# Patient Record
Sex: FEMALE | Race: WHITE | Employment: OTHER | URBAN - METROPOLITAN AREA
[De-identification: names, ages, dates, MRNs, and addresses within clinical notes are randomized per-mention and may not be internally consistent; named-entity substitution may affect disease eponyms.]

---

## 2017-02-03 PROBLEM — R07.9 CHEST PAIN: Status: ACTIVE | Noted: 2017-02-03

## 2017-02-03 PROBLEM — R06.02 SHORTNESS OF BREATH: Status: ACTIVE | Noted: 2017-02-03

## 2017-12-22 PROBLEM — F33.9 RECURRENT DEPRESSION (HCC): Status: ACTIVE | Noted: 2017-12-22

## 2018-02-16 ENCOUNTER — HOSPITAL ENCOUNTER (OUTPATIENT)
Dept: MAMMOGRAPHY | Age: 68
Discharge: HOME | End: 2018-02-16
Attending: FAMILY MEDICINE
Payer: MEDICARE

## 2018-02-16 DIAGNOSIS — Z12.39 SCREENING BREAST EXAMINATION: ICD-10-CM

## 2018-02-16 DIAGNOSIS — Z78.0 POSTMENOPAUSAL: ICD-10-CM

## 2018-02-16 DIAGNOSIS — Z13.820 SCREENING FOR OSTEOPOROSIS: ICD-10-CM

## 2018-02-16 PROCEDURE — 77067 SCR MAMMO BI INCL CAD: CPT

## 2018-02-16 PROCEDURE — 77080 DXA BONE DENSITY AXIAL: CPT

## 2018-05-24 ENCOUNTER — APPOINTMENT (RX ONLY)
Dept: URBAN - METROPOLITAN AREA CLINIC 23 | Facility: CLINIC | Age: 68
Setting detail: DERMATOLOGY
End: 2018-05-24

## 2018-05-24 DIAGNOSIS — B86 SCABIES: ICD-10-CM

## 2018-05-24 PROCEDURE — ? OTHER

## 2018-05-24 PROCEDURE — ? KOH PREP

## 2018-05-24 PROCEDURE — ? PRESCRIPTION

## 2018-05-24 PROCEDURE — ? COUNSELING

## 2018-05-24 PROCEDURE — 99202 OFFICE O/P NEW SF 15 MIN: CPT

## 2018-05-24 RX ORDER — HYDROXYZINE HYDROCHLORIDE 25 MG/1
TABLET, FILM COATED ORAL
Qty: 30 | Refills: 1 | Status: ERX | COMMUNITY
Start: 2018-05-24

## 2018-05-24 RX ORDER — PERMETHRIN 50 MG/G
CREAM TOPICAL QD
Qty: 2 | Refills: 3 | Status: ERX | COMMUNITY
Start: 2018-05-24

## 2018-05-24 RX ADMIN — PERMETHRIN: 50 CREAM TOPICAL at 00:00

## 2018-05-24 RX ADMIN — HYDROXYZINE HYDROCHLORIDE: 25 TABLET, FILM COATED ORAL at 00:00

## 2018-05-24 ASSESSMENT — LOCATION DETAILED DESCRIPTION DERM
LOCATION DETAILED: RIGHT MEDIAL SUPERIOR CHEST
LOCATION DETAILED: RIGHT SUPERIOR MEDIAL UPPER BACK
LOCATION DETAILED: PERIUMBILICAL SKIN
LOCATION DETAILED: RIGHT PROXIMAL PRETIBIAL REGION

## 2018-05-24 ASSESSMENT — LOCATION SIMPLE DESCRIPTION DERM
LOCATION SIMPLE: ABDOMEN
LOCATION SIMPLE: CHEST
LOCATION SIMPLE: RIGHT UPPER BACK
LOCATION SIMPLE: RIGHT PRETIBIAL REGION

## 2018-05-24 ASSESSMENT — LOCATION ZONE DERM
LOCATION ZONE: TRUNK
LOCATION ZONE: LEG

## 2018-05-24 NOTE — HPI: RASH
How Severe Is Your Rash?: moderate
Is This A New Presentation, Or A Follow-Up?: Rash
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 Pt states itching started in December. She has changed her detergent to a gentle, fragrant free detergent and changed her shampoo with no relief. No one else in the house itches. Pt does have cats. The on,y place that doesn’t itch is her face and the itching worsens at night.

## 2018-05-24 NOTE — PROCEDURE: OTHER
Detail Level: Zone
Other (Free Text): Pt advised to clean all bed linens and clothing in hot water and dry on high heat. Pt will also treat her  with the permethrin.
Note Text (......Xxx Chief Complaint.): This diagnosis correlates with the

## 2018-08-16 PROBLEM — K64.4 EXTERNAL HEMORRHOID, BLEEDING: Status: ACTIVE | Noted: 2018-08-16

## 2018-08-17 RX ORDER — CEFAZOLIN SODIUM IN 0.9 % NACL 2 G/50 ML
2 INTRAVENOUS SOLUTION, PIGGYBACK (ML) INTRAVENOUS ONCE
Status: CANCELLED | OUTPATIENT
Start: 2018-09-07 | End: 2018-09-07

## 2018-08-22 ENCOUNTER — APPOINTMENT (RX ONLY)
Dept: URBAN - METROPOLITAN AREA CLINIC 23 | Facility: CLINIC | Age: 68
Setting detail: DERMATOLOGY
End: 2018-08-22

## 2018-08-22 DIAGNOSIS — D485 NEOPLASM OF UNCERTAIN BEHAVIOR OF SKIN: ICD-10-CM

## 2018-08-22 DIAGNOSIS — L30.9 DERMATITIS, UNSPECIFIED: ICD-10-CM

## 2018-08-22 PROBLEM — D48.5 NEOPLASM OF UNCERTAIN BEHAVIOR OF SKIN: Status: ACTIVE | Noted: 2018-08-22

## 2018-08-22 PROCEDURE — ? PRESCRIPTION

## 2018-08-22 PROCEDURE — 99213 OFFICE O/P EST LOW 20 MIN: CPT | Mod: 25

## 2018-08-22 PROCEDURE — 11100: CPT

## 2018-08-22 PROCEDURE — 11101: CPT

## 2018-08-22 PROCEDURE — ? BIOPSY BY SHAVE METHOD

## 2018-08-22 PROCEDURE — ? COUNSELING

## 2018-08-22 RX ORDER — TRIAMCINOLONE ACETONIDE 1 MG/G
CREAM TOPICAL
Qty: 1 | Refills: 0 | Status: ERX | COMMUNITY
Start: 2018-08-22

## 2018-08-22 RX ADMIN — TRIAMCINOLONE ACETONIDE: 1 CREAM TOPICAL at 00:00

## 2018-08-22 ASSESSMENT — LOCATION DETAILED DESCRIPTION DERM
LOCATION DETAILED: LEFT LATERAL PROXIMAL UPPER ARM
LOCATION DETAILED: RIGHT ANTECUBITAL SKIN

## 2018-08-22 ASSESSMENT — LOCATION SIMPLE DESCRIPTION DERM
LOCATION SIMPLE: LEFT UPPER ARM
LOCATION SIMPLE: RIGHT ELBOW

## 2018-08-22 ASSESSMENT — LOCATION ZONE DERM: LOCATION ZONE: ARM

## 2018-08-22 NOTE — PROCEDURE: BIOPSY BY SHAVE METHOD
Type Of Destruction Used: Curettage
Cryotherapy Text: The wound bed was treated with cryotherapy after the biopsy was performed.
Wound Care: Vaseline
Post-Care Instructions: I reviewed with the patient in detail post-care instructions. Patient is to keep the biopsy site dry overnight, and then apply bacitracin twice daily until healed. Patient may apply hydrogen peroxide soaks to remove any crusting.
Anesthesia Type: 1% lidocaine with epinephrine
Hemostasis: Aluminum Chloride
Size Of Lesion In Cm: 0
Biopsy Method: Dermablade
Bill 61025 For Specimen Handling/Conveyance To Laboratory?: no
Biopsy Type: H and E
Notification Instructions: Patient will be notified of biopsy results. However, patient instructed to call the office if not contacted within 2 weeks.
Consent: Written consent was obtained and risks were reviewed including but not limited to scarring, infection, bleeding, scabbing, incomplete removal, nerve damage and allergy to anesthesia.
Dressing: bandage
Electrodesiccation And Curettage Text: The wound bed was treated with electrodesiccation and curettage after the biopsy was performed.
Electrodesiccation Text: The wound bed was treated with electrodesiccation after the biopsy was performed.
Curettage Text: The wound bed was treated with curettage after the biopsy was performed.
Depth Of Biopsy: dermis
Detail Level: Detailed
Accession #: [id number]
Silver Nitrate Text: The wound bed was treated with silver nitrate after the biopsy was performed.
Was A Bandage Applied: Yes
Billing Type: Third-Party Bill
Anesthesia Volume In Cc: 0.5

## 2018-09-06 ENCOUNTER — ANESTHESIA EVENT (OUTPATIENT)
Dept: SURGERY | Age: 68
End: 2018-09-06
Payer: MEDICARE

## 2018-09-06 NOTE — ANESTHESIA PREPROCEDURE EVALUATION
Anesthetic History Review of Systems / Medical History Patient summary reviewed and pertinent labs reviewed Pulmonary Smoker (Former) Asthma Neuro/Psych Comments: Peripheral neuropathy  Cardiovascular Hypertension Valvular problems/murmurs (MVP, mild MR): mitral insufficiency Exercise tolerance: >4 METS Comments: 8/2017: NL Perfusion Scan  
GI/Hepatic/Renal 
  
GERD: well controlled Endo/Other Hypothyroidism Comments: Hyperglycemia Other Findings Physical Exam 
 
Airway Mallampati: II 
 
Neck ROM: decreased range of motion Mouth opening: Normal 
 
 Cardiovascular Regular rate and rhythm,  S1 and S2 normal,  no murmur, click, rub, or gallop Dental 
 
Dentition: Full upper dentures Pulmonary Breath sounds clear to auscultation Abdominal 
 
 
 
 Other Findings Anesthetic Plan ASA: 2 Anesthesia type: general 
 
 
 
 
 
Anesthetic plan and risks discussed with: Patient

## 2018-09-07 ENCOUNTER — HOSPITAL ENCOUNTER (OUTPATIENT)
Age: 68
Setting detail: OUTPATIENT SURGERY
Discharge: HOME | End: 2018-09-07
Attending: SURGERY | Admitting: SURGERY
Payer: MEDICARE

## 2018-09-07 ENCOUNTER — ANESTHESIA (OUTPATIENT)
Dept: SURGERY | Age: 68
End: 2018-09-07
Payer: MEDICARE

## 2018-09-07 VITALS
BODY MASS INDEX: 29.18 KG/M2 | HEART RATE: 70 BPM | WEIGHT: 181.6 LBS | SYSTOLIC BLOOD PRESSURE: 138 MMHG | HEIGHT: 66 IN | DIASTOLIC BLOOD PRESSURE: 73 MMHG | RESPIRATION RATE: 18 BRPM | TEMPERATURE: 98.4 F | OXYGEN SATURATION: 96 %

## 2018-09-07 DIAGNOSIS — Z87.19 S/P HEMORRHOIDECTOMY: Primary | ICD-10-CM

## 2018-09-07 DIAGNOSIS — Z98.890 S/P HEMORRHOIDECTOMY: Primary | ICD-10-CM

## 2018-09-07 LAB — POTASSIUM BLD-SCNC: 3.3 MMOL/L (ref 3.5–5.1)

## 2018-09-07 PROCEDURE — 74011000250 HC RX REV CODE- 250

## 2018-09-07 PROCEDURE — 77030018836 HC SOL IRR NACL ICUM -A: Performed by: SURGERY

## 2018-09-07 PROCEDURE — 88304 TISSUE EXAM BY PATHOLOGIST: CPT

## 2018-09-07 PROCEDURE — 74011250636 HC RX REV CODE- 250/636

## 2018-09-07 PROCEDURE — 74011250636 HC RX REV CODE- 250/636: Performed by: SURGERY

## 2018-09-07 PROCEDURE — 76210000020 HC REC RM PH II FIRST 0.5 HR: Performed by: SURGERY

## 2018-09-07 PROCEDURE — 77030008477 HC STYL SATN SLP COVD -A: Performed by: ANESTHESIOLOGY

## 2018-09-07 PROCEDURE — 76060000032 HC ANESTHESIA 0.5 TO 1 HR: Performed by: SURGERY

## 2018-09-07 PROCEDURE — 76010000138 HC OR TIME 0.5 TO 1 HR: Performed by: SURGERY

## 2018-09-07 PROCEDURE — 74011000250 HC RX REV CODE- 250: Performed by: ANESTHESIOLOGY

## 2018-09-07 PROCEDURE — 74011250636 HC RX REV CODE- 250/636: Performed by: ANESTHESIOLOGY

## 2018-09-07 PROCEDURE — 77030032490 HC SLV COMPR SCD KNE COVD -B: Performed by: SURGERY

## 2018-09-07 PROCEDURE — 77030020782 HC GWN BAIR PAWS FLX 3M -B: Performed by: ANESTHESIOLOGY

## 2018-09-07 PROCEDURE — 77030039425 HC BLD LARYNG TRULITE DISP TELE -A: Performed by: ANESTHESIOLOGY

## 2018-09-07 PROCEDURE — 77030008703 HC TU ET UNCUF COVD -A: Performed by: ANESTHESIOLOGY

## 2018-09-07 PROCEDURE — 84132 ASSAY OF SERUM POTASSIUM: CPT

## 2018-09-07 PROCEDURE — 77030002888 HC SUT CHRMC J&J -A: Performed by: SURGERY

## 2018-09-07 PROCEDURE — 74011250637 HC RX REV CODE- 250/637: Performed by: ANESTHESIOLOGY

## 2018-09-07 PROCEDURE — 76210000006 HC OR PH I REC 0.5 TO 1 HR: Performed by: SURGERY

## 2018-09-07 PROCEDURE — 74011000250 HC RX REV CODE- 250: Performed by: SURGERY

## 2018-09-07 RX ORDER — ONDANSETRON 2 MG/ML
INJECTION INTRAMUSCULAR; INTRAVENOUS AS NEEDED
Status: DISCONTINUED | OUTPATIENT
Start: 2018-09-07 | End: 2018-09-07 | Stop reason: HOSPADM

## 2018-09-07 RX ORDER — LIDOCAINE 50 MG/G
OINTMENT TOPICAL AS NEEDED
Status: DISCONTINUED | OUTPATIENT
Start: 2018-09-07 | End: 2018-09-07 | Stop reason: HOSPADM

## 2018-09-07 RX ORDER — CIPROFLOXACIN 2 MG/ML
400 INJECTION, SOLUTION INTRAVENOUS
Status: COMPLETED | OUTPATIENT
Start: 2018-09-07 | End: 2018-09-07

## 2018-09-07 RX ORDER — SODIUM CHLORIDE, SODIUM LACTATE, POTASSIUM CHLORIDE, CALCIUM CHLORIDE 600; 310; 30; 20 MG/100ML; MG/100ML; MG/100ML; MG/100ML
100 INJECTION, SOLUTION INTRAVENOUS CONTINUOUS
Status: DISCONTINUED | OUTPATIENT
Start: 2018-09-07 | End: 2018-09-07 | Stop reason: HOSPADM

## 2018-09-07 RX ORDER — SODIUM CHLORIDE 9 MG/ML
25 INJECTION, SOLUTION INTRAVENOUS CONTINUOUS
Status: DISCONTINUED | OUTPATIENT
Start: 2018-09-07 | End: 2018-09-07 | Stop reason: HOSPADM

## 2018-09-07 RX ORDER — GLYCOPYRROLATE 0.2 MG/ML
INJECTION INTRAMUSCULAR; INTRAVENOUS AS NEEDED
Status: DISCONTINUED | OUTPATIENT
Start: 2018-09-07 | End: 2018-09-07 | Stop reason: HOSPADM

## 2018-09-07 RX ORDER — FAMOTIDINE 20 MG/1
20 TABLET, FILM COATED ORAL ONCE
Status: COMPLETED | OUTPATIENT
Start: 2018-09-07 | End: 2018-09-07

## 2018-09-07 RX ORDER — DOCUSATE SODIUM 100 MG/1
100 CAPSULE, LIQUID FILLED ORAL 2 TIMES DAILY
Qty: 60 CAP | Refills: 0 | Status: SHIPPED | OUTPATIENT
Start: 2018-09-07 | End: 2018-10-07

## 2018-09-07 RX ORDER — SODIUM CHLORIDE 0.9 % (FLUSH) 0.9 %
5-10 SYRINGE (ML) INJECTION AS NEEDED
Status: DISCONTINUED | OUTPATIENT
Start: 2018-09-07 | End: 2018-09-07 | Stop reason: HOSPADM

## 2018-09-07 RX ORDER — LIDOCAINE HYDROCHLORIDE 10 MG/ML
0.1 INJECTION INFILTRATION; PERINEURAL AS NEEDED
Status: DISCONTINUED | OUTPATIENT
Start: 2018-09-07 | End: 2018-09-07 | Stop reason: HOSPADM

## 2018-09-07 RX ORDER — DEXAMETHASONE SODIUM PHOSPHATE 4 MG/ML
INJECTION, SOLUTION INTRA-ARTICULAR; INTRALESIONAL; INTRAMUSCULAR; INTRAVENOUS; SOFT TISSUE AS NEEDED
Status: DISCONTINUED | OUTPATIENT
Start: 2018-09-07 | End: 2018-09-07 | Stop reason: HOSPADM

## 2018-09-07 RX ORDER — FENTANYL CITRATE 50 UG/ML
INJECTION, SOLUTION INTRAMUSCULAR; INTRAVENOUS AS NEEDED
Status: DISCONTINUED | OUTPATIENT
Start: 2018-09-07 | End: 2018-09-07 | Stop reason: HOSPADM

## 2018-09-07 RX ORDER — NEOSTIGMINE METHYLSULFATE 1 MG/ML
INJECTION INTRAVENOUS AS NEEDED
Status: DISCONTINUED | OUTPATIENT
Start: 2018-09-07 | End: 2018-09-07 | Stop reason: HOSPADM

## 2018-09-07 RX ORDER — PROPOFOL 10 MG/ML
INJECTION, EMULSION INTRAVENOUS AS NEEDED
Status: DISCONTINUED | OUTPATIENT
Start: 2018-09-07 | End: 2018-09-07 | Stop reason: HOSPADM

## 2018-09-07 RX ORDER — OXYCODONE HYDROCHLORIDE 5 MG/1
5 TABLET ORAL
Status: COMPLETED | OUTPATIENT
Start: 2018-09-07 | End: 2018-09-07

## 2018-09-07 RX ORDER — MIDAZOLAM HYDROCHLORIDE 1 MG/ML
2 INJECTION, SOLUTION INTRAMUSCULAR; INTRAVENOUS
Status: DISCONTINUED | OUTPATIENT
Start: 2018-09-07 | End: 2018-09-07 | Stop reason: HOSPADM

## 2018-09-07 RX ORDER — CEFAZOLIN SODIUM IN 0.9 % NACL 2 G/50 ML
2 INTRAVENOUS SOLUTION, PIGGYBACK (ML) INTRAVENOUS ONCE
Status: DISCONTINUED | OUTPATIENT
Start: 2018-09-07 | End: 2018-09-07

## 2018-09-07 RX ORDER — SODIUM CHLORIDE 0.9 % (FLUSH) 0.9 %
5-10 SYRINGE (ML) INJECTION EVERY 8 HOURS
Status: DISCONTINUED | OUTPATIENT
Start: 2018-09-07 | End: 2018-09-07 | Stop reason: HOSPADM

## 2018-09-07 RX ORDER — ROCURONIUM BROMIDE 10 MG/ML
INJECTION, SOLUTION INTRAVENOUS AS NEEDED
Status: DISCONTINUED | OUTPATIENT
Start: 2018-09-07 | End: 2018-09-07 | Stop reason: HOSPADM

## 2018-09-07 RX ORDER — NALOXONE HYDROCHLORIDE 0.4 MG/ML
0.1 INJECTION, SOLUTION INTRAMUSCULAR; INTRAVENOUS; SUBCUTANEOUS AS NEEDED
Status: DISCONTINUED | OUTPATIENT
Start: 2018-09-07 | End: 2018-09-07 | Stop reason: HOSPADM

## 2018-09-07 RX ORDER — HYDROCODONE BITARTRATE AND ACETAMINOPHEN 10; 325 MG/1; MG/1
1 TABLET ORAL
Qty: 20 TAB | Refills: 0 | Status: SHIPPED | OUTPATIENT
Start: 2018-09-07 | End: 2018-11-16 | Stop reason: SDUPTHER

## 2018-09-07 RX ORDER — HYDROCODONE BITARTRATE AND ACETAMINOPHEN 7.5; 325 MG/1; MG/1
1 TABLET ORAL AS NEEDED
Status: DISCONTINUED | OUTPATIENT
Start: 2018-09-07 | End: 2018-09-07 | Stop reason: HOSPADM

## 2018-09-07 RX ORDER — LIDOCAINE HYDROCHLORIDE 20 MG/ML
INJECTION, SOLUTION EPIDURAL; INFILTRATION; INTRACAUDAL; PERINEURAL AS NEEDED
Status: DISCONTINUED | OUTPATIENT
Start: 2018-09-07 | End: 2018-09-07 | Stop reason: HOSPADM

## 2018-09-07 RX ORDER — FENTANYL CITRATE 50 UG/ML
100 INJECTION, SOLUTION INTRAMUSCULAR; INTRAVENOUS ONCE
Status: DISCONTINUED | OUTPATIENT
Start: 2018-09-07 | End: 2018-09-07 | Stop reason: HOSPADM

## 2018-09-07 RX ADMIN — OXYCODONE HYDROCHLORIDE 5 MG: 5 TABLET ORAL at 08:47

## 2018-09-07 RX ADMIN — LIDOCAINE HYDROCHLORIDE 70 MG: 20 INJECTION, SOLUTION EPIDURAL; INFILTRATION; INTRACAUDAL; PERINEURAL at 07:25

## 2018-09-07 RX ADMIN — CIPROFLOXACIN 400 MG: 2 INJECTION, SOLUTION INTRAVENOUS at 07:20

## 2018-09-07 RX ADMIN — NEOSTIGMINE METHYLSULFATE 1 MG: 1 INJECTION INTRAVENOUS at 08:03

## 2018-09-07 RX ADMIN — ONDANSETRON 4 MG: 2 INJECTION INTRAMUSCULAR; INTRAVENOUS at 07:30

## 2018-09-07 RX ADMIN — SODIUM CHLORIDE 6.25 MG: 9 INJECTION INTRAMUSCULAR; INTRAVENOUS; SUBCUTANEOUS at 09:12

## 2018-09-07 RX ADMIN — DEXAMETHASONE SODIUM PHOSPHATE 4 MG: 4 INJECTION, SOLUTION INTRA-ARTICULAR; INTRALESIONAL; INTRAMUSCULAR; INTRAVENOUS; SOFT TISSUE at 07:30

## 2018-09-07 RX ADMIN — GLYCOPYRROLATE 0.6 MG: 0.2 INJECTION INTRAMUSCULAR; INTRAVENOUS at 07:57

## 2018-09-07 RX ADMIN — FENTANYL CITRATE 100 MCG: 50 INJECTION, SOLUTION INTRAMUSCULAR; INTRAVENOUS at 07:25

## 2018-09-07 RX ADMIN — ROCURONIUM BROMIDE 40 MG: 10 INJECTION, SOLUTION INTRAVENOUS at 07:25

## 2018-09-07 RX ADMIN — NEOSTIGMINE METHYLSULFATE 4 MG: 1 INJECTION INTRAVENOUS at 07:57

## 2018-09-07 RX ADMIN — FAMOTIDINE 20 MG: 20 TABLET ORAL at 07:13

## 2018-09-07 RX ADMIN — SODIUM CHLORIDE, SODIUM LACTATE, POTASSIUM CHLORIDE, AND CALCIUM CHLORIDE 100 ML/HR: 600; 310; 30; 20 INJECTION, SOLUTION INTRAVENOUS at 06:45

## 2018-09-07 RX ADMIN — PROPOFOL 160 MG: 10 INJECTION, EMULSION INTRAVENOUS at 07:25

## 2018-09-07 NOTE — OP NOTES
[facility] REPORT    Tanna Petty  MR#: [id number]  : 1950  ACCOUNT #: [de-identified]   DATE OF SERVICE: 2018    PREOPERATIVE DIAGNOSIS:  Bright red blood per rectum and prolapsed hemorrhoid. POSTOPERATIVE DIAGNOSIS:  Bright red blood per rectum and prolapsed hemorrhoid. PROCEDURE PERFORMED:  Rectal exam under anesthesia, hemorrhoidectomy. SURGEON:  Elder Hernandez MD    ASSISTANT:  None. ANESTHESIA:  General anesthetic. ESTIMATED BLOOD LOSS:  20 mL. SPECIMENS REMOVED: hemorrhoid    IMPLANTS:  None. COMPLICATIONS:  None. CONDITION:  Patient was stable. HISTORY OF PRESENT ILLNESS:  This patient is a 70-year-old white female who presented with complaints of pain at the rectum with frequent episodes of bright red blood per rectum, especially with bowel movements. Exam showed evidence of hemorrhoidal prolapse. Risks, benefits and alternatives to the above procedure were clearly discussed with the patient prior to surgery. She understood the risks and wished to proceed. Appropriate consent was given. PROCEDURE:  The patient was taken to the operating room where she underwent general anesthetic with no difficulty. She was then turned in the prone jackknife position. After proper positioning,  a timeout was performed identifying the patient and the procedure. IV antibiotics were given at time of induction. The area was prepped and draped in sterile fashion. A rectal exam was performed which showed evidence of a previously thrombosed external hemorrhoid on the right lateral external hemorrhoid. The patient also had a redundant hemorrhoidal tissue in the inferior 5 o'clock position, which appeared to be a site of frequent irritation and bleeding from the internal hemorrhoid. A small incision was made directly overlying the point of thrombosis and clot was expressed from the right lateral external hemorrhoid.   The prolapsed internal hemorrhoid was then removed. A 2-0 chromic suture was placed proximally at the apex of the hemorrhoidal tissue. Electrocautery Bovie was then used to resect the entirety of the internal and external hemorrhoid pillar in this position from proximal to distal in a wedge-like fashion. The mucosa was then reapproximated using the running chromic suture from proximal to distal manner in a locking fashion. At completion, hemostasis was complete. Lidocaine gel was applied for anesthetic purposes at the site and a sterile gauze dressing was placed. The patient tolerated the procedure well and was awakened and taken to recovery in stable condition.       MD Jersey Deluca / ASHLEY  D: 09/07/2018 12:28     T: 09/07/2018 13:19  JOB #: [id number]

## 2018-09-07 NOTE — DISCHARGE INSTRUCTIONS
Remove bandage with first bowel movement    Soak in warm water bath after bowel movement and as needed for comfort     May apply lidocaine cream to area as needed for comfort         After general anesthesia or intravenous sedation, for 24 hours or while taking prescription Narcotics:  · Limit your activities  · Do not drive and operate hazardous machinery  · Do not make important personal or business decisions  · Do  not drink alcoholic beverages  · If you have not urinated within 8 hours after discharge, please contact your surgeon on call. *  Please give a list of your current medications to your Primary Care Provider. *  Please update this list whenever your medications are discontinued, doses are      changed, or new medications (including over-the-counter products) are added. *  Please carry medication information at all times in case of emergency situations. These are general instructions for a healthy lifestyle:  No smoking/ No tobacco products/ Avoid exposure to second hand smoke  Surgeon General's Warning:  Quitting smoking now greatly reduces serious risk to your health. Obesity, smoking, and sedentary lifestyle greatly increases your risk for illness  A healthy diet, regular physical exercise & weight monitoring are important for maintaining a healthy lifestyle    You may be retaining fluid if you have a history of heart failure or if you experience any of the following symptoms:  Weight gain of 3 pounds or more overnight or 5 pounds in a week, increased swelling in our hands or feet or shortness of breath while lying flat in bed. Please call your doctor as soon as you notice any of these symptoms; do not wait until your next office visit.     Recognize signs and symptoms of STROKE:  F-face looks uneven  A-arms unable to move or move unevenly  S-speech slurred or non-existent  T-time-call 911 as soon as signs and symptoms begin-DO NOT go       Back to bed or wait to see if you get better-TIME IS BRAIN.

## 2018-09-07 NOTE — ANESTHESIA POSTPROCEDURE EVALUATION
Post-Anesthesia Evaluation and Assessment Patient: Shanita Kelly MRN: [id number]  SSN: [id number] YOB: 1950  Age: 77 y.o. Sex: female Cardiovascular Function/Vital Signs Visit Vitals  /73  Pulse 70  Temp 36.9 °C (98.4 °F)  Resp 18  Ht 5' 6\" (1.676 m)  Wt 82.4 kg (181 lb 9.6 oz)  SpO2 99%  BMI 29.31 kg/m2 Patient is status post general anesthesia for Procedure(s): RECTAL EXAM UNDER ANESTHESIA (EUA) HEMORRHOIDECTOMY EXCISION. Nausea/Vomiting: None Postoperative hydration reviewed and adequate. Pain: 
Pain Scale 1: Numeric (0 - 10) (09/07/18 0846) Pain Intensity 1: 3 (09/07/18 0846) Managed Neurological Status:  
Neuro (WDL): Exceptions to WDL (09/07/18 0846) Neuro Neurologic State: Alert (09/07/18 8072) Orientation Level: Oriented X4 (09/07/18 0846) Cognition: Appropriate decision making; Appropriate for age attention/concentration; Appropriate safety awareness; Follows commands (09/07/18 4057) Speech: Clear (09/07/18 1594) LUE Motor Response: Purposeful (09/07/18 0846) LLE Motor Response: Purposeful (09/07/18 0846) RUE Motor Response: Purposeful (09/07/18 0846) RLE Motor Response: Purposeful (09/07/18 0846) At baseline Mental Status and Level of Consciousness: Arousable Pulmonary Status:  
O2 Device: Room air (09/07/18 0846) Adequate oxygenation and airway patent Complications related to anesthesia: None Post-anesthesia assessment completed. No concerns Signed By: Kiana Coronado MD   
 September 7, 2018

## 2018-09-07 NOTE — BRIEF OP NOTE
BRIEF OPERATIVE NOTE Date of Procedure: 9/7/2018 Preoperative Diagnosis: Hemorrhoids, unspecified hemorrhoid type [K64.9] Postoperative Diagnosis: same Procedure(s): RECTAL EXAM UNDER ANESTHESIA (EUA) HEMORRHOIDECTOMY EXCISION Surgeon(s) and Role: Evans Barrera MD - Primary Surgical Assistant: none Surgical Staff: 
Circ-1: Randall Schilder, RN Scrub Tech-1: Zora Galindo Scrub Tech-2: Surya Silva Event Time In Incision Start 4675 Incision Close 3809 Anesthesia: General  
Estimated Blood Loss: 10ml Specimens:  
ID Type Source Tests Collected by Time Destination 1 : Hemorrhoid Preservative Anus  Ele Singleton MD 4/6/9271 8401 Pathology Findings: prolapse of hemorrhoid Complications: none Implants: * No implants in log *

## 2018-09-07 NOTE — IP AVS SNAPSHOT
Blain Kehr 
 
 
 [address] [facility] [address] 
[phone] Patient: Clifford Young MRN: [id number] Rebecca Kohli About your hospitalization You were admitted on:  September 7, 2018 You last received care in the:  [facility] PACU You were discharged on:  September 7, 2018 Why you were hospitalized Your primary diagnosis was:  Not on File Follow-up Information Follow up With Details Comments Contact Info Telma Amato DO   [address] [facility] [address] 
[phone] Claudia Devine MD Follow up on 9/17/2018 @2:00 pm [address] [facility] [address] [facility] [address] 
[phone] Your Scheduled Appointments Monday September 17, 2018  2:00 PM EDT Global Post Op with Claudia Devine MD  
[facility] ([facility]) [facility] [address] [facility] [address]  
[phone] Discharge Orders None A check oneyda indicates which time of day the medication should be taken. My Medications START taking these medications Instructions Each Dose to Equal  
 Morning Noon Evening Bedtime  
 docusate sodium 100 mg capsule Commonly known as:  Fausto El Your last dose was: Your next dose is: Take 1 Cap by mouth two (2) times a day for 30 days. 100 mg CHANGE how you take these medications Instructions Each Dose to Equal  
 Morning Noon Evening Bedtime Fenofibrate 150 mg Cap Commonly known as:  Susana Hernandez What changed:   
- when to take this 
- additional instructions Your last dose was: Your next dose is: TAKE 1 CAPSULE DAILY  
     
   
   
   
  
 * HYDROcodone-acetaminophen  mg tablet Commonly known as:  Nicolette Arts What changed:   You were already taking a medication with the same name, and this prescription was added. Make sure you understand how and when to take each. Your last dose was: Your next dose is: Take 1 Tab by mouth every four (4) hours as needed for Pain. Max Daily Amount: 6 Tabs. 1 Tab  
    
   
   
   
  
 * HYDROcodone-acetaminophen  mg tablet Commonly known as:  Clarissa Abraham Start taking on:  9/20/2018 What changed:  when to take this Your last dose was: Your next dose is: Take 1 Tab by mouth every six (6) hours as needed for Pain for up to 30 days. Max Daily Amount: 4 Tabs. 1 Tab * Notice: This list has 2 medication(s) that are the same as other medications prescribed for you. Read the directions carefully, and ask your doctor or other care provider to review them with you. CONTINUE taking these medications Instructions Each Dose to Equal  
 Morning Noon Evening Bedtime  
 albuterol 90 mcg/actuation inhaler Commonly known as:  PROAIR HFA Your last dose was: Your next dose is:    
   
   
 USE 2 INHALATIONS EVERY 4 HOURS AS NEEDED FOR WHEEZING , ACUTE ASTHMA ATTACK ALPRAZolam 1 mg tablet Commonly known as:  Kaleta Kia Your last dose was: Your next dose is: Take 1 Tab by mouth daily. Max Daily Amount: 1 mg.  
 1 mg  
    
   
   
   
  
 aspirin delayed-release 81 mg tablet Your last dose was: Your next dose is: Take 81 mg by mouth daily. Stopped 8/24/18--- per pt-- dr Bernadette Fu  Indications: OTC  
 81 mg  
    
   
   
   
  
 butalbital-acetaminophen-caffeine -40 mg per tablet Commonly known as:  Roc Pastrana Your last dose was: Your next dose is: TAKE 1 TABLET EVERY 6 HOURS AS NEEDED. MAXIMUM DAILY DOSE 4 TABLETS.  
     
   
   
   
  
 cyanocobalamin 1,000 mcg tablet Your last dose was: Your next dose is: Take 1,000 mcg by mouth daily. Indications: OTC  
 1000 mcg  
    
   
   
   
  
 fluticasone 50 mcg/actuation nasal spray Commonly known as:  Domnick Means Your last dose was: Your next dose is: 2 Sprays by Both Nostrils route daily. 2 Spray  
    
   
   
   
  
 hydrocortisone 2.5 % rectal cream  
Commonly known as:  ANUSOL-HC Your last dose was: Your next dose is: Insert  into rectum four (4) times daily. losartan-hydroCHLOROthiazide 100-25 mg per tablet Commonly known as:  HYZAAR Your last dose was: Your next dose is: Take 1 Tab by mouth daily. 1 Tab  
    
   
   
   
  
 nebivolol 10 mg tablet Commonly known as:  BYSTOLIC Your last dose was: Your next dose is: Take 1 Tab by mouth daily. 10 mg  
    
   
   
   
  
 ONE-A-DAY PROACTIVE 65 PLUS PO Your last dose was: Your next dose is: Take 1 Tab by mouth daily. Stopped 9/6/18  Indications: OTC  
 1 Tab  
    
   
   
   
  
 pantoprazole 40 mg tablet Commonly known as:  PROTONIX Your last dose was: Your next dose is: Take 1 Tab by mouth daily. 40 mg  
    
   
   
   
  
 simvastatin 40 mg tablet Commonly known as:  ZOCOR Your last dose was: Your next dose is: TAKE 1 TABLET NIGHTLY  
     
   
   
   
  
 SYNTHROID 100 mcg tablet Generic drug:  levothyroxine Your last dose was: Your next dose is: Take 1 Tab by mouth Daily (before breakfast). YVETTE-1  
 100 mcg  
    
   
   
   
  
 venlafaxine- mg capsule Commonly known as:  EFFEXOR-XR Your last dose was: Your next dose is: TAKE 1 CAPSULE EVERY 12 HOURS FOR NERVES Where to Get Your Medications These medications were sent to [address] [facility] [address] Phone:  [phone]  
  docusate sodium 100 mg capsule Information on where to get these meds will be given to you by the nurse or doctor. ! Ask your nurse or doctor about these medications HYDROcodone-acetaminophen  mg tablet Opioid Education Prescription Opioids: What You Need to Know: 
 
Prescription opioids can be used to help relieve moderate-to-severe pain and are often prescribed following a surgery or injury, or for certain health conditions. These medications can be an important part of treatment but also come with serious risks. Opioids are strong pain medicines. Examples include hydrocodone, oxycodone, fentanyl, and morphine. Heroin is an example of an illegal opioid. It is important to work with your health care provider to make sure you are getting the safest, most effective care. WHAT ARE THE RISKS AND SIDE EFFECTS OF OPIOID USE? Prescription opioids carry serious risks of addiction and overdose, especially with prolonged use. An opioid overdose, often marked by slow breathing, can cause sudden death. The use of prescription opioids can have a number of side effects as well, even when taken as directed. · Tolerance-meaning you might need to take more of a medication for the same pain relief · Physical dependence-meaning you have symptoms of withdrawal when the medication is stopped. Withdrawal symptoms can include nausea, sweating, chills, diarrhea, stomach cramps, and muscle aches. Withdrawal can last up to several weeks, depending on which drug you took and how long you took it. · Increased sensitivity to pain · Constipation · Nausea, vomiting, and dry mouth · Sleepiness and dizziness · Confusion · Depression · Low levels of testosterone that can result in lower sex drive, energy, and strength · Itching and sweating RISKS ARE GREATER WITH:      
 · History of drug misuse, substance use disorder, or overdose · Mental health conditions (such as depression or anxiety) · Sleep apnea · Older age (72 years or older) · Pregnancy Avoid alcohol while taking prescription opioids. Also, unless specifically advised by your health care provider, medications to avoid include: · Benzodiazepines (such as Xanax or Valium) · Muscle relaxants (such as Soma or Flexeril) · Hypnotics (such as Ambien or Lunesta) · Other prescription opioids KNOW YOUR OPTIONS Talk to your health care provider about ways to manage your pain that don't involve prescription opioids. Some of these options may actually work better and have fewer risks and side effects. Options may include: 
· Pain relievers such as acetaminophen, ibuprofen, and naproxen · Some medications that are also used for depression or seizures · Physical therapy and exercise · Counseling to help patients learn how to cope better with triggers of pain and stress. · Application of heat or cold compress · Massage therapy · Relaxation techniques Be Informed Make sure you know the name of your medication, how much and how often to take it, and its potential risks & side effects. IF YOU ARE PRESCRIBED OPIOIDS FOR PAIN: 
· Never take opioids in greater amounts or more often than prescribed. Remember the goal is not to be pain-free but to manage your pain at a tolerable level. · Follow up with your primary care provider to: · Work together to create a plan on how to manage your pain. · Talk about ways to help manage your pain that don't involve prescription opioids. · Talk about any and all concerns and side effects. · Help prevent misuse and abuse. · Never sell or share prescription opioids · Help prevent misuse and abuse. · Store prescription opioids in a secure place and out of reach of others (this may include visitors, children, friends, and family). · Safely dispose of unused/unwanted prescription opioids: Find your community drug take-back program or your pharmacy mail-back program, or flush them down the toilet, following guidance from the Food and Drug Administration (www.fda.gov/Drugs/ResourcesForYou). · Visit www.cdc.gov/drugoverdose to learn about the risks of opioid abuse and overdose. · If you believe you may be struggling with addiction, tell your health care provider and ask for guidance or call [facility] at 4-506-664-RVOP. Discharge Instructions Remove bandage with first bowel movement Soak in warm water bath after bowel movement and as needed for comfort May apply lidocaine cream to area as needed for comfort After general anesthesia or intravenous sedation, for 24 hours or while taking prescription Narcotics: · Limit your activities · Do not drive and operate hazardous machinery · Do not make important personal or business decisions · Do  not drink alcoholic beverages · If you have not urinated within 8 hours after discharge, please contact your surgeon on call. *  Please give a list of your current medications to your Primary Care Provider. *  Please update this list whenever your medications are discontinued, doses are 
    changed, or new medications (including over-the-counter products) are added. *  Please carry medication information at all times in case of emergency situations. These are general instructions for a healthy lifestyle: No smoking/ No tobacco products/ Avoid exposure to second hand smoke Surgeon General's Warning:  Quitting smoking now greatly reduces serious risk to your health. Obesity, smoking, and sedentary lifestyle greatly increases your risk for illness A healthy diet, regular physical exercise & weight monitoring are important for maintaining a healthy lifestyle You may be retaining fluid if you have a history of heart failure or if you experience any of the following symptoms:  Weight gain of 3 pounds or more overnight or 5 pounds in a week, increased swelling in our hands or feet or shortness of breath while lying flat in bed. Please call your doctor as soon as you notice any of these symptoms; do not wait until your next office visit. Recognize signs and symptoms of STROKE: 
F-face looks uneven A-arms unable to move or move unevenly S-speech slurred or non-existent T-time-call 911 as soon as signs and symptoms begin-DO NOT go Back to bed or wait to see if you get better-TIME IS BRAIN. ACO Transitions of Care [facility] offers a voluntary care coordination program to provide high quality service and care to [facility] fee-for-service beneficiaries. Tha Guajardo was designed to help you enhance your health and well-being through the following services: ? Transitions of Care  support for individuals who are transitioning from one care setting to another (example: Hospital to home). ? Chronic and Complex Care Coordination  support for individuals and caregivers of those with serious or chronic illnesses or with more than one chronic (ongoing) condition and those who take a number of different medications. If you meet specific medical criteria, a [facility] [address] may call you directly to coordinate your care with your primary care physician and your other care providers. For questions about the [facility] programs, please, contact your physicians office. For general questions or additional information about Accountable Care Organizations: 
Please visit www.medicare.gov/acos. html or call 1-800-MEDICARE (9-827.238.4204) TTY users should call 9-827.547.6851. Introducing [facility]! [employer] introduces [employer] patient portal. Now you can access parts of your medical record, email your doctor's office, and request medication refills online. 1. In your internet browser, go to [email or web address] 2. Click on the First Time User? Click Here link in the Sign In box. You will see the New Member Sign Up page. 3. Enter your [employer] Access Code exactly as it appears below. You will not need to use this code after youve completed the sign-up process. If you do not sign up before the expiration date, you must request a new code. · [employer] Access Code: [email or web address] Expires: 12/5/2018  6:31 AM 
 
4. Enter the last four digits of your Social Security Number (xxxx) and Date of Birth (mm/dd/yyyy) as indicated and click Submit. You will be taken to the next sign-up page. 5. Create a [employer] ID. This will be your [employer] login ID and cannot be changed, so think of one that is secure and easy to remember. 6. Create a [employer] password. You can change your password at any time. 7. Enter your Password Reset Question and Answer. This can be used at a later time if you forget your password. 8. Enter your e-mail address. You will receive e-mail notification when new information is available in [address] 9. Click Sign Up. You can now view and download portions of your medical record. 10. Click the Download Summary menu link to download a portable copy of your medical information. If you have questions, please visit the Frequently Asked Questions section of the [employer] website. Remember, [employer] is NOT to be used for urgent needs. For medical emergencies, dial 911. Now available from your iPhone and Android! Introducing Ld Garsia As a [employer] patient, I wanted to make you aware of our electronic visit tool called Ld Garsia. [employer] 24/7 allows you to connect within minutes with a medical provider 24 hours a day, seven days a week via a mobile device or tablet or logging into a secure website from your computer. You can access [employer] from anywhere in the [place]. A virtual visit might be right for you when you have a simple condition and feel like you just dont want to get out of bed, or cant get away from work for an appointment, when your regular [facility] provider is not available (evenings, weekends or holidays), or when youre out of town and need minor care. Electronic visits cost only $49 and if the [place] 24/Loccie provider determines a prescription is needed to treat your condition, one can be electronically transmitted to a nearby pharmacy*. Please take a moment to enroll today if you have not already done so. The enrollment process is free and takes just a few minutes. To enroll, please download the Paddle [employer]) elaine to your tablet or phone, or visit [email or web address] to enroll on your computer. And, as an [address] [place] [address] patient with a My Own Med account, the results of your visits will be scanned into your electronic medical record and your primary care provider will be able to view the scanned results. We urge you to continue to see your regular [facility] provider for your ongoing medical care. And while your primary care provider may not be the one available when you seek a Ld Garsia virtual visit, the peace of mind you get from getting a real diagnosis real time can be priceless. For more information on Ld RIDERSkristalfin, view our Frequently Asked Questions (FAQs) at [email or web address]. Sincerely, 
 
Tim Christianson MD 
Chief Medical Officer Ronni Lockett *:  certain medications cannot be prescribed via Ld RIDERSjesus Providers Seen During Your Hospitalization Provider Specialty Primary office phone Joanna Nunez MD General Surgery [phone] Your Primary Care Physician (PCP) Primary Care Physician Office Phone Office Fax Kia Gomes [phone] You are allergic to the following Allergen Reactions Keflex (Cephalexin) Shortness of Breath \" stop my breathing \" Ambien (Zolpidem) Unknown (comments) Aspirin Itching \" can take low dose \" Codeine Anaphylaxis \"full codeine injection\" states has had lortab since without reaction. Daypro (Oxaprozin) Swelling Demerol (Meperidine) Other (comments) Difficulty breathing. States has had since without reaction Dilaudid (Hydromorphone (Bulk)) Itching Nausea Only Mobic (Meloxicam) Swelling Onion Other (comments)  
 headache Pcn (Penicillins) Unknown (comments) Recent Documentation Height Weight BMI OB Status Smoking Status 1.676 m 82.4 kg 29.31 kg/m2 Hysterectomy Former Smoker Emergency Contacts Name Discharge Info Relation Home Work Mobile Vinicius Campos DISCHARGE CAREGIVER [3] Spouse [3] [phone] Elizabeth Rodriguez  Son [22] [phone] Yamile Key  Sister [23] [phone] Patient Belongings The following personal items are in your possession at time of discharge: 
  Dental Appliances: None  Visual Aid: None      Home Medications: None   Jewelry: None  Clothing: Undergarments, Footwear, Shirt, Pants    Other Valuables: None Discharge Instructions Attachments/References HEMORRHOIDECTOMY : POST-OP (ENGLISH) Patient Handouts Hemorrhoidectomy: What to Expect at [facility] Your Recovery After you have hemorrhoids removed, you can expect to feel better each day. Your anal area will be painful or ache for 2 to 4 weeks. And you may need pain medicine. It is common to have some light bleeding and clear or yellow fluids from your anus.  This is most likely when you have a bowel movement. These symptoms may last for 1 to 2 months after surgery. After 1 to 2 weeks, you should be able to do most of your normal activities. But don't do things that require a lot of effort. It is important to avoid heavy lifting and straining with bowel movements while you recover. This care sheet gives you a general idea about how long it will take for you to recover. But each person recovers at a different pace. Follow the steps below to get better as quickly as possible. How can you care for yourself at home? Activity 
  · Rest when you feel tired.  
  · Be active. Walking is a good choice.  
  · Allow your body to heal. Don't move quickly or lift anything heavy until you are feeling better.  
  · You may take showers and baths as usual. Pat your anal area dry when you are done.  
  · You will probably need to take 1 to 2 weeks off work. It depends on the type of work you do and how you feel. Diet 
  · Follow your doctor's instructions about eating after surgery.  
  · Start adding high-fiber foods to your diet 2 or 3 days after your surgery. This will make bowel movements easier. And it lowers the chance that you will get hemorrhoids again.  
  · If your bowel movements are not regular right after surgery, try to avoid constipation and straining. Drink plenty of water. Your doctor may suggest fiber, a stool softener, or a mild laxative.  
 Medications 
  · Your doctor will tell you if and when you can restart your medicines. He or she will also give you instructions about taking any new medicines.  
  · If you take blood thinners, such as warfarin (Coumadin), clopidogrel (Plavix), or aspirin, be sure to talk to your doctor. He or she will tell you if and when to start taking those medicines again. Make sure that you understand exactly what your doctor wants you to do.  
  · Be safe with medicines. Read and follow all instructions on the label. ¨ If the doctor gave you a prescription medicine for pain, take it as prescribed. ¨ If you are not taking a prescription pain medicine, ask your doctor if you can take an over-the-counter medicine.  
  · If your doctor prescribed antibiotics, take them as directed. Do not stop taking them just because you feel better. You need to take the full course of antibiotics.  
  · You may apply numbing medicines before and after bowel movements to relieve pain. Other instructions 
  · Sit in a few inches of warm water (sitz bath) for 15 to 20 minutes 3 times a day and after bowel movements. Then pat the area dry. Do this as long as you have pain in your anal area.  
  · Avoid sitting on the toilet for long periods of time or straining during bowel movements.  
  · Keep your anal area clean.  
  · Support your feet with a small step stool when you sit on the toilet. This helps flex your hips and places your pelvis in a squatting position. This can make bowel movements easier after surgery.  
  · Use baby wipes or medicated pads, such as Tucks, instead of toilet paper after a bowel movement. These products do not irritate the anus.  
  · If your doctor recommends it, use an over-the-counter hydrocortisone cream on the skin in your anal area. This can reduce pain and itching after surgery.  
  · Apply ice several times a day for 10 minutes at a time.  
  · Try lying on your stomach with a pillow under your hips to decrease swelling. Follow-up care is a key part of your treatment and safety. Be sure to make and go to all appointments, and call your doctor if you are having problems. It's also a good idea to know your test results and keep a list of the medicines you take. When should you call for help? Call 911 anytime you think you may need emergency care. For example, call if: 
  · You passed out (lost consciousness).  
  · You are short of breath.  
 Call your doctor now or seek immediate medical care if:   · You have signs of infection, such as: 
¨ Increased pain, swelling, warmth, or redness. ¨ Red streaks leading from the area. ¨ Pus draining from the area. ¨ A fever.  
  · You have pain that does not get better after you take your pain medicine.  
  · You are sick to your stomach and cannot keep fluids down.  
  · You have signs of a blood clot in your leg (called a deep vein thrombosis), such as: 
¨ Pain in your calf, back of the knee, thigh, or groin. ¨ Redness and swelling in your leg or groin.  
  · You cannot pass stools or gas.  
 Watch closely for changes in your health, and be sure to contact your doctor if you have any problems. Where can you learn more? Go to http://yuliana-hodan.info/. Enter 96 [id number] in the search box to learn more about \"Hemorrhoidectomy: What to Expect at Home. \" Current as of: May 12, 2017 Content Version: 11.7 © 2338-8110 mention. Care instructions adapted under license by [employer] (which disclaims liability or warranty for this information). If you have questions about a medical condition or this instruction, always ask your healthcare professional. [address] any warranty or liability for your use of this information. Please provide this summary of care documentation to your next provider. Signatures-by signing, you are acknowledging that this After Visit Summary has been reviewed with you and you have received a copy. Patient Signature:  ____________________________________________________________ Date:  ____________________________________________________________  
  
Alfreda Alejandro Provider Signature:  ____________________________________________________________ Date:  ____________________________________________________________

## 2018-09-07 NOTE — PROGRESS NOTES
Spiritual Care visit. Initial Visit, Pre Surgery Consult. Visit and prayer before patient goes to surgery. Visit by Marquis Rula M.Ed., Th.B. ,Staff

## 2018-09-26 ENCOUNTER — APPOINTMENT (RX ONLY)
Dept: URBAN - METROPOLITAN AREA CLINIC 23 | Facility: CLINIC | Age: 68
Setting detail: DERMATOLOGY
End: 2018-09-26

## 2018-09-26 DIAGNOSIS — D22 MELANOCYTIC NEVI: ICD-10-CM

## 2018-09-26 PROBLEM — D22.62 MELANOCYTIC NEVI OF LEFT UPPER LIMB, INCLUDING SHOULDER: Status: ACTIVE | Noted: 2018-09-26

## 2018-09-26 PROCEDURE — 12032 INTMD RPR S/A/T/EXT 2.6-7.5: CPT

## 2018-09-26 PROCEDURE — 11402 EXC TR-EXT B9+MARG 1.1-2 CM: CPT

## 2018-09-26 PROCEDURE — ? EXCISION

## 2018-09-26 ASSESSMENT — LOCATION ZONE DERM: LOCATION ZONE: ARM

## 2018-09-26 ASSESSMENT — LOCATION SIMPLE DESCRIPTION DERM: LOCATION SIMPLE: LEFT UPPER ARM

## 2018-09-26 ASSESSMENT — LOCATION DETAILED DESCRIPTION DERM: LOCATION DETAILED: LEFT LATERAL PROXIMAL UPPER ARM

## 2018-09-26 NOTE — PROCEDURE: EXCISION
Show Previous Accession Variable: Yes
Consent was obtained from the patient. The risks and benefits to therapy were discussed in detail. Specifically, the risks of infection, scarring, bleeding, prolonged wound healing, incomplete removal, allergy to anesthesia, nerve injury and recurrence were addressed. Prior to the procedure, the treatment site was clearly identified and confirmed by the patient. All components of Universal Protocol/PAUSE Rule completed.
Advancement Flap (Single) Text: The defect edges were debeveled with a #15 scalpel blade.  Given the location of the defect and the proximity to free margins a single advancement flap was deemed most appropriate.  Using a sterile surgical marker, an appropriate advancement flap was drawn incorporating the defect and placing the expected incisions within the relaxed skin tension lines where possible.    The area thus outlined was incised deep to adipose tissue with a #15 scalpel blade.  The skin margins were undermined to an appropriate distance in all directions utilizing iris scissors.
Deep Sutures: 4-0 PDO
Intermediate / Complex Repair - Final Wound Length In Cm: 3.5
Paramedian Forehead Flap Text: A decision was made to reconstruct the defect utilizing an interpolation axial flap and a staged reconstruction.  A telfa template was made of the defect.  This telfa template was then used to outline the paramedian forehead pedicle flap.  The donor area for the pedicle flap was then injected with anesthesia.  The flap was excised through the skin and subcutaneous tissue down to the layer of the underlying musculature.  The pedicle flap was carefully excised within this deep plane to maintain its blood supply.  The edges of the donor site were undermined.   The donor site was closed in a primary fashion.  The pedicle was then rotated into position and sutured.  Once the tube was sutured into place, adequate blood supply was confirmed with blanching and refill.  The pedicle was then wrapped with xeroform gauze and dressed appropriately with a telfa and gauze bandage to ensure continued blood supply and protect the attached pedicle.
Estimated Blood Loss (Cc): minimal
Eliptical Excision Additional Text (Leave Blank If You Do Not Want): The margin was drawn around the clinically apparent lesion.  An elliptical shape was then drawn on the skin incorporating the lesion and margins.  Incisions were then made along these lines to the appropriate tissue plane and the lesion was extirpated.
Slit Excision Additional Text (Leave Blank If You Do Not Want): A linear line was drawn on the skin overlying the lesion. An incision was made slowly until the lesion was visualized.  Once visualized, the lesion was removed with blunt dissection.
Complex Repair And Transposition Flap Text: The defect edges were debeveled with a #15 scalpel blade.  The primary defect was closed partially with a complex linear closure.  Given the location of the remaining defect, shape of the defect and the proximity to free margins a transposition flap was deemed most appropriate for complete closure of the defect.  Using a sterile surgical marker, an appropriate advancement flap was drawn incorporating the defect and placing the expected incisions within the relaxed skin tension lines where possible.    The area thus outlined was incised deep to adipose tissue with a #15 scalpel blade.  The skin margins were undermined to an appropriate distance in all directions utilizing iris scissors.
Path Notes (To The Dermatopathologist): Please check margins.
Hemostasis: Electrocautery
Second Skin Substitute Units (Will Override Primary Defect Units If Greater Than 0): 0
Bilateral Helical Rim Advancement Flap Text: The defect edges were debeveled with a #15 blade scalpel.  Given the location of the defect and the proximity to free margins (helical rim) a bilateral helical rim advancement flap was deemed most appropriate.  Using a sterile surgical marker, the appropriate advancement flaps were drawn incorporating the defect and placing the expected incisions between the helical rim and antihelix where possible.  The area thus outlined was incised through and through with a #15 scalpel blade.  With a skin hook and iris scissors, the flaps were gently and sharply undermined and freed up.
Scalpel Size: 15 blade
Fusiform Excision Additional Text (Leave Blank If You Do Not Want): The margin was drawn around the clinically apparent lesion.  A fusiform shape was then drawn on the skin incorporating the lesion and margins.  Incisions were then made along these lines to the appropriate tissue plane and the lesion was extirpated.
Saucerization Excision Additional Text (Leave Blank If You Do Not Want): The margin was drawn around the clinically apparent lesion.  Incisions were then made along these lines, in a tangential fashion, to the appropriate tissue plane and the lesion was extirpated.
Burow's Advancement Flap Text: The defect edges were debeveled with a #15 scalpel blade.  Given the location of the defect and the proximity to free margins a Burow's advancement flap was deemed most appropriate.  Using a sterile surgical marker, the appropriate advancement flap was drawn incorporating the defect and placing the expected incisions within the relaxed skin tension lines where possible.    The area thus outlined was incised deep to adipose tissue with a #15 scalpel blade.  The skin margins were undermined to an appropriate distance in all directions utilizing iris scissors.
Size Of Margin In Cm: 0.3
Transposition Flap Text: The defect edges were debeveled with a #15 scalpel blade.  Given the location of the defect and the proximity to free margins a transposition flap was deemed most appropriate.  Using a sterile surgical marker, an appropriate transposition flap was drawn incorporating the defect.    The area thus outlined was incised deep to adipose tissue with a #15 scalpel blade.  The skin margins were undermined to an appropriate distance in all directions utilizing iris scissors.
Muscle Hinge Flap Text: The defect edges were debeveled with a #15 scalpel blade.  Given the size, depth and location of the defect and the proximity to free margins a muscle hinge flap was deemed most appropriate.  Using a sterile surgical marker, an appropriate hinge flap was drawn incorporating the defect. The area thus outlined was incised with a #15 scalpel blade.  The skin margins were undermined to an appropriate distance in all directions utilizing iris scissors.
Mastoid Interpolation Flap Text: A decision was made to reconstruct the defect utilizing an interpolation axial flap and a staged reconstruction.  A telfa template was made of the defect.  This telfa template was then used to outline the mastoid interpolation flap.  The donor area for the pedicle flap was then injected with anesthesia.  The flap was excised through the skin and subcutaneous tissue down to the layer of the underlying musculature.  The pedicle flap was carefully excised within this deep plane to maintain its blood supply.  The edges of the donor site were undermined.   The donor site was closed in a primary fashion.  The pedicle was then rotated into position and sutured.  Once the tube was sutured into place, adequate blood supply was confirmed with blanching and refill.  The pedicle was then wrapped with xeroform gauze and dressed appropriately with a telfa and gauze bandage to ensure continued blood supply and protect the attached pedicle.
Home Suture Removal Text: Patient was provided a home suture removal kit and will remove their sutures at home.  If they have any questions or difficulties they will call the office.
Complex Repair And Single Advancement Flap Text: The defect edges were debeveled with a #15 scalpel blade.  The primary defect was closed partially with a complex linear closure.  Given the location of the remaining defect, shape of the defect and the proximity to free margins a single advancement flap was deemed most appropriate for complete closure of the defect.  Using a sterile surgical marker, an appropriate advancement flap was drawn incorporating the defect and placing the expected incisions within the relaxed skin tension lines where possible.    The area thus outlined was incised deep to adipose tissue with a #15 scalpel blade.  The skin margins were undermined to an appropriate distance in all directions utilizing iris scissors.
Bilobed Transposition Flap Text: The defect edges were debeveled with a #15 scalpel blade.  Given the location of the defect and the proximity to free margins a bilobed transposition flap was deemed most appropriate.  Using a sterile surgical marker, an appropriate bilobe flap drawn around the defect.    The area thus outlined was incised deep to adipose tissue with a #15 scalpel blade.  The skin margins were undermined to an appropriate distance in all directions utilizing iris scissors.
Render Path Notes In Note?: no
Ear Star Wedge Flap Text: The defect edges were debeveled with a #15 blade scalpel.  Given the location of the defect and the proximity to free margins (helical rim) an ear star wedge flap was deemed most appropriate.  Using a sterile surgical marker, the appropriate flap was drawn incorporating the defect and placing the expected incisions between the helical rim and antihelix where possible.  The area thus outlined was incised through and through with a #15 scalpel blade.
Complex Repair And Dermal Autograft Text: The defect edges were debeveled with a #15 scalpel blade.  The primary defect was closed partially with a complex linear closure.  Given the location of the defect, shape of the defect and the proximity to free margins an dermal autograft was deemed most appropriate to repair the remaining defect.  The graft was trimmed to fit the size of the remaining defect.  The graft was then placed in the primary defect, oriented appropriately, and sutured into place.
Z Plasty Text: The lesion was extirpated to the level of the fat with a #15 scalpel blade.  Given the location of the defect, shape of the defect and the proximity to free margins a Z-plasty was deemed most appropriate for repair.  Using a sterile surgical marker, the appropriate transposition arms of the Z-plasty were drawn incorporating the defect and placing the expected incisions within the relaxed skin tension lines where possible.    The area thus outlined was incised deep to adipose tissue with a #15 scalpel blade.  The skin margins were undermined to an appropriate distance in all directions utilizing iris scissors.  The opposing transposition arms were then transposed into place in opposite direction and anchored with interrupted buried subcutaneous sutures.
Complex Repair And Rotation Flap Text: The defect edges were debeveled with a #15 scalpel blade.  The primary defect was closed partially with a complex linear closure.  Given the location of the remaining defect, shape of the defect and the proximity to free margins a rotation flap was deemed most appropriate for complete closure of the defect.  Using a sterile surgical marker, an appropriate advancement flap was drawn incorporating the defect and placing the expected incisions within the relaxed skin tension lines where possible.    The area thus outlined was incised deep to adipose tissue with a #15 scalpel blade.  The skin margins were undermined to an appropriate distance in all directions utilizing iris scissors.
Complex Repair And Dorsal Nasal Flap Text: The defect edges were debeveled with a #15 scalpel blade.  The primary defect was closed partially with a complex linear closure.  Given the location of the remaining defect, shape of the defect and the proximity to free margins a dorsal nasal flap was deemed most appropriate for complete closure of the defect.  Using a sterile surgical marker, an appropriate flap was drawn incorporating the defect and placing the expected incisions within the relaxed skin tension lines where possible.    The area thus outlined was incised deep to adipose tissue with a #15 scalpel blade.  The skin margins were undermined to an appropriate distance in all directions utilizing iris scissors.
Hatchet Flap Text: The defect edges were debeveled with a #15 scalpel blade.  Given the location of the defect, shape of the defect and the proximity to free margins a hatchet flap was deemed most appropriate.  Using a sterile surgical marker, an appropriate hatchet flap was drawn incorporating the defect and placing the expected incisions within the relaxed skin tension lines where possible.    The area thus outlined was incised deep to adipose tissue with a #15 scalpel blade.  The skin margins were undermined to an appropriate distance in all directions utilizing iris scissors.
Medical Necessity Clause: This procedure was medically necessary because the lesion that was treated was: changing colors with peripheral depigmentation.
Composite Graft Text: The defect edges were debeveled with a #15 scalpel blade.  Given the location of the defect, shape of the defect, the proximity to free margins and the fact the defect was full thickness a composite graft was deemed most appropriate.  The defect was outline and then transferred to the donor site.  A full thickness graft was then excised from the donor site. The graft was then placed in the primary defect, oriented appropriately and then sutured into place.  The secondary defect was then repaired using a primary closure.
Purse String (Simple) Text: Given the location of the defect and the characteristics of the surrounding skin a purse string simple closure was deemed most appropriate.  Undermining was performed circumferentially around the surgical defect.  A purse string suture was then placed and tightened.
Skin Substitute Text: The defect edges were debeveled with a #15 scalpel blade.  Given the location of the defect, shape of the defect and the proximity to free margins a skin substitute graft was deemed most appropriate.  The graft material was trimmed to fit the size of the defect. The graft was then placed in the primary defect and oriented appropriately.
Island Pedicle Flap Text: The defect edges were debeveled with a #15 scalpel blade.  Given the location of the defect, shape of the defect and the proximity to free margins an island pedicle advancement flap was deemed most appropriate.  Using a sterile surgical marker, an appropriate advancement flap was drawn incorporating the defect, outlining the appropriate donor tissue and placing the expected incisions within the relaxed skin tension lines where possible.    The area thus outlined was incised deep to adipose tissue with a #15 scalpel blade.  The skin margins were undermined to an appropriate distance in all directions around the primary defect and laterally outward around the island pedicle utilizing iris scissors.  There was minimal undermining beneath the pedicle flap.
Excisional Biopsy Additional Text (Leave Blank If You Do Not Want): The margin was drawn around the clinically apparent lesion. An elliptical shape was then drawn on the skin incorporating the lesion and margins.  Incisions were then made along these lines to the appropriate tissue plane and the lesion was extirpated.
Perilesional Excision Additional Text (Leave Blank If You Do Not Want): The margin was drawn around the clinically apparent lesion. Incisions were then made along these lines to the appropriate tissue plane and the lesion was extirpated.
O-T Advancement Flap Text: The defect edges were debeveled with a #15 scalpel blade.  Given the location of the defect, shape of the defect and the proximity to free margins an O-T advancement flap was deemed most appropriate.  Using a sterile surgical marker, an appropriate advancement flap was drawn incorporating the defect and placing the expected incisions within the relaxed skin tension lines where possible.    The area thus outlined was incised deep to adipose tissue with a #15 scalpel blade.  The skin margins were undermined to an appropriate distance in all directions utilizing iris scissors.
O-Z Plasty Text: The defect edges were debeveled with a #15 scalpel blade.  Given the location of the defect, shape of the defect and the proximity to free margins an O-Z plasty (double transposition flap) was deemed most appropriate.  Using a sterile surgical marker, the appropriate transposition flaps were drawn incorporating the defect and placing the expected incisions within the relaxed skin tension lines where possible.    The area thus outlined was incised deep to adipose tissue with a #15 scalpel blade.  The skin margins were undermined to an appropriate distance in all directions utilizing iris scissors.  Hemostasis was achieved with electrocautery.  The flaps were then transposed into place, one clockwise and the other counterclockwise, and anchored with interrupted buried subcutaneous sutures.
Cheek-To-Nose Interpolation Flap Text: A decision was made to reconstruct the defect utilizing an interpolation axial flap and a staged reconstruction.  A telfa template was made of the defect.  This telfa template was then used to outline the Cheek-To-Nose Interpolation flap.  The donor area for the pedicle flap was then injected with anesthesia.  The flap was excised through the skin and subcutaneous tissue down to the layer of the underlying musculature.  The interpolation flap was carefully excised within this deep plane to maintain its blood supply.  The edges of the donor site were undermined.   The donor site was closed in a primary fashion.  The pedicle was then rotated into position and sutured.  Once the tube was sutured into place, adequate blood supply was confirmed with blanching and refill.  The pedicle was then wrapped with xeroform gauze and dressed appropriately with a telfa and gauze bandage to ensure continued blood supply and protect the attached pedicle.
Complex Repair And Z Plasty Text: The defect edges were debeveled with a #15 scalpel blade.  The primary defect was closed partially with a complex linear closure.  Given the location of the remaining defect, shape of the defect and the proximity to free margins a Z plasty was deemed most appropriate for complete closure of the defect.  Using a sterile surgical marker, an appropriate advancement flap was drawn incorporating the defect and placing the expected incisions within the relaxed skin tension lines where possible.    The area thus outlined was incised deep to adipose tissue with a #15 scalpel blade.  The skin margins were undermined to an appropriate distance in all directions utilizing iris scissors.
Cheek Interpolation Flap Text: A decision was made to reconstruct the defect utilizing an interpolation axial flap and a staged reconstruction.  A telfa template was made of the defect.  This telfa template was then used to outline the Cheek Interpolation flap.  The donor area for the pedicle flap was then injected with anesthesia.  The flap was excised through the skin and subcutaneous tissue down to the layer of the underlying musculature.  The interpolation flap was carefully excised within this deep plane to maintain its blood supply.  The edges of the donor site were undermined.   The donor site was closed in a primary fashion.  The pedicle was then rotated into position and sutured.  Once the tube was sutured into place, adequate blood supply was confirmed with blanching and refill.  The pedicle was then wrapped with xeroform gauze and dressed appropriately with a telfa and gauze bandage to ensure continued blood supply and protect the attached pedicle.
Epidermal Closure: running subcuticular
Trilobed Flap Text: The defect edges were debeveled with a #15 scalpel blade.  Given the location of the defect and the proximity to free margins a trilobed flap was deemed most appropriate.  Using a sterile surgical marker, an appropriate trilobed flap drawn around the defect.    The area thus outlined was incised deep to adipose tissue with a #15 scalpel blade.  The skin margins were undermined to an appropriate distance in all directions utilizing iris scissors.
Post-Care Instructions: I reviewed with the patient in detail post-care instructions. Patient is not to engage in any heavy lifting, exercise, or swimming for the next 14 days. Should the patient develop any fevers, chills, bleeding, severe pain patient will contact the office immediately.
Accession #: [id number]
Island Pedicle Flap With Canthal Suspension Text: The defect edges were debeveled with a #15 scalpel blade.  Given the location of the defect, shape of the defect and the proximity to free margins an island pedicle advancement flap was deemed most appropriate.  Using a sterile surgical marker, an appropriate advancement flap was drawn incorporating the defect, outlining the appropriate donor tissue and placing the expected incisions within the relaxed skin tension lines where possible. The area thus outlined was incised deep to adipose tissue with a #15 scalpel blade.  The skin margins were undermined to an appropriate distance in all directions around the primary defect and laterally outward around the island pedicle utilizing iris scissors.  There was minimal undermining beneath the pedicle flap. A suspension suture was placed in the canthal tendon to prevent tension and prevent ectropion.
Xenograft Text: The defect edges were debeveled with a #15 scalpel blade.  Given the location of the defect, shape of the defect and the proximity to free margins a xenograft was deemed most appropriate.  The graft was then trimmed to fit the size of the defect.  The graft was then placed in the primary defect and oriented appropriately.
Split-Thickness Skin Graft Text: The defect edges were debeveled with a #15 scalpel blade.  Given the location of the defect, shape of the defect and the proximity to free margins a split thickness skin graft was deemed most appropriate.  Using a sterile surgical marker, the primary defect shape was transferred to the donor site. The split thickness graft was then harvested.  The skin graft was then placed in the primary defect and oriented appropriately.
Rotation Flap Text: The defect edges were debeveled with a #15 scalpel blade.  Given the location of the defect, shape of the defect and the proximity to free margins a rotation flap was deemed most appropriate.  Using a sterile surgical marker, an appropriate rotation flap was drawn incorporating the defect and placing the expected incisions within the relaxed skin tension lines where possible.    The area thus outlined was incised deep to adipose tissue with a #15 scalpel blade.  The skin margins were undermined to an appropriate distance in all directions utilizing iris scissors.
Melolabial Interpolation Flap Text: A decision was made to reconstruct the defect utilizing an interpolation axial flap and a staged reconstruction.  A telfa template was made of the defect.  This telfa template was then used to outline the melolabial interpolation flap.  The donor area for the pedicle flap was then injected with anesthesia.  The flap was excised through the skin and subcutaneous tissue down to the layer of the underlying musculature.  The pedicle flap was carefully excised within this deep plane to maintain its blood supply.  The edges of the donor site were undermined.   The donor site was closed in a primary fashion.  The pedicle was then rotated into position and sutured.  Once the tube was sutured into place, adequate blood supply was confirmed with blanching and refill.  The pedicle was then wrapped with xeroform gauze and dressed appropriately with a telfa and gauze bandage to ensure continued blood supply and protect the attached pedicle.
Anesthesia Volume In Cc: 12
Lazy S Complex Repair Preamble Text (Leave Blank If You Do Not Want): Extensive wide undermining was performed.
Complex Repair And W Plasty Text: The defect edges were debeveled with a #15 scalpel blade.  The primary defect was closed partially with a complex linear closure.  Given the location of the remaining defect, shape of the defect and the proximity to free margins a W plasty was deemed most appropriate for complete closure of the defect.  Using a sterile surgical marker, an appropriate advancement flap was drawn incorporating the defect and placing the expected incisions within the relaxed skin tension lines where possible.    The area thus outlined was incised deep to adipose tissue with a #15 scalpel blade.  The skin margins were undermined to an appropriate distance in all directions utilizing iris scissors.
Dorsal Nasal Flap Text: The defect edges were debeveled with a #15 scalpel blade.  Given the location of the defect and the proximity to free margins a dorsal nasal flap was deemed most appropriate.  Using a sterile surgical marker, an appropriate dorsal nasal flap was drawn around the defect.    The area thus outlined was incised deep to adipose tissue with a #15 scalpel blade.  The skin margins were undermined to an appropriate distance in all directions utilizing iris scissors.
Interpolation Flap Text: A decision was made to reconstruct the defect utilizing an interpolation axial flap and a staged reconstruction.  A telfa template was made of the defect.  This telfa template was then used to outline the interpolation flap.  The donor area for the pedicle flap was then injected with anesthesia.  The flap was excised through the skin and subcutaneous tissue down to the layer of the underlying musculature.  The interpolation flap was carefully excised within this deep plane to maintain its blood supply.  The edges of the donor site were undermined.   The donor site was closed in a primary fashion.  The pedicle was then rotated into position and sutured.  Once the tube was sutured into place, adequate blood supply was confirmed with blanching and refill.  The pedicle was then wrapped with xeroform gauze and dressed appropriately with a telfa and gauze bandage to ensure continued blood supply and protect the attached pedicle.
Modified Advancement Flap Text: The defect edges were debeveled with a #15 scalpel blade.  Given the location of the defect, shape of the defect and the proximity to free margins a modified advancement flap was deemed most appropriate.  Using a sterile surgical marker, an appropriate advancement flap was drawn incorporating the defect and placing the expected incisions within the relaxed skin tension lines where possible.    The area thus outlined was incised deep to adipose tissue with a #15 scalpel blade.  The skin margins were undermined to an appropriate distance in all directions utilizing iris scissors.
Complex Repair And Bilobe Flap Text: The defect edges were debeveled with a #15 scalpel blade.  The primary defect was closed partially with a complex linear closure.  Given the location of the remaining defect, shape of the defect and the proximity to free margins a bilobe flap was deemed most appropriate for complete closure of the defect.  Using a sterile surgical marker, an appropriate advancement flap was drawn incorporating the defect and placing the expected incisions within the relaxed skin tension lines where possible.    The area thus outlined was incised deep to adipose tissue with a #15 scalpel blade.  The skin margins were undermined to an appropriate distance in all directions utilizing iris scissors.
Repair Type: Intermediate
Crescentic Advancement Flap Text: The defect edges were debeveled with a #15 scalpel blade.  Given the location of the defect and the proximity to free margins a crescentic advancement flap was deemed most appropriate.  Using a sterile surgical marker, the appropriate advancement flap was drawn incorporating the defect and placing the expected incisions within the relaxed skin tension lines where possible.    The area thus outlined was incised deep to adipose tissue with a #15 scalpel blade.  The skin margins were undermined to an appropriate distance in all directions utilizing iris scissors.
Detail Level: Detailed
V-Y Flap Text: The defect edges were debeveled with a #15 scalpel blade.  Given the location of the defect, shape of the defect and the proximity to free margins a V-Y flap was deemed most appropriate.  Using a sterile surgical marker, an appropriate advancement flap was drawn incorporating the defect and placing the expected incisions within the relaxed skin tension lines where possible.    The area thus outlined was incised deep to adipose tissue with a #15 scalpel blade.  The skin margins were undermined to an appropriate distance in all directions utilizing iris scissors.
Dermal Autograft Text: The defect edges were debeveled with a #15 scalpel blade.  Given the location of the defect, shape of the defect and the proximity to free margins a dermal autograft was deemed most appropriate.  Using a sterile surgical marker, the primary defect shape was transferred to the donor site. The area thus outlined was incised deep to adipose tissue with a #15 scalpel blade.  The harvested graft was then trimmed of adipose and epidermal tissue until only dermis was left.  The skin graft was then placed in the primary defect and oriented appropriately.
W Plasty Text: The lesion was extirpated to the level of the fat with a #15 scalpel blade.  Given the location of the defect, shape of the defect and the proximity to free margins a W-plasty was deemed most appropriate for repair.  Using a sterile surgical marker, the appropriate transposition arms of the W-plasty were drawn incorporating the defect and placing the expected incisions within the relaxed skin tension lines where possible.    The area thus outlined was incised deep to adipose tissue with a #15 scalpel blade.  The skin margins were undermined to an appropriate distance in all directions utilizing iris scissors.  The opposing transposition arms were then transposed into place in opposite direction and anchored with interrupted buried subcutaneous sutures.
Additional Anesthesia Volume In Cc: 6
Bilobed Flap Text: The defect edges were debeveled with a #15 scalpel blade.  Given the location of the defect and the proximity to free margins a bilobe flap was deemed most appropriate.  Using a sterile surgical marker, an appropriate bilobe flap drawn around the defect.    The area thus outlined was incised deep to adipose tissue with a #15 scalpel blade.  The skin margins were undermined to an appropriate distance in all directions utilizing iris scissors.
Mercedes Flap Text: The defect edges were debeveled with a #15 scalpel blade.  Given the location of the defect, shape of the defect and the proximity to free margins a Mercedes flap was deemed most appropriate.  Using a sterile surgical marker, an appropriate advancement flap was drawn incorporating the defect and placing the expected incisions within the relaxed skin tension lines where possible. The area thus outlined was incised deep to adipose tissue with a #15 scalpel blade.  The skin margins were undermined to an appropriate distance in all directions utilizing iris scissors.
Rhombic Flap Text: The defect edges were debeveled with a #15 scalpel blade.  Given the location of the defect and the proximity to free margins a rhombic flap was deemed most appropriate.  Using a sterile surgical marker, an appropriate rhombic flap was drawn incorporating the defect.    The area thus outlined was incised deep to adipose tissue with a #15 scalpel blade.  The skin margins were undermined to an appropriate distance in all directions utilizing iris scissors.
Complex Repair And A-T Advancement Flap Text: The defect edges were debeveled with a #15 scalpel blade.  The primary defect was closed partially with a complex linear closure.  Given the location of the remaining defect, shape of the defect and the proximity to free margins an A-T advancement flap was deemed most appropriate for complete closure of the defect.  Using a sterile surgical marker, an appropriate advancement flap was drawn incorporating the defect and placing the expected incisions within the relaxed skin tension lines where possible.    The area thus outlined was incised deep to adipose tissue with a #15 scalpel blade.  The skin margins were undermined to an appropriate distance in all directions utilizing iris scissors.
Purse String (Intermediate) Text: Given the location of the defect and the characteristics of the surrounding skin a purse string intermediate closure was deemed most appropriate.  Undermining was performed circumferentially around the surgical defect.  A purse string suture was then placed and tightened.
Complex Repair And Melolabial Flap Text: The defect edges were debeveled with a #15 scalpel blade.  The primary defect was closed partially with a complex linear closure.  Given the location of the remaining defect, shape of the defect and the proximity to free margins a melolabial flap was deemed most appropriate for complete closure of the defect.  Using a sterile surgical marker, an appropriate advancement flap was drawn incorporating the defect and placing the expected incisions within the relaxed skin tension lines where possible.    The area thus outlined was incised deep to adipose tissue with a #15 scalpel blade.  The skin margins were undermined to an appropriate distance in all directions utilizing iris scissors.
No Repair - Repaired With Adjacent Surgical Defect Text (Leave Blank If You Do Not Want): After the excision the defect was repaired concurrently with another surgical defect which was in close approximation.
Complex Repair And Xenograft Text: The defect edges were debeveled with a #15 scalpel blade.  The primary defect was closed partially with a complex linear closure.  Given the location of the defect, shape of the defect and the proximity to free margins a xenograft was deemed most appropriate to repair the remaining defect.  The graft was trimmed to fit the size of the remaining defect.  The graft was then placed in the primary defect, oriented appropriately, and sutured into place.
Complex Repair And V-Y Plasty Text: The defect edges were debeveled with a #15 scalpel blade.  The primary defect was closed partially with a complex linear closure.  Given the location of the remaining defect, shape of the defect and the proximity to free margins a V-Y plasty was deemed most appropriate for complete closure of the defect.  Using a sterile surgical marker, an appropriate advancement flap was drawn incorporating the defect and placing the expected incisions within the relaxed skin tension lines where possible.    The area thus outlined was incised deep to adipose tissue with a #15 scalpel blade.  The skin margins were undermined to an appropriate distance in all directions utilizing iris scissors.
Complex Repair And M Plasty Text: The defect edges were debeveled with a #15 scalpel blade.  The primary defect was closed partially with a complex linear closure.  Given the location of the remaining defect, shape of the defect and the proximity to free margins an M plasty was deemed most appropriate for complete closure of the defect.  Using a sterile surgical marker, an appropriate advancement flap was drawn incorporating the defect and placing the expected incisions within the relaxed skin tension lines where possible.    The area thus outlined was incised deep to adipose tissue with a #15 scalpel blade.  The skin margins were undermined to an appropriate distance in all directions utilizing iris scissors.
Wound Care: Vaseline
Posterior Auricular Interpolation Flap Text: A decision was made to reconstruct the defect utilizing an interpolation axial flap and a staged reconstruction.  A telfa template was made of the defect.  This telfa template was then used to outline the posterior auricular interpolation flap.  The donor area for the pedicle flap was then injected with anesthesia.  The flap was excised through the skin and subcutaneous tissue down to the layer of the underlying musculature.  The pedicle flap was carefully excised within this deep plane to maintain its blood supply.  The edges of the donor site were undermined.   The donor site was closed in a primary fashion.  The pedicle was then rotated into position and sutured.  Once the tube was sutured into place, adequate blood supply was confirmed with blanching and refill.  The pedicle was then wrapped with xeroform gauze and dressed appropriately with a telfa and gauze bandage to ensure continued blood supply and protect the attached pedicle.
Cartilage Graft Text: The defect edges were debeveled with a #15 scalpel blade.  Given the location of the defect, shape of the defect, the fact the defect involved a full thickness cartilage defect a cartilage graft was deemed most appropriate.  An appropriate donor site was identified, cleansed, and anesthetized. The cartilage graft was then harvested and transferred to the recipient site, oriented appropriately and then sutured into place.  The secondary defect was then repaired using a primary closure.
Dressing: waterproof dressing
Complex Repair And Epidermal Autograft Text: The defect edges were debeveled with a #15 scalpel blade.  The primary defect was closed partially with a complex linear closure.  Given the location of the defect, shape of the defect and the proximity to free margins an epidermal autograft was deemed most appropriate to repair the remaining defect.  The graft was trimmed to fit the size of the remaining defect.  The graft was then placed in the primary defect, oriented appropriately, and sutured into place.
Crescentic Intermediate Repair Preamble Text (Leave Blank If You Do Not Want): Undermining was performed with blunt dissection.
Excision Depth: adipose tissue
Billing Type: Third-Party Bill
S Plasty Text: Given the location and shape of the defect, and the orientation of relaxed skin tension lines, an S-plasty was deemed most appropriate for repair.  Using a sterile surgical marker, the appropriate outline of the S-plasty was drawn, incorporating the defect and placing the expected incisions within the relaxed skin tension lines where possible.  The area thus outlined was incised deep to adipose tissue with a #15 scalpel blade.  The skin margins were undermined to an appropriate distance in all directions utilizing iris scissors. The skin flaps were advanced over the defect.  The opposing margins were then approximated with interrupted buried subcutaneous sutures.
Epidermal Sutures: steri-strips
Epidermal Autograft Text: The defect edges were debeveled with a #15 scalpel blade.  Given the location of the defect, shape of the defect and the proximity to free margins an epidermal autograft was deemed most appropriate.  Using a sterile surgical marker, the primary defect shape was transferred to the donor site. The epidermal graft was then harvested.  The skin graft was then placed in the primary defect and oriented appropriately.
Complex Repair And Double Advancement Flap Text: The defect edges were debeveled with a #15 scalpel blade.  The primary defect was closed partially with a complex linear closure.  Given the location of the remaining defect, shape of the defect and the proximity to free margins a double advancement flap was deemed most appropriate for complete closure of the defect.  Using a sterile surgical marker, an appropriate advancement flap was drawn incorporating the defect and placing the expected incisions within the relaxed skin tension lines where possible.    The area thus outlined was incised deep to adipose tissue with a #15 scalpel blade.  The skin margins were undermined to an appropriate distance in all directions utilizing iris scissors.
Ftsg Text: The defect edges were debeveled with a #15 scalpel blade.  Given the location of the defect, shape of the defect and the proximity to free margins a full thickness skin graft was deemed most appropriate.  Using a sterile surgical marker, the primary defect shape was transferred to the donor site. The area thus outlined was incised deep to adipose tissue with a #15 scalpel blade.  The harvested graft was then trimmed of adipose tissue until only dermis and epidermis was left.  The skin margins of the secondary defect were undermined to an appropriate distance in all directions utilizing iris scissors.  The secondary defect was closed with interrupted buried subcutaneous sutures.  The skin edges were then re-apposed with running  sutures.  The skin graft was then placed in the primary defect and oriented appropriately.
Complex Repair And Tissue Cultured Epidermal Autograft Text: The defect edges were debeveled with a #15 scalpel blade.  The primary defect was closed partially with a complex linear closure.  Given the location of the defect, shape of the defect and the proximity to free margins an tissue cultured epidermal autograft was deemed most appropriate to repair the remaining defect.  The graft was trimmed to fit the size of the remaining defect.  The graft was then placed in the primary defect, oriented appropriately, and sutured into place.
Star Wedge Flap Text: The defect edges were debeveled with a #15 scalpel blade.  Given the location of the defect, shape of the defect and the proximity to free margins a star wedge flap was deemed most appropriate.  Using a sterile surgical marker, an appropriate rotation flap was drawn incorporating the defect and placing the expected incisions within the relaxed skin tension lines where possible. The area thus outlined was incised deep to adipose tissue with a #15 scalpel blade.  The skin margins were undermined to an appropriate distance in all directions utilizing iris scissors.
Dermal Closure: with plication
Complex Repair And Rhombic Flap Text: The defect edges were debeveled with a #15 scalpel blade.  The primary defect was closed partially with a complex linear closure.  Given the location of the remaining defect, shape of the defect and the proximity to free margins a rhombic flap was deemed most appropriate for complete closure of the defect.  Using a sterile surgical marker, an appropriate advancement flap was drawn incorporating the defect and placing the expected incisions within the relaxed skin tension lines where possible.    The area thus outlined was incised deep to adipose tissue with a #15 scalpel blade.  The skin margins were undermined to an appropriate distance in all directions utilizing iris scissors.
Spiral Flap Text: The defect edges were debeveled with a #15 scalpel blade.  Given the location of the defect, shape of the defect and the proximity to free margins a spiral flap was deemed most appropriate.  Using a sterile surgical marker, an appropriate rotation flap was drawn incorporating the defect and placing the expected incisions within the relaxed skin tension lines where possible. The area thus outlined was incised deep to adipose tissue with a #15 scalpel blade.  The skin margins were undermined to an appropriate distance in all directions utilizing iris scissors.
Medical Necessity Information: It is in your best interest to select a reason for this procedure from the list below. All of these items fulfill various CMS LCD requirements except lesion extends to a margin.
Tissue Cultured Epidermal Autograft Text: The defect edges were debeveled with a #15 scalpel blade.  Given the location of the defect, shape of the defect and the proximity to free margins a tissue cultured epidermal autograft was deemed most appropriate.  The graft was then trimmed to fit the size of the defect.  The graft was then placed in the primary defect and oriented appropriately.
Size Of Lesion In Cm: 1
Lip Wedge Excision Repair Text: Given the location of the defect and the proximity to free margins a full thickness wedge repair was deemed most appropriate.  Using a sterile surgical marker, the appropriate repair was drawn incorporating the defect and placing the expected incisions perpendicular to the vermilion border.  The vermilion border was also meticulously outlined to ensure appropriate reapproximation during the repair.  The area thus outlined was incised through and through with a #15 scalpel blade.  The muscularis and dermis were reaproximated with deep sutures following hemostasis. Care was taken to realign the vermilion border before proceeding with the superficial closure.  Once the vermilion was realigned the superfical and mucosal closure was finished.
Complex Repair And Ftsg Text: The defect edges were debeveled with a #15 scalpel blade.  The primary defect was closed partially with a complex linear closure.  Given the location of the defect, shape of the defect and the proximity to free margins a full thickness skin graft was deemed most appropriate to repair the remaining defect.  The graft was trimmed to fit the size of the remaining defect.  The graft was then placed in the primary defect, oriented appropriately, and sutured into place.
O-L Flap Text: The defect edges were debeveled with a #15 scalpel blade.  Given the location of the defect, shape of the defect and the proximity to free margins an O-L flap was deemed most appropriate.  Using a sterile surgical marker, an appropriate advancement flap was drawn incorporating the defect and placing the expected incisions within the relaxed skin tension lines where possible.    The area thus outlined was incised deep to adipose tissue with a #15 scalpel blade.  The skin margins were undermined to an appropriate distance in all directions utilizing iris scissors.
Complex Repair And Split-Thickness Skin Graft Text: The defect edges were debeveled with a #15 scalpel blade.  The primary defect was closed partially with a complex linear closure.  Given the location of the defect, shape of the defect and the proximity to free margins a split thickness skin graft was deemed most appropriate to repair the remaining defect.  The graft was trimmed to fit the size of the remaining defect.  The graft was then placed in the primary defect, oriented appropriately, and sutured into place.
Helical Rim Advancement Flap Text: The defect edges were debeveled with a #15 blade scalpel.  Given the location of the defect and the proximity to free margins (helical rim) a double helical rim advancement flap was deemed most appropriate.  Using a sterile surgical marker, the appropriate advancement flaps were drawn incorporating the defect and placing the expected incisions between the helical rim and antihelix where possible.  The area thus outlined was incised through and through with a #15 scalpel blade.  With a skin hook and iris scissors, the flaps were gently and sharply undermined and freed up.
Repair Performed By Another Provider Text (Leave Blank If You Do Not Want): After the tissue was excised the defect was repaired by another provider.
Excision Method: Elliptical
Anesthesia Type: 1% lidocaine with epinephrine
V-Y Plasty Text: The defect edges were debeveled with a #15 scalpel blade.  Given the location of the defect, shape of the defect and the proximity to free margins an V-Y advancement flap was deemed most appropriate.  Using a sterile surgical marker, an appropriate advancement flap was drawn incorporating the defect and placing the expected incisions within the relaxed skin tension lines where possible.    The area thus outlined was incised deep to adipose tissue with a #15 scalpel blade.  The skin margins were undermined to an appropriate distance in all directions utilizing iris scissors.
Complex Repair And O-L Flap Text: The defect edges were debeveled with a #15 scalpel blade.  The primary defect was closed partially with a complex linear closure.  Given the location of the remaining defect, shape of the defect and the proximity to free margins an O-L flap was deemed most appropriate for complete closure of the defect.  Using a sterile surgical marker, an appropriate flap was drawn incorporating the defect and placing the expected incisions within the relaxed skin tension lines where possible.    The area thus outlined was incised deep to adipose tissue with a #15 scalpel blade.  The skin margins were undermined to an appropriate distance in all directions utilizing iris scissors.
Melolabial Transposition Flap Text: The defect edges were debeveled with a #15 scalpel blade.  Given the location of the defect and the proximity to free margins a melolabial flap was deemed most appropriate.  Using a sterile surgical marker, an appropriate melolabial transposition flap was drawn incorporating the defect.    The area thus outlined was incised deep to adipose tissue with a #15 scalpel blade.  The skin margins were undermined to an appropriate distance in all directions utilizing iris scissors.
H Plasty Text: Given the location of the defect, shape of the defect and the proximity to free margins a H-plasty was deemed most appropriate for repair.  Using a sterile surgical marker, the appropriate advancement arms of the H-plasty were drawn incorporating the defect and placing the expected incisions within the relaxed skin tension lines where possible. The area thus outlined was incised deep to adipose tissue with a #15 scalpel blade. The skin margins were undermined to an appropriate distance in all directions utilizing iris scissors.  The opposing advancement arms were then advanced into place in opposite direction and anchored with interrupted buried subcutaneous sutures.
Island Pedicle Flap-Requiring Vessel Identification Text: The defect edges were debeveled with a #15 scalpel blade.  Given the location of the defect, shape of the defect and the proximity to free margins an island pedicle advancement flap was deemed most appropriate.  Using a sterile surgical marker, an appropriate advancement flap was drawn, based on the axial vessel mentioned above, incorporating the defect, outlining the appropriate donor tissue and placing the expected incisions within the relaxed skin tension lines where possible.    The area thus outlined was incised deep to adipose tissue with a #15 scalpel blade.  The skin margins were undermined to an appropriate distance in all directions around the primary defect and laterally outward around the island pedicle utilizing iris scissors.  There was minimal undermining beneath the pedicle flap.
Mucosal Advancement Flap Text: Given the location of the defect, shape of the defect and the proximity to free margins a mucosal advancement flap was deemed most appropriate. Incisions were made with a 15 blade scalpel in the appropriate fashion along the cutaneous vermillion border and the mucosal lip. The remaining actinically damaged mucosal tissue was excised.  The mucosal advancement flap was then elevated to the gingival sulcus with care taken to preserve the neurovascular structures and advanced into the primary defect. Care was taken to ensure that precise realignment of the vermilion border was achieved.
Anesthesia Type: 1% lidocaine with 1:100,000 epinephrine and 408mcg clindamycin/ml and a 1:10 solution of 8.4% sodium bicarbonate
Complex Repair And Double M Plasty Text: The defect edges were debeveled with a #15 scalpel blade.  The primary defect was closed partially with a complex linear closure.  Given the location of the remaining defect, shape of the defect and the proximity to free margins a double M plasty was deemed most appropriate for complete closure of the defect.  Using a sterile surgical marker, an appropriate advancement flap was drawn incorporating the defect and placing the expected incisions within the relaxed skin tension lines where possible.    The area thus outlined was incised deep to adipose tissue with a #15 scalpel blade.  The skin margins were undermined to an appropriate distance in all directions utilizing iris scissors.
Epidermal Closure Graft Donor Site (Optional): simple interrupted
Complex Repair And Modified Advancement Flap Text: The defect edges were debeveled with a #15 scalpel blade.  The primary defect was closed partially with a complex linear closure.  Given the location of the remaining defect, shape of the defect and the proximity to free margins a modified advancement flap was deemed most appropriate for complete closure of the defect.  Using a sterile surgical marker, an appropriate advancement flap was drawn incorporating the defect and placing the expected incisions within the relaxed skin tension lines where possible.    The area thus outlined was incised deep to adipose tissue with a #15 scalpel blade.  The skin margins were undermined to an appropriate distance in all directions utilizing iris scissors.
A-T Advancement Flap Text: The defect edges were debeveled with a #15 scalpel blade.  Given the location of the defect, shape of the defect and the proximity to free margins an A-T advancement flap was deemed most appropriate.  Using a sterile surgical marker, an appropriate advancement flap was drawn incorporating the defect and placing the expected incisions within the relaxed skin tension lines where possible.    The area thus outlined was incised deep to adipose tissue with a #15 scalpel blade.  The skin margins were undermined to an appropriate distance in all directions utilizing iris scissors.
Complex Repair And O-T Advancement Flap Text: The defect edges were debeveled with a #15 scalpel blade.  The primary defect was closed partially with a complex linear closure.  Given the location of the remaining defect, shape of the defect and the proximity to free margins an O-T advancement flap was deemed most appropriate for complete closure of the defect.  Using a sterile surgical marker, an appropriate advancement flap was drawn incorporating the defect and placing the expected incisions within the relaxed skin tension lines where possible.    The area thus outlined was incised deep to adipose tissue with a #15 scalpel blade.  The skin margins were undermined to an appropriate distance in all directions utilizing iris scissors.
Bi-Rhombic Flap Text: The defect edges were debeveled with a #15 scalpel blade.  Given the location of the defect and the proximity to free margins a bi-rhombic flap was deemed most appropriate.  Using a sterile surgical marker, an appropriate rhombic flap was drawn incorporating the defect. The area thus outlined was incised deep to adipose tissue with a #15 scalpel blade.  The skin margins were undermined to an appropriate distance in all directions utilizing iris scissors.
Banner Transposition Flap Text: The defect edges were debeveled with a #15 scalpel blade.  Given the location of the defect and the proximity to free margins a Banner transposition flap was deemed most appropriate.  Using a sterile surgical marker, an appropriate flap drawn around the defect. The area thus outlined was incised deep to adipose tissue with a #15 scalpel blade.  The skin margins were undermined to an appropriate distance in all directions utilizing iris scissors.
Suture Removal: 14 days
Alar Island Pedicle Flap Text: The defect edges were debeveled with a #15 scalpel blade.  Given the location of the defect, shape of the defect and the proximity to the alar rim an island pedicle advancement flap was deemed most appropriate.  Using a sterile surgical marker, an appropriate advancement flap was drawn incorporating the defect, outlining the appropriate donor tissue and placing the expected incisions within the nasal ala running parallel to the alar rim. The area thus outlined was incised with a #15 scalpel blade.  The skin margins were undermined minimally to an appropriate distance in all directions around the primary defect and laterally outward around the island pedicle utilizing iris scissors.  There was minimal undermining beneath the pedicle flap.
Complex Repair And Skin Substitute Graft Text: The defect edges were debeveled with a #15 scalpel blade.  The primary defect was closed partially with a complex linear closure.  Given the location of the remaining defect, shape of the defect and the proximity to free margins a skin substitute graft was deemed most appropriate to repair the remaining defect.  The graft was trimmed to fit the size of the remaining defect.  The graft was then placed in the primary defect, oriented appropriately, and sutured into place.
O-T Plasty Text: The defect edges were debeveled with a #15 scalpel blade.  Given the location of the defect, shape of the defect and the proximity to free margins an O-T plasty was deemed most appropriate.  Using a sterile surgical marker, an appropriate O-T plasty was drawn incorporating the defect and placing the expected incisions within the relaxed skin tension lines where possible.    The area thus outlined was incised deep to adipose tissue with a #15 scalpel blade.  The skin margins were undermined to an appropriate distance in all directions utilizing iris scissors.
Advancement Flap (Double) Text: The defect edges were debeveled with a #15 scalpel blade.  Given the location of the defect and the proximity to free margins a double advancement flap was deemed most appropriate.  Using a sterile surgical marker, the appropriate advancement flaps were drawn incorporating the defect and placing the expected incisions within the relaxed skin tension lines where possible.    The area thus outlined was incised deep to adipose tissue with a #15 scalpel blade.  The skin margins were undermined to an appropriate distance in all directions utilizing iris scissors.
Double Island Pedicle Flap Text: The defect edges were debeveled with a #15 scalpel blade.  Given the location of the defect, shape of the defect and the proximity to free margins a double island pedicle advancement flap was deemed most appropriate.  Using a sterile surgical marker, an appropriate advancement flap was drawn incorporating the defect, outlining the appropriate donor tissue and placing the expected incisions within the relaxed skin tension lines where possible.    The area thus outlined was incised deep to adipose tissue with a #15 scalpel blade.  The skin margins were undermined to an appropriate distance in all directions around the primary defect and laterally outward around the island pedicle utilizing iris scissors.  There was minimal undermining beneath the pedicle flap.
Keystone Flap Text: The defect edges were debeveled with a #15 scalpel blade.  Given the location of the defect, shape of the defect a keystone flap was deemed most appropriate.  Using a sterile surgical marker, an appropriate keystone flap was drawn incorporating the defect, outlining the appropriate donor tissue and placing the expected incisions within the relaxed skin tension lines where possible. The area thus outlined was incised deep to adipose tissue with a #15 scalpel blade.  The skin margins were undermined to an appropriate distance in all directions around the primary defect and laterally outward around the flap utilizing iris scissors.

## 2018-10-01 ENCOUNTER — RX ONLY (OUTPATIENT)
Age: 68
Setting detail: RX ONLY
End: 2018-10-01

## 2018-10-01 RX ORDER — HYDROXYZINE HYDROCHLORIDE 25 MG/1
TABLET, FILM COATED ORAL
Qty: 30 | Refills: 1 | Status: ERX

## 2018-10-01 RX ORDER — TRIAMCINOLONE ACETONIDE 1 MG/G
CREAM TOPICAL
Qty: 1 | Refills: 2 | Status: ERX

## 2018-10-10 ENCOUNTER — APPOINTMENT (RX ONLY)
Dept: URBAN - METROPOLITAN AREA CLINIC 23 | Facility: CLINIC | Age: 68
Setting detail: DERMATOLOGY
End: 2018-10-10

## 2018-10-10 DIAGNOSIS — Z48.01 ENCOUNTER FOR CHANGE OR REMOVAL OF SURGICAL WOUND DRESSING: ICD-10-CM

## 2018-10-10 PROCEDURE — 99024 POSTOP FOLLOW-UP VISIT: CPT

## 2018-10-10 PROCEDURE — ? SUTURE REMOVAL (GLOBAL PERIOD)

## 2018-10-10 ASSESSMENT — LOCATION ZONE DERM: LOCATION ZONE: ARM

## 2018-10-10 ASSESSMENT — LOCATION SIMPLE DESCRIPTION DERM: LOCATION SIMPLE: LEFT UPPER ARM

## 2018-10-10 ASSESSMENT — LOCATION DETAILED DESCRIPTION DERM: LOCATION DETAILED: LEFT LATERAL PROXIMAL UPPER ARM

## 2018-10-10 NOTE — PROCEDURE: SUTURE REMOVAL (GLOBAL PERIOD)
Add 81285 Cpt? (Important Note: In 2017 The Use Of 68595 Is Being Tracked By Cms To Determine Future Global Period Reimbursement For Global Periods): no
Detail Level: Detailed

## 2018-11-07 ENCOUNTER — APPOINTMENT (RX ONLY)
Dept: URBAN - METROPOLITAN AREA CLINIC 23 | Facility: CLINIC | Age: 68
Setting detail: DERMATOLOGY
End: 2018-11-07

## 2018-11-07 DIAGNOSIS — L74.51 PRIMARY FOCAL HYPERHIDROSIS: ICD-10-CM | Status: INADEQUATELY CONTROLLED

## 2018-11-07 DIAGNOSIS — Z87.2 PERSONAL HISTORY OF DISEASES OF THE SKIN AND SUBCUTANEOUS TISSUE: ICD-10-CM

## 2018-11-07 PROBLEM — L74.519 PRIMARY FOCAL HYPERHIDROSIS, UNSPECIFIED: Status: ACTIVE | Noted: 2018-11-07

## 2018-11-07 PROCEDURE — 99214 OFFICE O/P EST MOD 30 MIN: CPT

## 2018-11-07 PROCEDURE — ? DEFER

## 2018-11-07 PROCEDURE — ? ORDER TESTS

## 2018-11-07 PROCEDURE — ? OTHER

## 2018-11-07 PROCEDURE — ? COUNSELING

## 2018-11-07 ASSESSMENT — LOCATION DETAILED DESCRIPTION DERM
LOCATION DETAILED: LEFT LATERAL PROXIMAL UPPER ARM
LOCATION DETAILED: RIGHT PROXIMAL PRETIBIAL REGION
LOCATION DETAILED: MID POSTERIOR NECK

## 2018-11-07 ASSESSMENT — LOCATION ZONE DERM
LOCATION ZONE: ARM
LOCATION ZONE: LEG
LOCATION ZONE: NECK

## 2018-11-07 ASSESSMENT — LOCATION SIMPLE DESCRIPTION DERM
LOCATION SIMPLE: POSTERIOR NECK
LOCATION SIMPLE: RIGHT PRETIBIAL REGION
LOCATION SIMPLE: LEFT UPPER ARM

## 2018-11-07 NOTE — PROCEDURE: COUNSELING
Medical Necessity Information: LCD Guidelines vary from payer to payer. Please check with your payer's policy to determine medical necessity.
Medical Necessity Clause: Botulinum toxin hyperhidrosis therapy is medically necessary because
Detail Level: Zone

## 2018-11-16 PROBLEM — K59.00 CONSTIPATION: Status: ACTIVE | Noted: 2018-11-16

## 2018-11-16 PROBLEM — R93.89 ABNORMAL FINDINGS ON DIAGNOSTIC IMAGING OF OTHER SPECIFIED BODY STRUCTURES: Status: ACTIVE | Noted: 2018-11-16

## 2018-11-16 PROBLEM — Z86.010 PERSONAL HISTORY OF COLONIC POLYPS: Status: ACTIVE | Noted: 2018-11-16

## 2018-11-16 PROBLEM — K62.5 RECTAL BLEEDING: Status: ACTIVE | Noted: 2018-11-16

## 2019-01-02 ENCOUNTER — APPOINTMENT (RX ONLY)
Dept: URBAN - METROPOLITAN AREA CLINIC 23 | Facility: CLINIC | Age: 69
Setting detail: DERMATOLOGY
End: 2019-01-02

## 2019-01-02 DIAGNOSIS — L82.1 OTHER SEBORRHEIC KERATOSIS: ICD-10-CM

## 2019-01-02 DIAGNOSIS — L74.51 PRIMARY FOCAL HYPERHIDROSIS: ICD-10-CM

## 2019-01-02 DIAGNOSIS — D485 NEOPLASM OF UNCERTAIN BEHAVIOR OF SKIN: ICD-10-CM

## 2019-01-02 DIAGNOSIS — L30.9 DERMATITIS, UNSPECIFIED: ICD-10-CM

## 2019-01-02 PROBLEM — D48.5 NEOPLASM OF UNCERTAIN BEHAVIOR OF SKIN: Status: ACTIVE | Noted: 2019-01-02

## 2019-01-02 PROBLEM — L74.519 PRIMARY FOCAL HYPERHIDROSIS, UNSPECIFIED: Status: ACTIVE | Noted: 2019-01-02

## 2019-01-02 PROCEDURE — ? COUNSELING

## 2019-01-02 PROCEDURE — ? BIOPSY BY SHAVE METHOD

## 2019-01-02 PROCEDURE — 11103 TANGNTL BX SKIN EA SEP/ADDL: CPT

## 2019-01-02 PROCEDURE — 99213 OFFICE O/P EST LOW 20 MIN: CPT | Mod: 25

## 2019-01-02 PROCEDURE — 11102 TANGNTL BX SKIN SINGLE LES: CPT

## 2019-01-02 PROCEDURE — ? OTHER

## 2019-01-02 ASSESSMENT — LOCATION ZONE DERM
LOCATION ZONE: TRUNK
LOCATION ZONE: ARM
LOCATION ZONE: NECK

## 2019-01-02 ASSESSMENT — LOCATION SIMPLE DESCRIPTION DERM
LOCATION SIMPLE: RIGHT ELBOW
LOCATION SIMPLE: LEFT FOREARM
LOCATION SIMPLE: CHEST
LOCATION SIMPLE: RIGHT FOREARM
LOCATION SIMPLE: POSTERIOR NECK

## 2019-01-02 ASSESSMENT — LOCATION DETAILED DESCRIPTION DERM
LOCATION DETAILED: MIDDLE STERNUM
LOCATION DETAILED: LEFT PROXIMAL DORSAL FOREARM
LOCATION DETAILED: RIGHT ELBOW
LOCATION DETAILED: RIGHT DISTAL DORSAL FOREARM
LOCATION DETAILED: MID POSTERIOR NECK

## 2019-01-02 NOTE — PROCEDURE: BIOPSY BY SHAVE METHOD
Path Notes (To The Dermatopathologist): .
Notification Instructions: Patient will be notified of biopsy results. However, patient instructed to call the office if not contacted within 2 weeks.
Detail Level: Detailed
Bill For Surgical Tray: no
Electrodesiccation And Curettage Text: The wound bed was treated with electrodesiccation and curettage after the biopsy was performed.
Was A Bandage Applied: Yes
Billing Type: Third-Party Bill
Consent: Written consent was obtained and risks were reviewed including but not limited to scarring, infection, bleeding, scabbing, incomplete removal, nerve damage and allergy to anesthesia.
Curettage Text: The wound bed was treated with curettage after the biopsy was performed.
Hemostasis: Aluminum Chloride
Additional Anesthesia Volume In Cc (Will Not Render If 0): 0
Electrodesiccation Text: The wound bed was treated with electrodesiccation after the biopsy was performed.
Anesthesia Type: 1% lidocaine with epinephrine
Type Of Destruction Used: Curettage
Wound Care: Vaseline
Silver Nitrate Text: The wound bed was treated with silver nitrate after the biopsy was performed.
Cryotherapy Text: The wound bed was treated with cryotherapy after the biopsy was performed.
Biopsy Type: H and E
Post-Care Instructions: I reviewed with the patient in detail post-care instructions. Patient is to keep the biopsy site dry overnight, and then apply bacitracin twice daily until healed. Patient may apply hydrogen peroxide soaks to remove any crusting.
Dressing: bandage
Biopsy Method: Dermablade
Depth Of Biopsy: dermis
Anesthesia Volume In Cc: 0.5

## 2019-01-02 NOTE — PROCEDURE: OTHER
Other (Free Text): Pt had labs drawn by pcp. She is in third stage kidney disease. She is unsure what the results were, due to pcp being more concerned with her kidneys. \\n\\nStressed importance of checking thyroid levels. Pt is to continue synthroid at this time.\\n\\nWill request lab work.
Detail Level: Zone
Note Text (......Xxx Chief Complaint.): This diagnosis correlates with the

## 2019-01-02 NOTE — PROCEDURE: COUNSELING
Detail Level: Zone
Detail Level: Generalized
Medical Necessity Clause: Botulinum toxin hyperhidrosis therapy is medically necessary because
Medical Necessity Information: LCD Guidelines vary from payer to payer. Please check with your payer's policy to determine medical necessity.

## 2019-01-10 ENCOUNTER — HOSPITAL ENCOUNTER (INPATIENT)
Age: 69
LOS: 6 days | Discharge: HOME | DRG: 390 | End: 2019-01-16
Attending: EMERGENCY MEDICINE | Admitting: SURGERY
Payer: MEDICARE

## 2019-01-10 ENCOUNTER — APPOINTMENT (OUTPATIENT)
Dept: CT IMAGING | Age: 69
DRG: 390 | End: 2019-01-10
Attending: EMERGENCY MEDICINE
Payer: MEDICARE

## 2019-01-10 DIAGNOSIS — K56.609 SMALL BOWEL OBSTRUCTION (HCC): Primary | ICD-10-CM

## 2019-01-10 LAB
ALBUMIN SERPL-MCNC: 4.3 G/DL (ref 3.2–4.6)
ALBUMIN/GLOB SERPL: 1.2 {RATIO} (ref 1.2–3.5)
ALP SERPL-CCNC: 71 U/L (ref 50–136)
ALT SERPL-CCNC: 33 U/L (ref 12–65)
ANION GAP SERPL CALC-SCNC: 9 MMOL/L (ref 7–16)
AST SERPL-CCNC: 16 U/L (ref 15–37)
BASOPHILS # BLD: 0.1 K/UL (ref 0–0.2)
BASOPHILS NFR BLD: 1 % (ref 0–2)
BILIRUB SERPL-MCNC: 1.4 MG/DL (ref 0.2–1.1)
BUN SERPL-MCNC: 19 MG/DL (ref 8–23)
CALCIUM SERPL-MCNC: 8.9 MG/DL (ref 8.3–10.4)
CHLORIDE SERPL-SCNC: 103 MMOL/L (ref 98–107)
CO2 SERPL-SCNC: 27 MMOL/L (ref 21–32)
CREAT SERPL-MCNC: 1.03 MG/DL (ref 0.6–1)
DIFFERENTIAL METHOD BLD: ABNORMAL
EOSINOPHIL # BLD: 0.1 K/UL (ref 0–0.8)
EOSINOPHIL NFR BLD: 1 % (ref 0.5–7.8)
ERYTHROCYTE [DISTWIDTH] IN BLOOD BY AUTOMATED COUNT: 12.8 % (ref 11.9–14.6)
GLOBULIN SER CALC-MCNC: 3.5 G/DL (ref 2.3–3.5)
GLUCOSE SERPL-MCNC: 115 MG/DL (ref 65–100)
HCT VFR BLD AUTO: 47.7 % (ref 35.8–46.3)
HGB BLD-MCNC: 16.2 G/DL (ref 11.7–15.4)
IMM GRANULOCYTES # BLD AUTO: 0 K/UL (ref 0–0.5)
IMM GRANULOCYTES NFR BLD AUTO: 0 % (ref 0–5)
LYMPHOCYTES # BLD: 1.5 K/UL (ref 0.5–4.6)
LYMPHOCYTES NFR BLD: 14 % (ref 13–44)
MCH RBC QN AUTO: 31.2 PG (ref 26.1–32.9)
MCHC RBC AUTO-ENTMCNC: 34 G/DL (ref 31.4–35)
MCV RBC AUTO: 91.9 FL (ref 79.6–97.8)
MONOCYTES # BLD: 0.7 K/UL (ref 0.1–1.3)
MONOCYTES NFR BLD: 6 % (ref 4–12)
NEUTS SEG # BLD: 8.7 K/UL (ref 1.7–8.2)
NEUTS SEG NFR BLD: 79 % (ref 43–78)
NRBC # BLD: 0 K/UL (ref 0–0.2)
PLATELET # BLD AUTO: 244 K/UL (ref 150–450)
PMV BLD AUTO: 9.8 FL (ref 9.4–12.3)
POTASSIUM SERPL-SCNC: 3.2 MMOL/L (ref 3.5–5.1)
PROT SERPL-MCNC: 7.8 G/DL (ref 6.3–8.2)
RBC # BLD AUTO: 5.19 M/UL (ref 4.05–5.2)
SODIUM SERPL-SCNC: 139 MMOL/L (ref 136–145)
TROPONIN I BLD-MCNC: 0.01 NG/ML (ref 0.02–0.05)
WBC # BLD AUTO: 11 K/UL (ref 4.3–11.1)

## 2019-01-10 PROCEDURE — 85025 COMPLETE CBC W/AUTO DIFF WBC: CPT

## 2019-01-10 PROCEDURE — 93005 ELECTROCARDIOGRAM TRACING: CPT | Performed by: EMERGENCY MEDICINE

## 2019-01-10 PROCEDURE — 0D9670Z DRAINAGE OF STOMACH WITH DRAINAGE DEVICE, VIA NATURAL OR ARTIFICIAL OPENING: ICD-10-PCS | Performed by: EMERGENCY MEDICINE

## 2019-01-10 PROCEDURE — 65270000029 HC RM PRIVATE

## 2019-01-10 PROCEDURE — 74011636320 HC RX REV CODE- 636/320: Performed by: EMERGENCY MEDICINE

## 2019-01-10 PROCEDURE — 96361 HYDRATE IV INFUSION ADD-ON: CPT | Performed by: EMERGENCY MEDICINE

## 2019-01-10 PROCEDURE — 74011250636 HC RX REV CODE- 250/636: Performed by: EMERGENCY MEDICINE

## 2019-01-10 PROCEDURE — 99285 EMERGENCY DEPT VISIT HI MDM: CPT | Performed by: EMERGENCY MEDICINE

## 2019-01-10 PROCEDURE — 84484 ASSAY OF TROPONIN QUANT: CPT

## 2019-01-10 PROCEDURE — 80053 COMPREHEN METABOLIC PANEL: CPT

## 2019-01-10 PROCEDURE — 96374 THER/PROPH/DIAG INJ IV PUSH: CPT | Performed by: EMERGENCY MEDICINE

## 2019-01-10 PROCEDURE — 74011000258 HC RX REV CODE- 258: Performed by: EMERGENCY MEDICINE

## 2019-01-10 PROCEDURE — 96375 TX/PRO/DX INJ NEW DRUG ADDON: CPT | Performed by: EMERGENCY MEDICINE

## 2019-01-10 PROCEDURE — 74177 CT ABD & PELVIS W/CONTRAST: CPT

## 2019-01-10 PROCEDURE — 77030008771 HC TU NG SALEM SUMP -A

## 2019-01-10 RX ORDER — ONDANSETRON 2 MG/ML
4 INJECTION INTRAMUSCULAR; INTRAVENOUS
Status: COMPLETED | OUTPATIENT
Start: 2019-01-10 | End: 2019-01-10

## 2019-01-10 RX ORDER — LIDOCAINE HYDROCHLORIDE 40 MG/ML
3 SOLUTION TOPICAL ONCE
Status: DISCONTINUED | OUTPATIENT
Start: 2019-01-10 | End: 2019-01-11 | Stop reason: SDUPTHER

## 2019-01-10 RX ORDER — SODIUM CHLORIDE 0.9 % (FLUSH) 0.9 %
10 SYRINGE (ML) INJECTION
Status: COMPLETED | OUTPATIENT
Start: 2019-01-10 | End: 2019-01-10

## 2019-01-10 RX ORDER — SODIUM CHLORIDE 9 MG/ML
125 INJECTION, SOLUTION INTRAVENOUS ONCE
Status: COMPLETED | OUTPATIENT
Start: 2019-01-10 | End: 2019-01-11

## 2019-01-10 RX ORDER — MORPHINE SULFATE 10 MG/ML
5 INJECTION, SOLUTION INTRAMUSCULAR; INTRAVENOUS
Status: COMPLETED | OUTPATIENT
Start: 2019-01-10 | End: 2019-01-10

## 2019-01-10 RX ORDER — SODIUM CHLORIDE 9 MG/ML
500 INJECTION, SOLUTION INTRAVENOUS ONCE
Status: COMPLETED | OUTPATIENT
Start: 2019-01-10 | End: 2019-01-10

## 2019-01-10 RX ADMIN — IOPAMIDOL 100 ML: 755 INJECTION, SOLUTION INTRAVENOUS at 22:27

## 2019-01-10 RX ADMIN — ONDANSETRON 4 MG: 2 INJECTION INTRAMUSCULAR; INTRAVENOUS at 22:01

## 2019-01-10 RX ADMIN — SODIUM CHLORIDE 100 ML: 900 INJECTION, SOLUTION INTRAVENOUS at 22:27

## 2019-01-10 RX ADMIN — SODIUM CHLORIDE 500 ML: 900 INJECTION, SOLUTION INTRAVENOUS at 22:01

## 2019-01-10 RX ADMIN — MORPHINE SULFATE 5 MG: 10 INJECTION INTRAVENOUS at 22:05

## 2019-01-10 RX ADMIN — Medication 10 ML: at 22:27

## 2019-01-10 RX ADMIN — SODIUM CHLORIDE 125 ML/HR: 900 INJECTION, SOLUTION INTRAVENOUS at 22:15

## 2019-01-11 ENCOUNTER — APPOINTMENT (OUTPATIENT)
Dept: GENERAL RADIOLOGY | Age: 69
DRG: 390 | End: 2019-01-11
Attending: EMERGENCY MEDICINE
Payer: MEDICARE

## 2019-01-11 ENCOUNTER — APPOINTMENT (OUTPATIENT)
Dept: GENERAL RADIOLOGY | Age: 69
DRG: 390 | End: 2019-01-11
Attending: SURGERY
Payer: MEDICARE

## 2019-01-11 LAB
ATRIAL RATE: 89 BPM
CALCULATED P AXIS, ECG09: 61 DEGREES
CALCULATED R AXIS, ECG10: -26 DEGREES
CALCULATED T AXIS, ECG11: 100 DEGREES
DIAGNOSIS, 93000: NORMAL
P-R INTERVAL, ECG05: 164 MS
Q-T INTERVAL, ECG07: 330 MS
QRS DURATION, ECG06: 104 MS
QTC CALCULATION (BEZET), ECG08: 401 MS
VENTRICULAR RATE, ECG03: 89 BPM

## 2019-01-11 PROCEDURE — 74011250637 HC RX REV CODE- 250/637: Performed by: SURGERY

## 2019-01-11 PROCEDURE — 94640 AIRWAY INHALATION TREATMENT: CPT

## 2019-01-11 PROCEDURE — 77030012893

## 2019-01-11 PROCEDURE — 74011000250 HC RX REV CODE- 250: Performed by: EMERGENCY MEDICINE

## 2019-01-11 PROCEDURE — 74022 RADEX COMPL AQT ABD SERIES: CPT

## 2019-01-11 PROCEDURE — 74018 RADEX ABDOMEN 1 VIEW: CPT

## 2019-01-11 PROCEDURE — 74011000250 HC RX REV CODE- 250: Performed by: SURGERY

## 2019-01-11 PROCEDURE — 74011250636 HC RX REV CODE- 250/636: Performed by: SURGERY

## 2019-01-11 PROCEDURE — 74011250637 HC RX REV CODE- 250/637: Performed by: NURSE PRACTITIONER

## 2019-01-11 PROCEDURE — 65270000029 HC RM PRIVATE

## 2019-01-11 RX ORDER — LIDOCAINE HYDROCHLORIDE 20 MG/ML
15 SOLUTION OROPHARYNGEAL ONCE
Status: COMPLETED | OUTPATIENT
Start: 2019-01-11 | End: 2019-01-11

## 2019-01-11 RX ORDER — SODIUM CHLORIDE 0.9 % (FLUSH) 0.9 %
5-40 SYRINGE (ML) INJECTION AS NEEDED
Status: DISCONTINUED | OUTPATIENT
Start: 2019-01-11 | End: 2019-01-16 | Stop reason: HOSPADM

## 2019-01-11 RX ORDER — ALPRAZOLAM 0.5 MG/1
1 TABLET ORAL
Status: COMPLETED | OUTPATIENT
Start: 2019-01-11 | End: 2019-01-11

## 2019-01-11 RX ORDER — MORPHINE SULFATE 10 MG/ML
5 INJECTION, SOLUTION INTRAMUSCULAR; INTRAVENOUS
Status: DISCONTINUED | OUTPATIENT
Start: 2019-01-11 | End: 2019-01-16 | Stop reason: HOSPADM

## 2019-01-11 RX ORDER — ONDANSETRON 2 MG/ML
4 INJECTION INTRAMUSCULAR; INTRAVENOUS
Status: DISCONTINUED | OUTPATIENT
Start: 2019-01-11 | End: 2019-01-16 | Stop reason: HOSPADM

## 2019-01-11 RX ORDER — LEVOTHYROXINE SODIUM 100 UG/1
100 TABLET ORAL
Status: DISCONTINUED | OUTPATIENT
Start: 2019-01-11 | End: 2019-01-16 | Stop reason: HOSPADM

## 2019-01-11 RX ORDER — DEXTROSE, SODIUM CHLORIDE, AND POTASSIUM CHLORIDE 5; .45; .15 G/100ML; G/100ML; G/100ML
100 INJECTION INTRAVENOUS CONTINUOUS
Status: DISCONTINUED | OUTPATIENT
Start: 2019-01-11 | End: 2019-01-14

## 2019-01-11 RX ORDER — ALBUTEROL SULFATE 0.83 MG/ML
2.5 SOLUTION RESPIRATORY (INHALATION)
Status: DISCONTINUED | OUTPATIENT
Start: 2019-01-11 | End: 2019-01-16 | Stop reason: HOSPADM

## 2019-01-11 RX ORDER — SODIUM CHLORIDE 0.9 % (FLUSH) 0.9 %
5-40 SYRINGE (ML) INJECTION EVERY 8 HOURS
Status: DISCONTINUED | OUTPATIENT
Start: 2019-01-11 | End: 2019-01-16 | Stop reason: HOSPADM

## 2019-01-11 RX ADMIN — ALBUTEROL SULFATE 2.5 MG: 2.5 SOLUTION RESPIRATORY (INHALATION) at 02:15

## 2019-01-11 RX ADMIN — DEXTROSE MONOHYDRATE, SODIUM CHLORIDE, AND POTASSIUM CHLORIDE 100 ML/HR: 50; 4.5; 1.49 INJECTION, SOLUTION INTRAVENOUS at 01:19

## 2019-01-11 RX ADMIN — LIDOCAINE HYDROCHLORIDE 15 ML: 20 SOLUTION ORAL; TOPICAL at 02:07

## 2019-01-11 RX ADMIN — Medication 10 ML: at 12:21

## 2019-01-11 RX ADMIN — LEVOTHYROXINE SODIUM 100 MCG: 100 TABLET ORAL at 08:08

## 2019-01-11 RX ADMIN — Medication 10 ML: at 23:51

## 2019-01-11 RX ADMIN — MORPHINE SULFATE 5 MG: 10 INJECTION INTRAVENOUS at 23:51

## 2019-01-11 RX ADMIN — MORPHINE SULFATE 5 MG: 10 INJECTION INTRAVENOUS at 20:18

## 2019-01-11 RX ADMIN — DEXTROSE MONOHYDRATE, SODIUM CHLORIDE, AND POTASSIUM CHLORIDE 100 ML/HR: 50; 4.5; 1.49 INJECTION, SOLUTION INTRAVENOUS at 23:47

## 2019-01-11 RX ADMIN — MORPHINE SULFATE 5 MG: 10 INJECTION INTRAVENOUS at 16:11

## 2019-01-11 RX ADMIN — MORPHINE SULFATE 5 MG: 10 INJECTION INTRAVENOUS at 12:21

## 2019-01-11 RX ADMIN — DEXTROSE MONOHYDRATE, SODIUM CHLORIDE, AND POTASSIUM CHLORIDE 100 ML/HR: 50; 4.5; 1.49 INJECTION, SOLUTION INTRAVENOUS at 12:26

## 2019-01-11 RX ADMIN — ONDANSETRON 4 MG: 2 INJECTION INTRAMUSCULAR; INTRAVENOUS at 03:43

## 2019-01-11 RX ADMIN — ONDANSETRON 4 MG: 2 INJECTION INTRAMUSCULAR; INTRAVENOUS at 08:59

## 2019-01-11 RX ADMIN — MORPHINE SULFATE 5 MG: 10 INJECTION INTRAVENOUS at 03:43

## 2019-01-11 RX ADMIN — ALPRAZOLAM 1 MG: 0.5 TABLET ORAL at 08:57

## 2019-01-11 NOTE — PROGRESS NOTES
Progress Note Patient: Rachelle Bains MRN: [id number]  SSN: [id number] YOB: 1950  Age: 77 y.o. Sex: female Admit Date: 1/10/2019 * No surgery found * Procedure:   
 
Subjective:  
 
Patient seen in ED. Feeling much better today since NG tube placed. Abdomen softer. AF, NAD. Objective:  
 
Visit Vitals /74 (BP 1 Location: Left arm, BP Patient Position: At rest;Head of bed elevated (Comment degrees)) Pulse 94 Temp 98.6 °F (37 °C) Resp 18 Ht 5' 6\" (1.676 m) Wt 180 lb (81.6 kg) SpO2 92% BMI 29.05 kg/m² Temp (24hrs), Av.3 °F (34.6 °C), Min:82 °F (27.8 °C), Max:98.8 °F (37.1 °C) Physical Exam:   
Constitutional: Alert, cooperative, no acute distress Eyes: Sclera are clear. EOMs intact ENMT: no external lesions' gross hearing normal; no obvious neck masses, no ear or lip lesions, nares normal, NGT 
CV: RRR. Normal perfusion Resp: No JVD. Breathing is  non-labored; no audible wheezing. GI: soft, non-tender, non-distended Musculoskeletal: No embolic signs or cyanosis. Neuro:  Oriented; moves all 4; no focal deficits Psychiatric: normal affect and mood, no memory impairment Data Review: images and reports reviewed 19: Abdomen x-ray single view. 
  
HISTORY: Nasogastric tube placement. 
  
COMPARISON: 2019. 
  
FINDINGS: Nasogastric tube its tip in the gastric body. Bowel gas pattern is 
nonspecific. There is evidence of right hydronephrosis with contrast in the 
right collecting system. IMPRESSION: 
  
Nasogastric tube its tip in the gastric body. 
  
Right hydronephrosis. Lab Review: All lab results for the last 24 hours reviewed. Assessment:  
 
Hospital Problems  Date Reviewed: 2018 Codes Class Noted POA  
 SBO (small bowel obstruction) ([facility] [address] ICD-10-CM: S16.755 ICD-9-CM: 560.9  1/10/2019 Unknown Plan/Recommendations/Medical Decision Making:  
 
Conservative management NPO with ice chips Pain/ nausea control IVF I&O 
OOB/ chair Signed By: Sherie Cranker, NP   
 January 11, 2019

## 2019-01-11 NOTE — PROGRESS NOTES
TRANSFER - IN REPORT: 
 
Verbal report received from Janusz, [id number](name) on Gato Going  being received from ED(unit) for routine progression of care Report consisted of patients Situation, Background, Assessment and  
Recommendations(SBAR). Information from the following report(s) SBAR, Kardex, ED Summary, Intake/Output, MAR, Accordion, Recent Results and Med Rec Status was reviewed with the receiving nurse. Opportunity for questions and clarification was provided. Assessment completed upon patients arrival to unit and care assumed.

## 2019-01-11 NOTE — ROUTINE PROCESS
TRANSFER - OUT REPORT: 
 
Verbal report given to Gail Lin RN on Jaspreet Irene  being transferred to [facility] for routine progression of care Report consisted of patients Situation, Background, Assessment and  
Recommendations(SBAR). Information from the following report(s) SBAR was reviewed with the receiving nurse. Lines:  
Peripheral IV 01/11/19 Left Forearm (Active) Site Assessment Clean, dry, & intact 1/11/2019 12:19 AM  
Phlebitis Assessment 0 1/11/2019 12:19 AM  
Infiltration Assessment 0 1/11/2019 12:19 AM  
Dressing Status Clean, dry, & intact 1/11/2019 12:19 AM  
  
 
Opportunity for questions and clarification was provided. Patient transported with: 
 [employer]

## 2019-01-11 NOTE — PROGRESS NOTES
01/11/19 1221 Dual Skin Pressure Injury Assessment Dual Skin Pressure Injury Assessment [facility] Second Care Provider (Based on [address] [place] [address]) Kailey Wolfe, RN Skin intact.  Scattered rash all over pt's body that pt states she is being treated by her dermatologist

## 2019-01-11 NOTE — ED PROVIDER NOTES
The history is provided by the patient. Abdominal Pain This is a new problem. Episode onset: 5 PM. The problem occurs constantly. The problem has not changed since onset. The pain is associated with vomiting. The pain is located in the LUQ and LLQ. The quality of the pain is aching. The pain is moderate. Associated symptoms include nausea, vomiting and chest pain. Pertinent negatives include no fever, no diarrhea, no hematochezia, no melena, no constipation, no dysuria, no headaches, no myalgias and no back pain. Nothing worsens the pain. The pain is relieved by vomiting. Past workup includes CT scan. Her past medical history is significant for diverticulitis and small bowel obstruction. The patient's surgical history includes appendectomy and cholecystectomy. surgery for bowel obstruction Past Medical History:  
Diagnosis Date  Allergic rhinitis  Anxiety  Arthritis   
 fingers  Asthma   
 prn inhaler  Calculus of kidney x 2-- last over 30 yrs ago  Cancer ([facility] [address] melanoma R breast  
 Chronic pain   
 feet  Depression   
 worsening  Dizziness 09/08/2016  
 not a recent problem  Dyslipidemia  Fatigue  Former cigarette smoker  GERD (gastroesophageal reflux disease)   
 controlled with med  Heart murmur 11/24/2015  
 due to MVP-- followed by dr Felix Patel  HTN (hypertension)   
 controlled with med  Hypercholesterolemia  Hypothyroid  IBS (irritable bowel syndrome)  Migraines  Mitral valve insufficiency   
 rheumatic fever as a child --- followed by dr Felix Patel--- last echo 2017  Nausea & vomiting   
 post-op  Peripheral neuropathy   
 bilat feet  Rheumatic fever   
 as a child  Seizures ([facility] [address]   
 febrile seiz. as a child , no problems after that.  Unspecified adverse effect of anesthesia   
 elevated temp after colectomy only per pt Past Surgical History:  
Procedure Laterality Date  HX APPENDECTOMY  HX BLADDER SUSPENSION    
 HX BREAST LUMPECTOMY Right   
 melanoma breast - with lymph node biopsies.  HX COLECTOMY for obstruction - about 14 inches removed  HX COLONOSCOPY    
 HX HYSTERECTOMY  HX KNEE ARTHROSCOPY Left  HX KNEE ARTHROSCOPY Right 10/16/09; 2015  HX KNEE REPLACEMENT Right 3/2015  HX KNEE REPLACEMENT Left 2016  HX LAP CHOLECYSTECTOMY  HX ORTHOPAEDIC    
 C- 7 ACDF  
 HX OTHER SURGICAL    
 urethra repair  HX OTHER SURGICAL    
 melanoma resected from right chest wall  HX TONSIL AND ADENOIDECTOMY Family History:  
Problem Relation Age of Onset 22 [facility] Other Mother  Delayed Awakening Mother  Thyroid Disease Mother  Diabetes Mother  Cancer Mother   
     breast cancer  Breast Problems Mother  Heart Disease Father  Hypertension Father  Heart Failure Father CHF  Delayed Awakening Sister  Thyroid Disease Sister  Breast Problems Sister  Cancer Other   
     breast  
 Diabetes Other   
     type II  
 High Cholesterol Other  Elevated Lipids Other  Hypertension Other  Thyroid Disease Other   
     hypo, acquired  Breast Problems Maternal Aunt Social History Socioeconomic History  Marital status:  Spouse name: Not on file  Number of children: Not on file  Years of education: Not on file  Highest education level: Not on file Social Needs  Financial resource strain: Not on file  Food insecurity - worry: Not on file  Food insecurity - inability: Not on file  Transportation needs - medical: Not on file  Transportation needs - non-medical: Not on file Occupational History  Not on file Tobacco Use  Smoking status: Former Smoker Packs/day: 0.25 Years: 4.00 Pack years: 1.00 Last attempt to quit: 2002 Years since quittin.3  Smokeless tobacco: Never Used Substance and Sexual Activity  Alcohol use: No  
 Drug use: No  
 Sexual activity: Not on file Other Topics Concern  Not on file Social History Narrative  Not on file ALLERGIES: Keflex [cephalexin]; Ambien [zolpidem]; Aspirin; Codeine; Daypro [oxaprozin]; Demerol [meperidine]; Dilaudid [hydromorphone (bulk)]; Mobic [meloxicam]; Onion; and Pcn [penicillins] Review of Systems Constitutional: Negative for chills and fever. HENT: Negative for congestion, rhinorrhea and sore throat. Eyes: Negative for photophobia and redness. Respiratory: Positive for shortness of breath. Negative for cough. Cardiovascular: Positive for chest pain. Negative for leg swelling. Gastrointestinal: Positive for abdominal pain, nausea and vomiting. Negative for blood in stool, constipation, diarrhea, hematochezia and melena. Endocrine: Negative for polydipsia and polyuria. Genitourinary: Negative for dysuria. Musculoskeletal: Negative for back pain and myalgias. Neurological: Negative for weakness, numbness and headaches. Vitals:  
 01/10/19 1905 BP: 140/87 Pulse: 79 Resp: 16 Temp: (!) 82 °F (27.8 °C) SpO2: 97% Weight: 81.6 kg (180 lb) Height: 5' 6\" (1.676 m) Physical Exam  
Constitutional: She appears well-developed and well-nourished. No distress. HENT:  
Head: Normocephalic. Mucous membranes tacky Eyes: Pupils are equal, round, and reactive to light. Cardiovascular: Normal rate, regular rhythm and normal heart sounds. Pulmonary/Chest: Effort normal and breath sounds normal.  
Abdominal: Soft. She exhibits no distension and no mass. There is no splenomegaly or hepatomegaly. There is tenderness in the right lower quadrant, left upper quadrant and left lower quadrant. There is no rebound, no guarding, no tenderness at McBurney's point and negative Conti's sign. Musculoskeletal: Normal range of motion. Lymphadenopathy:  
  She has no cervical adenopathy. Neurological: She is alert. Skin: Skin is warm and dry. Nursing note and vitals reviewed. MDM Number of Diagnoses or Management Options Diagnosis management comments: Patient had a normal bowel movement today so less likely to think this is a bowel obstruction or more likely think she has diverticulitis. She's had a prior appendectomy and cholecystectomy. CT scan ordered. IV hydration and analgesia and antiemetics will be provided. I will obtain an EKG and troponin due to chest discomfort experienced a little over an hour ago that lasted close to an hour. This may have been esophageal pain from vomiting. Amount and/or Complexity of Data Reviewed Clinical lab tests: ordered and reviewed Tests in the radiology section of CPT®: ordered and reviewed Procedures

## 2019-01-11 NOTE — ED NOTES
Verbal report given to [facility] for continuation of patient care upon shift change. Patient care transferred at this time.

## 2019-01-11 NOTE — H&P
Surgery History and Physical 
 
Subjective:  
  
John Clark is a 77 y.o. female who presents onset abdominal pain,nausea and vomiting yesterday. Normal BM and flatus this am. CT shows SBO. No fever or chills. Past Medical History:  
Diagnosis Date  Allergic rhinitis  Anxiety  Arthritis   
 fingers  Asthma   
 prn inhaler  Calculus of kidney x 2-- last over 30 yrs ago  Cancer ([address] melanoma R breast  
 Chronic pain   
 feet  Depression   
 worsening  Dizziness 09/08/2016  
 not a recent problem  Dyslipidemia  Fatigue  Former cigarette smoker  GERD (gastroesophageal reflux disease)   
 controlled with med  Heart murmur 11/24/2015  
 due to MVP-- followed by dr Yumiko Molina  HTN (hypertension)   
 controlled with med  Hypercholesterolemia  Hypothyroid  IBS (irritable bowel syndrome)  Migraines  Mitral valve insufficiency   
 rheumatic fever as a child --- followed by dr Yumiko Molina--- last echo 2017  Nausea & vomiting   
 post-op  Peripheral neuropathy   
 bilat feet  Rheumatic fever   
 as a child  Seizures ([address]   
 febrile seiz. as a child , no problems after that.  Unspecified adverse effect of anesthesia   
 elevated temp after colectomy only per pt Past Surgical History:  
Procedure Laterality Date  HX APPENDECTOMY  HX BLADDER SUSPENSION    
 HX BREAST LUMPECTOMY Right 1996  
 melanoma breast - with lymph node biopsies.  HX COLECTOMY for obstruction - about 14 inches removed  HX COLONOSCOPY    
 HX HYSTERECTOMY  HX KNEE ARTHROSCOPY Left  HX KNEE ARTHROSCOPY Right 10/16/09; 03/2015  HX KNEE REPLACEMENT Right 3/2015  HX KNEE REPLACEMENT Left 2016  HX LAP CHOLECYSTECTOMY  HX ORTHOPAEDIC    
 C- 7 ACDF  
 HX OTHER SURGICAL    
 urethra repair  HX OTHER SURGICAL    
 melanoma resected from right chest wall  HX TONSIL AND ADENOIDECTOMY Family History Problem Relation Age of Onset Christi Other Mother  Delayed Awakening Mother  Thyroid Disease Mother  Diabetes Mother  Cancer Mother   
     breast cancer  Breast Problems Mother  Heart Disease Father  Hypertension Father  Heart Failure Father CHF  Delayed Awakening Sister  Thyroid Disease Sister  Breast Problems Sister  Cancer Other   
     breast  
 Diabetes Other   
     type II  
 High Cholesterol Other  Elevated Lipids Other  Hypertension Other  Thyroid Disease Other   
     hypo, acquired  Breast Problems Maternal Aunt Social History Tobacco Use  Smoking status: Former Smoker Packs/day: 0.25 Years: 4.00 Pack years: 1.00 Last attempt to quit: 2002 Years since quittin.3  Smokeless tobacco: Never Used Substance Use Topics  Alcohol use: No  
  
Prior to Admission medications Medication Sig Start Date End Date Taking? Authorizing Provider  
atorvastatin (LIPITOR) 20 mg tablet Take 1 Tab by mouth daily. 19   Janelle Velazquez DO  
losartan-hydroCHLOROthiazide (HYZAAR) 100-25 mg per tablet Take 1 Tab by mouth daily. 19   Melva SHELTON DO  
nebivolol (BYSTOLIC) 10 mg tablet Take 1 Tab by mouth daily. 19   Janelle Velazquez DO Fenofibrate (LIPOFEN) 150 mg cap TAKE 1 CAPSULE DAILY 19   Melva SHELTON, DO  
ALPRAZolam [facility]) 1 mg tablet take 1 tablet by mouth once daily 19   Janelle Velazquez DO  
HYDROcodone-acetaminophen [facility])  mg tablet Take 1 Tab by mouth every four (4) hours as needed for Pain. Max Daily Amount: 6 Tabs. 18   Melva SHELTON DO  
butalbital-acetaminophen-caffeine (FIORICET, ESGIC) -40 mg per tablet TAKE 1 TABLET EVERY 6 HOURS AS NEEDED. MAXIMUM DAILY DOSE 4 TABLETS. 18   Melva SHELTON DO  
SYNTHROID 100 mcg tablet Take 1 Tab by mouth Daily (before breakfast).  YVETTE-18   Janelle Velazquez DO  
 venlafaxine-SR (EFFEXOR-XR) 150 mg capsule TAKE 1 CAPSULE EVERY 12 HOURS FOR NERVES 11/16/18   Ángel SHELTON, DO  
lidocaine 5 % gel 1 Dose by Apply Externally route four (4) times daily as needed. 9/17/18   Claudio Garza NP  
HYDROcodone-acetaminophen (NORCO)  mg tablet Take 1 Tab by mouth every six (6) hours as needed for Pain for up to 30 days. Max Daily Amount: 4 Tabs. Patient taking differently: Take 1 Tab by mouth three (3) times daily. 9/20/18 10/20/18  Ángel Rebollar P, DO  
fluticasone (FLONASE) 50 mcg/actuation nasal spray 2 Sprays by Both Nostrils route daily. 7/9/18   Ángel Rebollar P, DO  
albuterol (PROAIR HFA) 90 mcg/actuation inhaler USE 2 INHALATIONS EVERY 4 HOURS AS NEEDED FOR WHEEZING , ACUTE ASTHMA ATTACK 4/4/18   Ángel Rebollar P, DO  
MULTIVIT,CALC,MINS/FOLIC ACID (ONE-A-DAY PROACTIVE 65 PLUS PO) Take 1 Tab by mouth daily. Stopped 9/6/18  Indications: OTC    Provider, Historical  
hydrocortisone (ANUSOL-HC) 2.5 % rectal cream Insert  into rectum four (4) times daily. 11/3/17   Ángel SHELTON, DO  
pantoprazole (PROTONIX) 40 mg tablet Take 1 Tab by mouth daily. 8/18/17   Sandra Flores MD  
aspirin delayed-release 81 mg tablet Take 81 mg by mouth daily. Stopped 8/24/18--- per pt-- dr Pearlie Cockayne  Indications: OTC    Provider, Historical  
cyanocobalamin 1,000 mcg tablet Take 1,000 mcg by mouth daily. Indications: OTC    Provider, Historical  
  
Allergies Allergen Reactions  Keflex [Cephalexin] Shortness of Breath \" stop my breathing \"  Ambien [Zolpidem] Unknown (comments)  Aspirin Itching \" can take low dose \"  Codeine Anaphylaxis \"full codeine injection\" states has had lortab since without reaction.  Daypro [Oxaprozin] Swelling  Demerol [Meperidine] Other (comments) Difficulty breathing. States has had since without reaction  Dilaudid [Hydromorphone (Bulk)] Itching and Nausea Only  Mobic [Meloxicam] Swelling  Onion Other (comments)  
  headache  Pcn [Penicillins] Unknown (comments) Review of Systems Gastrointestinal: Positive for abdominal pain, nausea and vomiting. All other systems reviewed and are negative. Objective:  
 
Patient Vitals for the past 8 hrs: 
 BP Temp Pulse Resp SpO2 Height Weight 01/10/19 1905 140/87 (!) 82 °F (27.8 °C) 79 16 97 % 5' 6\" (1.676 m) 180 lb (81.6 kg) Temp (24hrs), Av °F (27.8 °C), Min:82 °F (27.8 °C), Max:82 °F (27.8 °C) Physical Exam  
Constitutional: She is oriented to person, place, and time. She appears well-developed and well-nourished. No distress. HENT:  
Head: Normocephalic and atraumatic. Eyes: Conjunctivae and EOM are normal. Pupils are equal, round, and reactive to light. Neck: Normal range of motion. Neck supple. Cardiovascular: Normal rate, regular rhythm and normal heart sounds. Pulmonary/Chest: Effort normal and breath sounds normal. No respiratory distress. She has no wheezes. Abdominal: Soft. Bowel sounds are normal. She exhibits no distension and no mass. There is tenderness. There is no rebound and no guarding. Musculoskeletal: Normal range of motion. Neurological: She is alert and oriented to person, place, and time. Skin: Skin is warm and dry. She is not diaphoretic. Psychiatric: She has a normal mood and affect. Her behavior is normal. Judgment and thought content normal.  
 
 
Assessment: SBO Plan:  
 
Admit. NG. Repeat films in am The patient understands the risks; any and all questions were answered to the patient's satisfaction. Signed By: Yuly Guerrero MD   
 January 10, 2019

## 2019-01-11 NOTE — PROGRESS NOTES
Initial visit to assess pt's spiritual needs. Pt's mother asked for the name of the  who handled complaints about pt care; I gave her patient relations #. Ministry of presence & prayer to demonstrate caring & concern, convey emotional & spiritual support.  
 
damari Pérez MDiv,[facility],PhD

## 2019-01-11 NOTE — ED NOTES
Report received from Ariadne Wagner, [facility] at this time

## 2019-01-11 NOTE — ED TRIAGE NOTES
Pt arrives from [address] [place] [address] where she was treated for abdominal pain and vomiting x 2 hours.

## 2019-01-12 ENCOUNTER — APPOINTMENT (OUTPATIENT)
Dept: GENERAL RADIOLOGY | Age: 69
DRG: 390 | End: 2019-01-12
Attending: NURSE PRACTITIONER
Payer: MEDICARE

## 2019-01-12 PROCEDURE — 74019 RADEX ABDOMEN 2 VIEWS: CPT

## 2019-01-12 PROCEDURE — 74011250636 HC RX REV CODE- 250/636: Performed by: SURGERY

## 2019-01-12 PROCEDURE — 74011250637 HC RX REV CODE- 250/637: Performed by: SURGERY

## 2019-01-12 PROCEDURE — 65270000029 HC RM PRIVATE

## 2019-01-12 RX ADMIN — LOSARTAN POTASSIUM: 50 TABLET ORAL at 09:14

## 2019-01-12 RX ADMIN — LEVOTHYROXINE SODIUM 100 MCG: 100 TABLET ORAL at 06:08

## 2019-01-12 RX ADMIN — Medication 5 ML: at 15:48

## 2019-01-12 RX ADMIN — DEXTROSE MONOHYDRATE, SODIUM CHLORIDE, AND POTASSIUM CHLORIDE 100 ML/HR: 50; 4.5; 1.49 INJECTION, SOLUTION INTRAVENOUS at 12:13

## 2019-01-12 RX ADMIN — MORPHINE SULFATE 5 MG: 10 INJECTION INTRAVENOUS at 04:31

## 2019-01-12 RX ADMIN — Medication 10 ML: at 20:15

## 2019-01-12 RX ADMIN — MORPHINE SULFATE 5 MG: 10 INJECTION INTRAVENOUS at 23:39

## 2019-01-12 RX ADMIN — MORPHINE SULFATE 5 MG: 10 INJECTION INTRAVENOUS at 15:58

## 2019-01-12 RX ADMIN — MORPHINE SULFATE 5 MG: 10 INJECTION INTRAVENOUS at 20:15

## 2019-01-12 RX ADMIN — DEXTROSE MONOHYDRATE, SODIUM CHLORIDE, AND POTASSIUM CHLORIDE 100 ML/HR: 50; 4.5; 1.49 INJECTION, SOLUTION INTRAVENOUS at 23:35

## 2019-01-12 RX ADMIN — MORPHINE SULFATE 5 MG: 10 INJECTION INTRAVENOUS at 09:14

## 2019-01-12 NOTE — PROGRESS NOTES
Progress Note Patient: William Torrez MRN: [id number]  SSN: [id number] YOB: 1950  Age: 77 y.o. Sex: female Admit Date: 1/10/2019 * No surgery found * Procedure:   
 
Subjective:  
 
Patient awake in bed. NG tube patent. Pt c/o \"crampy\" abd pain this am. Reports -flatus/-BM/ AF. NAD. Objective:  
 
Visit Vitals /76 Pulse (!) 102 Temp 98.9 °F (37.2 °C) Resp 18 Ht 5' 6\" (1.676 m) Wt 180 lb (81.6 kg) SpO2 91% BMI 29.05 kg/m² Temp (24hrs), Av.2 °F (37.3 °C), Min:98.5 °F (36.9 °C), Max:100 °F (37.8 °C) Physical Exam:   
Constitutional: Alert, cooperative, no acute distress Eyes: Sclera are clear. EOMs intact ENMT: no external lesions' gross hearing normal; no obvious neck masses, no ear or lip lesions, nares normal, NGT - 1100mL dark liquid 24hr output CV: Tachy. Normal perfusion Resp: No JVD. Breathing is  non-labored; no audible wheezing. GI: soft, generalized ttp, non-distended Musculoskeletal: No embolic signs or cyanosis. Neuro:  Oriented; moves all 4; no focal deficits Psychiatric: normal affect and mood, no memory impairment Data Review: images and reports reviewed 19: Abdomen x-ray single view. 
  
HISTORY: Nasogastric tube placement. 
  
COMPARISON: 2019. 
  
FINDINGS: Nasogastric tube its tip in the gastric body. Bowel gas pattern is 
nonspecific. There is evidence of right hydronephrosis with contrast in the 
right collecting system. IMPRESSION: 
  
Nasogastric tube its tip in the gastric body. 
  
Right hydronephrosis. Lab Review: All lab results for the last 24 hours reviewed. Assessment:  
 
Hospital Problems  Date Reviewed: 2018 Codes Class Noted POA * (Principal) [facility] ICD-10-CM: T94.159 ICD-9-CM: 560.9  1/10/2019 Unknown Plan/Recommendations/Medical Decision Making:  
 
Conservative management NPO with ice chips Pain/ nausea control IVF I&O 
OOB/ chair Abd films Signed By: Brianna De La Rosa MD   
 January 12, 2019

## 2019-01-13 LAB
ALBUMIN SERPL-MCNC: 3.4 G/DL (ref 3.2–4.6)
ALBUMIN/GLOB SERPL: 0.9 {RATIO} (ref 1.2–3.5)
ALP SERPL-CCNC: 57 U/L (ref 50–136)
ALT SERPL-CCNC: 28 U/L (ref 12–65)
ANION GAP SERPL CALC-SCNC: 8 MMOL/L (ref 7–16)
AST SERPL-CCNC: 17 U/L (ref 15–37)
BILIRUB SERPL-MCNC: 2 MG/DL (ref 0.2–1.1)
BUN SERPL-MCNC: 7 MG/DL (ref 8–23)
CALCIUM SERPL-MCNC: 8.8 MG/DL (ref 8.3–10.4)
CHLORIDE SERPL-SCNC: 100 MMOL/L (ref 98–107)
CO2 SERPL-SCNC: 30 MMOL/L (ref 21–32)
CREAT SERPL-MCNC: 0.72 MG/DL (ref 0.6–1)
ERYTHROCYTE [DISTWIDTH] IN BLOOD BY AUTOMATED COUNT: 12.4 % (ref 11.9–14.6)
GLOBULIN SER CALC-MCNC: 3.7 G/DL (ref 2.3–3.5)
GLUCOSE SERPL-MCNC: 130 MG/DL (ref 65–100)
HCT VFR BLD AUTO: 42.5 % (ref 35.8–46.3)
HGB BLD-MCNC: 14.4 G/DL (ref 11.7–15.4)
MAGNESIUM SERPL-MCNC: 1.6 MG/DL (ref 1.8–2.4)
MCH RBC QN AUTO: 31.4 PG (ref 26.1–32.9)
MCHC RBC AUTO-ENTMCNC: 33.9 G/DL (ref 31.4–35)
MCV RBC AUTO: 92.6 FL (ref 79.6–97.8)
NRBC # BLD: 0 K/UL (ref 0–0.2)
PHOSPHATE SERPL-MCNC: 3.2 MG/DL (ref 2.3–3.7)
PLATELET # BLD AUTO: 154 K/UL (ref 150–450)
PMV BLD AUTO: 9.7 FL (ref 9.4–12.3)
POTASSIUM SERPL-SCNC: 3.1 MMOL/L (ref 3.5–5.1)
PROT SERPL-MCNC: 7.1 G/DL (ref 6.3–8.2)
RBC # BLD AUTO: 4.59 M/UL (ref 4.05–5.2)
SODIUM SERPL-SCNC: 138 MMOL/L (ref 136–145)
WBC # BLD AUTO: 5.6 K/UL (ref 4.3–11.1)

## 2019-01-13 PROCEDURE — 84100 ASSAY OF PHOSPHORUS: CPT

## 2019-01-13 PROCEDURE — 74011250637 HC RX REV CODE- 250/637: Performed by: SURGERY

## 2019-01-13 PROCEDURE — 74011250636 HC RX REV CODE- 250/636: Performed by: NURSE PRACTITIONER

## 2019-01-13 PROCEDURE — C9113 INJ PANTOPRAZOLE SODIUM, VIA: HCPCS | Performed by: NURSE PRACTITIONER

## 2019-01-13 PROCEDURE — 36415 COLL VENOUS BLD VENIPUNCTURE: CPT

## 2019-01-13 PROCEDURE — 85027 COMPLETE CBC AUTOMATED: CPT

## 2019-01-13 PROCEDURE — 74011250636 HC RX REV CODE- 250/636: Performed by: SURGERY

## 2019-01-13 PROCEDURE — 80053 COMPREHEN METABOLIC PANEL: CPT

## 2019-01-13 PROCEDURE — 74011000250 HC RX REV CODE- 250: Performed by: NURSE PRACTITIONER

## 2019-01-13 PROCEDURE — 74011250637 HC RX REV CODE- 250/637: Performed by: NURSE PRACTITIONER

## 2019-01-13 PROCEDURE — 83735 ASSAY OF MAGNESIUM: CPT

## 2019-01-13 PROCEDURE — 65270000029 HC RM PRIVATE

## 2019-01-13 RX ORDER — POTASSIUM CHLORIDE 20 MEQ/1
40 TABLET, EXTENDED RELEASE ORAL
Status: COMPLETED | OUTPATIENT
Start: 2019-01-13 | End: 2019-01-13

## 2019-01-13 RX ORDER — ALPRAZOLAM 0.5 MG/1
1 TABLET ORAL
Status: DISCONTINUED | OUTPATIENT
Start: 2019-01-13 | End: 2019-01-16 | Stop reason: HOSPADM

## 2019-01-13 RX ADMIN — Medication 10 ML: at 15:10

## 2019-01-13 RX ADMIN — DEXTROSE MONOHYDRATE, SODIUM CHLORIDE, AND POTASSIUM CHLORIDE 100 ML/HR: 50; 4.5; 1.49 INJECTION, SOLUTION INTRAVENOUS at 09:48

## 2019-01-13 RX ADMIN — DEXTROSE MONOHYDRATE, SODIUM CHLORIDE, AND POTASSIUM CHLORIDE 100 ML/HR: 50; 4.5; 1.49 INJECTION, SOLUTION INTRAVENOUS at 19:53

## 2019-01-13 RX ADMIN — LEVOTHYROXINE SODIUM 100 MCG: 100 TABLET ORAL at 05:37

## 2019-01-13 RX ADMIN — Medication: at 16:04

## 2019-01-13 RX ADMIN — MORPHINE SULFATE 5 MG: 10 INJECTION INTRAVENOUS at 15:09

## 2019-01-13 RX ADMIN — LOSARTAN POTASSIUM: 50 TABLET ORAL at 08:21

## 2019-01-13 RX ADMIN — MORPHINE SULFATE 5 MG: 10 INJECTION INTRAVENOUS at 10:35

## 2019-01-13 RX ADMIN — MORPHINE SULFATE 5 MG: 10 INJECTION INTRAVENOUS at 19:53

## 2019-01-13 RX ADMIN — POTASSIUM CHLORIDE 40 MEQ: 20 TABLET, EXTENDED RELEASE ORAL at 10:15

## 2019-01-13 RX ADMIN — SODIUM CHLORIDE 40 MG: 9 INJECTION, SOLUTION INTRAMUSCULAR; INTRAVENOUS; SUBCUTANEOUS at 16:02

## 2019-01-13 RX ADMIN — Medication 10 ML: at 02:35

## 2019-01-13 RX ADMIN — ALPRAZOLAM 1 MG: 0.5 TABLET ORAL at 23:27

## 2019-01-13 RX ADMIN — MORPHINE SULFATE 5 MG: 10 INJECTION INTRAVENOUS at 05:36

## 2019-01-13 RX ADMIN — Medication 10 ML: at 05:36

## 2019-01-13 RX ADMIN — ONDANSETRON 4 MG: 2 INJECTION INTRAMUSCULAR; INTRAVENOUS at 02:34

## 2019-01-13 NOTE — PROGRESS NOTES
Patient c/o chest pain, pt states  \" is probably gas\". Patient denies SOB or trouble breathing, VS stables, Lowell Post, NP notified. Will continue to monitor patient.

## 2019-01-13 NOTE — PROGRESS NOTES
Progress Note Patient: Chadwick Tenorio MRN: [id number]  SSN: [id number] YOB: 1950  Age: 77 y.o. Sex: female Admit Date: 1/10/2019 * No surgery found * Procedure:   
 
Subjective:  
 
Patient awake in bed. NG tube patent. Feeling better. Reports +flatus/-BM. AF. NAD. Objective:  
 
Visit Vitals /83 Pulse 97 Temp 97.9 °F (36.6 °C) Resp 18 Ht 5' 6\" (1.676 m) Wt 180 lb (81.6 kg) SpO2 91% BMI 29.05 kg/m² Temp (24hrs), Av.3 °F (36.8 °C), Min:97.9 °F (36.6 °C), Max:98.6 °F (37 °C) Physical Exam:   
Constitutional: Alert, cooperative, no acute distress Eyes: Sclera are clear. EOMs intact ENMT: no external lesions' gross hearing normal; no obvious neck masses, no ear or lip lesions, nares normal, NGT - 500mL light brown liquid 24hr output CV: Tachy. Normal perfusion Resp: No JVD. Breathing is  non-labored; no audible wheezing. GI: soft, generalized ttp improved, non-distended Musculoskeletal: No embolic signs or cyanosis. Neuro:  Oriented; moves all 4; no focal deficits Psychiatric: normal affect and mood, no memory impairment Data Review: images and reports reviewed 19: Abdomen x-ray single view. 
  
HISTORY: Nasogastric tube placement. 
  
COMPARISON: 2019. 
  
FINDINGS: Nasogastric tube its tip in the gastric body. Bowel gas pattern is 
nonspecific. There is evidence of right hydronephrosis with contrast in the 
right collecting system. IMPRESSION: 
  
Nasogastric tube its tip in the gastric body. 
  
Right hydronephrosis. Lab Review: All lab results for the last 24 hours reviewed. Assessment:  
 
Hospital Problems  Date Reviewed: 2018 Codes Class Noted POA * (Principal) [facility] ICD-10-CM: G24.861 ICD-9-CM: 560.9  1/10/2019 Unknown Plan/Recommendations/Medical Decision Making:  
 
Conservative management Clamp NG 
 NPO with ice chips and sips of clear liquids Pain/ nausea control IVF I&O 
OOB/ chair Replace potassium Signed By: Kem Goldman MD   
 January 13, 2019

## 2019-01-14 LAB
ANION GAP SERPL CALC-SCNC: 9 MMOL/L (ref 7–16)
BUN SERPL-MCNC: 6 MG/DL (ref 8–23)
CALCIUM SERPL-MCNC: 9.1 MG/DL (ref 8.3–10.4)
CHLORIDE SERPL-SCNC: 101 MMOL/L (ref 98–107)
CO2 SERPL-SCNC: 26 MMOL/L (ref 21–32)
CREAT SERPL-MCNC: 0.8 MG/DL (ref 0.6–1)
GLUCOSE SERPL-MCNC: 170 MG/DL (ref 65–100)
POTASSIUM SERPL-SCNC: 3.2 MMOL/L (ref 3.5–5.1)
SODIUM SERPL-SCNC: 136 MMOL/L (ref 136–145)

## 2019-01-14 PROCEDURE — 65270000029 HC RM PRIVATE

## 2019-01-14 PROCEDURE — 74011250636 HC RX REV CODE- 250/636: Performed by: NURSE PRACTITIONER

## 2019-01-14 PROCEDURE — 74011250636 HC RX REV CODE- 250/636: Performed by: SURGERY

## 2019-01-14 PROCEDURE — 36415 COLL VENOUS BLD VENIPUNCTURE: CPT

## 2019-01-14 PROCEDURE — 74011250637 HC RX REV CODE- 250/637: Performed by: SURGERY

## 2019-01-14 PROCEDURE — 74011000250 HC RX REV CODE- 250: Performed by: NURSE PRACTITIONER

## 2019-01-14 PROCEDURE — 80048 BASIC METABOLIC PNL TOTAL CA: CPT

## 2019-01-14 PROCEDURE — C9113 INJ PANTOPRAZOLE SODIUM, VIA: HCPCS | Performed by: NURSE PRACTITIONER

## 2019-01-14 RX ORDER — DEXTROSE, SODIUM CHLORIDE, AND POTASSIUM CHLORIDE 5; .45; .22 G/100ML; G/100ML; G/100ML
INJECTION INTRAVENOUS CONTINUOUS
Status: DISCONTINUED | OUTPATIENT
Start: 2019-01-14 | End: 2019-01-16 | Stop reason: HOSPADM

## 2019-01-14 RX ORDER — POTASSIUM CHLORIDE 14.9 MG/ML
20 INJECTION INTRAVENOUS 2 TIMES DAILY
Status: COMPLETED | OUTPATIENT
Start: 2019-01-14 | End: 2019-01-14

## 2019-01-14 RX ADMIN — DEXTROSE MONOHYDRATE, SODIUM CHLORIDE, AND POTASSIUM CHLORIDE 100 ML/HR: 50; 4.5; 1.49 INJECTION, SOLUTION INTRAVENOUS at 06:10

## 2019-01-14 RX ADMIN — ONDANSETRON 4 MG: 2 INJECTION INTRAMUSCULAR; INTRAVENOUS at 17:02

## 2019-01-14 RX ADMIN — MORPHINE SULFATE 5 MG: 10 INJECTION INTRAVENOUS at 12:18

## 2019-01-14 RX ADMIN — LOSARTAN POTASSIUM: 50 TABLET ORAL at 08:44

## 2019-01-14 RX ADMIN — SODIUM CHLORIDE 40 MG: 9 INJECTION, SOLUTION INTRAMUSCULAR; INTRAVENOUS; SUBCUTANEOUS at 15:06

## 2019-01-14 RX ADMIN — DEXTROSE MONOHYDRATE, SODIUM CHLORIDE, AND POTASSIUM CHLORIDE 100 ML/HR: 50; 4.5; 1.49 INJECTION, SOLUTION INTRAVENOUS at 12:21

## 2019-01-14 RX ADMIN — POTASSIUM CHLORIDE 20 MEQ: 200 INJECTION, SOLUTION INTRAVENOUS at 17:00

## 2019-01-14 RX ADMIN — DEXTROSE MONOHYDRATE, SODIUM CHLORIDE, AND POTASSIUM CHLORIDE: 50; 4.5; 2.24 INJECTION, SOLUTION INTRAVENOUS at 15:06

## 2019-01-14 RX ADMIN — Medication 10 ML: at 14:00

## 2019-01-14 RX ADMIN — MORPHINE SULFATE 5 MG: 10 INJECTION INTRAVENOUS at 06:24

## 2019-01-14 RX ADMIN — Medication 10 ML: at 06:27

## 2019-01-14 RX ADMIN — POTASSIUM CHLORIDE 20 MEQ: 200 INJECTION, SOLUTION INTRAVENOUS at 15:07

## 2019-01-14 RX ADMIN — LEVOTHYROXINE SODIUM 100 MCG: 100 TABLET ORAL at 06:10

## 2019-01-14 RX ADMIN — MORPHINE SULFATE 5 MG: 10 INJECTION INTRAVENOUS at 17:03

## 2019-01-14 NOTE — PROGRESS NOTES
Progress Note Patient: Jose A Pedroza MRN: [id number]  SSN: [id number] YOB: 1950  Age: 77 y.o. Sex: female Admit Date: 1/10/2019 * No surgery found * Procedure:   
 
Subjective:  
 
Patient awake in bed. NG tube clamped. Feeling better. Reports +flatus/-BM. AF. NAD. AM labs pending. Objective:  
 
Visit Vitals /86 (BP 1 Location: Left arm, BP Patient Position: At rest) Pulse (!) 111 Temp 99.1 °F (37.3 °C) Resp 18 Ht 5' 6\" (1.676 m) Wt 180 lb (81.6 kg) SpO2 95% BMI 29.05 kg/m² Temp (24hrs), Av.2 °F (36.8 °C), Min:97.9 °F (36.6 °C), Max:99.1 °F (37.3 °C) Physical Exam:   
Constitutional: Alert, cooperative, no acute distress Eyes: Sclera are clear. EOMs intact ENMT: no external lesions' gross hearing normal; no obvious neck masses, no ear or lip lesions, nares normal, NGT - 500mL light brown liquid 24hr output CV: Tachy. Normal perfusion Resp: No JVD. Breathing is  non-labored; no audible wheezing. GI: soft, generalized ttp improved, non-distended Musculoskeletal: No embolic signs or cyanosis. Neuro:  Oriented; moves all 4; no focal deficits Psychiatric: normal affect and mood, no memory impairment Data Review: images and reports reviewed 19: Abdomen x-ray single view. 
  
HISTORY: Nasogastric tube placement. 
  
COMPARISON: 2019. 
  
FINDINGS: Nasogastric tube its tip in the gastric body. Bowel gas pattern is 
nonspecific. There is evidence of right hydronephrosis with contrast in the 
right collecting system. IMPRESSION: 
  
Nasogastric tube its tip in the gastric body. 
  
Right hydronephrosis. Lab Review: All lab results for the last 24 hours reviewed. Assessment:  
 
Hospital Problems  Date Reviewed: 2018 Codes Class Noted POA * (Principal) [facility] ICD-10-CM: X15.236 ICD-9-CM: 560.9  1/10/2019 Unknown Plan/Recommendations/Medical Decision Making:  
 
Conservative management Clamp NG Trial of clears Pain/ nausea control IVF I&O 
OOB/ chair Monitor labs, replace PRN (Replace K+) Signed By: Stephan Weinberg MD   
 January 14, 2019

## 2019-01-14 NOTE — PROGRESS NOTES
Problem: Pressure Injury - Risk of 
Goal: *Prevention of pressure injury Document Cuco Scale and appropriate interventions in the flowsheet. Outcome: Progressing Towards Goal 
Pressure Injury Interventions: 
  
 
Moisture Interventions: Absorbent underpads, Apply protective barrier, creams and emollients, Assess need for specialty bed, Maintain skin hydration (lotion/cream), Minimize layers, Moisture barrier, Offer toileting Q_hr Activity Interventions: Increase time out of bed, Pressure redistribution bed/mattress(bed type) Mobility Interventions: Assess need for specialty bed, HOB 30 degrees or less, Pressure redistribution bed/mattress (bed type) Nutrition Interventions: Document food/fluid/supplement intake Problem: Falls - Risk of 
Goal: *Absence of Falls Document Lilian Lock Fall Risk and appropriate interventions in the flowsheet. Outcome: Progressing Towards Goal 
Fall Risk Interventions: 
Mobility Interventions: Communicate number of staff needed for ambulation/transfer, Patient to call before getting OOB, PT Consult for mobility concerns Medication Interventions: Assess postural VS orthostatic hypotension, Evaluate medications/consider consulting pharmacy, Patient to call before getting OOB, Teach patient to arise slowly, Utilize gait belt for transfers/ambulation Elimination Interventions: Call light in reach, Elevated toilet seat, Patient to call for help with toileting needs, Toilet paper/wipes in reach, Urinal in reach

## 2019-01-14 NOTE — PROGRESS NOTES
Care Management Interventions PCP Verified by CM: Yes Mode of Transport at Discharge: Other (see comment)(family) Transition of Care Consult (CM Consult): Discharge Planning Current Support Network: Lives with Spouse Confirm Follow Up Transport: Family Plan discussed with Pt/Family/Caregiver: Yes Freedom of Choice Offered: Yes Discharge Location Discharge Placement: Home This CM visited with pt and spouse at bedside this day. Pt and spouse verified pts PCP, insurance information, emergency contact information, and home address. Spouse reports no difficulty obtaining pts rx's in the community. Pt lives at home with spouse in [facility] with 3 steps to enter. Her home DME includes a cane, walker, and shower chair. Spouse reports that at baseline pt is independent with her ADLs including bathing, dressing, cooking, and driving. During conversation, we discussed tentative discharge plan. At this time, discharge plan is that pt will return home with spouse when medically stable. Pts spouse retired and is there throughout the day. Spouse agreeable to provide transportation home at discharge. No other anticipated needs voiced during conversation. CM to continue to follow.

## 2019-01-15 LAB
ANION GAP SERPL CALC-SCNC: 9 MMOL/L (ref 7–16)
BUN SERPL-MCNC: 6 MG/DL (ref 8–23)
CALCIUM SERPL-MCNC: 9.2 MG/DL (ref 8.3–10.4)
CHLORIDE SERPL-SCNC: 104 MMOL/L (ref 98–107)
CO2 SERPL-SCNC: 26 MMOL/L (ref 21–32)
CREAT SERPL-MCNC: 0.73 MG/DL (ref 0.6–1)
GLUCOSE SERPL-MCNC: 124 MG/DL (ref 65–100)
MAGNESIUM SERPL-MCNC: 1.7 MG/DL (ref 1.8–2.4)
PHOSPHATE SERPL-MCNC: 3.2 MG/DL (ref 2.3–3.7)
POTASSIUM SERPL-SCNC: 3.6 MMOL/L (ref 3.5–5.1)
SODIUM SERPL-SCNC: 139 MMOL/L (ref 136–145)

## 2019-01-15 PROCEDURE — 36415 COLL VENOUS BLD VENIPUNCTURE: CPT

## 2019-01-15 PROCEDURE — 74011250637 HC RX REV CODE- 250/637: Performed by: NURSE PRACTITIONER

## 2019-01-15 PROCEDURE — 74011250636 HC RX REV CODE- 250/636: Performed by: SURGERY

## 2019-01-15 PROCEDURE — 74011250636 HC RX REV CODE- 250/636: Performed by: NURSE PRACTITIONER

## 2019-01-15 PROCEDURE — C9113 INJ PANTOPRAZOLE SODIUM, VIA: HCPCS | Performed by: NURSE PRACTITIONER

## 2019-01-15 PROCEDURE — 74011000250 HC RX REV CODE- 250: Performed by: NURSE PRACTITIONER

## 2019-01-15 PROCEDURE — 83735 ASSAY OF MAGNESIUM: CPT

## 2019-01-15 PROCEDURE — 80048 BASIC METABOLIC PNL TOTAL CA: CPT

## 2019-01-15 PROCEDURE — 84100 ASSAY OF PHOSPHORUS: CPT

## 2019-01-15 PROCEDURE — 74011250637 HC RX REV CODE- 250/637: Performed by: SURGERY

## 2019-01-15 PROCEDURE — 65270000029 HC RM PRIVATE

## 2019-01-15 RX ORDER — VENLAFAXINE HYDROCHLORIDE 150 MG/1
150 CAPSULE, EXTENDED RELEASE ORAL 2 TIMES DAILY
Status: DISCONTINUED | OUTPATIENT
Start: 2019-01-15 | End: 2019-01-16 | Stop reason: HOSPADM

## 2019-01-15 RX ORDER — MAGNESIUM SULFATE HEPTAHYDRATE 40 MG/ML
2 INJECTION, SOLUTION INTRAVENOUS ONCE
Status: COMPLETED | OUTPATIENT
Start: 2019-01-15 | End: 2019-01-15

## 2019-01-15 RX ADMIN — LEVOTHYROXINE SODIUM 100 MCG: 100 TABLET ORAL at 05:44

## 2019-01-15 RX ADMIN — ALPRAZOLAM 1 MG: 0.5 TABLET ORAL at 23:42

## 2019-01-15 RX ADMIN — MAGNESIUM SULFATE HEPTAHYDRATE 2 G: 40 INJECTION, SOLUTION INTRAVENOUS at 09:24

## 2019-01-15 RX ADMIN — Medication 5 ML: at 13:30

## 2019-01-15 RX ADMIN — Medication 10 ML: at 21:19

## 2019-01-15 RX ADMIN — SODIUM CHLORIDE 40 MG: 9 INJECTION, SOLUTION INTRAMUSCULAR; INTRAVENOUS; SUBCUTANEOUS at 18:15

## 2019-01-15 RX ADMIN — DEXTROSE MONOHYDRATE, SODIUM CHLORIDE, AND POTASSIUM CHLORIDE: 50; 4.5; 2.24 INJECTION, SOLUTION INTRAVENOUS at 01:58

## 2019-01-15 RX ADMIN — VENLAFAXINE HYDROCHLORIDE 150 MG: 150 CAPSULE, EXTENDED RELEASE ORAL at 21:19

## 2019-01-15 RX ADMIN — ALPRAZOLAM 1 MG: 0.5 TABLET ORAL at 00:42

## 2019-01-15 RX ADMIN — LOSARTAN POTASSIUM: 50 TABLET ORAL at 09:25

## 2019-01-15 NOTE — PROGRESS NOTES
Problem: Nutrition Deficit Goal: *Optimize nutritional status Nutrition Reason for assessment: NPO/CLQ x 5 days Assessment:  
Diet order(s): 1/11 NPO, 1/14 CLQ-presentFood/Nutrition Patient History:  Pt admitted with SBO. NGT removed and on CLQ as of yesterday morning. Pt reports passing flatus and feels like she needs to have a BM. She reports a good appetite PTA. Pt is receptive to receiving ensure clears while on CLQ diet. Labs remarkable for BUN of 6 c/w poor PRO status. Mg 1.7 and being replaced. Anthropometrics:Height: 5' 6\" (167.6 cm),  Weight: 81.6 kg (180 lb),  , Body mass index is 29.05 kg/m². BMI class of normal weight. Macronutrient needs: EER:  1079-6159 kcal /day (20-25 kcal/kg listed BW) EPR:  59-71 grams protein/day (1-1.2 grams/kg IBW) Intake/Comparative Standards: Per RD meal rounds: CLQ diet, inadequate in kcal/PRO. Nutrition Diagnosis: Inadequate oral intake r/t altered GI function as evidenced by pt with SBO and NPO or on a CLQ diet throughout 5 day admission. Intervention: 
Meals and snacks: CLQ Nutrition Supplement Therapy: ensure clear TID - provided one for pt to have now. Discharge nutrition plan: Hopefully d/c home on adequate PO diet. Kathia Guzman [address], [address] [place] [address], [facility] [address], [phone]

## 2019-01-15 NOTE — PROGRESS NOTES
Progress Note Patient: Juan C Villarreal MRN: [id number]  SSN: [id number] YOB: 1950  Age: 77 y.o. Sex: female Admit Date: 1/10/2019 * No surgery found * Procedure:   
 
Subjective:  
 
Patient awake in bed. Reports she feels much better. Tolerated clear liquids without nausea or vomiting. Abdomen soft. K+ 3.6. Mag 1.7. AF, VSS. +flatus/+BM. Objective:  
 
Visit Vitals /82 Pulse 92 Temp 97.8 °F (36.6 °C) Resp 18 Ht 5' 6\" (1.676 m) Wt 180 lb (81.6 kg) SpO2 93% BMI 29.05 kg/m² Temp (24hrs), Av.3 °F (36.8 °C), Min:97.3 °F (36.3 °C), Max:98.9 °F (37.2 °C) Physical Exam:   
Constitutional: Alert, cooperative, no acute distress Eyes: Sclera are clear. EOMs intact ENMT: no external lesions' gross hearing normal; no obvious neck masses, no ear or lip lesions, nares normal 
CV: RRR Normal perfusion Resp: No JVD. Breathing is  non-labored; no audible wheezing. GI: soft, non-distended, no ttp Musculoskeletal: No embolic signs or cyanosis. Neuro:  Oriented; moves all 4; no focal deficits Psychiatric: normal affect and mood, no memory impairment Data Review: images and reports reviewed 19: Abdomen x-ray single view. 
  
HISTORY: Nasogastric tube placement. 
  
COMPARISON: 2019. 
  
FINDINGS: Nasogastric tube its tip in the gastric body. Bowel gas pattern is 
nonspecific. There is evidence of right hydronephrosis with contrast in the 
right collecting system. IMPRESSION: 
  
Nasogastric tube its tip in the gastric body. 
  
Right hydronephrosis. Lab Review: All lab results for the last 24 hours reviewed. Assessment:  
 
Hospital Problems  Date Reviewed: 2018 Codes Class Noted POA * (Principal) [facility] (small bowel obstruction) ([facility] ICD-10-CM: T18.482 ICD-9-CM: 560.9  1/10/2019 Unknown Plan/Recommendations/Medical Decision Making:  
 
Advance FLD then to GI soft Diet consult for low residue diet Pain/ nausea control IVF I&O 
OOB/ chair Monitor labs, replace PRN (replace Mag) Signed By: Mk Naqvi MD   
 January 15, 2019

## 2019-01-15 NOTE — PROGRESS NOTES
Problem: Nutrition Deficit Goal: *Optimize nutritional status Nutrition-consult received for diet education \"low residue\" (Jeffy Krause) Pt seen following CLQ lunch today. Verbal education on low residue diet given. Handouts provided: \"Low Fiber Diet plan,\" \"GI soft diet guidelines,\" \"Low Fiber/GI soft diet,\" \"low fiber sample menu. \" 
All questions answered. Kathia June [address], [address] [place] [address], [phone]

## 2019-01-16 VITALS
HEIGHT: 66 IN | BODY MASS INDEX: 28.93 KG/M2 | SYSTOLIC BLOOD PRESSURE: 129 MMHG | HEART RATE: 90 BPM | WEIGHT: 180 LBS | TEMPERATURE: 98.7 F | RESPIRATION RATE: 18 BRPM | OXYGEN SATURATION: 96 % | DIASTOLIC BLOOD PRESSURE: 80 MMHG

## 2019-01-16 LAB
ANION GAP SERPL CALC-SCNC: 12 MMOL/L (ref 7–16)
BUN SERPL-MCNC: 8 MG/DL (ref 8–23)
CALCIUM SERPL-MCNC: 9.3 MG/DL (ref 8.3–10.4)
CHLORIDE SERPL-SCNC: 105 MMOL/L (ref 98–107)
CO2 SERPL-SCNC: 25 MMOL/L (ref 21–32)
CREAT SERPL-MCNC: 0.81 MG/DL (ref 0.6–1)
GLUCOSE SERPL-MCNC: 120 MG/DL (ref 65–100)
MAGNESIUM SERPL-MCNC: 2.1 MG/DL (ref 1.8–2.4)
PHOSPHATE SERPL-MCNC: 4 MG/DL (ref 2.3–3.7)
POTASSIUM SERPL-SCNC: 3.5 MMOL/L (ref 3.5–5.1)
SODIUM SERPL-SCNC: 142 MMOL/L (ref 136–145)

## 2019-01-16 PROCEDURE — 74011250637 HC RX REV CODE- 250/637: Performed by: SURGERY

## 2019-01-16 PROCEDURE — 83735 ASSAY OF MAGNESIUM: CPT

## 2019-01-16 PROCEDURE — 84100 ASSAY OF PHOSPHORUS: CPT

## 2019-01-16 PROCEDURE — 36415 COLL VENOUS BLD VENIPUNCTURE: CPT

## 2019-01-16 PROCEDURE — 74011250636 HC RX REV CODE- 250/636: Performed by: NURSE PRACTITIONER

## 2019-01-16 PROCEDURE — 80048 BASIC METABOLIC PNL TOTAL CA: CPT

## 2019-01-16 RX ADMIN — DEXTROSE MONOHYDRATE, SODIUM CHLORIDE, AND POTASSIUM CHLORIDE: 50; 4.5; 2.24 INJECTION, SOLUTION INTRAVENOUS at 02:33

## 2019-01-16 RX ADMIN — LEVOTHYROXINE SODIUM 100 MCG: 100 TABLET ORAL at 05:43

## 2019-01-16 NOTE — DISCHARGE INSTRUCTIONS
Discharge Instructions/Follow-up Plans:   MD Instructions:     Follow-up with your PCP as needed. Diet - low residue  Activity as tolerated  Resume other home medications.      If problems or questions arise, please call our office at [phone].     Greater than 30 minutes were spent discharging the patient          Patient Education        Bowel Blockage (Intestinal Obstruction): Care Instructions  Your Care Instructions  A bowel blockage, also called an intestinal obstruction, can prevent gas, fluids, or solids from moving through the intestines normally. It can cause constipation and, rarely, diarrhea. You may have pain, nausea, vomiting, and cramping. Most of the time, complete blockages require a stay in the hospital and possibly surgery. But if your bowel is only partly blocked, your doctor may tell you to wait until it clears on its own and you are able to pass gas and stool. If so, there are things you can do at home to help make you feel better. If you have had surgery for a bowel blockage, there are things you can do at home to make sure you heal well. You can also make some changes to keep your bowel from becoming blocked again. Follow-up care is a key part of your treatment and safety. Be sure to make and go to all appointments, and call your doctor if you are having problems. It's also a good idea to know your test results and keep a list of the medicines you take. How can you care for yourself at home? If your doctor has told you to wait at home for a blockage to clear on its own:  · Follow your doctor's instructions. These may include eating a liquid diet to avoid complete blockage. · Be safe with medicines. Take your medicines exactly as prescribed. Call your doctor if you think you are having a problem with your medicine. · Put a heating pad set on low on your belly to relieve mild cramps and pain. To prevent another blockage  · Try to eat smaller amounts of food more often.  For example, have 5 or 6 small meals throughout the day instead of 2 or 3 large meals. · Chew your food very well. Try to chew each bite about 20 times or until it is liquid. · Avoid high-fiber foods and raw fruits and vegetables with skins, husks, strings, or seeds. These can form a ball of undigested material that can cause a blockage if a part of your bowel is scarred or narrowed. · Check with your doctor before you eat whole-grain products or use a fiber supplement such as Citrucel or Metamucil. · To help you have regular bowel movements, eat at regular times, do not strain during a bowel movement, and drink at least 8 to 10 glasses of water each day. If you have kidney, heart, or liver disease and have to limit fluids, talk with your doctor or before you increase the amount of fluids you drink. · Drink high-calorie liquid formulas if your doctor says to. Severe symptoms may make it hard for your body to take in vitamins and minerals. · Get regular exercise. It helps you digest your food better. Get at least 30 minutes of physical activity on most days of the week. Walking is a good choice. When should you call for help? Call your doctor now or seek immediate medical care if:    · You have a fever.     · You are vomiting.     · You have new or worse belly pain.     · You cannot pass stools or gas.    Watch closely for changes in your health, and be sure to contact your doctor if you have any problems. Where can you learn more? Go to http://yuliana-hodan.info/. Enter W382 in the search box to learn more about \"Bowel Blockage (Intestinal Obstruction): Care Instructions. \"  Current as of: March 28, 2018  Content Version: 11.8  © 1908-5812 "[employer]. Care instructions adapted under license by [employer] (which disclaims liability or warranty for this information).  If you have questions about a medical condition or this instruction, always ask your healthcare mariely. [address] any warranty or liability for your use of this information. DISCHARGE SUMMARY from Nurse    PATIENT INSTRUCTIONS:    After general anesthesia or intravenous sedation, for 24 hours or while taking prescription Narcotics:  · Limit your activities  · Do not drive and operate hazardous machinery  · Do not make important personal or business decisions  · Do  not drink alcoholic beverages  · If you have not urinated within 8 hours after discharge, please contact your surgeon on call. Report the following to your surgeon:  · Excessive pain, swelling, redness or odor of or around the surgical area  · Temperature over 100.5  · Nausea and vomiting lasting longer than 4 hours or if unable to take medications  · Any signs of decreased circulation or nerve impairment to extremity: change in color, persistent  numbness, tingling, coldness or increase pain  · Any questions    What to do at Home:  Recommended activity: Activity as tolerated,     If you experience any of the following symptoms nausea or vomiting, fever greater then 100.5, pain unrelieved by medication, please follow up with primary care doctor. *  Please give a list of your current medications to your Primary Care Provider. *  Please update this list whenever your medications are discontinued, doses are      changed, or new medications (including over-the-counter products) are added. *  Please carry medication information at all times in case of emergency situations. These are general instructions for a healthy lifestyle:    No smoking/ No tobacco products/ Avoid exposure to second hand smoke  Surgeon General's Warning:  Quitting smoking now greatly reduces serious risk to your health.     Obesity, smoking, and sedentary lifestyle greatly increases your risk for illness    A healthy diet, regular physical exercise & weight monitoring are important for maintaining a healthy lifestyle    You may be retaining fluid if you have a history of heart failure or if you experience any of the following symptoms:  Weight gain of 3 pounds or more overnight or 5 pounds in a week, increased swelling in our hands or feet or shortness of breath while lying flat in bed. Please call your doctor as soon as you notice any of these symptoms; do not wait until your next office visit. Recognize signs and symptoms of STROKE:    F-face looks uneven    A-arms unable to move or move unevenly    S-speech slurred or non-existent    T-time-call 911 as soon as signs and symptoms begin-DO NOT go       Back to bed or wait to see if you get better-TIME IS BRAIN. Warning Signs of HEART ATTACK     Call 911 if you have these symptoms:   Chest discomfort. Most heart attacks involve discomfort in the center of the chest that lasts more than a few minutes, or that goes away and comes back. It can feel like uncomfortable pressure, squeezing, fullness, or pain.  Discomfort in other areas of the upper body. Symptoms can include pain or discomfort in one or both arms, the back, neck, jaw, or stomach.  Shortness of breath with or without chest discomfort.  Other signs may include breaking out in a cold sweat, nausea, or lightheadedness. Don't wait more than five minutes to call 911 - MINUTES MATTER! Fast action can save your life. Calling 911 is almost always the fastest way to get lifesaving treatment. Emergency Medical Services staff can begin treatment when they arrive -- up to an hour sooner than if someone gets to the hospital by car. The discharge information has been reviewed with the patient. The patient verbalized understanding. Discharge medications reviewed with the patient and appropriate educational materials and side effects teaching were provided.   ___________________________________________________________________________________________________________________________________

## 2019-01-16 NOTE — DISCHARGE SUMMARY
[facility] [address]  [place], [address] [facility]  [phone]   Discharge Summary     Lenard Rivera  MRN: [id number]     : 1950     Age: 77 y.o. Admit date: 1/10/2019     Discharge date: 2019  Attending Physician: Felipa Smith MD  Primary Discharge Diagnosis:   Principal Problem:    SBO (small bowel obstruction) ([address] (1/10/2019)      Primary Operations or Procedures Performed :  * No surgery found *     Brief History and Reason for Admission: Lenard Rivera was admitted with the following history of present illness. Lenard Rivera is a 77 y.o. female who presents onset abdominal pain,nausea and vomiting yesterday. Normal BM and flatus this am. CT shows SBO. No fever or chills. Hospital Course:  Symptoms resolved with bowel rest and conservative management. Diet education was provided to assist with recurrence. Condition at Discharge: Good    Discharge Medications:   Current Discharge Medication List      CONTINUE these medications which have NOT CHANGED    Details   lidocaine 5 % gel 1 Dose by Apply Externally route four (4) times daily as needed. Qty: 1 Tube, Refills: 2      atorvastatin (LIPITOR) 20 mg tablet Take 1 Tab by mouth daily. Qty: 90 Tab, Refills: 3    Associated Diagnoses: Hypercholesterolemia      losartan-hydroCHLOROthiazide (HYZAAR) 100-25 mg per tablet Take 1 Tab by mouth daily. Qty: 90 Tab, Refills: 3    Associated Diagnoses: Essential hypertension      nebivolol (BYSTOLIC) 10 mg tablet Take 1 Tab by mouth daily. Qty: 90 Tab, Refills: 3    Associated Diagnoses: Essential hypertension      ALPRAZolam (XANAX) 1 mg tablet take 1 tablet by mouth once daily  Qty: 90 Tab, Refills: 1    Associated Diagnoses: Anxiety      HYDROcodone-acetaminophen (NORCO)  mg tablet Take 1 Tab by mouth every four (4) hours as needed for Pain. Max Daily Amount: 6 Tabs.   Qty: 20 Tab, Refills: 0    Associated Diagnoses: Chronic pain syndrome      butalbital-acetaminophen-caffeine (FIORICET, ESGIC) -40 mg per tablet TAKE 1 TABLET EVERY 6 HOURS AS NEEDED. MAXIMUM DAILY DOSE 4 TABLETS. Qty: 90 Tab, Refills: 3    Associated Diagnoses: Migraine without status migrainosus, not intractable, unspecified migraine type      SYNTHROID 100 mcg tablet Take 1 Tab by mouth Daily (before breakfast). YVETTE-1  Qty: 90 Tab, Refills: 3    Associated Diagnoses: Hypothyroidism due to acquired atrophy of thyroid      venlafaxine-SR (EFFEXOR-XR) 150 mg capsule TAKE 1 CAPSULE EVERY 12 HOURS FOR NERVES  Qty: 180 Cap, Refills: 3    Associated Diagnoses: Moderate episode of recurrent major depressive disorder (HCC)      fluticasone (FLONASE) 50 mcg/actuation nasal spray 2 Sprays by Both Nostrils route daily. Qty: 1 Bottle, Refills: 0      albuterol (PROAIR HFA) 90 mcg/actuation inhaler USE 2 INHALATIONS EVERY 4 HOURS AS NEEDED FOR WHEEZING , ACUTE ASTHMA ATTACK  Qty: 4 Inhaler, Refills: 3      MULTIVIT,CALC,MINS/FOLIC ACID (ONE-A-DAY PROACTIVE 65 PLUS PO) Take 1 Tab by mouth daily. Stopped 9/6/18  Indications: OTC      hydrocortisone (ANUSOL-HC) 2.5 % rectal cream Insert  into rectum four (4) times daily. Qty: 30 g, Refills: 3      pantoprazole (PROTONIX) 40 mg tablet Take 1 Tab by mouth daily. Qty: 90 Tab, Refills: 3      aspirin delayed-release 81 mg tablet Take 81 mg by mouth daily. Stopped 8/24/18--- per pt-- dr Priti Aguillon  Indications: OTC      cyanocobalamin 1,000 mcg tablet Take 1,000 mcg by mouth daily. Indications: OTC               Disposition: Home    Discharge Instructions/Follow-up Care:      MD Instructions:     Follow-up with your PCP as needed.   Diet - low residue  Activity as tolerated  Resume other home medications.      If problems or questions arise, please call our office at [phone].     Greater than 30 minutes were spent discharging the patient      Signed:  Katya Hamm MD   1/16/2019  7:47 AM

## 2019-01-16 NOTE — PROGRESS NOTES
Pt to discharge home this day with spouse. Pts  to provide transportation home. No anticipated discharge needs voiced by pt, spouse, or staff. Milestones met.

## 2019-01-16 NOTE — PROGRESS NOTES
END OF SHIFT NOTE: 
 
INTAKE/OUTPUT 
01/14 0701 - 01/15 0700 In: 7229 [I.V.:2333] Out: -  
Voiding: YES Catheter: NO 
Drain:   
 
 
 
 
 
Flatus: Patient does have flatus present. Stool:  4 occurrences. Characteristics: 
Stool Assessment Stool Appearance: Soft, Other (comment)(large) Emesis: 0 occurrences. Characteristics: VITAL SIGNS Patient Vitals for the past 12 hrs: 
 Temp Pulse Resp BP SpO2  
01/15/19 1805 98 °F (36.7 °C) (!) 107 18 131/83 94 % 01/15/19 1521 97.4 °F (36.3 °C) 96 18 118/75 97 % 01/15/19 1058 98 °F (36.7 °C) 99 18 116/73 95 % Pain Assessment Pain Intensity 1: 0 (01/14/19 1920) Pain Location 1: Abdomen Pain Intervention(s) 1: Medication (see MAR) Patient Stated Pain Goal: 0 Ambulating Yes Shift report given to oncoming nurse at the bedside.  
 
Maral Langley RN

## 2019-01-16 NOTE — PROGRESS NOTES
Gave discharge instructions to the pt, the pt voiced a clear understanding, IV removed x 1 site dressed.

## 2019-01-16 NOTE — PROGRESS NOTES
LATE NOTE: 
This Medicare patient was discharged prior to the delivery of the Important Letter from Medicare. Care Managers notified.

## 2019-01-17 ENCOUNTER — PATIENT OUTREACH (OUTPATIENT)
Dept: CASE MANAGEMENT | Age: 69
End: 2019-01-17

## 2019-01-17 NOTE — PROGRESS NOTES
This note will not be viewable in [address] Date/Time of Call: 
 01/17/19 1148am  
What was the patient hospitalized for? SBO Consent for [address] Call Does the patient understand his/her diagnosis and/or treatment and what happened during the hospitalization? Patient agrees to call Yes, patient states that she understands the hospitalization Did the patient receive discharge instructions? Yes  
CM Assessed Risk for Readmission:  
 
 
 
Patient stated Risk for Readmission:  
 patient is a low risk for readmission per Care Coordinator assessment Patient states that she is not concerned about readmission at this time Review any discharge instructions (see discharge instructions/AVS in [facility]). Ask patient if they understand these. Do they have any questions? DC instructions reviewed Were home services ordered (nursing, PT, OT, ST, etc.)? No  
If so, has the first visit occurred? If not, why? (Assist with coordination of services if necessary. ) 
 NA Was any DME ordered? No  
If so, has it been received? If not, why?  (Assist patient in obtaining DME orders &/or equipment if necessary. ) NA Complete a review of all medications (new, continued and discontinued meds per the D/C instructions and medication tab in [address] [place] [address]). Medications reviewed with patient. Were all new prescriptions filled? If not, why?  (Assist patient in obtaining medications if necessary  escalate for CCM &/or SW if ongoing issues are verbalized by pt or anticipated) NA Does the patient understand the purpose and dosing instructions for all medications? (If patient has questions, provide explanation and education.) Patient states that she understands all of her medications Does the patient have any problems in performing ADLs? (If patient is unable to perform ADLs  what is the limiting factor(s)?   Do they have a support system that can assist? If no support system is present, discuss possible assistance that they may be able to obtain. Escalate for CCM/SW if ongoing issues are verbalized by pt or anticipated) Patient is independent with ADLs and her spouse is assisting PRN Does the patient have all follow-up appointments scheduled? 7 day f/up with PCP?  
(f/up with PCP may be w/in 14 days if patient has a f/up with their specialist w/in 7 days) 7-14 day f/up with specialist?  
(or per discharge instructions) If f/up has not been made  what actions has the care coordinator made to accomplish this? Has transportation been arranged? yes  
 
 
01/24/19 @ 950am 
 
 
 
NA Called and scheduled patients PCP appointment Patients spouse will transport her to her appointment Any other questions or concerns expressed by the patient? Patient denies any further questions or concerns. Care Coordinator contact information provided should anything else arise that the patient need assistance with Schedule next appointment with CIARA CHONG Coordinator or refer to RN Case Manager/ per the workflow guidelines. When is care coordinators next follow-up call scheduled? If referred for CCM  what RN care manager was the referral assigned? Within 30 days Within 30 days NA  
JODY Call Completed By: Hali Tejeda CMA Care Coordinator

## 2019-02-07 ENCOUNTER — PATIENT OUTREACH (OUTPATIENT)
Dept: CASE MANAGEMENT | Age: 69
End: 2019-02-07

## 2019-02-07 NOTE — PROGRESS NOTES
This note will not be viewable in [address] Transitions of Care  Follow up Outreach Note Outreach type Phone call: spoke with patient Date/Time of Outreach: 02/07/19 159pm  
 
Has patient attended PCP or specialist follow-up appointments since last contact? What was outcome of appointment? When is next follow-up scheduled? Patient has had two FU appointments with Dr. Carlos Oleary and is doing well. She states that he just told her to stay on her diet, which she is trying to adhere to, Review medications. Any medication changes since last outreach? Does patient have any questions or issues related to their medications? Patient has meds and no significant changes have been made, no questions or concerns are voiced at this time Home health active? If yes  any issue? Progress? NA Referrals needed? 
(CM, SW, HH, etc.) 
 NA Other issues/Miscellaneous? (Transportation, access to meals, ability to perform ADLs, adequate caregiver support, etc.) Patient denies any questions or concerns at this time. Next Outreach Scheduled? Graduation from program? 
 N/A Yes Next Steps/Goals (if applicable): 
 NA Outreach completed by: 
 Joanne Downing CMA Community Care Coordinator

## 2019-05-06 PROBLEM — N34.2 URETHRITIS: Status: ACTIVE | Noted: 2019-05-06

## 2019-05-06 PROBLEM — R10.2 PELVIC PAIN: Status: ACTIVE | Noted: 2019-05-06

## 2019-11-08 PROBLEM — I73.9 PERIPHERAL VASCULAR DISEASE (HCC): Status: ACTIVE | Noted: 2019-11-08

## 2019-11-25 ENCOUNTER — HOSPITAL ENCOUNTER (INPATIENT)
Age: 69
LOS: 1 days | Discharge: HOME | DRG: 065 | End: 2019-11-27
Attending: EMERGENCY MEDICINE | Admitting: INTERNAL MEDICINE
Payer: MEDICARE

## 2019-11-25 ENCOUNTER — APPOINTMENT (OUTPATIENT)
Dept: CT IMAGING | Age: 69
DRG: 065 | End: 2019-11-25
Attending: EMERGENCY MEDICINE
Payer: MEDICARE

## 2019-11-25 ENCOUNTER — APPOINTMENT (OUTPATIENT)
Dept: GENERAL RADIOLOGY | Age: 69
DRG: 065 | End: 2019-11-25
Attending: INTERNAL MEDICINE
Payer: MEDICARE

## 2019-11-25 ENCOUNTER — APPOINTMENT (OUTPATIENT)
Dept: CT IMAGING | Age: 69
DRG: 065 | End: 2019-11-25
Attending: INTERNAL MEDICINE
Payer: MEDICARE

## 2019-11-25 ENCOUNTER — APPOINTMENT (OUTPATIENT)
Dept: MRI IMAGING | Age: 69
DRG: 065 | End: 2019-11-25
Attending: INTERNAL MEDICINE
Payer: MEDICARE

## 2019-11-25 DIAGNOSIS — R53.1 ACUTE RIGHT-SIDED WEAKNESS: Primary | ICD-10-CM

## 2019-11-25 DIAGNOSIS — I66.02 MIDDLE CEREBRAL ARTERY STENOSIS, LEFT: ICD-10-CM

## 2019-11-25 PROBLEM — N39.0 UTI (URINARY TRACT INFECTION): Chronic | Status: ACTIVE | Noted: 2019-11-25

## 2019-11-25 PROBLEM — E87.6 HYPOKALEMIA: Status: ACTIVE | Noted: 2019-11-25

## 2019-11-25 PROBLEM — G45.9 TIA (TRANSIENT ISCHEMIC ATTACK): Status: ACTIVE | Noted: 2019-11-25
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
## 2019-11-25 PROBLEM — R17 ELEVATED BILIRUBIN: Status: ACTIVE | Noted: 2019-11-25

## 2019-11-25 PROBLEM — I63.9 CVA (CEREBRAL VASCULAR ACCIDENT) (HCC): Status: ACTIVE | Noted: 2019-11-25

## 2019-11-25 PROBLEM — R27.0 ATAXIA: Status: ACTIVE | Noted: 2019-11-25

## 2019-11-25 PROBLEM — D75.1 POLYCYTHEMIA: Chronic | Status: ACTIVE | Noted: 2019-11-25

## 2019-11-25 PROBLEM — B37.0 THRUSH: Status: ACTIVE | Noted: 2019-11-25

## 2019-11-25 LAB
ALBUMIN SERPL-MCNC: 5 G/DL (ref 3.2–4.6)
ALBUMIN/GLOB SERPL: 1.3 {RATIO} (ref 1.2–3.5)
ALP SERPL-CCNC: 75 U/L (ref 50–136)
ALT SERPL-CCNC: 29 U/L (ref 12–65)
ANION GAP SERPL CALC-SCNC: 11 MMOL/L (ref 7–16)
AST SERPL-CCNC: 17 U/L (ref 15–37)
BASOPHILS # BLD: 0 K/UL (ref 0–0.2)
BASOPHILS NFR BLD: 1 % (ref 0–2)
BILIRUB DIRECT SERPL-MCNC: 0.4 MG/DL
BILIRUB INDIRECT SERPL-MCNC: 1.2 MG/DL (ref 0–1.1)
BILIRUB SERPL-MCNC: 1.6 MG/DL (ref 0.2–1.1)
BILIRUB SERPL-MCNC: 1.8 MG/DL (ref 0.2–1.1)
BUN SERPL-MCNC: 18 MG/DL (ref 8–23)
CALCIUM SERPL-MCNC: 9.8 MG/DL (ref 8.3–10.4)
CHLORIDE SERPL-SCNC: 103 MMOL/L (ref 98–107)
CO2 SERPL-SCNC: 25 MMOL/L (ref 21–32)
CREAT SERPL-MCNC: 1.19 MG/DL (ref 0.6–1)
DIFFERENTIAL METHOD BLD: ABNORMAL
EOSINOPHIL # BLD: 0 K/UL (ref 0–0.8)
EOSINOPHIL NFR BLD: 0 % (ref 0.5–7.8)
ERYTHROCYTE [DISTWIDTH] IN BLOOD BY AUTOMATED COUNT: 12.9 % (ref 11.9–14.6)
GLOBULIN SER CALC-MCNC: 4 G/DL (ref 2.3–3.5)
GLUCOSE BLD STRIP.AUTO-MCNC: 119 MG/DL (ref 65–100)
GLUCOSE BLD STRIP.AUTO-MCNC: 139 MG/DL (ref 65–100)
GLUCOSE SERPL-MCNC: 135 MG/DL (ref 65–100)
HCT VFR BLD AUTO: 46.7 % (ref 35.8–46.3)
HGB BLD-MCNC: 16.1 G/DL (ref 11.7–15.4)
IMM GRANULOCYTES # BLD AUTO: 0 K/UL (ref 0–0.5)
IMM GRANULOCYTES NFR BLD AUTO: 0 % (ref 0–5)
INR PPP: 1
LYMPHOCYTES # BLD: 2.2 K/UL (ref 0.5–4.6)
LYMPHOCYTES NFR BLD: 30 % (ref 13–44)
MAGNESIUM SERPL-MCNC: 2 MG/DL (ref 1.8–2.4)
MCH RBC QN AUTO: 31.8 PG (ref 26.1–32.9)
MCHC RBC AUTO-ENTMCNC: 34.5 G/DL (ref 31.4–35)
MCV RBC AUTO: 92.1 FL (ref 79.6–97.8)
MONOCYTES # BLD: 0.6 K/UL (ref 0.1–1.3)
MONOCYTES NFR BLD: 8 % (ref 4–12)
NEUTS SEG # BLD: 4.5 K/UL (ref 1.7–8.2)
NEUTS SEG NFR BLD: 61 % (ref 43–78)
NRBC # BLD: 0 K/UL (ref 0–0.2)
PLATELET # BLD AUTO: 318 K/UL (ref 150–450)
PMV BLD AUTO: 9.4 FL (ref 9.4–12.3)
POTASSIUM SERPL-SCNC: 3.2 MMOL/L (ref 3.5–5.1)
PROT SERPL-MCNC: 9 G/DL (ref 6.3–8.2)
PROTHROMBIN TIME: 13.2 SEC (ref 11.7–14.5)
RBC # BLD AUTO: 5.07 M/UL (ref 4.05–5.2)
SODIUM SERPL-SCNC: 139 MMOL/L (ref 136–145)
TSH SERPL DL<=0.005 MIU/L-ACNC: 1.7 UIU/ML (ref 0.36–3.74)
WBC # BLD AUTO: 7.4 K/UL (ref 4.3–11.1)

## 2019-11-25 PROCEDURE — 70551 MRI BRAIN STEM W/O DYE: CPT

## 2019-11-25 PROCEDURE — 99218 HC RM OBSERVATION: CPT

## 2019-11-25 PROCEDURE — 80053 COMPREHEN METABOLIC PANEL: CPT

## 2019-11-25 PROCEDURE — 96372 THER/PROPH/DIAG INJ SC/IM: CPT

## 2019-11-25 PROCEDURE — 74011000258 HC RX REV CODE- 258: Performed by: INTERNAL MEDICINE

## 2019-11-25 PROCEDURE — 83735 ASSAY OF MAGNESIUM: CPT

## 2019-11-25 PROCEDURE — 74011250636 HC RX REV CODE- 250/636: Performed by: INTERNAL MEDICINE

## 2019-11-25 PROCEDURE — 74011636320 HC RX REV CODE- 636/320: Performed by: INTERNAL MEDICINE

## 2019-11-25 PROCEDURE — 96366 THER/PROPH/DIAG IV INF ADDON: CPT

## 2019-11-25 PROCEDURE — 99284 EMERGENCY DEPT VISIT MOD MDM: CPT | Performed by: EMERGENCY MEDICINE

## 2019-11-25 PROCEDURE — 70450 CT HEAD/BRAIN W/O DYE: CPT

## 2019-11-25 PROCEDURE — 84443 ASSAY THYROID STIM HORMONE: CPT

## 2019-11-25 PROCEDURE — 85025 COMPLETE CBC W/AUTO DIFF WBC: CPT

## 2019-11-25 PROCEDURE — 85610 PROTHROMBIN TIME: CPT

## 2019-11-25 PROCEDURE — 82248 BILIRUBIN DIRECT: CPT

## 2019-11-25 PROCEDURE — 82962 GLUCOSE BLOOD TEST: CPT

## 2019-11-25 PROCEDURE — 36415 COLL VENOUS BLD VENIPUNCTURE: CPT

## 2019-11-25 PROCEDURE — 81003 URINALYSIS AUTO W/O SCOPE: CPT | Performed by: EMERGENCY MEDICINE

## 2019-11-25 PROCEDURE — 96365 THER/PROPH/DIAG IV INF INIT: CPT

## 2019-11-25 PROCEDURE — 71045 X-RAY EXAM CHEST 1 VIEW: CPT

## 2019-11-25 PROCEDURE — 70496 CT ANGIOGRAPHY HEAD: CPT

## 2019-11-25 PROCEDURE — 96360 HYDRATION IV INFUSION INIT: CPT | Performed by: EMERGENCY MEDICINE

## 2019-11-25 RX ORDER — ALBUTEROL SULFATE 0.83 MG/ML
2.5 SOLUTION RESPIRATORY (INHALATION)
Status: DISCONTINUED | OUTPATIENT
Start: 2019-11-25 | End: 2019-11-27 | Stop reason: HOSPADM

## 2019-11-25 RX ORDER — POTASSIUM CHLORIDE 14.9 MG/ML
20 INJECTION INTRAVENOUS
Status: DISPENSED | OUTPATIENT
Start: 2019-11-25 | End: 2019-11-26

## 2019-11-25 RX ORDER — ASPIRIN 81 MG/1
81 TABLET ORAL DAILY
Status: DISCONTINUED | OUTPATIENT
Start: 2019-11-26 | End: 2019-11-25 | Stop reason: CLARIF

## 2019-11-25 RX ORDER — HYDROCODONE BITARTRATE AND ACETAMINOPHEN 10; 325 MG/1; MG/1
1 TABLET ORAL
Status: DISCONTINUED | OUTPATIENT
Start: 2019-11-25 | End: 2019-11-27 | Stop reason: HOSPADM

## 2019-11-25 RX ORDER — ALPRAZOLAM 0.5 MG/1
1 TABLET ORAL
Status: DISCONTINUED | OUTPATIENT
Start: 2019-11-25 | End: 2019-11-27 | Stop reason: HOSPADM

## 2019-11-25 RX ORDER — SODIUM CHLORIDE 0.9 % (FLUSH) 0.9 %
10 SYRINGE (ML) INJECTION
Status: COMPLETED | OUTPATIENT
Start: 2019-11-25 | End: 2019-11-25

## 2019-11-25 RX ORDER — LOSARTAN POTASSIUM 50 MG/1
100 TABLET ORAL DAILY
Status: DISCONTINUED | OUTPATIENT
Start: 2019-11-26 | End: 2019-11-27 | Stop reason: HOSPADM

## 2019-11-25 RX ORDER — ASPIRIN 81 MG/1
81 TABLET ORAL DAILY
Status: DISCONTINUED | OUTPATIENT
Start: 2019-11-26 | End: 2019-11-27 | Stop reason: HOSPADM

## 2019-11-25 RX ORDER — LEVOTHYROXINE SODIUM 112 UG/1
112 TABLET ORAL
Status: DISCONTINUED | OUTPATIENT
Start: 2019-11-26 | End: 2019-11-27 | Stop reason: HOSPADM

## 2019-11-25 RX ORDER — SODIUM CHLORIDE 0.9 % (FLUSH) 0.9 %
5-40 SYRINGE (ML) INJECTION EVERY 8 HOURS
Status: DISCONTINUED | OUTPATIENT
Start: 2019-11-25 | End: 2019-11-27 | Stop reason: HOSPADM

## 2019-11-25 RX ORDER — NEBIVOLOL 10 MG/1
10 TABLET ORAL DAILY
Status: DISCONTINUED | OUTPATIENT
Start: 2019-11-26 | End: 2019-11-27 | Stop reason: HOSPADM

## 2019-11-25 RX ORDER — FLUCONAZOLE 100 MG/1
100 TABLET ORAL DAILY
Status: DISCONTINUED | OUTPATIENT
Start: 2019-11-25 | End: 2019-11-27 | Stop reason: HOSPADM

## 2019-11-25 RX ORDER — ACETAMINOPHEN 325 MG/1
650 TABLET ORAL
Status: DISCONTINUED | OUTPATIENT
Start: 2019-11-25 | End: 2019-11-27 | Stop reason: HOSPADM

## 2019-11-25 RX ORDER — ENOXAPARIN SODIUM 100 MG/ML
40 INJECTION SUBCUTANEOUS EVERY 24 HOURS
Status: DISCONTINUED | OUTPATIENT
Start: 2019-11-25 | End: 2019-11-27 | Stop reason: HOSPADM

## 2019-11-25 RX ORDER — PANTOPRAZOLE SODIUM 40 MG/1
40 TABLET, DELAYED RELEASE ORAL
Status: DISCONTINUED | OUTPATIENT
Start: 2019-11-26 | End: 2019-11-27 | Stop reason: HOSPADM

## 2019-11-25 RX ORDER — VENLAFAXINE HYDROCHLORIDE 150 MG/1
150 CAPSULE, EXTENDED RELEASE ORAL
Status: DISCONTINUED | OUTPATIENT
Start: 2019-11-26 | End: 2019-11-27 | Stop reason: HOSPADM

## 2019-11-25 RX ORDER — FLUTICASONE PROPIONATE 50 MCG
2 SPRAY, SUSPENSION (ML) NASAL DAILY
Status: DISCONTINUED | OUTPATIENT
Start: 2019-11-26 | End: 2019-11-27 | Stop reason: HOSPADM

## 2019-11-25 RX ORDER — SODIUM CHLORIDE 0.9 % (FLUSH) 0.9 %
5-40 SYRINGE (ML) INJECTION AS NEEDED
Status: DISCONTINUED | OUTPATIENT
Start: 2019-11-25 | End: 2019-11-27 | Stop reason: HOSPADM

## 2019-11-25 RX ORDER — ATORVASTATIN CALCIUM 40 MG/1
40 TABLET, FILM COATED ORAL
Status: DISCONTINUED | OUTPATIENT
Start: 2019-11-25 | End: 2019-11-27 | Stop reason: HOSPADM

## 2019-11-25 RX ORDER — ONDANSETRON 2 MG/ML
4 INJECTION INTRAMUSCULAR; INTRAVENOUS
Status: DISCONTINUED | OUTPATIENT
Start: 2019-11-25 | End: 2019-11-27 | Stop reason: HOSPADM

## 2019-11-25 RX ORDER — SODIUM CHLORIDE 9 MG/ML
100 INJECTION, SOLUTION INTRAVENOUS CONTINUOUS
Status: DISPENSED | OUTPATIENT
Start: 2019-11-25 | End: 2019-11-26

## 2019-11-25 RX ADMIN — Medication 10 ML: at 22:01

## 2019-11-25 RX ADMIN — SODIUM CHLORIDE 100 ML: 900 INJECTION, SOLUTION INTRAVENOUS at 18:26

## 2019-11-25 RX ADMIN — POTASSIUM CHLORIDE 20 MEQ: 200 INJECTION, SOLUTION INTRAVENOUS at 22:00

## 2019-11-25 RX ADMIN — ENOXAPARIN SODIUM 40 MG: 40 INJECTION SUBCUTANEOUS at 22:08

## 2019-11-25 RX ADMIN — IOPAMIDOL 50 ML: 755 INJECTION, SOLUTION INTRAVENOUS at 18:26

## 2019-11-25 RX ADMIN — SODIUM CHLORIDE 100 ML/HR: 900 INJECTION, SOLUTION INTRAVENOUS at 22:00

## 2019-11-25 RX ADMIN — Medication 10 ML: at 18:26

## 2019-11-25 RX ADMIN — SODIUM CHLORIDE 100 ML/HR: 900 INJECTION, SOLUTION INTRAVENOUS at 18:40

## 2019-11-25 NOTE — PROGRESS NOTES
CM chart review. PCP - Dr. Meka Arias ([facility] [employer]) - last visit today in office prior to arrival to ER.

## 2019-11-25 NOTE — ED TRIAGE NOTES
Patient sent from MD office for right sided weakness that has gotten progressively worse for 1 week. Also reports difficulty formulating sentences. Denies blurred vision, headache. Sensory deficits, facial droop and right upper extremity  strength weakness present for triage.  MD notified, verbal orders received

## 2019-11-25 NOTE — H&P
Hospitalist H&P Note     Admit Date:  2019  3:29 PM   Name:  Mae Enriquez   Age:  68 y.o.  :  1950   MRN:  [id number]   PCP:  Brent Maldonado DO  Treatment Team: Attending Provider: Albania Garza MD; Primary Nurse: Zay Lopez RN    HPI:     CC:  Falls       Ms. Nasir Amaral is a 69 yo female with PMH of HTN, HLP, chronic pain, asthma who is evaluated with progressive malaise/ several recent falls/ and recent UTI/ development of slurred speech. She had UTI treated with antibiotics per PCP, but failed to improve. She then developed slurred speech, decreased depth perception, right sided weakness and several falls. Denies syncope or prodromal complaints. Seen by PCP today and sent to the ED for CVA workup. CT head shows possible small hypodensity posterior limb of the internal capsule of left. 10 systems reviewed and negative except as noted in HPI. - additionally has sore throat, arthritis, dyspnea, fatigue, neuropathy, weight loss       Past Medical History:   Diagnosis Date    Allergic rhinitis     Anxiety     Arthritis     fingers    Asthma     prn inhaler    Calculus of kidney x 2-- last over 30 yrs ago    Cancer [facility])     melanoma R breast    Chronic pain     feet    Depression     worsening    Dizziness 2016    not a recent problem    Dyslipidemia     Fatigue     Former cigarette smoker     GERD (gastroesophageal reflux disease)     controlled with med    Heart murmur 2015    due to MVP-- followed by dr Que Russ HTN (hypertension)     controlled with med    Hypercholesterolemia     Hypothyroid     IBS (irritable bowel syndrome)     Migraines     Mitral valve insufficiency     rheumatic fever as a child --- followed by dr Anurag Caceres--- last echo 2017    Nausea & vomiting     post-op    Peripheral neuropathy     bilat feet    Rheumatic fever     as a child    Seizures ([facility] [address]     febrile seiz. as a child , no problems after that.     Unspecified adverse effect of anesthesia     elevated temp after colectomy only per pt      Past Surgical History:   Procedure Laterality Date    HX APPENDECTOMY      HX BLADDER SUSPENSION      HX BREAST LUMPECTOMY Right     melanoma breast - with lymph node biopsies.  HX COLECTOMY      for obstruction - about 14 inches removed    HX COLONOSCOPY      HX HYSTERECTOMY      HX KNEE ARTHROSCOPY Left     HX KNEE ARTHROSCOPY Right 10/16/09; 2015    HX KNEE REPLACEMENT Right 3/2015    HX KNEE REPLACEMENT Left 2016    HX LAP CHOLECYSTECTOMY      HX ORTHOPAEDIC      C- 7 ACDF    HX OTHER SURGICAL      urethra repair    HX OTHER SURGICAL      melanoma resected from right chest wall    HX TONSIL AND ADENOIDECTOMY        Allergies   Allergen Reactions    Keflex [Cephalexin] Shortness of Breath     \" stop my breathing \"     Ambien [Zolpidem] Unknown (comments)    Aspirin Itching     \" can take low dose \"     Codeine Anaphylaxis     \"full codeine injection\" states has had lortab since without reaction.  Daypro [Oxaprozin] Swelling    Demerol [Meperidine] Other (comments)     Difficulty breathing.   States has had since without reaction    Dilaudid [Hydromorphone (Bulk)] Itching and Nausea Only    Mobic [Meloxicam] Swelling    Onion Other (comments)     headache    Pcn [Penicillins] Unknown (comments)      Social History     Tobacco Use    Smoking status: Former Smoker     Packs/day: 0.25     Years: 4.00     Pack years: 1.00     Last attempt to quit: 2002     Years since quittin.2    Smokeless tobacco: Never Used   Substance Use Topics    Alcohol use: No      Family History   Problem Relation Age of Onset    Other Mother     Delayed Awakening Mother     Thyroid Disease Mother     Diabetes Mother     Cancer Mother         breast cancer    Breast Problems Mother     Heart Disease Father     Hypertension Father     Heart Failure Father         CHF    Delayed Awakening Sister  Thyroid Disease Sister     Breast Problems Sister     Cancer Other         breast    Diabetes Other         type II    High Cholesterol Other     Elevated Lipids Other     Hypertension Other     Thyroid Disease Other         hypo, acquired    Breast Problems Maternal Aunt       Immunization History   Administered Date(s) Administered    Influenza Vaccine 10/29/2014, 10/21/2016    Influenza Vaccine (Quad) Mdck Pf 2017    Influenza Vaccine (Quad) PF 2018, 2019    Influenza Vaccine PF 2015    Pneumococcal Polysaccharide (PPSV-23) 2015    Pneumococcal Vaccine (Unspecified Type) 2010    TB Skin Test (PPD) Intradermal 2015, 2016     PTA Medications:  Prior to Admission Medications   Prescriptions Last Dose Informant Patient Reported? Taking? ALPRAZolam (XANAX) 1 mg tablet   No No   Sig: take 1 tablet by mouth once daily   HYDROcodone-acetaminophen (NORCO)  mg tablet   No No   Sig: Take 1 Tab by mouth every six (6) hours as needed for Pain for up to 30 days. Max Daily Amount: 4 Tabs. HYDROcodone-acetaminophen (NORCO)  mg tablet   No No   Sig: Take 1 Tab by mouth every six (6) hours as needed for Pain for up to 30 days. Max Daily Amount: 4 Tabs. LIPOFEN 150 mg cap   Yes No   MULTIVIT,CALC,MINS/FOLIC ACID (ONE-A-DAY PROACTIVE 65 PLUS PO)   Yes No   Sig: Take 1 Tab by mouth daily. Stopped 18  Indications: OTC   albuterol (PROAIR HFA) 90 mcg/actuation inhaler   No No   Sig: USE 2 INHALATIONS EVERY 4 HOURS AS NEEDED FOR WHEEZING , ACUTE ASTHMA ATTACK   aluminum-magnesium hydroxide 200-200 mg/5 mL susp 5 mL, diphenhydrAMINE 12.5 mg/5 mL liqd 12.5 mg, lidocaine 2 % soln 5 mL   No No   Si mL by Swish and Spit route four (4) times daily for 7 days. Magic mouth wash   MYLANTA, LIDOCAINE, BENADRYL AND NYSTATIN (EQUAL PARTS) 12.5MG/5ML    Pharmacy to mix equal portions of ingredients to a total volume as indicated in the dispense amount. aspirin delayed-release 81 mg tablet   Yes No   Sig: Take 81 mg by mouth daily. Stopped 18--- per pt-- dr Sanjuanita Jimenez  Indications: OTC   atorvastatin (LIPITOR) 20 mg tablet   No No   Sig: Take 1 Tab by mouth daily. butalbital-acetaminophen-caffeine (FIORICET, ESGIC) -40 mg per tablet   No No   Sig: TAKE 1 TABLET EVERY 6 HOURS AS NEEDED. MAXIMUM DAILY DOSE 4 TABLETS.   cyanocobalamin 1,000 mcg tablet   Yes No   Sig: Take 1,000 mcg by mouth daily. Indications: OTC   fluticasone (FLONASE) 50 mcg/actuation nasal spray   No No   Si Sprays by Both Nostrils route daily. levothyroxine (SYNTHROID) 112 mcg tablet   No No   Sig: Take 1 Tab by mouth Daily (before breakfast). lidocaine 5 % gel   No No   Si Dose by Apply Externally route four (4) times daily as needed. losartan-hydroCHLOROthiazide (HYZAAR) 100-25 mg per tablet   No No   Sig: Take 1 Tab by mouth daily. lubiPROStone (AMITIZA) 24 mcg capsule   No No   Sig: Take 1 Cap by mouth two (2) times daily (with meals) for 30 days. nebivolol (BYSTOLIC) 10 mg tablet   No No   Sig: Take 1 Tab by mouth daily. pantoprazole (PROTONIX) 40 mg tablet   No No   Sig: Take 1 Tab by mouth daily. rifAXIMin (XIFAXAN) 550 mg tablet   No No   Si p.o. 3 times daily   venlafaxine-SR (EFFEXOR-XR) 150 mg capsule   No No   Sig: TAKE 1 CAPSULE EVERY 12 HOURS FOR NERVES   vitamin E acetate (VITAMIN E PO)   Yes No   Sig: Take  by mouth. Facility-Administered Medications: None       Objective:     Patient Vitals for the past 24 hrs:   Temp Pulse Resp BP SpO2   19 1534 97.9 °F (36.6 °C) 90 18 148/85 93 %     Oxygen Therapy  O2 Sat (%): 93 % (19 1534)  O2 Device: Room air (19 1534)  No intake or output data in the 24 hours ending 19 1744    Physical Exam:  General:    Alert. No distress, pleasant   Eyes:   Normal sclera. Extraocular movements intact. PERRLA  ENT:  Normocephalic, atraumatic.   Dry  mucous membranes, oral thrush   CV: RRR.  No m/r/g.  . No edema  Lungs:  CTAB. No wheezing, rhonchi, or rales. Anterior   Abdomen: Soft, nontender, nondistended. Present BS  Extremities: Warm and dry. .  Neurologic:  CN 2-12 grossly intact, 5/5 extremity strength   Skin:     No rashes or jaundice. Normal coloration  Psych:  Normal mood and affect. I reviewed the labs, imaging, EKGs, telemetry, and other studies done this admission. EKG ordered     Data Review:   Recent Results (from the past 24 hour(s))   GLUCOSE, POC    Collection Time: 11/25/19  3:36 PM   Result Value Ref Range    Glucose (POC) 139 (H) 65 - 100 mg/dL   CBC WITH AUTOMATED DIFF    Collection Time: 11/25/19  3:38 PM   Result Value Ref Range    WBC 7.4 4.3 - 11.1 K/uL    RBC 5.07 4.05 - 5.2 M/uL    HGB 16.1 (H) 11.7 - 15.4 g/dL    HCT 46.7 (H) 35.8 - 46.3 %    MCV 92.1 79.6 - 97.8 FL    MCH 31.8 26.1 - 32.9 PG    MCHC 34.5 31.4 - 35.0 g/dL    RDW 12.9 11.9 - 14.6 %    PLATELET 617 360 - 285 K/uL    MPV 9.4 9.4 - 12.3 FL    ABSOLUTE NRBC 0.00 0.0 - 0.2 K/uL    DF AUTOMATED      NEUTROPHILS 61 43 - 78 %    LYMPHOCYTES 30 13 - 44 %    MONOCYTES 8 4.0 - 12.0 %    EOSINOPHILS 0 (L) 0.5 - 7.8 %    BASOPHILS 1 0.0 - 2.0 %    IMMATURE GRANULOCYTES 0 0.0 - 5.0 %    ABS. NEUTROPHILS 4.5 1.7 - 8.2 K/UL    ABS. LYMPHOCYTES 2.2 0.5 - 4.6 K/UL    ABS. MONOCYTES 0.6 0.1 - 1.3 K/UL    ABS. EOSINOPHILS 0.0 0.0 - 0.8 K/UL    ABS. BASOPHILS 0.0 0.0 - 0.2 K/UL    ABS. IMM.  GRANS. 0.0 0.0 - 0.5 K/UL   METABOLIC PANEL, COMPREHENSIVE    Collection Time: 11/25/19  3:38 PM   Result Value Ref Range    Sodium 139 136 - 145 mmol/L    Potassium 3.2 (L) 3.5 - 5.1 mmol/L    Chloride 103 98 - 107 mmol/L    CO2 25 21 - 32 mmol/L    Anion gap 11 7 - 16 mmol/L    Glucose 135 (H) 65 - 100 mg/dL    BUN 18 8 - 23 MG/DL    Creatinine 1.19 (H) 0.6 - 1.0 MG/DL    GFR est AA 58 (L) >60 ml/min/1.73m2    GFR est non-AA 48 (L) >60 ml/min/1.73m2    Calcium 9.8 8.3 - 10.4 MG/DL    Bilirubin, total 1.8 (H) 0.2 - 1.1 MG/DL ALT (SGPT) 29 12 - 65 U/L    AST (SGOT) 17 15 - 37 U/L    Alk. phosphatase 75 50 - 136 U/L    Protein, total 9.0 (H) 6.3 - 8.2 g/dL    Albumin 5.0 (H) 3.2 - 4.6 g/dL    Globulin 4.0 (H) 2.3 - 3.5 g/dL    A-G Ratio 1.3 1.2 - 3.5     PROTHROMBIN TIME + INR    Collection Time: 11/25/19  3:38 PM   Result Value Ref Range    Prothrombin time 13.2 11.7 - 14.5 sec    INR 1.0         All Micro Results     None          Other Studies:  Ct Head Wo Cont    Result Date: 11/25/2019  EXAMINATION: HEAD CT WITHOUT CONTRAST 11/25/2019 4:25 PM ACCESSION NUMBER: [id number] INDICATION: right sided extremity weakness for one week COMPARISON: None available TECHNIQUE: Multiple-row detector helical CT examination of the head without intravenous contrast. Radiation dose reduction techniques were used for this study:  Our CT scanners use one or all of the following: Automated exposure control, adjustment of the mA and/or kVp according to patient's size, iterative reconstruction. FINDINGS: The ventricles are within normal limits for the degree of global brain parenchymal volume loss. There is no midline shift. The basilar cisterns are patent. There is no cerebellar tonsillar ectopia or herniation. Hypodensities in the periventricular and subcortical white matter are nonspecific, but they are most likely to represent chronic microangiopathy. There is a small oblong hypodensity in the posterior limb of the left internal capsule (axial image 16). Prior bilateral lens replacement. There is no evidence of acute intracranial hemorrhage or extra-axial collections. Partially atelectatic left maxillary sinus. Otherwise well aerated and clear paranasal sinuses and mastoid air cells. Cervical spine fixation hardware on the topogram.     IMPRESSION: 1. Small oblong hypodensity in the posterior limb of the internal capsule on the left.  Given the described symptoms in the indication, this is age indeterminate but most likely early subacute lacunar infarction. Definitive aging with MRI is recommended. 2. No acute intracranial hemorrhage. 3. Mild to moderate burden of chronic microangiopathy. VOICE DICTATED BY: Dr. Steph Duong and Plan:     Hospital Problems as of 11/25/2019 Date Reviewed: 11/25/2019          Codes Class Noted - Resolved POA    * (Principal) CVA (cerebral vascular accident) ([facility] [address] ICD-10-CM: I63.9  ICD-9-CM: 434.91  11/25/2019 - Present Yes        Ataxia ICD-10-CM: R27.0  ICD-9-CM: 781.3  11/25/2019 - Present Yes        Falls ICD-10-CM: W19. Delphina Bail  ICD-9-CM: E888.9  11/25/2019 - Present Yes        Thrush ICD-10-CM: B37.0  ICD-9-CM: 112.0  11/25/2019 - Present Yes        UTI (urinary tract infection) (Chronic) ICD-10-CM: N39.0  ICD-9-CM: 599.0  11/25/2019 - Present Yes        Polycythemia (Chronic) ICD-10-CM: D75.1  ICD-9-CM: 238.4  11/25/2019 - Present Yes        Hypokalemia ICD-10-CM: E87.6  ICD-9-CM: 276.8  11/25/2019 - Present Yes        Elevated bilirubin ICD-10-CM: R17  ICD-9-CM: 277.4  11/25/2019 - Present Yes        Anxiety ICD-10-CM: F41.9  ICD-9-CM: 300.00  Unknown - Present Yes        Hypothyroid ICD-10-CM: E03.9  ICD-9-CM: 244.9  Unknown - Present Yes        Chronic pain syndrome ICD-10-CM: G89.4  ICD-9-CM: 338.4  Unknown - Present Yes        HTN (hypertension) ICD-10-CM: I10  ICD-9-CM: 401.9  Unknown - Present Yes              · CVA: admit to remote tele, check MRI brain, followup ECHO/ CTA head, neurology consult, continue asa, increase Lipitor dose, PPD, PT/OT .  Case management, check EKG  · HTN: resume losartan  · Thrush: start oral diflcuan   · Recent UTI: no current antibiotics, check UA   ·  polycythemia: hydrate and reassess CBC   · Elevated bilirubin: check fractionated level   · Hypokalemia: replace and repeat lab, check magnesium   · Chronic pain: continued home dose norco prn  · Hypothyroidism: resume synthroid   · Anxiety: resume antidepressants     Discharge planning:  PPD, pending   DVT ppx: lovenox  Code status:  Full  Estimated LOS:  Greater than 2 midnights  Risk:  high  Care plan:  sallie [phone]  Signed:  Isabell Lund MD

## 2019-11-25 NOTE — ED PROVIDER NOTES
Patient is a 70-year-old  female with history of anxiety, hypertension, asthma, cholesterolemia and irritable bowel syndrome who presents complaining of some problems controlling her right side for the last week with difficulty with speech often unable to pronounce the words/slurring of the words. Patient was seen by her family doctor about 2 weeks ago and diagnosed with a severe UTI, placed on antibiotics and on follow-up the following week was found the urine was clean but the patient had developed thrush. Patient was subsequently placed on nystatin solution, and on recheck today at the family doctor she was sent to the emergency department for these neurologic symptoms which have been going on for the past week. Patient states her dysarthria waxes and wanes, but she is falling more frequently due to her right side not behaving normally. She also complains of a mild right facial droop. The history is provided by the patient and the spouse. Extremity Weakness    This is a new problem. The current episode started more than 2 days ago. The problem occurs constantly. The problem has not changed since onset. The patient is experiencing no pain. Pertinent negatives include no numbness, full range of motion, no stiffness, no tingling, no itching, no back pain and no neck pain. The symptoms are aggravated by standing. She has tried rest for the symptoms. The treatment provided no relief. There has been no history of extremity trauma.         Past Medical History:   Diagnosis Date    Allergic rhinitis     Anxiety     Arthritis     fingers    Asthma     prn inhaler    Calculus of kidney x 2-- last over 30 yrs ago    Cancer [facility])     melanoma R breast    Chronic pain     feet    Depression     worsening    Dizziness 09/08/2016    not a recent problem    Dyslipidemia     Fatigue     Former cigarette smoker     GERD (gastroesophageal reflux disease)     controlled with med    Heart murmur 11/24/2015    due to MVP-- followed by dr Gertie Brunner HTN (hypertension)     controlled with med    Hypercholesterolemia     Hypothyroid     IBS (irritable bowel syndrome)     Migraines     Mitral valve insufficiency     rheumatic fever as a child --- followed by dr Bernard Olson--- last echo 2017    Nausea & vomiting     post-op    Peripheral neuropathy     bilat feet    Rheumatic fever     as a child    Seizures ([address]     febrile seiz. as a child , no problems after that.  Unspecified adverse effect of anesthesia     elevated temp after colectomy only per pt       Past Surgical History:   Procedure Laterality Date    HX APPENDECTOMY      HX BLADDER SUSPENSION      HX BREAST LUMPECTOMY Right 1996    melanoma breast - with lymph node biopsies.      HX COLECTOMY      for obstruction - about 14 inches removed    HX COLONOSCOPY      HX HYSTERECTOMY      HX KNEE ARTHROSCOPY Left     HX KNEE ARTHROSCOPY Right 10/16/09; 03/2015    HX KNEE REPLACEMENT Right 3/2015    HX KNEE REPLACEMENT Left 2016    HX LAP CHOLECYSTECTOMY      HX ORTHOPAEDIC      C- 7 ACDF    HX OTHER SURGICAL      urethra repair    HX OTHER SURGICAL      melanoma resected from right chest wall    HX TONSIL AND ADENOIDECTOMY           Family History:   Problem Relation Age of Onset   Ceballos Other Mother    Ceballos Delayed Awakening Mother     Thyroid Disease Mother     Diabetes Mother     Cancer Mother         breast cancer    Breast Problems Mother     Heart Disease Father     Hypertension Father     Heart Failure Father         CHF    Delayed Awakening Sister     Thyroid Disease Sister     Breast Problems Sister     Cancer Other         breast    Diabetes Other         type II    High Cholesterol Other     Elevated Lipids Other     Hypertension Other     Thyroid Disease Other         hypo, acquired    Breast Problems Maternal Aunt        Social History     Socioeconomic History    Marital status:      Spouse name: Not on file    Number of children: Not on file    Years of education: Not on file    Highest education level: Not on file   Occupational History    Not on file   Social Needs    Financial resource strain: Not on file    Food insecurity:     Worry: Not on file     Inability: Not on file    Transportation needs:     Medical: Not on file     Non-medical: Not on file   Tobacco Use    Smoking status: Former Smoker     Packs/day: 0.25     Years: 4.00     Pack years: 1.00     Last attempt to quit: 2002     Years since quittin.2    Smokeless tobacco: Never Used   Substance and Sexual Activity    Alcohol use: No    Drug use: No    Sexual activity: Not on file   Lifestyle    Physical activity:     Days per week: Not on file     Minutes per session: Not on file    Stress: Not on file   Relationships    Social connections:     Talks on phone: Not on file     Gets together: Not on file     Attends Hoahaoism service: Not on file     Active member of club or organization: Not on file     Attends meetings of clubs or organizations: Not on file     Relationship status: Not on file    Intimate partner violence:     Fear of current or ex partner: Not on file     Emotionally abused: Not on file     Physically abused: Not on file     Forced sexual activity: Not on file   Other Topics Concern    Not on file   Social History Narrative    Not on file         ALLERGIES: Keflex [cephalexin]; Ambien [zolpidem]; Aspirin; Codeine; Daypro [oxaprozin]; Demerol [meperidine]; Dilaudid [hydromorphone (bulk)]; Mobic [meloxicam]; Onion; and Pcn [penicillins]    Review of Systems   Constitutional: Negative for chills and fever. Musculoskeletal: Negative for back pain, neck pain and stiffness. Skin: Negative for itching. Neurological: Positive for facial asymmetry, speech difficulty, weakness and headaches. Negative for dizziness, tingling, light-headedness and numbness.    Psychiatric/Behavioral: The patient is nervous/anxious. All other systems reviewed and are negative. Vitals:    11/25/19 1534   BP: 148/85   Pulse: 90   Resp: 18   Temp: 97.9 °F (36.6 °C)   SpO2: 93%   Weight: 78.5 kg (173 lb)   Height: 5' 6\" (1.676 m)            Physical Exam  Vitals signs and nursing note reviewed. Constitutional:       General: She is not in acute distress. Appearance: She is well-developed. HENT:      Head: Normocephalic and atraumatic. Right Ear: External ear normal.      Left Ear: External ear normal.   Eyes:      General: No visual field deficit. Conjunctiva/sclera: Conjunctivae normal.      Pupils: Pupils are equal, round, and reactive to light. Neck:      Musculoskeletal: Normal range of motion and neck supple. Cardiovascular:      Rate and Rhythm: Normal rate and regular rhythm. Heart sounds: Normal heart sounds. Pulmonary:      Effort: Pulmonary effort is normal.      Breath sounds: Normal breath sounds. Abdominal:      General: Bowel sounds are normal.      Palpations: Abdomen is soft. Tenderness: There is no tenderness. Musculoskeletal: Normal range of motion. Skin:     General: Skin is warm and dry. Capillary Refill: Capillary refill takes less than 2 seconds. Neurological:      Mental Status: She is alert and oriented to person, place, and time. Cranial Nerves: Dysarthria (Slight) and facial asymmetry (Possible slight right facial droop. Sensation is altered right side of the face compared to the left does not include the forehead.) present. Sensory: Sensation is intact. Motor: No tremor, abnormal muscle tone or pronator drift. Coordination: Finger-Nose-Finger Test abnormal. Heel to Shin Test normal. Impaired rapid alternating movements. Deep Tendon Reflexes: Reflexes normal.      Comments: Negative pronator drift. Minimal weakness noted in the right upper extremity compared to left.    Psychiatric:         Attention and Perception: Attention normal.         Mood and Affect: Mood is anxious. Speech: Speech is slurred (Mild). Behavior: Behavior normal.         Thought Content:  Thought content normal.         Cognition and Memory: Cognition normal.          MDM  Number of Diagnoses or Management Options  Acute right-sided weakness: new and requires workup     Amount and/or Complexity of Data Reviewed  Clinical lab tests: ordered and reviewed  Tests in the radiology section of CPT®: ordered and reviewed  Obtain history from someone other than the patient: yes  Review and summarize past medical records: yes  Discuss the patient with other providers: yes  Independent visualization of images, tracings, or specimens: yes    Risk of Complications, Morbidity, and/or Mortality  Presenting problems: high  Diagnostic procedures: high  Management options: high    Patient Progress  Patient progress: stable    ED Course as of Nov 25 1639   Mon Nov 25, 2019   1608 Patient is not a TPA candidate for the following reason(s):  Symptom onset fall outside the therapeutic window or onset undetermined        [BB]      ED Course User Index  [BB] Tona Brittle, MD       Procedures    Results Reviewed:      Recent Results (from the past 24 hour(s))   GLUCOSE, POC    Collection Time: 11/25/19  3:36 PM   Result Value Ref Range    Glucose (POC) 139 (H) 65 - 100 mg/dL   CBC WITH AUTOMATED DIFF    Collection Time: 11/25/19  3:38 PM   Result Value Ref Range    WBC 7.4 4.3 - 11.1 K/uL    RBC 5.07 4.05 - 5.2 M/uL    HGB 16.1 (H) 11.7 - 15.4 g/dL    HCT 46.7 (H) 35.8 - 46.3 %    MCV 92.1 79.6 - 97.8 FL    MCH 31.8 26.1 - 32.9 PG    MCHC 34.5 31.4 - 35.0 g/dL    RDW 12.9 11.9 - 14.6 %    PLATELET 436 046 - 964 K/uL    MPV 9.4 9.4 - 12.3 FL    ABSOLUTE NRBC 0.00 0.0 - 0.2 K/uL    DF AUTOMATED      NEUTROPHILS 61 43 - 78 %    LYMPHOCYTES 30 13 - 44 %    MONOCYTES 8 4.0 - 12.0 %    EOSINOPHILS 0 (L) 0.5 - 7.8 %    BASOPHILS 1 0.0 - 2.0 %    IMMATURE GRANULOCYTES 0 0.0 - 5.0 %    ABS. NEUTROPHILS 4.5 1.7 - 8.2 K/UL    ABS. LYMPHOCYTES 2.2 0.5 - 4.6 K/UL    ABS. MONOCYTES 0.6 0.1 - 1.3 K/UL    ABS. EOSINOPHILS 0.0 0.0 - 0.8 K/UL    ABS. BASOPHILS 0.0 0.0 - 0.2 K/UL    ABS. IMM. GRANS. 0.0 0.0 - 0.5 K/UL   METABOLIC PANEL, COMPREHENSIVE    Collection Time: 11/25/19  3:38 PM   Result Value Ref Range    Sodium 139 136 - 145 mmol/L    Potassium 3.2 (L) 3.5 - 5.1 mmol/L    Chloride 103 98 - 107 mmol/L    CO2 25 21 - 32 mmol/L    Anion gap 11 7 - 16 mmol/L    Glucose 135 (H) 65 - 100 mg/dL    BUN 18 8 - 23 MG/DL    Creatinine 1.19 (H) 0.6 - 1.0 MG/DL    GFR est AA 58 (L) >60 ml/min/1.73m2    GFR est non-AA 48 (L) >60 ml/min/1.73m2    Calcium 9.8 8.3 - 10.4 MG/DL    Bilirubin, total PENDING MG/DL    ALT (SGPT) 29 12 - 65 U/L    AST (SGOT) 17 15 - 37 U/L    Alk. phosphatase PENDING U/L    Protein, total PENDING g/dL    Albumin 5.0 (H) 3.2 - 4.6 g/dL    Globulin PENDING g/dL    A-G Ratio PENDING     PROTHROMBIN TIME + INR    Collection Time: 11/25/19  3:38 PM   Result Value Ref Range    Prothrombin time 13.2 11.7 - 14.5 sec    INR 1.0         CT HEAD WO CONT   Final Result   IMPRESSION:      1. Small oblong hypodensity in the posterior limb of the internal capsule on the   left. Given the described symptoms in the indication, this is age indeterminate   but most likely early subacute lacunar infarction. Definitive aging with MRI is   recommended. 2. No acute intracranial hemorrhage. 3. Mild to moderate burden of chronic microangiopathy. VOICE DICTATED BY: Dr. Milagros Patel           Dictated using voice recognition software.  Proofread, but unrecognized errors may exist.

## 2019-11-26 LAB
ALBUMIN SERPL-MCNC: 4.2 G/DL (ref 3.2–4.6)
ALBUMIN/GLOB SERPL: 1.3 {RATIO} (ref 1.2–3.5)
ALP SERPL-CCNC: 67 U/L (ref 50–136)
ALT SERPL-CCNC: 25 U/L (ref 12–65)
APPEARANCE UR: CLEAR
AST SERPL-CCNC: 12 U/L (ref 15–37)
ATRIAL RATE: 74 BPM
BILIRUB DIRECT SERPL-MCNC: 0.3 MG/DL
BILIRUB SERPL-MCNC: 1.7 MG/DL (ref 0.2–1.1)
BILIRUB UR QL: NEGATIVE
CALCULATED P AXIS, ECG09: 70 DEGREES
CALCULATED R AXIS, ECG10: -6 DEGREES
CALCULATED T AXIS, ECG11: 6 DEGREES
CHOLEST SERPL-MCNC: 113 MG/DL
COLOR UR: YELLOW
DIAGNOSIS, 93000: NORMAL
ERYTHROCYTE [DISTWIDTH] IN BLOOD BY AUTOMATED COUNT: 12.7 % (ref 11.9–14.6)
EST. AVERAGE GLUCOSE BLD GHB EST-MCNC: 108 MG/DL
GLOBULIN SER CALC-MCNC: 3.3 G/DL (ref 2.3–3.5)
GLUCOSE BLD STRIP.AUTO-MCNC: 120 MG/DL (ref 65–100)
GLUCOSE UR STRIP.AUTO-MCNC: NEGATIVE MG/DL
HBA1C MFR BLD: 5.4 % (ref 4.8–6)
HCT VFR BLD AUTO: 40.6 % (ref 35.8–46.3)
HDLC SERPL-MCNC: 38 MG/DL (ref 40–60)
HDLC SERPL: 3 {RATIO}
HGB BLD-MCNC: 14 G/DL (ref 11.7–15.4)
HGB UR QL STRIP: NEGATIVE
KETONES UR QL STRIP.AUTO: NEGATIVE MG/DL
LDLC SERPL CALC-MCNC: 23.6 MG/DL
LEUKOCYTE ESTERASE UR QL STRIP.AUTO: NEGATIVE
LIPID PROFILE,FLP: ABNORMAL
MAGNESIUM SERPL-MCNC: 2.1 MG/DL (ref 1.8–2.4)
MCH RBC QN AUTO: 31.9 PG (ref 26.1–32.9)
MCHC RBC AUTO-ENTMCNC: 34.5 G/DL (ref 31.4–35)
MCV RBC AUTO: 92.5 FL (ref 79.6–97.8)
NITRITE UR QL STRIP.AUTO: NEGATIVE
NRBC # BLD: 0 K/UL (ref 0–0.2)
P-R INTERVAL, ECG05: 164 MS
PH UR STRIP: 6 [PH] (ref 5–9)
PLATELET # BLD AUTO: 222 K/UL (ref 150–450)
PMV BLD AUTO: 9.4 FL (ref 9.4–12.3)
PROT SERPL-MCNC: 7.5 G/DL (ref 6.3–8.2)
PROT UR STRIP-MCNC: NEGATIVE MG/DL
Q-T INTERVAL, ECG07: 410 MS
QRS DURATION, ECG06: 94 MS
QTC CALCULATION (BEZET), ECG08: 455 MS
RBC # BLD AUTO: 4.39 M/UL (ref 4.05–5.2)
SP GR UR REFRACTOMETRY: 1.03 (ref 1–1.02)
TRIGL SERPL-MCNC: 257 MG/DL (ref 35–150)
UROBILINOGEN UR QL STRIP.AUTO: 0.2 EU/DL (ref 0.2–1)
VENTRICULAR RATE, ECG03: 74 BPM
VLDLC SERPL CALC-MCNC: 51.4 MG/DL (ref 6–23)
WBC # BLD AUTO: 6.8 K/UL (ref 4.3–11.1)

## 2019-11-26 PROCEDURE — 96372 THER/PROPH/DIAG INJ SC/IM: CPT

## 2019-11-26 PROCEDURE — 74011250636 HC RX REV CODE- 250/636: Performed by: INTERNAL MEDICINE

## 2019-11-26 PROCEDURE — C8929 TTE W OR WO FOL WCON,DOPPLER: HCPCS

## 2019-11-26 PROCEDURE — 74011250637 HC RX REV CODE- 250/637: Performed by: INTERNAL MEDICINE

## 2019-11-26 PROCEDURE — 82962 GLUCOSE BLOOD TEST: CPT

## 2019-11-26 PROCEDURE — 83735 ASSAY OF MAGNESIUM: CPT

## 2019-11-26 PROCEDURE — 85027 COMPLETE CBC AUTOMATED: CPT

## 2019-11-26 PROCEDURE — 83036 HEMOGLOBIN GLYCOSYLATED A1C: CPT

## 2019-11-26 PROCEDURE — 74011000250 HC RX REV CODE- 250: Performed by: INTERNAL MEDICINE

## 2019-11-26 PROCEDURE — 80076 HEPATIC FUNCTION PANEL: CPT

## 2019-11-26 PROCEDURE — 92610 EVALUATE SWALLOWING FUNCTION: CPT

## 2019-11-26 PROCEDURE — 97165 OT EVAL LOW COMPLEX 30 MIN: CPT

## 2019-11-26 PROCEDURE — 99222 1ST HOSP IP/OBS MODERATE 55: CPT | Performed by: PSYCHIATRY & NEUROLOGY

## 2019-11-26 PROCEDURE — 80061 LIPID PANEL: CPT

## 2019-11-26 PROCEDURE — 93005 ELECTROCARDIOGRAM TRACING: CPT | Performed by: NURSE PRACTITIONER

## 2019-11-26 PROCEDURE — 74011250637 HC RX REV CODE- 250/637: Performed by: NURSE PRACTITIONER

## 2019-11-26 PROCEDURE — 81003 URINALYSIS AUTO W/O SCOPE: CPT

## 2019-11-26 PROCEDURE — 36415 COLL VENOUS BLD VENIPUNCTURE: CPT

## 2019-11-26 PROCEDURE — 99218 HC RM OBSERVATION: CPT

## 2019-11-26 RX ORDER — CLOPIDOGREL BISULFATE 75 MG/1
75 TABLET ORAL DAILY
Status: DISCONTINUED | OUTPATIENT
Start: 2019-11-26 | End: 2019-11-27 | Stop reason: HOSPADM

## 2019-11-26 RX ORDER — LOPERAMIDE HYDROCHLORIDE 2 MG/1
2 CAPSULE ORAL
Status: DISCONTINUED | OUTPATIENT
Start: 2019-11-26 | End: 2019-11-27 | Stop reason: HOSPADM

## 2019-11-26 RX ADMIN — Medication 1 AMPULE: at 11:57

## 2019-11-26 RX ADMIN — ENOXAPARIN SODIUM 40 MG: 40 INJECTION SUBCUTANEOUS at 19:21

## 2019-11-26 RX ADMIN — PERFLUTREN 1 ML: 6.52 INJECTION, SUSPENSION INTRAVENOUS at 09:00

## 2019-11-26 RX ADMIN — HYDROCODONE BITARTRATE AND ACETAMINOPHEN 1 TABLET: 10; 325 TABLET ORAL at 22:05

## 2019-11-26 RX ADMIN — CLOPIDOGREL BISULFATE 75 MG: 75 TABLET ORAL at 11:57

## 2019-11-26 RX ADMIN — HYDROCODONE BITARTRATE AND ACETAMINOPHEN 1 TABLET: 10; 325 TABLET ORAL at 12:01

## 2019-11-26 RX ADMIN — Medication 10 ML: at 22:08

## 2019-11-26 RX ADMIN — SODIUM CHLORIDE 100 ML/HR: 900 INJECTION, SOLUTION INTRAVENOUS at 11:46

## 2019-11-26 RX ADMIN — Medication 1 AMPULE: at 22:04

## 2019-11-26 RX ADMIN — LOPERAMIDE HYDROCHLORIDE 2 MG: 2 CAPSULE ORAL at 15:48

## 2019-11-26 RX ADMIN — ATORVASTATIN CALCIUM 40 MG: 40 TABLET, FILM COATED ORAL at 22:06

## 2019-11-26 RX ADMIN — Medication 10 ML: at 06:12

## 2019-11-26 RX ADMIN — Medication 5 ML: at 13:13

## 2019-11-26 NOTE — PROGRESS NOTES
CM met with patient, spouse and friends at bedside. Patient sitting in bed. Alert and oriented. Patient drives and is independent with all ADLs. Patient lives in one story home with three stairs to enter. Currently has cane and walker at home. Demographics, insurance and PCP verified. Awaiting recommendations from PT/OT and ST. CM will continue to follow for discharge needs. Care Management Interventions  PCP Verified by CM:  Yes  Physical Therapy Consult: Yes  Occupational Therapy Consult: Yes  Speech Therapy Consult: Yes  Current Support Network: Own Home, Lives with Spouse  Plan discussed with Pt/Family/Caregiver: Yes  Freedom of Choice Offered: Yes  Discharge Location  Discharge Placement: [address]

## 2019-11-26 NOTE — PROGRESS NOTES
Hospitalist Progress Note    Subjective:   Daily Progress Note: 11/26/2019 7:45 AM    Patient presented to [address] [place] [address], Dr Letty Burnett' office 11/25 for follow up from 11/18 when she had been seen with dizziness. She was also seen 2 weeks ago for severe UTI, treated with abx. A week after the abx began, she subsequently developed thrush. Currently, she has progressively worsening right side weakness, intermittent dysarthria, decreased depth perception with falls, mild right side of mouth droop, altered right facial sensation alteration, and left side headache for the past week. She was sent to ER where workup revealed a small hypodensity in the posterior limb of the left internal capsule with possible subacute lacunar infarction. Admitted with stroke protocols. She is also found with elevated liver enzymes. 11/26:  Triglycerides: 257, VLDL: 51.4.  TSH normal.  MRI with acute infarct posterior limb left internal capsule. Doing well. Anxious to go home. At baseline. Neurology in.      ADDITIONAL HISTORY:  Multiple drug allergies, asthma, GERD, cancer, seizures as a child only, depression and anxiety, chronic pain, hypertension, IBS, hyperlipidemia, rectal bleeding, SBO, PVD    Current Facility-Administered Medications   Medication Dose Route Frequency    albuterol (PROVENTIL VENTOLIN) nebulizer solution 2.5 mg  2.5 mg Nebulization Q4H PRN    ALPRAZolam (XANAX) tablet 1 mg  1 mg Oral QHS PRN    atorvastatin (LIPITOR) tablet 40 mg  40 mg Oral QHS    fluticasone propionate (FLONASE) 50 mcg/actuation nasal spray 2 Spray  2 Spray Both Nostrils DAILY    HYDROcodone-acetaminophen (NORCO)  mg tablet 1 Tab  1 Tab Oral Q6H PRN    levothyroxine (SYNTHROID) tablet 112 mcg  112 mcg Oral ACB    losartan (COZAAR) tablet 100 mg  100 mg Oral DAILY    nebivolol (BYSTOLIC) tablet 10 mg  10 mg Oral DAILY    pantoprazole (PROTONIX) tablet 40 mg  40 mg Oral ACB    venlafaxine-SR (EFFEXOR-XR) capsule 150 mg  150 mg Oral DAILY WITH BREAKFAST    sodium chloride (NS) flush 5-40 mL  5-40 mL IntraVENous Q8H    sodium chloride (NS) flush 5-40 mL  5-40 mL IntraVENous PRN    0.9% sodium chloride infusion  100 mL/hr IntraVENous CONTINUOUS    ondansetron (ZOFRAN) injection 4 mg  4 mg IntraVENous Q6H PRN    acetaminophen (TYLENOL) tablet 650 mg  650 mg Oral Q6H PRN    enoxaparin (LOVENOX) injection 40 mg  40 mg SubCUTAneous Q24H    tuberculin injection 5 Units  5 Units IntraDERMal ONCE    fluconazole (DIFLUCAN) tablet 100 mg  100 mg Oral DAILY    aspirin delayed-release tablet 81 mg  81 mg Oral DAILY      Review of Systems  A comprehensive review of systems was negative except for that written in the HPI. Objective:     Visit Vitals  /79 (BP 1 Location: Right arm, BP Patient Position: At rest)   Pulse 69   Temp 97.9 °F (36.6 °C)   Resp 16   Ht 5' 6\" (1.676 m)   Wt 78.5 kg (173 lb)   SpO2 94%   BMI 27.92 kg/m²      O2 Device: Room air    Temp (24hrs), Av °F (36.7 °C), Min:97.7 °F (36.5 °C), Max:98.5 °F (36.9 °C)    General appearance:Oriented and alert, no complaints.  at bedside. Discussed all tests and findings to date. Well nourished, well hydrated. Head: Normocephalic, without obvious abnormality, atraumatic  Throat: Lips, mucosa, and tongue normal. Teeth and gums normal  Neck: supple, symmetrical, trachea midline, no JVD  Lungs: clear to auscultation bilaterally  Heart: regular rate and rhythm, S1, S2 normal, no murmur, click, rub or gallop  Abdomen: soft, non-tender. Bowel sounds normal. No masses,  no organomegaly  Extremities: All extremities normal, atraumatic, no cyanosis or edema. Skin: Skin color, texture, turgor normal. No rashes or lesions  Neurologic: Grossly normal.  No unilateral deficit.     Additional comments: Notes,orders, test results, vitals reviewed    Data Review  Recent Results (from the past 24 hour(s))   GLUCOSE, POC    Collection Time: 19  3:36 PM   Result Value Ref Range Glucose (POC) 139 (H) 65 - 100 mg/dL   CBC WITH AUTOMATED DIFF    Collection Time: 11/25/19  3:38 PM   Result Value Ref Range    WBC 7.4 4.3 - 11.1 K/uL    RBC 5.07 4.05 - 5.2 M/uL    HGB 16.1 (H) 11.7 - 15.4 g/dL    HCT 46.7 (H) 35.8 - 46.3 %    MCV 92.1 79.6 - 97.8 FL    MCH 31.8 26.1 - 32.9 PG    MCHC 34.5 31.4 - 35.0 g/dL    RDW 12.9 11.9 - 14.6 %    PLATELET 108 636 - 838 K/uL    MPV 9.4 9.4 - 12.3 FL    ABSOLUTE NRBC 0.00 0.0 - 0.2 K/uL    DF AUTOMATED      NEUTROPHILS 61 43 - 78 %    LYMPHOCYTES 30 13 - 44 %    MONOCYTES 8 4.0 - 12.0 %    EOSINOPHILS 0 (L) 0.5 - 7.8 %    BASOPHILS 1 0.0 - 2.0 %    IMMATURE GRANULOCYTES 0 0.0 - 5.0 %    ABS. NEUTROPHILS 4.5 1.7 - 8.2 K/UL    ABS. LYMPHOCYTES 2.2 0.5 - 4.6 K/UL    ABS. MONOCYTES 0.6 0.1 - 1.3 K/UL    ABS. EOSINOPHILS 0.0 0.0 - 0.8 K/UL    ABS. BASOPHILS 0.0 0.0 - 0.2 K/UL    ABS. IMM. GRANS. 0.0 0.0 - 0.5 K/UL   METABOLIC PANEL, COMPREHENSIVE    Collection Time: 11/25/19  3:38 PM   Result Value Ref Range    Sodium 139 136 - 145 mmol/L    Potassium 3.2 (L) 3.5 - 5.1 mmol/L    Chloride 103 98 - 107 mmol/L    CO2 25 21 - 32 mmol/L    Anion gap 11 7 - 16 mmol/L    Glucose 135 (H) 65 - 100 mg/dL    BUN 18 8 - 23 MG/DL    Creatinine 1.19 (H) 0.6 - 1.0 MG/DL    GFR est AA 58 (L) >60 ml/min/1.73m2    GFR est non-AA 48 (L) >60 ml/min/1.73m2    Calcium 9.8 8.3 - 10.4 MG/DL    Bilirubin, total 1.8 (H) 0.2 - 1.1 MG/DL    ALT (SGPT) 29 12 - 65 U/L    AST (SGOT) 17 15 - 37 U/L    Alk.  phosphatase 75 50 - 136 U/L    Protein, total 9.0 (H) 6.3 - 8.2 g/dL    Albumin 5.0 (H) 3.2 - 4.6 g/dL    Globulin 4.0 (H) 2.3 - 3.5 g/dL    A-G Ratio 1.3 1.2 - 3.5     PROTHROMBIN TIME + INR    Collection Time: 11/25/19  3:38 PM   Result Value Ref Range    Prothrombin time 13.2 11.7 - 14.5 sec    INR 1.0     GLUCOSE, POC    Collection Time: 11/25/19 10:28 PM   Result Value Ref Range    Glucose (POC) 119 (H) 65 - 100 mg/dL   MAGNESIUM    Collection Time: 11/25/19 10:37 PM   Result Value Ref Range    Magnesium 2.0 1.8 - 2.4 mg/dL   TSH 3RD GENERATION    Collection Time: 11/25/19 10:37 PM   Result Value Ref Range    TSH 1.700 0.358 - 3.740 uIU/mL   BILIRUBIN, FRACTIONATED    Collection Time: 11/25/19 10:37 PM   Result Value Ref Range    Bilirubin, total 1.6 (H) 0.2 - 1.1 MG/DL    Bilirubin, direct 0.4 (H) <0.4 MG/DL    Bilirubin, indirect 1.2 (H) 0.0 - 1.1 MG/DL   LIPID PANEL    Collection Time: 11/26/19  4:53 AM   Result Value Ref Range    LIPID PROFILE          Cholesterol, total 113 <200 MG/DL    Triglyceride 257 (H) 35 - 150 MG/DL    HDL Cholesterol 38 (L) 40 - 60 MG/DL    LDL, calculated 23.6 <100 MG/DL    VLDL, calculated 51.4 (H) 6.0 - 23.0 MG/DL    CHOL/HDL Ratio 3.0     HEMOGLOBIN A1C WITH EAG    Collection Time: 11/26/19  4:53 AM   Result Value Ref Range    Hemoglobin A1c 5.4 4.8 - 6.0 %    Est. average glucose 108 mg/dL   CBC W/O DIFF    Collection Time: 11/26/19  4:53 AM   Result Value Ref Range    WBC 6.8 4.3 - 11.1 K/uL    RBC 4.39 4.05 - 5.2 M/uL    HGB 14.0 11.7 - 15.4 g/dL    HCT 40.6 35.8 - 46.3 %    MCV 92.5 79.6 - 97.8 FL    MCH 31.9 26.1 - 32.9 PG    MCHC 34.5 31.4 - 35.0 g/dL    RDW 12.7 11.9 - 14.6 %    PLATELET 594 285 - 094 K/uL    MPV 9.4 9.4 - 12.3 FL    ABSOLUTE NRBC 0.00 0.0 - 0.2 K/uL   HEPATIC FUNCTION PANEL    Collection Time: 11/26/19  4:53 AM   Result Value Ref Range    Protein, total 7.5 6.3 - 8.2 g/dL    Albumin 4.2 3.2 - 4.6 g/dL    Globulin 3.3 2.3 - 3.5 g/dL    A-G Ratio 1.3 1.2 - 3.5      Bilirubin, total 1.7 (H) 0.2 - 1.1 MG/DL    Bilirubin, direct 0.3 <0.4 MG/DL    Alk. phosphatase 67 50 - 136 U/L    AST (SGOT) 12 (L) 15 - 37 U/L    ALT (SGPT) 25 12 - 65 U/L      11/25:  CT HEAD:  Small oblong hypodensity in the posterior limb of the internal capsule on the left. Given the described symptoms in the indication, this is age indeterminate but most likely early subacute lacunar infarction. Definitive aging with MRI is recommended. No acute intracranial hemorrhage. Mild to moderate burden of chronic microangiopathy. 11/25:  CTA HEAD AND NECK:  Severe stenosis of the proximal left M1 segment of middle cerebral artery. Otherwise, mild atherosclerotic narrowing in the remainder of the major intracranial arterial vasculature without evidence of proximal vessel occlusion or aneurysms. Diffusely diminutive cervical left vertebral artery. Intracranial left vertebral artery terminates in posterior inferior cerebellar artery and is occluded to the vertebrobasilar bifurcation. . This is likely chronic in chronicity. Similar hypodensity in the left internal capsule are consistent with the known acute to subacute infarct in this region. 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 11/25:  CXR:  Negative for acute change      11/25:  MRI BRAIN: Acute infarct posterior limb left internal capsule    11/25:  ECHOCARDIOGRAM:     -  Left ventricle: Systolic function was normal. Ejection fraction was  estimated in the range of 55 % to 60 %. There were no regional wall motion  abnormalities.   -  Atrial septum: Contrast injection was performed. There was no   right-to-left shunt, with provocative maneuvers to increase right atrial pressure. Assessment/Plan:   CVA:  Acute infarct posterior limb left internal capsule   Code S using stroke protocol   Remote telemetry   See by Teleneuro and Dr Leonel Fuentes 11/26   MRI positive as above   Echocardiogram pending   Continue asa   lipitor 80 mg daily   PT/OT,CM, PPD,    EKG   Asa and plavix x 90 days, then transition    To monotherapy   B/P management  Severe stenosis of left M1 segment and Occlusion of left vertebral artery   Discussed with Dr Mari Pantoja and    Vascular.   Vascular reviewed films, no    Surgical intervention indicated  Anxiety:  Home meds  Hypothyroid: Home meds  Chronic pain syndrome    HTN:  Continue losartan  Ataxia   Falls   Recent UTI:  Treated    Repeat urinalysis negative   Thrush following antibiotics:  Po diflucan  Polycythemia:  Hydrate and reassess   Hypokalemia:  Replace and recheck, add mag  Elevated bilirubin:  Unclear etiology    Get fractionated bili    Repeat in am     Monitor, OP folllow up     Discharge to home when ok with Neuro    Care Plan discussed with: Patient/Family and Nurse    Signed By: Blayne Quinones NP     November 26, 2019

## 2019-11-26 NOTE — PROGRESS NOTES
11/25/19 2040   Dual Skin Pressure Injury Assessment   Dual Skin Pressure Injury Assessment WDL   Second Care Provider (Based on Facility Policy) Ellen Talavera RN   Skin Integumentary   Skin Integumentary (WDL) X   Skin Color Appropriate for ethnicity   Skin Condition/Temp Dry; Warm   Skin Integrity Intact;Scars (comment)  (abdomen)   Wound Prevention and Protection Methods   Orientation of Wound Prevention Posterior   Location of Wound Prevention Sacrum/Coccyx   Wound Offloading (Prevention Methods) Bed, pressure reduction mattress

## 2019-11-26 NOTE — PROGRESS NOTES
SPEECH LANGUAGE PATHOLOGY: DYSPHAGIA- Initial Assessment    NAME/AGE/GENDER: Drea Randall is a 68 y.o. female  DATE: 11/26/2019  PRIMARY DIAGNOSIS: TIA (transient ischemic attack) [G45.9]      ICD-10: Treatment Diagnosis: R13.12 Dysphagia, Oropharyngeal Phase    INTERDISCIPLINARY COLLABORATION: Registered Nurse  PRECAUTIONS/ALLERGIES: Keflex [cephalexin]; Ambien [zolpidem]; Aspirin; Codeine; Daypro [oxaprozin]; Demerol [meperidine]; Dilaudid [hydromorphone (bulk)]; Mobic [meloxicam]; Onion; and Pcn [penicillins]       SUBJECTIVE   Patient alert and pleasant. Multiple family members at bedside. She endorses mild difficulty with swallowing characterized by food sticking in upper chest. Outpatient GI consult pending. History of Present Injury/Illness: Ms. Jesus Khan  has a past medical history of Allergic rhinitis, Anxiety, Arthritis, Asthma, Calculus of kidney (x 2-- last over 30 yrs ago), Cancer [facility]), Chronic pain, Depression, Dizziness (09/08/2016), Dyslipidemia, Fatigue, Former cigarette smoker, GERD (gastroesophageal reflux disease), Heart murmur (11/24/2015), HTN (hypertension), Hypercholesterolemia, Hypothyroid, IBS (irritable bowel syndrome), Migraines, Mitral valve insufficiency, Nausea & vomiting, Peripheral neuropathy, Rheumatic fever, Seizures ([address] and Unspecified adverse effect of anesthesia. . She also  has a past surgical history that includes hx lap cholecystectomy; hx appendectomy; hx hysterectomy; hx tonsil and adenoidectomy; hx colectomy; hx bladder suspension; hx colonoscopy; hx other surgical; hx other surgical; hx breast lumpectomy (Right, 1996); hx orthopaedic; hx knee arthroscopy (Left); hx knee arthroscopy (Right, 10/16/09; 03/2015); hx knee replacement (Right, 3/2015); and hx knee replacement (Left, 2016). Problem List:  (Impairments causing functional limitations):  1.  Oropharyngeal dysphagia- No symptoms identified  2. '  3.    Previous Dysphagia: YES Reports of food \"sticking\" in upper chest x6 months. Outpatient GI consult pending    Diet Prior to Evaluation: NPo       Orientation:   Person  Place  Time  Situation    Pain: Pain Scale 1: Numeric (0 - 10)  Pain Intensity 1: 0       Cognitive-Linguistic Screen:   Speech Production:   o Impaired  o Needs further assessment   Expressive Language:  o Impaired  o Needs further assessment   Receptive Language:  o WNL   For basic command following and participation in unstructured conversations   Cognition:   o Needs further assessment       OBJECTIVE   Oral Motor:   · Labial: Decreased rate, Decreased seal and Right droop  · Dentition: Intact  · Oral Hygiene: Adequate  · Lingual: Right deviation     Swallow assessment:   Patient presented with thin liquid via cup and straw, puree, mixed, and solid consistencies. Appropriate oral prep with all textures. Timely swallow initiation, and single swallows upon palpation. Adequate oral clearing. No overt signs or symptoms of airway compromise observed with liquid or solid textures. ASSESSMENT   Patient presents with oropharyngeal swallow function that is within normal limits. No s/sx of airway compromise with liquids or solids. Recommend regular diet/thin liquids. Medications whole with liquid wash. No dysphagia treatment indicated at this time. Intermittent issues observed with speech production and word finding. Mild stuttering noted. Per patient and family report, speech/language are not at baseline. Will follow up for full assessment of cognitive-linguistic and speech abilities.            Tool Used: Dysphagia Outcome and Severity Scale (ESVIN)    Score Comments   Normal Diet  [] 7 With no strategies or extra time needed   Functional Swallow  [] 6 May have mild oral or pharyngeal delay   Mild Dysphagia  [] 5 Which may require one diet consistency restricted    Mild-Moderate Dysphagia  [] 4 With 1-2 diet consistencies restricted   Moderate Dysphagia  [] 3 With 2 or more diet consistencies restricted   Moderate-Severe Dysphagia  [] 2 With partial PO strategies (trials with ST only)   Severe Dysphagia  [] 1 With inability to tolerate any PO safely      Score:  Initial: 7 Most Recent: x (Date 19 )   Interpretation of Tool: The Dysphagia Outcome and Severity Scale (ESVIN) is a simple, easy-to-use, 7-point scale developed to systematically rate the functional severity of dysphagia based on objective assessment and make recommendations for diet level, independence level, and type of nutrition. Current Medications:   No current facility-administered medications on file prior to encounter. Current Outpatient Medications on File Prior to Encounter   Medication Sig Dispense Refill    [] aluminum-magnesium hydroxide 200-200 mg/5 mL susp 5 mL, diphenhydrAMINE 12.5 mg/5 mL liqd 12.5 mg, lidocaine 2 % soln 5 mL 5 mL by Swish and Spit route four (4) times daily for 7 days. Magic mouth wash   MYLANTA, LIDOCAINE, BENADRYL AND NYSTATIN (EQUAL PARTS) 12.5MG/5ML    Pharmacy to mix equal portions of ingredients to a total volume as indicated in the dispense amount. 150 mL 0    ALPRAZolam (XANAX) 1 mg tablet take 1 tablet by mouth once daily 30 Tab 5    [START ON 2019] HYDROcodone-acetaminophen (NORCO)  mg tablet Take 1 Tab by mouth every six (6) hours as needed for Pain for up to 30 days. Max Daily Amount: 4 Tabs. 120 Tab 0    HYDROcodone-acetaminophen (NORCO)  mg tablet Take 1 Tab by mouth every six (6) hours as needed for Pain for up to 30 days. Max Daily Amount: 4 Tabs. 120 Tab 0    lubiPROStone (AMITIZA) 24 mcg capsule Take 1 Cap by mouth two (2) times daily (with meals) for 30 days.  180 Cap 3    albuterol (PROAIR HFA) 90 mcg/actuation inhaler USE 2 INHALATIONS EVERY 4 HOURS AS NEEDED FOR WHEEZING , ACUTE ASTHMA ATTACK 4 Inhaler 3    rifAXIMin (XIFAXAN) 550 mg tablet 1 p.o. 3 times daily 14 Tab 0    levothyroxine (SYNTHROID) 112 mcg tablet Take 1 Tab by mouth Daily (before breakfast). 90 Tab 3    atorvastatin (LIPITOR) 20 mg tablet Take 1 Tab by mouth daily. 90 Tab 3    losartan-hydroCHLOROthiazide (HYZAAR) 100-25 mg per tablet Take 1 Tab by mouth daily. 90 Tab 3    nebivolol (BYSTOLIC) 10 mg tablet Take 1 Tab by mouth daily. 90 Tab 3    venlafaxine-SR (EFFEXOR-XR) 150 mg capsule TAKE 1 CAPSULE EVERY 12 HOURS FOR NERVES 180 Cap 3    pantoprazole (PROTONIX) 40 mg tablet Take 1 Tab by mouth daily. 90 Tab 3    butalbital-acetaminophen-caffeine (FIORICET, ESGIC) -40 mg per tablet TAKE 1 TABLET EVERY 6 HOURS AS NEEDED. MAXIMUM DAILY DOSE 4 TABLETS. 90 Tab 3    LIPOFEN 150 mg cap       vitamin E acetate (VITAMIN E PO) Take  by mouth.  lidocaine 5 % gel 1 Dose by Apply Externally route four (4) times daily as needed. 1 Tube 2    fluticasone (FLONASE) 50 mcg/actuation nasal spray 2 Sprays by Both Nostrils route daily. 1 Bottle 0    MULTIVIT,CALC,MINS/FOLIC ACID (ONE-A-DAY PROACTIVE 65 PLUS PO) Take 1 Tab by mouth daily. Stopped 9/6/18  Indications: OTC      aspirin delayed-release 81 mg tablet Take 81 mg by mouth daily. Stopped 8/24/18--- per pt-- dr Bib Lockett  Indications: OTC      cyanocobalamin 1,000 mcg tablet Take 1,000 mcg by mouth daily.  Indications: OTC           PLAN    FREQUENCY/DURATION: Speech therapy to follow up for assessment of cognitive-linguistic function.     - Recommendations for next treatment session: Next treatment will address assessment of speech-language and cognitive function     REHABILITATION POTENTIAL FOR STATED GOALS: Excellent         RECOMMENDATIONS   DIET:    PO:  Regular   Liquids:  regular thin    MEDICATIONS: With liquid     ASPIRATION PRECAUTIONS  · Slow rate of intake  · Small bites/sips  · Upright at 90 degrees during meal     COMPENSATORY STRATEGIES/MODIFICATIONS  · None     EDUCATION:  · Recommendations discussed with Nursing  · Patient     RECOMMENDATIONS for CONTINUED SPEECH THERAPY:   YES: Anticipate need for ongoing speech therapy for assessment of cognitive-linguistic and speech function. Anticiapte need for ongoing speech therapy during this admission and at next level of care. Shyam Hernandez        SAFETY:  After treatment position/precautions:  · Upright in bed  · Family at bedside  · Call light within reach    Total Treatment Duration:   Time In: 7431  Time Out: [address] CCC-SLP

## 2019-11-26 NOTE — PROGRESS NOTES
11/26/19 0707   NIH Stroke Scale   Interval Other (comment)  (Dual NIH with Trixie Mathews RN)   LOC 0   LOC Questions 0   LOC Commands 0   Best Gaze 0   Visual 0   Facial Palsy 1   Motor Right Arm 0   Motor Left Arm 0   Motor Right Leg 0   Motor Left Leg 0   Limb Ataxia 0   Sensory 0   Best Language 0   Dysarthria 0   Extinction and Inattention 0   Total 1

## 2019-11-26 NOTE — PROGRESS NOTES
Problem: Patient Education: Go to Patient Education Activity  Goal: Patient/Family Education  Outcome: Resolved/Met       OCCUPATIONAL THERAPY: Initial Assessment and Discharge 11/26/2019  OBSERVATION:    [place] / [place] / Product Type: Medicare /      NAME/AGE/GENDER: Susan Espinal is a 68 y.o. female   PRIMARY DIAGNOSIS:  TIA (transient ischemic attack) [G45.9] CVA (cerebral vascular accident) ([facility] [address] CVA (cerebral vascular accident) ([facility] [address]        ICD-10: Treatment Diagnosis:    · Generalized Muscle Weakness (M62.81)   Precautions/Allergies:     Keflex [cephalexin]; Ambien [zolpidem]; Aspirin; Codeine; Daypro [oxaprozin]; Demerol [meperidine]; Dilaudid [hydromorphone (bulk)]; Mobic [meloxicam]; Onion; and Pcn [penicillins]      ASSESSMENT:     Ms. Dangelo Brandt presents for TIA with onset of R sided weakness and slurred speech. Upon arrival, pt supine in bed with family at bedside. Pt is alert, oriented x 4, and agreeable to OT evaluation. Pt reports living with  in a 1-story home with 4 steps to enter with hand rails. At baseline, pt was independent with ADLs, IADLs, and functional mobility. Pt endorses 3 falls over the last 2 weeks d/t LOB; began using SPC. Today, pt completed functional transfers with supervision. Static and dynamic sitting balance are intact. BUE AROM and strength (4+/5) are [facility]; coordination and sensation are intact. Pt returned to supine to bed with needs met, call light within reach, and family at bedside. At this time, Susan Espinal is functioning at baseline for ADLs and functional mobility. Pt to be discharged from OT services at this time. This section established at most recent assessment   PROBLEM LIST (Impairments causing functional limitations):  1. Decreased Strength  2. Decreased ADL/Functional Activities  3. Decreased Balance  4.  Decreased Activity Tolerance   INTERVENTIONS PLANNED: (Benefits and precautions of occupational therapy have been discussed with the patient.)  1. discharge     TREATMENT PLAN: Frequency/Duration: discharge  Rehabilitation Potential For Stated Goals: discharge     REHAB RECOMMENDATIONS (at time of discharge pending progress):    Placement: It is my opinion, based on this patient's performance to date, that Ms. Melanie Burch may benefit from being discharged with NO further skilled therapy due to the high likelihood of returning to baseline. Equipment:    None at this time              OCCUPATIONAL PROFILE AND HISTORY:   History of Present Injury/Illness (Reason for Referral):  See H&P  Past Medical History/Comorbidities:   Ms. Melanie Burch  has a past medical history of Allergic rhinitis, Anxiety, Arthritis, Asthma, Calculus of kidney (x 2-- last over 30 yrs ago), Cancer [facility]), Chronic pain, Depression, Dizziness (09/08/2016), Dyslipidemia, Fatigue, Former cigarette smoker, GERD (gastroesophageal reflux disease), Heart murmur (11/24/2015), HTN (hypertension), Hypercholesterolemia, Hypothyroid, IBS (irritable bowel syndrome), Migraines, Mitral valve insufficiency, Nausea & vomiting, Peripheral neuropathy, Rheumatic fever, Seizures ([address] and Unspecified adverse effect of anesthesia. Ms. Melanie Burch  has a past surgical history that includes hx lap cholecystectomy; hx appendectomy; hx hysterectomy; hx tonsil and adenoidectomy; hx colectomy; hx bladder suspension; hx colonoscopy; hx other surgical; hx other surgical; hx breast lumpectomy (Right, 1996); hx orthopaedic; hx knee arthroscopy (Left); hx knee arthroscopy (Right, 10/16/09; 03/2015); hx knee replacement (Right, 3/2015); and hx knee replacement (Left, 2016).   Social History/Living Environment:   Home Environment: Private residence  # Steps to Enter: 4  Rails to Enter: Yes  One/Two Story Residence: One story  Living Alone: No  Support Systems: Spouse/Significant Other/Partner, Child(kristian)  Patient Expects to be Discharged to[de-identified] Private residence  Current DME Used/Available at Home: Cane, straight, Shower chair, Raised toilet seat  Prior Level of Function/Work/Activity:  Independent with ADls and functional mobility. Personal Factors:          Sex:  female        Age:  68 y.o. Other factors that influence how disability is experienced by the patient:  multiple co-morbidities    Number of Personal Factors/Comorbidities that affect the Plan of Care: Brief history (0):  LOW COMPLEXITY   ASSESSMENT OF OCCUPATIONAL PERFORMANCE[de-identified]   Activities of Daily Living:   Basic ADLs (From Assessment) Complex ADLs (From Assessment)   Feeding: Independent  Oral Facial Hygiene/Grooming: Independent  Bathing: Supervision  Upper Body Dressing: Independent  Lower Body Dressing: Independent  Toileting: Independent Instrumental ADL  Meal Preparation: Moderate assistance  Homemaking: Moderate assistance   Grooming/Bathing/Dressing Activities of Daily Living     Cognitive Retraining  Safety/Judgement: Awareness of environment                       Bed/Mat Mobility  Rolling: Stand-by assistance  Supine to Sit: Stand-by assistance  Sit to Supine: Stand-by assistance  Scooting: Stand-by assistance     Most Recent Physical Functioning:   Gross Assessment:  AROM: Generally decreased, functional  PROM: Generally decreased, functional  Strength:  Within functional limits  Coordination: Within functional limits  Tone: Normal  Sensation: Intact               Posture:     Balance:  Sitting: Intact  Standing: Intact Bed Mobility:  Rolling: Stand-by assistance  Supine to Sit: Stand-by assistance  Sit to Supine: Stand-by assistance  Scooting: Stand-by assistance  Wheelchair Mobility:     Transfers:               Patient Vitals for the past 6 hrs:   BP BP Patient Position SpO2 Pulse   11/26/19 0800 142/79 At rest 94 % 69       Mental Status  Neurologic State: Alert  Orientation Level: Oriented X4  Cognition: Follows commands  Perception: Appears intact  Perseveration: No perseveration noted  Safety/Judgement: Awareness of environment                          Physical Skills Involved:  1. Balance  2. Strength Cognitive Skills Affected (resulting in the inability to perform in a timely and safe manner): 1. none  Psychosocial Skills Affected:  1. Habits/Routines   Number of elements that affect the Plan of Care: 1-3:  LOW COMPLEXITY   CLINICAL DECISION MAKING:    [employer] AM-PAC 6 Clicks   Daily Activity Inpatient Short Form  How much help from another person does the patient currently need. .. Total A Lot A Little None   1. Putting on and taking off regular lower body clothing? [] 1   [] 2   [] 3   [x] 4   2. Bathing (including washing, rinsing, drying)? [] 1   [] 2   [x] 3   [] 4   3. Toileting, which includes using toilet, bedpan or urinal?   [] 1   [] 2   [] 3   [x] 4   4. Putting on and taking off regular upper body clothing? [] 1   [] 2   [] 3   [x] 4   5. Taking care of personal grooming such as brushing teeth? [] 1   [] 2   [] 3   [x] 4   6. Eating meals? [] 1   [] 2   [] 3   [x] 4   © 2007, Trustees of [facility] [employer], under license to [employer]. All rights reserved      Score:  Initial: 23 Most Recent: X (Date: -- )    Interpretation of Tool:  Represents activities that are increasingly more difficult (i.e. Bed mobility, Transfers, Gait). Medical Necessity:     · discharge. Reason for Services/Other Comments:  · discharge. Use of outcome tool(s) and clinical judgement create a POC that gives a: LOW COMPLEXITY         TREATMENT:   (In addition to Assessment/Re-Assessment sessions the following treatments were rendered)     Pre-treatment Symptoms/Complaints:    Pain: Initial:   Pain Intensity 1: 0 /10 Post Session:  same     Assessment/Reassessment only, no treatment provided today    Braces/Orthotics/Lines/Etc:   · O2 Device: Room air  Treatment/Session Assessment:    · Response to Treatment:  tolerated well with no issues noted.    · Interdisciplinary Collaboration:   o Occupational Therapist  o Registered Nurse  · After treatment position/precautions:   o Supine in bed  o Bed/Chair-wheels locked  o Bed in low position  o Call light within reach  o Family at bedside   · Compliance with Program/Exercises: Will assess as treatment progresses. · Recommendations/Intent for next treatment session: \"Next visit will focus on advancements to more challenging activities and reduction in assistance provided\".   Total Treatment Duration:  OT Patient Time In/Time Out  Time In: 1015  Time Out: 1330 [facility],

## 2019-11-26 NOTE — PROGRESS NOTES
Occupational Therapy Note:    Occupational therapy evaluation orders received and chart reviewed. Attempted to see patient this AM to initiate assessment. Patient currently getting an ECHO. Will follow and re-attempt at a later time/date as schedule permits/patient available.      Raisa Huang, OTR/L

## 2019-11-26 NOTE — PROGRESS NOTES
TRANSFER - IN REPORT:    Verbal report received from Janeen RN(name) on Dyan Christianson  being received from ED(unit) for routine progression of care      Report consisted of patients Situation, Background, Assessment and   Recommendations(SBAR). Information from the following report(s) ED Summary and Recent Results was reviewed with the receiving nurse. Opportunity for questions and clarification was provided. Assessment completed upon patients arrival to unit(719) and care assumed.

## 2019-11-26 NOTE — DISCHARGE INSTRUCTIONS

## 2019-11-26 NOTE — PROGRESS NOTES
Problem: Patient Education: Go to Patient Education Activity  Goal: Patient/Family Education  Outcome: Progressing Towards Goal     Problem: TIA/CVA Stroke: 0-24 hours  Goal: Psychosocial  Outcome: Progressing Towards Goal     Problem: TIA/CVA Stroke: Day 2 Until Discharge  Goal: Diagnostic Test/Procedures  Outcome: Progressing Towards Goal  Goal: Nutrition/Diet  Outcome: Progressing Towards Goal  Goal: Discharge Planning  Outcome: Progressing Towards Goal  Goal: Medications  Outcome: Progressing Towards Goal  Goal: Respiratory  Outcome: Progressing Towards Goal  Goal: Treatments/Interventions/Procedures  Outcome: Progressing Towards Goal  Goal: Psychosocial  Outcome: Progressing Towards Goal  Goal: *Verbalizes anxiety and depression are reduced or absent  Outcome: Progressing Towards Goal  Goal: *Absence of aspiration  Outcome: Progressing Towards Goal  Goal: *Absence of deep venous thrombosis signs and symptoms(Stroke Metric)  Outcome: Progressing Towards Goal  Goal: *Optimal pain control at patient's stated goal  Outcome: Progressing Towards Goal  Goal: *Tolerating diet  Outcome: Progressing Towards Goal  Goal: *Ability to perform ADLs and demonstrates progressive mobility and function  Outcome: Progressing Towards Goal  Goal: *Stroke education continued(Stroke Metric)  Outcome: Progressing Towards Goal     Problem: Ischemic Stroke: Discharge Outcomes  Goal: *Verbalizes anxiety and depression are reduced or absent  Outcome: Progressing Towards Goal  Goal: *Verbalize understanding of risk factor modification(Stroke Metric)  Outcome: Progressing Towards Goal  Goal: *Hemodynamically stable  Outcome: Progressing Towards Goal  Goal: *Absence of aspiration pneumonia  Outcome: Progressing Towards Goal  Goal: *Aware of needed dietary changes  Outcome: Progressing Towards Goal  Goal: *Verbalize understanding of prescribed medications including anti-coagulants, anti-lipid, and/or anti-platelets(Stroke Metric)  Outcome: Progressing Towards Goal  Goal: *Tolerating diet  Outcome: Progressing Towards Goal  Goal: *Aware of follow-up diagnostics related to anticoagulants  Outcome: Progressing Towards Goal  Goal: *Ability to perform ADLs and demonstrates progressive mobility and function  Outcome: Progressing Towards Goal  Goal: *Absence of DVT(Stroke Metric)  Outcome: Progressing Towards Goal  Goal: *Absence of aspiration  Outcome: Progressing Towards Goal  Goal: *Optimal pain control at patient's stated goal  Outcome: Progressing Towards Goal  Goal: *Home safety concerns addressed  Outcome: Progressing Towards Goal  Goal: *Describes available resources and support systems  Outcome: Progressing Towards Goal  Goal: *Verbalizes understanding of activation of EMS(911) for stroke symptoms(Stroke Metric)  Outcome: Progressing Towards Goal  Goal: *Understands and describes signs and symptoms to report to providers(Stroke Metric)  Outcome: Progressing Towards Goal  Goal: *Neurolgocially stable (absence of additional neurological deficits)  Outcome: Progressing Towards Goal  Goal: *Verbalizes importance of follow-up with primary care physician(Stroke Metric)  Outcome: Progressing Towards Goal  Goal: *Smoking cessation discussed,if applicable(Stroke Metric)  Outcome: Progressing Towards Goal  Goal: *Depression screening completed(Stroke Metric)  Outcome: Progressing Towards Goal     Problem: Patient Education: Go to Patient Education Activity  Goal: Patient/Family Education  Outcome: Progressing Towards Goal     Problem: TIA/CVA Stroke: 0-24 hours  Goal: Off Pathway (Use only if patient is Off Pathway)  Outcome: Progressing Towards Goal  Goal: Activity/Safety  Outcome: Progressing Towards Goal  Goal: Consults, if ordered  Outcome: Progressing Towards Goal  Goal: Diagnostic Test/Procedures  Outcome: Progressing Towards Goal  Goal: Nutrition/Diet  Outcome: Progressing Towards Goal  Goal: Discharge Planning  Outcome: Progressing Towards Goal  Goal: Medications  Outcome: Progressing Towards Goal  Goal: Respiratory  Outcome: Progressing Towards Goal  Goal: Treatments/Interventions/Procedures  Outcome: Progressing Towards Goal  Goal: Minimize risk of bleeding post-thrombolytic infusion  Outcome: Progressing Towards Goal  Goal: Monitor for complications post-thrombolytic infusion  Outcome: Progressing Towards Goal  Goal: Psychosocial  Outcome: Progressing Towards Goal  Goal: *Hemodynamically stable  Outcome: Progressing Towards Goal  Goal: *Neurologically stable  Description  Absence of additional neurological deficits    Outcome: Progressing Towards Goal  Goal: *Verbalizes anxiety and depression are reduced or absent  Outcome: Progressing Towards Goal  Goal: *Absence of Signs of Aspiration on Current Diet  Outcome: Progressing Towards Goal  Goal: *Absence of deep venous thrombosis signs and symptoms(Stroke Metric)  Outcome: Progressing Towards Goal  Goal: *Ability to perform ADLs and demonstrates progressive mobility and function  Outcome: Progressing Towards Goal  Goal: *Stroke education started(Stroke Metric)  Outcome: Progressing Towards Goal  Goal: *Dysphagia screen performed(Stroke Metric)  Outcome: Progressing Towards Goal  Goal: *Rehab consulted(Stroke Metric)  Outcome: Progressing Towards Goal     Problem: TIA/CVA Stroke: Day 2 Until Discharge  Goal: Off Pathway (Use only if patient is Off Pathway)  Outcome: Progressing Towards Goal  Goal: Activity/Safety  Outcome: Progressing Towards Goal  Goal: Diagnostic Test/Procedures  Outcome: Progressing Towards Goal  Goal: Nutrition/Diet  Outcome: Progressing Towards Goal  Goal: Discharge Planning  Outcome: Progressing Towards Goal  Goal: Medications  Outcome: Progressing Towards Goal  Goal: Respiratory  Outcome: Progressing Towards Goal  Goal: Treatments/Interventions/Procedures  Outcome: Progressing Towards Goal  Goal: Psychosocial  Outcome: Progressing Towards Goal  Goal: *Verbalizes anxiety and depression are reduced or absent  Outcome: Progressing Towards Goal  Goal: *Absence of aspiration  Outcome: Progressing Towards Goal  Goal: *Absence of deep venous thrombosis signs and symptoms(Stroke Metric)  Outcome: Progressing Towards Goal  Goal: *Optimal pain control at patient's stated goal  Outcome: Progressing Towards Goal  Goal: *Tolerating diet  Outcome: Progressing Towards Goal  Goal: *Ability to perform ADLs and demonstrates progressive mobility and function  Outcome: Progressing Towards Goal  Goal: *Stroke education continued(Stroke Metric)  Outcome: Progressing Towards Goal     Problem: Ischemic Stroke: Discharge Outcomes  Goal: *Verbalizes anxiety and depression are reduced or absent  Outcome: Progressing Towards Goal  Goal: *Verbalize understanding of risk factor modification(Stroke Metric)  Outcome: Progressing Towards Goal  Goal: *Hemodynamically stable  Outcome: Progressing Towards Goal  Goal: *Absence of aspiration pneumonia  Outcome: Progressing Towards Goal  Goal: *Aware of needed dietary changes  Outcome: Progressing Towards Goal  Goal: *Verbalize understanding of prescribed medications including anti-coagulants, anti-lipid, and/or anti-platelets(Stroke Metric)  Outcome: Progressing Towards Goal  Goal: *Tolerating diet  Outcome: Progressing Towards Goal  Goal: *Aware of follow-up diagnostics related to anticoagulants  Outcome: Progressing Towards Goal  Goal: *Ability to perform ADLs and demonstrates progressive mobility and function  Outcome: Progressing Towards Goal  Goal: *Absence of DVT(Stroke Metric)  Outcome: Progressing Towards Goal  Goal: *Absence of aspiration  Outcome: Progressing Towards Goal  Goal: *Optimal pain control at patient's stated goal  Outcome: Progressing Towards Goal  Goal: *Home safety concerns addressed  Outcome: Progressing Towards Goal  Goal: *Describes available resources and support systems  Outcome: Progressing Towards Goal  Goal: *Verbalizes understanding of activation of SEPIDEH(933) for stroke symptoms(Stroke Metric)  Outcome: Progressing Towards Goal  Goal: *Understands and describes signs and symptoms to report to providers(Stroke Metric)  Outcome: Progressing Towards Goal  Goal: *Neurolgocially stable (absence of additional neurological deficits)  Outcome: Progressing Towards Goal  Goal: *Verbalizes importance of follow-up with primary care physician(Stroke Metric)  Outcome: Progressing Towards Goal  Goal: *Smoking cessation discussed,if applicable(Stroke Metric)  Outcome: Progressing Towards Goal  Goal: *Depression screening completed(Stroke Metric)  Outcome: Progressing Towards Goal

## 2019-11-26 NOTE — CONSULTS
Consult    Patient: Christiano Carolina MRN: [id number]     YOB: 1950  Age: 68 y.o. Sex: female      Subjective:      Christiano Carolina is a 68 y.o. female who is being seen for stroke. The patient presented with several days of malaise, slurred speech, and right sided weakness. Symptoms have now resolved. She also notes initial numbness on the right, which has also resolved. MRI of brain demonstrated left internal capsule infarct. CTA demonstrates severe stenosis of left M1 segment mild generalized atherosclerosis, and occlusion of left vertebral artery. Past Medical History:   Diagnosis Date    Allergic rhinitis     Anxiety     Arthritis     fingers    Asthma     prn inhaler    Calculus of kidney x 2-- last over 30 yrs ago    Cancer [facility])     melanoma R breast    Chronic pain     feet    Depression     worsening    Dizziness 09/08/2016    not a recent problem    Dyslipidemia     Fatigue     Former cigarette smoker     GERD (gastroesophageal reflux disease)     controlled with med    Heart murmur 11/24/2015    due to MVP-- followed by dr Hailey Zarco HTN (hypertension)     controlled with med    Hypercholesterolemia     Hypothyroid     IBS (irritable bowel syndrome)     Migraines     Mitral valve insufficiency     rheumatic fever as a child --- followed by dr Yoandy Leslie--- last echo 2017    Nausea & vomiting     post-op    Peripheral neuropathy     bilat feet    Rheumatic fever     as a child    Seizures ([facility] [address]     febrile seiz. as a child , no problems after that.  Unspecified adverse effect of anesthesia     elevated temp after colectomy only per pt     Past Surgical History:   Procedure Laterality Date    HX APPENDECTOMY      HX BLADDER SUSPENSION      HX BREAST LUMPECTOMY Right 1996    melanoma breast - with lymph node biopsies.      HX COLECTOMY      for obstruction - about 14 inches removed    HX COLONOSCOPY      HX HYSTERECTOMY      HX KNEE ARTHROSCOPY Left     HX KNEE ARTHROSCOPY Right 10/16/09; 2015    HX KNEE REPLACEMENT Right 3/2015    HX KNEE REPLACEMENT Left 2016    HX LAP CHOLECYSTECTOMY      HX ORTHOPAEDIC      C- 7 ACDF    HX OTHER SURGICAL      urethra repair    HX OTHER SURGICAL      melanoma resected from right chest wall    HX TONSIL AND ADENOIDECTOMY        Family History   Problem Relation Age of Onset    Other Mother     Delayed Awakening Mother     Thyroid Disease Mother     Diabetes Mother     Cancer Mother         breast cancer    Breast Problems Mother     Heart Disease Father     Hypertension Father     Heart Failure Father         CHF    Delayed Awakening Sister     Thyroid Disease Sister     Breast Problems Sister     Cancer Other         breast    Diabetes Other         type II    High Cholesterol Other     Elevated Lipids Other     Hypertension Other     Thyroid Disease Other         hypo, acquired    Breast Problems Maternal Aunt      Social History     Tobacco Use    Smoking status: Former Smoker     Packs/day: 0.25     Years: 4.00     Pack years: 1.00     Last attempt to quit: 2002     Years since quittin.2    Smokeless tobacco: Never Used   Substance Use Topics    Alcohol use: No      Current Facility-Administered Medications   Medication Dose Route Frequency Provider Last Rate Last Dose    perflutren lipid microspheres (DEFINITY) in NS bolus IV  1 mL IntraVENous PRN Lynne Scruggs MD   1 mL at 19 0900    albuterol (PROVENTIL VENTOLIN) nebulizer solution 2.5 mg  2.5 mg Nebulization Q4H PRN Davis-Pachter, Lorella Lanes, MD        ALPRAZolam [place]) tablet 1 mg  1 mg Oral QHS PRN Davis-Pachter, Lorella Lanes, MD        atorvastatin (LIPITOR) tablet 40 mg  40 mg Oral QHS Davis-Pachter, Lorella Lanes, MD   Stopped at 19 2200    fluticasone propionate (FLONASE) 50 mcg/actuation nasal spray 2 Spray  2 Spray Both Nostrils DAILY Shayna Beckett MD   Stopped at 19 0900    HYDROcodone-acetaminophen (NORCO)  mg tablet 1 Tab  1 Tab Oral Q6H PRN Sydni Scruggs MD        levothyroxine (SYNTHROID) tablet 112 mcg  112 mcg Oral ACB Sydni Scruggs MD   Stopped at 11/26/19 0730    losartan (COZAAR) tablet 100 mg  100 mg Oral DAILY Sydni Scruggs MD   Stopped at 11/26/19 0900    nebivolol (BYSTOLIC) tablet 10 mg  10 mg Oral DAILY Sydni Scruggs MD   Stopped at 11/26/19 0900    pantoprazole (PROTONIX) tablet 40 mg  40 mg Oral ACB Eileen Frausto MD   Stopped at 11/26/19 0730    venlafaxine-SR (EFFEXOR-XR) capsule 150 mg  150 mg Oral DAILY WITH BREAKFAST Eileen Frausto MD   Stopped at 11/26/19 0800    sodium chloride (NS) flush 5-40 mL  5-40 mL IntraVENous Q8H Lnyne Scruggs MD   10 mL at 11/26/19 0612    sodium chloride (NS) flush 5-40 mL  5-40 mL IntraVENous PRN Sydni Scruggs MD        0.9% sodium chloride infusion  100 mL/hr IntraVENous CONTINUOUS Lynne Scruggs  mL/hr at 11/25/19 2200 100 mL/hr at 11/25/19 2200    ondansetron (ZOFRAN) injection 4 mg  4 mg IntraVENous Q6H PRN Sydni Scruggs MD        acetaminophen (TYLENOL) tablet 650 mg  650 mg Oral Q6H PRN Sydni Scruggs MD        enoxaparin (LOVENOX) injection 40 mg  40 mg SubCUTAneous Q24H Sydni Scruggs MD   40 mg at 11/25/19 2208    tuberculin injection 5 Units  5 Units IntraDERMal ONCE Sydni Scruggs MD        fluconazole (DIFLUCAN) tablet 100 mg  100 mg Oral DAILY Sydni Scruggs MD   Stopped at 11/26/19 0900    aspirin delayed-release tablet 81 mg  81 mg Oral DAILY Sydni Scruggs MD   Stopped at 11/26/19 0900        Allergies   Allergen Reactions    Keflex [Cephalexin] Shortness of Breath     \" stop my breathing \"     Ambien [Zolpidem] Unknown (comments)    Aspirin Itching     \" can take low dose \"     Codeine Anaphylaxis     \"full codeine injection\" states has had lortab since without reaction.  Daypro [Oxaprozin] Swelling    Demerol [Meperidine] Other (comments)     Difficulty breathing. States has had since without reaction    Dilaudid [Hydromorphone (Bulk)] Itching and Nausea Only    Mobic [Meloxicam] Swelling    Onion Other (comments)     headache    Pcn [Penicillins] Unknown (comments)     Pt states not allergic         Review of Systems:  CONSTITUTIONAL:  Endorses generalized weakness. Denies weight loss, fever, chills. HEENT:  Eyes:  Denies visual loss, blurred vision, double vision or yellow sclerae. Ears, Nose, Throat:  Denies hearing loss, sneezing, congestion, runny nose or sore throat. SKIN:  Denies rash or itching. CARDIOVASCULAR:  Denies chest pain, chest pressure or chest discomfort. No palpitations or edema. RESPIRATORY:  Denies shortness of breath, cough or sputum. GASTROINTESTINAL:  Denies anorexia, nausea, vomiting or diarrhea. No abdominal pain or blood. GENITOURINARY:  Denies burning with urination. NEUROLOGICAL:  Denies headache, dizziness, syncope, paralysis, ataxia, numbness or tingling in the extremities. Denies change in bowel or bladder control. MUSCULOSKELETAL:  Denies muscle, back pain, joint pain or stiffness. HEMATOLOGIC:  Denies anemia, bleeding or bruising. LYMPHATICS:  Denies enlarged nodes. PSYCHIATRIC: Denies history depression or anxiety. ENDOCRINOLOGIC:  Denies reports of sweating, cold or heat intolerance. No polyuria or polydipsia. ALLERGIES:  Denies history of asthma, hives, eczema or rhinitis. Objective:     Vitals:    11/25/19 1534 11/26/19 0000 11/26/19 0400 11/26/19 0800   BP:  148/82 147/81 142/79   Pulse:  72 67 69   Resp:  20 20 16   Temp:  97.8 °F (36.6 °C) 98.5 °F (36.9 °C) 97.9 °F (36.6 °C)   SpO2:  91% 93% 94%   Weight: 173 lb (78.5 kg)      Height: 5' 6\" (1.676 m)           Physical Exam:  General - Well developed, well nourished, in no apparent distress. Pleasant and conversent.    HEENT - Normocephalic, atraumatic. Conjunctiva, tympanic membranes, and oropharynx are clear. Neck - Supple without masses, no bruits   Cardiovascular - Regular rate and rhythm. Normal S1, S2 without murmurs, rubs, or gallops. Lungs - Clear to auscultation. Abdomen - Soft, nontender with normal bowel sounds. Extremities - Peripheral pulses intact. No edema and no rashes. Neurological examination - Comprehension, attention, memory and reasoning are intact. Language and speech are normal.   On cranial nerve examination, (II, III, IV, VI) pupils are equal, round, and reactive to light. Visual acuity is adequate. Visual fields are full to finger confrontation. Extraocular motility is normal. (V, VII) Mild right facial droop(VIII) Hearing is intact. (IX, X) Palate and uvula elevate symmetrically. Voice is normal. (XI) Shoulder shrug is strong and equal bilaterally. (XII)Tongue is midline. Motor examination - There is normal muscle tone and bulk. Power is full throughout. Muscle stretch reflexes are normoactive, with the exception of absent at left patella. Sensation is intact to light touch, pinprick, vibration and proprioception in all extremities. Cerebellar examination is normal. Gait deferred.      NIHSS   NIHSS Score: 1  1a-Level of Consciousness 0  1b-What is Month/Age 0  1c-Open/Close Eyes&Hand 0  2 -Best Gaze 0  3 -Visual Fields 0  4 -Facial Palsy 1  5a-Motor-Left Arm 0  5b-Motor-Right Arm 0  6a-Motor-Left Leg 0  6b-Motor-Right Leg 0  7 -Limb Ataxia 0  8 -Sensory 0  9 -Best Language 0  10-Dysarthria 0  11-Extinction/Inattention 0    Lab Results   Component Value Date/Time    Cholesterol, total 113 11/26/2019 04:53 AM    HDL Cholesterol 38 (L) 11/26/2019 04:53 AM    LDL, calculated 23.6 11/26/2019 04:53 AM    VLDL, calculated 51.4 (H) 11/26/2019 04:53 AM    Triglyceride 257 (H) 11/26/2019 04:53 AM    CHOL/HDL Ratio 3.0 11/26/2019 04:53 AM        Lab Results   Component Value Date/Time    Hemoglobin A1c 5.4 11/26/2019 04:53 AM          Results for orders placed or performed during the hospital encounter of 01/10/19   EKG, 12 LEAD, INITIAL   Result Value Ref Range    Ventricular Rate 89 BPM    Atrial Rate 89 BPM    P-R Interval 164 ms    QRS Duration 104 ms    Q-T Interval 330 ms    QTC Calculation (Bezet) 401 ms    Calculated P Axis 61 degrees    Calculated R Axis -26 degrees    Calculated T Axis 100 degrees    Diagnosis       !! AGE AND GENDER SPECIFIC ECG ANALYSIS !! Normal sinus rhythm  Left axis deviation Left anterior fascicular block  Left ventricular hypertrophy with repolarization abnormality  Abnormal ECG  When compared with ECG of 17-MAR-2016 14:41,  Criteria for Septal infarct are no longer Present    Confirmed by [facility]  MD (), STEVE SOUTH ([id number]) on 1/11/2019 7:44:12 AM     Results for orders placed or performed in visit on 11/19/18   AMB POC EKG ROUTINE W/ 12 LEADS, INTER & REP    Impression    Sinus  Rhythm   -Old anteroseptal infarct. ABNORMAL         MRI Results (most recent):   Results from [place] encounter on 11/25/19   MRI BRAIN WO CONT    Narrative MRI BRAIN WITHOUT CONTRAST. HISTORY: Right-sided weakness and slurred speech. COMPARISON:  None. Study performed within 24 hours of admission. TECHNIQUE:  Sagittal T1, axial T1, T2, FLAIR, gradient echo, diffusion with ADC  map and coronal FLAIR. FINDINGS:  Diffusion images demonstrate a small focus of restricted diffusion in  the posterior limb of the internal capsule on the left. Corresponding decreased  ADC map signal.  No midline shift, mass or mass effect. Gradient echo images  are unremarkable. No evidence of acute hemorrhage. The lateral ventricles are  normal size. The pituitary, parasellar and midline structures are unremarkable  on the sagittal T1 images. There are normal T2 flow-voids in the major vessels. Posterior fossa structures are unremarkable. The basal ganglia appear  symmetric.   Orbits are grossly normal. Paranasal sinuses are clear. Impression IMPRESSION: Acute infarct posterior limb left internal capsule. Most recent CTA:  Results from [place] encounter on 11/25/19   CTA HEAD NECK W WO CONT    Narrative HISTORY: 68years of age Female with slurred speech, right side weakness, and  vision changes. Ibeth Ask EXAM: CTA HEAD NECK W WO CONT. Radiation reduction dose techniques were used  for the study. Our CT scanner use one or all of the following- Automated  exposure control, adjustment of the mA and/or KV according the patient size,  iterative reconstruction. 3-D multiplanar reformations were performed at the  workstation. All carotid artery stenosis percentages are based upon NASCET  criteria if appropriate. COMPARISON: Noncontrast CT head exam on 11/25/2019. This examination was  concerning for a subacute infarction in the posterior limb of left internal  capsule. There were also chronic ischemic white matter demyelination changes. MR  brain examination subsequent to this study also demonstrated findings of an  acute infarct in the posterior limb of the left internal capsule. FINDINGS:   VASCULAR FINDINGS:  Cervical CTA:  There is a three-vessel branching pattern of the aortic arch. The right  subclavian artery is patent where visualized. The left subclavian artery is  patent where visualized. The right common carotid artery is patent and tortuous proximally. The right  external carotid artery is patent. The right carotid bulb demonstrates minimal atherosclerotic disease. No  significant narrowing of the right carotid bulb or ICA origin. The cervical  right internal carotid artery is patent. There is a trace focal saccular  aneurysm of the distal cervical right internal carotid artery measuring  approximately 2 mm. The distal ICA is otherwise unremarkable. The right vertebral artery is dominant. The cervical right vertebral artery is  patent.     The left common carotid artery is patent. The left external carotid artery is  patent. The left carotid bulb and ICA origin are without significant atherosclerotic  disease or stenosis. The cervical left internal carotid artery is patent. The cervical left vertebral artery is diffusely diminutive but patent. Cranial CTA:  The bilateral intracranial internal carotid arteries are patent. There is  atherosclerotic calcification of the left cavernous ICA without evidence of  significant flow-limiting stenosis. The bilateral A1 segments are patent. The bilateral A2 and A3 segments are  patent. There is some mild atherosclerotic narrowing in the bilateral LUZ  branches distally. The right M1 segment is patent. The major right MCA proximal branches beyond the  bifurcation are patent. The left M1 segment is patent. There is severe  atherosclerotic narrowing of the proximal left M1 segment. The major left MCA  proximal branches beyond the bifurcation are patent. The intracranial right vertebral artery is patent. The intracranial left  vertebral artery essentially terminates in PICA with occlusion of the  intracranial left vertebral artery beyond this level to near the level of the  vertebrobasilar bifurcation. The basilar artery is patent. The right posterior  cerebral artery is patent. The left posterior cerebral artery is patent. There  is some atherosclerotic narrowing in the left P2 and P3 segments. There is no evidence of intracranial aneurysms. NONVASCULAR FINDINGS:  Head:  There are similar findings within the brain parenchyma. There are findings of a  diffusely mildly prominent sulcal and ventricular pattern consistent with age  related cerebral volume loss. There are chronic ischemic white matter  demyelination changes. There is redemonstration of the hypodensity in the region  of the left internal capsule consistent with the known acute to subacute  infarct. No evidence of enhancing masses or focal fluid collections.     The bilateral orbital structures demonstrate findings of lens replacements. The  calvarial and maxillofacial soft tissues are without evidence of acute  abnormality. Neck:  The thyroid gland is unremarkable. There is evidence of significant cervical or  upper thoracic adenopathy. The visualized upper lungs are without areas of  prominent focal consolidation or masses. OSSEOUS STRUCTURES:  Short air cells are well aerated. The paranasal sinuses are without significant  mucoperiosteal thickening with some mild thickening in the left maxillary sinus. There are findings of lower cervical ACDF changes. There are multilevel advanced  degenerative changes of the cervical spine primarily involving facet  arthropathy. Impression IMPRESSION:  1. Severe stenosis of the proximal left M1 segment of middle cerebral artery. Otherwise, mild atherosclerotic narrowing in the remainder of the major  intracranial arterial vasculature without evidence of proximal vessel occlusion  or aneurysms. 2. Diffusely diminutive cervical left vertebral artery. Intracranial left  vertebral artery terminates in posterior inferior cerebellar artery and is  occluded to the vertebrobasilar bifurcation. . This is likely chronic in  chronicity. 3. Similar hypodensity in the left internal capsule are consistent with the  known acute to subacute infarct in this region. 4. 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 Most recent MRA:  Results from [place] encounter on 11/25/19   MRI BRAIN WO CONT    Narrative MRI BRAIN WITHOUT CONTRAST. HISTORY: Right-sided weakness and slurred speech. COMPARISON:  None. Study performed within 24 hours of admission. TECHNIQUE:  Sagittal T1, axial T1, T2, FLAIR, gradient echo, diffusion with ADC  map and coronal FLAIR.       FINDINGS:  Diffusion images demonstrate a small focus of restricted diffusion in  the posterior limb of the internal capsule on the left. Corresponding decreased  ADC map signal.  No midline shift, mass or mass effect. Gradient echo images  are unremarkable. No evidence of acute hemorrhage. The lateral ventricles are  normal size. The pituitary, parasellar and midline structures are unremarkable  on the sagittal T1 images. There are normal T2 flow-voids in the major vessels. Posterior fossa structures are unremarkable. The basal ganglia appear  symmetric. Orbits are grossly normal.  Paranasal sinuses are clear. Impression IMPRESSION: Acute infarct posterior limb left internal capsule. Assessment:     68year old female with acute ischemic stroke, left internal capsule. CTA demonstrates severe stenosis of M1 segment of left MCA. Will start the patient on DAPT with aspirin and Plavix for 90 days. Her triglycerides are also elevated, so she may be a candidate for Vascepa after d/c.     Plan:     · Start DAPT with aspirin 81 mg and Plavix 75 mg daily for 90 days, then transition to monotherapy. · Continue statin   · Neurochecks Q4H  · Telemetry   · TTE pending   · PT/OT/ST  · DVT prophylaxis   · BP management - normotensive, with long-term goal <140/90  · Smoking cessation if applicable   · Diabetes education if applicable   · Depression Screening prior to discharge      Signed By: Jeff De Los Santos NP     November 26, 2019    Neurology attending    Seen and examined reviewed results of the CT angiogram    Does have a left-sided tight stenosis of the M1 segment. Clinical data with reference to management of these patients in aggressive fashion has been uniformly disappointing over the past 20 years. The extracranial-intracranial bypass trial failed and the interventional arm had more strokes then did the optimum medical management arm. Similarly the SAMPRISS trial was not encouraging terms of the role of intracranial stents. Accordingly would agree with management suggestions above.   Perfusional issues with reference to this stenosis may be present and therefore this should be borne in mind with reference to blood pressure management    100% agree that the existence of the stenosis will potentially amplify any effective obstructive sleep apnea in terms of a stroke risk factor and that a sleep study and if positive adherence to CPAP will be mandatory    Neurological findings otherwise as noted above

## 2019-11-26 NOTE — PROGRESS NOTES
Physical Therapy Note:    Physical therapy evaluation orders received and chart reviewed. Attempted to see patient this AM to initiate assessment. Patient currently getting an ECHO. Will follow and re-attempt at a later time/date as schedule permits/patient available.  Thank you,    Syl Shea, PT, DPT  11/26/19

## 2019-11-26 NOTE — ED NOTES
TRANSFER - OUT REPORT:    Verbal report given to Jo(name) on [facility]  being transferred to [facility](unit) for routine progression of care       Report consisted of patients Situation, Background, Assessment and   Recommendations(SBAR). Information from the following report(s) SBAR was reveiwed with the receiving nurse. Opportunity for questions and clarification was provided.       Ischemic Stroke without Activase/TIA    BP Parameters: Less Than 220/120 for 24 hours, then consult MD for parameters    Controlled With: None    Dysphagia Screen Completed: No        NIH Stroke Scale Complete: Yes: 1    Frequency of Vital Signs: Every 4 hours    Frequency of Neuro Checks: Every 4 hours

## 2019-11-27 VITALS
HEIGHT: 66 IN | WEIGHT: 173 LBS | TEMPERATURE: 98.7 F | DIASTOLIC BLOOD PRESSURE: 66 MMHG | BODY MASS INDEX: 27.8 KG/M2 | OXYGEN SATURATION: 95 % | HEART RATE: 65 BPM | RESPIRATION RATE: 18 BRPM | SYSTOLIC BLOOD PRESSURE: 137 MMHG

## 2019-11-27 LAB
ALBUMIN SERPL-MCNC: 4.1 G/DL (ref 3.2–4.6)
ALBUMIN/GLOB SERPL: 1.4 {RATIO} (ref 1.2–3.5)
ALP SERPL-CCNC: 62 U/L (ref 50–136)
ALT SERPL-CCNC: 26 U/L (ref 12–65)
ANION GAP SERPL CALC-SCNC: 9 MMOL/L (ref 7–16)
AST SERPL-CCNC: 12 U/L (ref 15–37)
BILIRUB DIRECT SERPL-MCNC: 0.3 MG/DL
BILIRUB DIRECT SERPL-MCNC: 0.3 MG/DL
BILIRUB INDIRECT SERPL-MCNC: 1.1 MG/DL (ref 0–1.1)
BILIRUB SERPL-MCNC: 1.4 MG/DL (ref 0.2–1.1)
BILIRUB SERPL-MCNC: 1.4 MG/DL (ref 0.2–1.1)
BUN SERPL-MCNC: 12 MG/DL (ref 8–23)
CALCIUM SERPL-MCNC: 9.1 MG/DL (ref 8.3–10.4)
CHLORIDE SERPL-SCNC: 110 MMOL/L (ref 98–107)
CO2 SERPL-SCNC: 24 MMOL/L (ref 21–32)
CREAT SERPL-MCNC: 0.74 MG/DL (ref 0.6–1)
GLOBULIN SER CALC-MCNC: 3 G/DL (ref 2.3–3.5)
GLUCOSE SERPL-MCNC: 106 MG/DL (ref 65–100)
MAGNESIUM SERPL-MCNC: 2.1 MG/DL (ref 1.8–2.4)
POTASSIUM SERPL-SCNC: 2.8 MMOL/L (ref 3.5–5.1)
POTASSIUM SERPL-SCNC: 3.8 MMOL/L (ref 3.5–5.1)
PROT SERPL-MCNC: 7.1 G/DL (ref 6.3–8.2)
SODIUM SERPL-SCNC: 143 MMOL/L (ref 136–145)

## 2019-11-27 PROCEDURE — 74011250637 HC RX REV CODE- 250/637: Performed by: NURSE PRACTITIONER

## 2019-11-27 PROCEDURE — 83735 ASSAY OF MAGNESIUM: CPT

## 2019-11-27 PROCEDURE — 36415 COLL VENOUS BLD VENIPUNCTURE: CPT

## 2019-11-27 PROCEDURE — 74011250636 HC RX REV CODE- 250/636: Performed by: NURSE PRACTITIONER

## 2019-11-27 PROCEDURE — 99218 HC RM OBSERVATION: CPT

## 2019-11-27 PROCEDURE — 65270000029 HC RM PRIVATE

## 2019-11-27 PROCEDURE — 92523 SPEECH SOUND LANG COMPREHEN: CPT

## 2019-11-27 PROCEDURE — 97161 PT EVAL LOW COMPLEX 20 MIN: CPT

## 2019-11-27 PROCEDURE — 82248 BILIRUBIN DIRECT: CPT

## 2019-11-27 PROCEDURE — 90686 IIV4 VACC NO PRSV 0.5 ML IM: CPT | Performed by: NURSE PRACTITIONER

## 2019-11-27 PROCEDURE — 80053 COMPREHEN METABOLIC PANEL: CPT

## 2019-11-27 PROCEDURE — 90471 IMMUNIZATION ADMIN: CPT

## 2019-11-27 PROCEDURE — 74011250637 HC RX REV CODE- 250/637: Performed by: INTERNAL MEDICINE

## 2019-11-27 PROCEDURE — 84132 ASSAY OF SERUM POTASSIUM: CPT

## 2019-11-27 RX ORDER — POTASSIUM CHLORIDE 20 MEQ/1
40 TABLET, EXTENDED RELEASE ORAL ONCE
Status: COMPLETED | OUTPATIENT
Start: 2019-11-27 | End: 2019-11-27

## 2019-11-27 RX ORDER — LOSARTAN POTASSIUM 100 MG/1
100 TABLET ORAL DAILY
Qty: 30 TAB | Refills: 0 | Status: SHIPPED | OUTPATIENT
Start: 2019-11-28 | End: 2019-12-02 | Stop reason: SDUPTHER

## 2019-11-27 RX ORDER — POTASSIUM CHLORIDE 14.9 MG/ML
20 INJECTION INTRAVENOUS ONCE
Status: COMPLETED | OUTPATIENT
Start: 2019-11-27 | End: 2019-11-27

## 2019-11-27 RX ORDER — CLOPIDOGREL BISULFATE 75 MG/1
75 TABLET ORAL DAILY
Qty: 30 TAB | Refills: 0 | Status: SHIPPED | OUTPATIENT
Start: 2019-11-28 | End: 2019-12-02 | Stop reason: SDUPTHER

## 2019-11-27 RX ORDER — ATORVASTATIN CALCIUM 40 MG/1
40 TABLET, FILM COATED ORAL
Qty: 30 TAB | Refills: 0 | Status: SHIPPED | OUTPATIENT
Start: 2019-11-27 | End: 2019-12-02 | Stop reason: SDUPTHER

## 2019-11-27 RX ADMIN — POTASSIUM CHLORIDE 20 MEQ: 200 INJECTION, SOLUTION INTRAVENOUS at 08:20

## 2019-11-27 RX ADMIN — LEVOTHYROXINE SODIUM 112 MCG: 0.11 TABLET ORAL at 06:39

## 2019-11-27 RX ADMIN — PANTOPRAZOLE SODIUM 40 MG: 40 TABLET, DELAYED RELEASE ORAL at 06:40

## 2019-11-27 RX ADMIN — NEBIVOLOL HYDROCHLORIDE 10 MG: 10 TABLET ORAL at 08:20

## 2019-11-27 RX ADMIN — LOSARTAN POTASSIUM 100 MG: 50 TABLET, FILM COATED ORAL at 08:19

## 2019-11-27 RX ADMIN — ASPIRIN 81 MG: 81 TABLET ORAL at 08:19

## 2019-11-27 RX ADMIN — Medication 10 ML: at 06:40

## 2019-11-27 RX ADMIN — INFLUENZA VIRUS VACCINE 0.5 ML: 15; 15; 15; 15 SUSPENSION INTRAMUSCULAR at 18:43

## 2019-11-27 RX ADMIN — Medication 1 AMPULE: at 09:18

## 2019-11-27 RX ADMIN — FLUCONAZOLE 100 MG: 100 TABLET ORAL at 08:20

## 2019-11-27 RX ADMIN — POTASSIUM CHLORIDE 40 MEQ: 20 TABLET, EXTENDED RELEASE ORAL at 13:41

## 2019-11-27 RX ADMIN — POTASSIUM CHLORIDE 20 MEQ: 200 INJECTION, SOLUTION INTRAVENOUS at 11:40

## 2019-11-27 RX ADMIN — VENLAFAXINE HYDROCHLORIDE 150 MG: 150 CAPSULE, EXTENDED RELEASE ORAL at 08:20

## 2019-11-27 RX ADMIN — CLOPIDOGREL BISULFATE 75 MG: 75 TABLET ORAL at 08:20

## 2019-11-27 NOTE — PROGRESS NOTES
PHYSICAL THERAPY: Initial Assessment, Discharge, and AM 11/27/2019  INPATIENT:    [place] / Plan[place] / Product Type: Medicare /       NAME/AGE/GENDER: Senia Knox is a 68 y.o. female   PRIMARY DIAGNOSIS: TIA (transient ischemic attack) [G45.9]  CVA (cerebral vascular accident) ([facility] [I63.9] CVA (cerebral vascular accident) ([facility] CVA (cerebral vascular accident) ([facility]        ICD-10: Treatment Diagnosis:    · Generalized Muscle Weakness (M62.81)  · Difficulty in walking, Not elsewhere classified (R26.2)   Precaution/Allergies:  Keflex [cephalexin]; Ambien [zolpidem]; Aspirin; Codeine; Daypro [oxaprozin]; Demerol [meperidine]; Dilaudid [hydromorphone (bulk)]; Mobic [meloxicam]; Onion; and Pcn [penicillins]      ASSESSMENT:     Ms. Maxi Pope is a 68year old female admitted with with acute onset right sided weakness, balance disturbance, and speech impairment and found to have acute left internal capsule CVA. Patient seen this AM for initial physical therapy evaluation: presents supine in bed, flat affect, oriented x4, and endorses 0/10 pain. Patient lives with her spouse in a single story residence with 4 steps to enter. At baseline, patient is independent with ADLs/IADLs, ambulates community-level distances without utilizing an assistive device, and drives. Patient endorses 3 recent falls secondary to loss of balance. Today, B LE strength 4/5, B LE ROM WFL, and sensation is intact to light touch B LE. Patient performed bed mobility with independence and additional time, transfers with SBA-CGA from low surface, and ambulation within room with supervision. Demonstrated a slow, reciprocal, gait pattern with mildly narrowed base of support and fair dynamic standing balance. Mild trunk sway throughout; patient able to self correct without therapist intervention.  Educated patient on activity pacing and role of balance in ambulation and functional mobility safety; verbalized understanding. Ms. Alesha Oreilly is currently functioning at her baseline, independent level of functioning. No additional skilled acute PT services indicated at this time. Discharge PT. Thank you for this consult. This section established at most recent assessment   PROBLEM LIST (Impairments causing functional limitations):  1. Decreased Balance   INTERVENTIONS PLANNED: (Benefits and precautions of physical therapy have been discussed with the patient.)  1. None      TREATMENT PLAN: Frequency/Duration: N/A  Rehabilitation Potential For Stated Goals: N/A     REHAB RECOMMENDATIONS (at time of discharge pending progress):    Placement: It is my opinion, based on this patient's performance to date, that Ms. Alesha Oreilly may benefit from being discharged with NO further skilled therapy due to a proven ability to function at baseline. Equipment:    None at this time              HISTORY:   History of Present Injury/Illness (Reason for Referral):  CVA  Past Medical History/Comorbidities:   Ms. Alesha Oreilly  has a past medical history of Allergic rhinitis, Anxiety, Arthritis, Asthma, Calculus of kidney (x 2-- last over 30 yrs ago), Cancer [facility]), Chronic pain, Depression, Dizziness (09/08/2016), Dyslipidemia, Fatigue, Former cigarette smoker, GERD (gastroesophageal reflux disease), Heart murmur (11/24/2015), HTN (hypertension), Hypercholesterolemia, Hypothyroid, IBS (irritable bowel syndrome), Migraines, Mitral valve insufficiency, Nausea & vomiting, Peripheral neuropathy, Rheumatic fever, Seizures ([address] and Unspecified adverse effect of anesthesia.   Ms. Alesha Oreilly  has a past surgical history that includes hx lap cholecystectomy; hx appendectomy; hx hysterectomy; hx tonsil and adenoidectomy; hx colectomy; hx bladder suspension; hx colonoscopy; hx other surgical; hx other surgical; hx breast lumpectomy (Right, 1996); hx orthopaedic; hx knee arthroscopy (Left); hx knee arthroscopy (Right, 10/16/09; 03/2015); hx knee replacement (Right, 3/2015); and hx knee replacement (Left, 2016). Social History/Living Environment:   Home Environment: Private residence  # Steps to Enter: 4  Rails to Enter: Yes  One/Two Story Residence: One story  Living Alone: No  Support Systems: Spouse/Significant Other/Partner  Patient Expects to be Discharged to[de-identified] Private residence  Current DME Used/Available at Home: Teo Dills, straight, Walker, rolling  Prior Level of Function/Work/Activity:  See assessment. Number of Personal Factors/Comorbidities that affect the Plan of Care: 1-2: MODERATE COMPLEXITY   EXAMINATION:   Most Recent Physical Functioning:   Gross Assessment:  AROM: Within functional limits  Strength: Generally decreased, functional  Coordination: Within functional limits  Sensation: Intact(Light touch distal B LE)               Posture:     Balance:  Sitting: Intact  Standing: Impaired  Standing - Static: Good  Standing - Dynamic : Fair Bed Mobility:  Supine to Sit: Independent  Scooting: Independent  Wheelchair Mobility:     Transfers:  Sit to Stand: Contact guard assistance  Stand to Sit: Supervision  Bed to Chair: Stand-by assistance  Interventions: Tactile cues; Safety awareness training;Verbal cues  Gait:     Base of Support: Center of gravity altered  Gait Abnormalities: Path deviations(MILD)  Interventions: Safety awareness training; Tactile cues; Verbal cues      Body Structures Involved:  1. Nerves Body Functions Affected:  1. None Activities and Participation Affected:  1. None   Number of elements that affect the Plan of Care: 1-2: LOW COMPLEXITY   CLINICAL PRESENTATION:   Presentation: Stable and uncomplicated: LOW COMPLEXITY   CLINICAL DECISION MAKING:   [employer] 6 Clicks   Basic Mobility Inpatient Short Form  How much difficulty does the patient currently have. .. Unable A Lot A Little None   1. Turning over in bed (including adjusting bedclothes, sheets and blankets)? [] 1   [] 2   [] 3   [x] 4   2.   Sitting down on and standing up from a chair with arms ( e.g., wheelchair, bedside commode, etc.)   [] 1   [] 2   [] 3   [x] 4   3. Moving from lying on back to sitting on the side of the bed? [] 1   [] 2   [] 3   [x] 4   How much help from another person does the patient currently need. .. Total A Lot A Little None   4. Moving to and from a bed to a chair (including a wheelchair)? [] 1   [] 2   [] 3   [x] 4   5. Need to walk in hospital room? [] 1   [] 2   [] 3   [x] 4   6. Climbing 3-5 steps with a railing? [] 1   [] 2   [x] 3   [] 4   © 2007, Trustees of [address] [place] [address] [address] [id number], under license to [employer]. All rights reserved      Score:  Initial: 23 Most Recent: 23 (Date: 11/27/19 )    Interpretation of Tool:  Represents activities that are increasingly more difficult (i.e. Bed mobility, Transfers, Gait).     Medical Necessity:     · N/A  Reason for Services/Other Comments:  · N/A   Use of outcome tool(s) and clinical judgement create a POC that gives a: Clear prediction of patient's progress: LOW COMPLEXITY            TREATMENT:   (In addition to Assessment/Re-Assessment sessions the following treatments were rendered)   Pre-treatment Symptoms/Complaints:   Pain: Initial:   Pain Intensity 1: 0  Post Session:  0/10     Assessment/Reassessment only, no treatment provided today    Braces/Orthotics/Lines/Etc:   · IV  · O2 Device: Room air  Treatment/Session Assessment:    · Response to Treatment:  See above  · Interdisciplinary Collaboration:   o Physical Therapist  o Registered Nurse  · After treatment position/precautions:   o Up in chair  o Bed/Chair-wheels locked  o Bed in low position  o Call light within reach  o RN notified  o Patient in NAD; needs met; educated on falls precautions and calling for assistance with IV; verbalized understanding   · Compliance with Program/Exercises:N/A  · Recommendations/Intent for next treatment session:  N/A  Total Treatment Duration:  PT Patient Time In/Time Out  Time In: 0905  Time Out: [address] [place] [address], T

## 2019-11-27 NOTE — PROGRESS NOTES
Problem: Patient Education: Go to Patient Education Activity  Goal: Patient/Family Education  Outcome: Progressing Towards Goal     Problem: Ischemic Stroke: Discharge Outcomes  Goal: *Verbalizes anxiety and depression are reduced or absent  Outcome: Progressing Towards Goal  Goal: *Verbalize understanding of risk factor modification(Stroke Metric)  Outcome: Progressing Towards Goal  Goal: *Hemodynamically stable  Outcome: Progressing Towards Goal  Goal: *Absence of aspiration pneumonia  Outcome: Progressing Towards Goal  Goal: *Aware of needed dietary changes  Outcome: Progressing Towards Goal  Goal: *Verbalize understanding of prescribed medications including anti-coagulants, anti-lipid, and/or anti-platelets(Stroke Metric)  Outcome: Progressing Towards Goal  Goal: *Tolerating diet  Outcome: Progressing Towards Goal  Goal: *Aware of follow-up diagnostics related to anticoagulants  Outcome: Progressing Towards Goal  Goal: *Ability to perform ADLs and demonstrates progressive mobility and function  Outcome: Progressing Towards Goal  Goal: *Absence of DVT(Stroke Metric)  Outcome: Progressing Towards Goal  Goal: *Absence of aspiration  Outcome: Progressing Towards Goal  Goal: *Optimal pain control at patient's stated goal  Outcome: Progressing Towards Goal  Goal: *Home safety concerns addressed  Outcome: Progressing Towards Goal  Goal: *Describes available resources and support systems  Outcome: Progressing Towards Goal  Goal: *Verbalizes understanding of activation of EMS(911) for stroke symptoms(Stroke Metric)  Outcome: Progressing Towards Goal  Goal: *Understands and describes signs and symptoms to report to providers(Stroke Metric)  Outcome: Progressing Towards Goal  Goal: *Neurolgocially stable (absence of additional neurological deficits)  Outcome: Progressing Towards Goal  Goal: *Verbalizes importance of follow-up with primary care physician(Stroke Metric)  Outcome: Progressing Towards Goal  Goal: *Smoking cessation discussed,if applicable(Stroke Metric)  Outcome: Progressing Towards Goal  Goal: *Depression screening completed(Stroke Metric)  Outcome: Progressing Towards Goal     Problem: Falls - Risk of  Goal: *Absence of Falls  Description  Document Maegan Nesbitt Fall Risk and appropriate interventions in the flowsheet.   Outcome: Progressing Towards Goal  Note: Fall Risk Interventions:  Mobility Interventions: Bed/chair exit alarm, OT consult for ADLs, Patient to call before getting OOB, PT Consult for mobility concerns, PT Consult for assist device competence, Strengthening exercises (ROM-active/passive), Utilize walker, cane, or other assistive device         Medication Interventions: Assess postural VS orthostatic hypotension, Bed/chair exit alarm, Patient to call before getting OOB, Teach patient to arise slowly    Elimination Interventions: Bed/chair exit alarm, Call light in reach, Patient to call for help with toileting needs, Toilet paper/wipes in reach    History of Falls Interventions: Bed/chair exit alarm         Problem: Patient Education: Go to Patient Education Activity  Goal: Patient/Family Education  Outcome: Progressing Towards Goal     Problem: Patient Education: Go to Patient Education Activity  Goal: Patient/Family Education  Outcome: Progressing Towards Goal     Problem: TIA/CVA Stroke: Day 2 Until Discharge  Goal: Off Pathway (Use only if patient is Off Pathway)  Outcome: Progressing Towards Goal  Goal: Activity/Safety  Outcome: Progressing Towards Goal  Goal: Diagnostic Test/Procedures  Outcome: Progressing Towards Goal  Goal: Nutrition/Diet  Outcome: Progressing Towards Goal  Goal: Discharge Planning  Outcome: Progressing Towards Goal  Goal: Medications  Outcome: Progressing Towards Goal  Goal: Respiratory  Outcome: Progressing Towards Goal  Goal: Treatments/Interventions/Procedures  Outcome: Progressing Towards Goal  Goal: Psychosocial  Outcome: Progressing Towards Goal  Goal: *Verbalizes anxiety and depression are reduced or absent  Outcome: Progressing Towards Goal  Goal: *Absence of aspiration  Outcome: Progressing Towards Goal  Goal: *Absence of deep venous thrombosis signs and symptoms(Stroke Metric)  Outcome: Progressing Towards Goal  Goal: *Optimal pain control at patient's stated goal  Outcome: Progressing Towards Goal  Goal: *Tolerating diet  Outcome: Progressing Towards Goal  Goal: *Ability to perform ADLs and demonstrates progressive mobility and function  Outcome: Progressing Towards Goal  Goal: *Stroke education continued(Stroke Metric)  Outcome: Progressing Towards Goal     Problem: Ischemic Stroke: Discharge Outcomes  Goal: *Verbalizes anxiety and depression are reduced or absent  Outcome: Progressing Towards Goal  Goal: *Verbalize understanding of risk factor modification(Stroke Metric)  Outcome: Progressing Towards Goal  Goal: *Hemodynamically stable  Outcome: Progressing Towards Goal  Goal: *Absence of aspiration pneumonia  Outcome: Progressing Towards Goal  Goal: *Aware of needed dietary changes  Outcome: Progressing Towards Goal  Goal: *Verbalize understanding of prescribed medications including anti-coagulants, anti-lipid, and/or anti-platelets(Stroke Metric)  Outcome: Progressing Towards Goal  Goal: *Tolerating diet  Outcome: Progressing Towards Goal  Goal: *Aware of follow-up diagnostics related to anticoagulants  Outcome: Progressing Towards Goal  Goal: *Ability to perform ADLs and demonstrates progressive mobility and function  Outcome: Progressing Towards Goal  Goal: *Absence of DVT(Stroke Metric)  Outcome: Progressing Towards Goal  Goal: *Absence of aspiration  Outcome: Progressing Towards Goal  Goal: *Optimal pain control at patient's stated goal  Outcome: Progressing Towards Goal  Goal: *Home safety concerns addressed  Outcome: Progressing Towards Goal  Goal: *Describes available resources and support systems  Outcome: Progressing Towards Goal  Goal: *Verbalizes understanding of activation of EMS(911) for stroke symptoms(Stroke Metric)  Outcome: Progressing Towards Goal  Goal: *Understands and describes signs and symptoms to report to providers(Stroke Metric)  Outcome: Progressing Towards Goal  Goal: *Neurolgocially stable (absence of additional neurological deficits)  Outcome: Progressing Towards Goal  Goal: *Verbalizes importance of follow-up with primary care physician(Stroke Metric)  Outcome: Progressing Towards Goal  Goal: *Smoking cessation discussed,if applicable(Stroke Metric)  Outcome: Progressing Towards Goal  Goal: *Depression screening completed(Stroke Metric)  Outcome: Progressing Towards Goal     Problem: Dysphagia (Adult)  Goal: *Speech Goal: (INSERT TEXT)  Outcome: Progressing Towards Goal     Problem: Patient Education: Go to Patient Education Activity  Goal: Patient/Family Education  Outcome: Progressing Towards Goal

## 2019-11-27 NOTE — PROGRESS NOTES
11/27/19 0736   NIH Stroke Scale   Interval Other (comment)  (NIH with Vera Shajessica)   LOC 0   LOC Questions 0   LOC Commands 0   Best Gaze 0   Visual 0   Facial Palsy 1   Motor Right Arm 0   Motor Left Arm 0   Motor Right Leg 0   Motor Left Leg 0   Limb Ataxia 0   Sensory 0   Best Language 0   Dysarthria 0   Extinction and Inattention 0   Total 1

## 2019-11-27 NOTE — ROUTINE PROCESS
Discharge paperwork reviewed with patient and spouse, IV taken out. Patient will let staff know when ready to go.

## 2019-11-27 NOTE — DISCHARGE SUMMARY
Discharge Summary   Patient ID:  Dong Alonso  [id number]  76 y.o.  1950  Admit date: 11/25/2019  3:29 PM  Discharge date and time: 11/27/2019  Attending: Paris De Leon MD  PCP:  Justin Palacois DO  Treatment Team: Attending Provider: Paris De Leon MD; Utilization Review: Danielle Mittal; Care Manager: Randy Ruff, RN; Care Manager: Charles Yao RN; Primary Nurse: Ronda Forte  Principal Diagnosis CVA (cerebral vascular accident) [facility])   Principal Problem:    CVA (cerebral vascular accident) ([facility] [address] (11/25/2019)    Active Problems:    Anxiety ()      Hypothyroid ()      Chronic pain syndrome ()      HTN (hypertension) ()      Ataxia (11/25/2019)      Falls (11/25/2019)      [place] (11/25/2019)      UTI (urinary tract infection) (11/25/2019)      Polycythemia (11/25/2019)      Hypokalemia (11/25/2019)      Elevated bilirubin (11/25/2019)       * Admission Diagnoses: TIA (transient ischemic attack) [G45.9]  CVA (cerebral vascular accident) [facility]) [I63.9]  * Discharge Diagnoses:    Hospital Problems as of 11/27/2019 Date Reviewed: 11/25/2019          Codes Class Noted - Resolved POA    * (Principal) [facility]cere[facility]) ([facility] ICD-10-CM: I63.9  ICD-9-CM: 434.91  11/25/2019 - Present Yes        Ataxia ICD-10-CM: R27.0  ICD-9-CM: 781.3  11/25/2019 - Present Yes        Falls ICD-10-CM: W19. Jesusita Gander  ICD-9-CM: E888.9  11/25/2019 - Present Yes        [place] ICD-10-CM: B37.0  ICD-9-CM: 112.0  11/25/2019 - Present Yes        UTI (urinary tract infection) (Chronic) ICD-10-CM: N39.0  ICD-9-CM: 599.0  11/25/2019 - Present Yes        Polycythemia (Chronic) ICD-10-CM: D75.1  ICD-9-CM: 238.4  11/25/2019 - Present Yes        Hypokalemia ICD-10-CM: E87.6  ICD-9-CM: 276.8  11/25/2019 - Present Yes        Elevated bilirubin ICD-10-CM: R17  ICD-9-CM: 277.4  11/25/2019 - Present Yes        Anxiety ICD-10-CM: F41.9  ICD-9-CM: 300.00  Unknown - Present Yes        Hypothyroid ICD-10-CM: E03.9  ICD-9-CM: 244.9  Unknown - Present Yes        Chronic pain syndrome ICD-10-CM: G89.4  ICD-9-CM: 338.4  Unknown - Present Yes        HTN (hypertension) ICD-10-CM: I10  ICD-9-CM: 401.9  Unknown - Present Yes                Hospital Course:  Ms. Jesus Khan is a 67 yo female with PMH of asthma, GERD, cancer, seizures as a child only, depression, anxiety, chronic pain, HTN, IBS, hyperlipidemia, SBO and PVD who presented to PCP office  for f/u from  visit for dizziness. She was also tx for UTI 2 weeks PTA. She developed right sided weakness, intermittent dysarthria, decreased depth perception with falls, right facial droop and altered sensation and left sided HA X1 week PTA. CT head showed small oblong hypodensity in the posterior limb of the internal capsule on the left. CTA head/neck  Showed severe stenosis of the proximal left M1 segment of middle cerebral artery, diffusely diminutive cervical left vertebral artery. MRI brain showed acute infarct posterior limb left internal capsule. Echo showed EF 55-60%, no right to left shunt. Pt on plavix and ASA, statin. Neurology consulted. Was hypokalemic this morning, replaced and resolved. Pt reports symptoms resolved. Diagnostic Study/Procedure results summary copied from within [facility] EMR:    [place]  [address] [facility], [address] [facility]  [phone]     Transthoracic Echocardiogram  2D, M-mode, Doppler, and Color Doppler     Patient: Harper Jones  MR #: [id number]  : 1950  Age: 68 years  Gender: Female  Study date: 2019  Account #: [de-identified]  Height: 66 in  Weight: 172.7 lb  BSA: 1.88 mï¾²  Status:Routine  Location: 719  BP: 147/ 81     Allergies: CEPHALEXIN, ZOLPIDEM, ASPIRIN, CODEINE, OXAPROZIN, MEPERIDINE,  HYDROMORPHONE (BULK), MELOXICAM, ONION, PENICILLINS     Sonographer:  Adrian Patel RD  Group:  [id number] Cardiology  Referring Physician:  Suma Tavera.  Sheba MD  Reading Physician:  Karri Cordero MD     INDICATIONS: CVA.     *Technically difficult due to breast implants. *     PROCEDURE: This was a routine study. A transthoracic echocardiogram was  performed. The study included complete 2D imaging, M-mode, complete spectral  Doppler, and color Doppler. Intravenous contrast (Definity) was administered. This was a technically difficult study.     LEFT VENTRICLE: Size was normal. Systolic function was normal. Ejection  fraction was estimated in the range of 55 % to 60 %. There were no regional  wall motion abnormalities. Wall thickness was normal. Avg. E/e'= 8.75. Left  ventricular diastolic function parameters were normal.     RIGHT VENTRICLE: The size was normal. Systolic function was normal. The  tricuspid jet envelope definition was inadequate for estimation of RV   systolic  pressure.     LEFT ATRIUM: Size was normal.     ATRIAL SEPTUM: Contrast injection was performed. There was no right-to-left  shunt, with provocative maneuvers to increase right atrial pressure.     RIGHT ATRIUM: Size was normal.     SYSTEMIC VEINS: IVC: The inferior vena cava was normal in size and course. The  respirophasic change in diameter was more than 50%.     AORTIC VALVE: The valve was structurally normal, tri-commissural. There was   no  evidence for stenosis. There was no insufficiency.     MITRAL VALVE: Valve structure was normal. There was no evidence for stenosis. There was no regurgitation.     TRICUSPID VALVE: The valve structure was normal. There was no evidence for  stenosis. There was trivial regurgitation.     PULMONIC VALVE: Not well visualized.     PERICARDIUM: There was no pericardial effusion.     AORTA: The root exhibited normal size.     SUMMARY:     -  Left ventricle: Systolic function was normal. Ejection fraction was  estimated in the range of 55 % to 60 %. There were no regional wall motion  abnormalities.     -  Atrial septum: Contrast injection was performed. There was no   right-to-left  shunt, with provocative maneuvers to increase right atrial pressure.     SYSTEM MEASUREMENT TABLES     2D mode  AoR Diam (2D): 2.8 cm  IVS/LVPW (2D): 1.1  IVSd (2D): 1 cm  LVIDd (2D): 3.6 cm  LVIDs (2D): 2.4 cm  LVOT Area (2D): 3.1 cm2  LVPWd (2D): 0.9 cm     Unspecified Scan Mode  Peak Grad; Mean; Antegrade Flow: 5 mm[Hg]  Vmax; Antegrade Flow: 115 cm/s  LVOT Diam: 2 cm     Prepared and signed by  Heriberto Boucher MD  Signed 26-Nov-2019 12:06:56          MRI BRAIN WITHOUT CONTRAST.     HISTORY: Right-sided weakness and slurred speech.     COMPARISON:  None. Study performed within 24 hours of admission.     TECHNIQUE:  Sagittal T1, axial T1, T2, FLAIR, gradient echo, diffusion with ADC  map and coronal FLAIR.       FINDINGS:  Diffusion images demonstrate a small focus of restricted diffusion in  the posterior limb of the internal capsule on the left. Corresponding decreased  ADC map signal.  No midline shift, mass or mass effect. Gradient echo images  are unremarkable. No evidence of acute hemorrhage. The lateral ventricles are  normal size. The pituitary, parasellar and midline structures are unremarkable  on the sagittal T1 images. There are normal T2 flow-voids in the major vessels. Posterior fossa structures are unremarkable. The basal ganglia appear  symmetric. Orbits are grossly normal.  Paranasal sinuses are clear.     IMPRESSION  IMPRESSION: Acute infarct posterior limb left internal capsule. CHEST X-RAY, one view.     HISTORY:  Headaches and dizziness.     TECHNIQUE:  AP portable semiupright view     COMPARISON: None.     FINDINGS:     *The lungs: are clear. *The heart size: is normal.   *The costophrenic angles: are sharp. *The pulmonary vasculature: is unremarkable. *Included portion of the upper abdomen: is unremarkable. *Bones: Prior cervical spinal fusion.   *Other: None.     IMPRESSION  IMPRESSION:  Negative for acute change                HISTORY: 68years of age Female with slurred speech, right side weakness, and  vision changes. .     EXAM: CTA HEAD NECK W WO CONT. Radiation reduction dose techniques were used  for the study. Our CT scanner use one or all of the following- Automated  exposure control, adjustment of the mA and/or KV according the patient size,  iterative reconstruction. 3-D multiplanar reformations were performed at the  workstation. All carotid artery stenosis percentages are based upon NASCET  criteria if appropriate.     COMPARISON: Noncontrast CT head exam on 11/25/2019. This examination was  concerning for a subacute infarction in the posterior limb of left internal  capsule. There were also chronic ischemic white matter demyelination changes. MR  brain examination subsequent to this study also demonstrated findings of an  acute infarct in the posterior limb of the left internal capsule.     FINDINGS:   VASCULAR FINDINGS:  Cervical CTA:  There is a three-vessel branching pattern of the aortic arch. The right  subclavian artery is patent where visualized. The left subclavian artery is  patent where visualized.     The right common carotid artery is patent and tortuous proximally. The right  external carotid artery is patent.     The right carotid bulb demonstrates minimal atherosclerotic disease. No  significant narrowing of the right carotid bulb or ICA origin. The cervical  right internal carotid artery is patent. There is a trace focal saccular  aneurysm of the distal cervical right internal carotid artery measuring  approximately 2 mm. The distal ICA is otherwise unremarkable.     The right vertebral artery is dominant. The cervical right vertebral artery is  patent.     The left common carotid artery is patent. The left external carotid artery is  patent.     The left carotid bulb and ICA origin are without significant atherosclerotic  disease or stenosis.  The cervical left internal carotid artery is patent.     The cervical left vertebral artery is diffusely diminutive but patent.     Cranial CTA:  The bilateral intracranial internal carotid arteries are patent. There is  atherosclerotic calcification of the left cavernous ICA without evidence of  significant flow-limiting stenosis.     The bilateral A1 segments are patent. The bilateral A2 and A3 segments are  patent. There is some mild atherosclerotic narrowing in the bilateral LUZ  branches distally.     The right M1 segment is patent. The major right MCA proximal branches beyond the  bifurcation are patent. The left M1 segment is patent. There is severe  atherosclerotic narrowing of the proximal left M1 segment. The major left MCA  proximal branches beyond the bifurcation are patent.     The intracranial right vertebral artery is patent. The intracranial left  vertebral artery essentially terminates in PICA with occlusion of the  intracranial left vertebral artery beyond this level to near the level of the  vertebrobasilar bifurcation. The basilar artery is patent. The right posterior  cerebral artery is patent. The left posterior cerebral artery is patent. There  is some atherosclerotic narrowing in the left P2 and P3 segments.      There is no evidence of intracranial aneurysms.     NONVASCULAR FINDINGS:  Head:  There are similar findings within the brain parenchyma. There are findings of a  diffusely mildly prominent sulcal and ventricular pattern consistent with age  related cerebral volume loss. There are chronic ischemic white matter  demyelination changes. There is redemonstration of the hypodensity in the region  of the left internal capsule consistent with the known acute to subacute  infarct. No evidence of enhancing masses or focal fluid collections.     The bilateral orbital structures demonstrate findings of lens replacements. The  calvarial and maxillofacial soft tissues are without evidence of acute  abnormality.     Neck:  The thyroid gland is unremarkable.  There is evidence of significant cervical or  upper thoracic adenopathy. The visualized upper lungs are without areas of  prominent focal consolidation or masses.     OSSEOUS STRUCTURES:  Short air cells are well aerated. The paranasal sinuses are without significant  mucoperiosteal thickening with some mild thickening in the left maxillary sinus. There are findings of lower cervical ACDF changes. There are multilevel advanced  degenerative changes of the cervical spine primarily involving facet  arthropathy.     IMPRESSION  IMPRESSION:  1. Severe stenosis of the proximal left M1 segment of middle cerebral artery. Otherwise, mild atherosclerotic narrowing in the remainder of the major  intracranial arterial vasculature without evidence of proximal vessel occlusion  or aneurysms.     2. Diffusely diminutive cervical left vertebral artery. Intracranial left  vertebral artery terminates in posterior inferior cerebellar artery and is  occluded to the vertebrobasilar bifurcation. . This is likely chronic in  chronicity.     3. Similar hypodensity in the left internal capsule are consistent with the  known acute to subacute infarct in this region.     4. 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 EXAMINATION: HEAD CT WITHOUT CONTRAST 11/25/2019 4:25 PM     ACCESSION NUMBER: [id number]     INDICATION: right sided extremity weakness for one week     COMPARISON: None available     TECHNIQUE: Multiple-row detector helical CT examination of the head without  intravenous contrast.      Radiation dose reduction techniques were used for this study:  Our CT scanners  use one or all of the following: Automated exposure control, adjustment of the  mA and/or kVp according to patient's size, iterative reconstruction.     FINDINGS:     The ventricles are within normal limits for the degree of global brain  parenchymal volume loss. There is no midline shift. The basilar cisterns are  patent. There is no cerebellar tonsillar ectopia or herniation.     Hypodensities in the periventricular and subcortical white matter are  nonspecific, but they are most likely to represent chronic microangiopathy.     There is a small oblong hypodensity in the posterior limb of the left internal  capsule (axial image 16).    Prior bilateral lens replacement.      There is no evidence of acute intracranial hemorrhage or extra-axial  collections.      Partially atelectatic left maxillary sinus. Otherwise well aerated and clear  paranasal sinuses and mastoid air cells. Cervical spine fixation hardware on the  topogram.     IMPRESSION  IMPRESSION:     1. Small oblong hypodensity in the posterior limb of the internal capsule on the  left. Given the described symptoms in the indication, this is age indeterminate  but most likely early subacute lacunar infarction. Definitive aging with MRI is  recommended.     2. No acute intracranial hemorrhage.     3. Mild to moderate burden of chronic microangiopathy.     VOICE DICTATED BY: Dr. Greg Smith       Labs: Results:       Chemistry Recent Labs     11/27/19  1638 11/27/19  0651 11/26/19  0453 11/25/19  2237 11/25/19  1538   GLU  --  106*  --   --  135*   NA  --  143  --   --  139   K 3.8 2.8*  --   --  3.2*   CL  --  110*  --   --  103   CO2  --  24  --   --  25   BUN  --  12  --   --  18   CREA  --  0.74  --   --  1.19*   CA  --  9.1  --   --  9.8   AGAP  --  9  --   --  11   TBILI  --  1.4*  1.4* 1.7* 1.6* 1.8*   ALT  --  26 25  --  29   AP  --  62 67  --  75   TP  --  7.1 7.5  --  9.0*   ALB  --  4.1 4.2  --  5.0*   GLOB  --  3.0 3.3  --  4.0*   AGRAT  --  1.4 1.3  --  1.3      CBC w/Diff Recent Labs     11/26/19  0453 11/25/19  1538   WBC 6.8 7.4   RBC 4.39 5.07   HGB 14.0 16.1*   HCT 40.6 46.7*    318   GRANS  --  61   LYMPH  --  30   EOS  --  0*      Cardiac Enzymes No results for input(s): CPK, CKND1, MAVIS in the last 72 hours.     No lab exists for component: CKRMB, TROIP   Coagulation Recent Labs     11/25/19  1538   PTP 13.2   INR 1.0       Lipid Panel @BRIEFLAB(CHOL,CHOLPOCT,614219,391029,[id number],CHOLX,CHOLP,CHLST,CHOLV,708920,HDL,HDLPOC,HDLPOCT,203237,NHDLCT,[id number],HDLC,HDLP,LDL,LDLPOCT,LDLCPOC,874068,NLDLCT,DLDL,LDLC,DLDLP,876320,VLDLC,VLDL,TGL,TGLX,TRIGL,[id number],TRIGP,TGLPOCT,816176,475033,CHHD,CHHDX)@   BNP No results for input(s): BNPP in the last 72 hours. Liver Enzymes Recent Labs     11/27/19  0651   TP 7.1   ALB 4.1   AP 62   SGOT 12*      Thyroid Studies Lab Results   Component Value Date/Time    TSH 1.700 11/25/2019 10:37 PM            Discharge Exam:  Visit Vitals  /66 (BP 1 Location: Left arm, BP Patient Position: At rest)   Pulse 65   Temp 98.7 °F (37.1 °C)   Resp 18   Ht 5' 6\" (1.676 m)   Wt 78.5 kg (173 lb)   SpO2 95%   Breastfeeding No   BMI 27.92 kg/m²     General appearance: alert, cooperative, no distress, appears stated age  Lungs: clear to auscultation bilaterally, resp even and nonlabored  Heart: regular rate and rhythm, S1S2 present without murmurs rubs gallops. No LE edema  Abdomen: soft, non-tender. Bowel sounds normal. Non distended  Extremities: no cyanosis. Moves ext spontaneously  Neuro:  A/O X3. Speech clear. PERRLA. EOMs intact. Bilateral UE/LE strength 5/5 without drift. Facial expressions symmetrical. Sensation intact. Disposition: home  Discharge Condition: stable  Patient Instructions:   Current Discharge Medication List      START taking these medications    Details   clopidogrel (PLAVIX) 75 mg tab Take 1 Tab by mouth daily. Qty: 30 Tab, Refills: 0      losartan (COZAAR) 100 mg tablet Take 1 Tab by mouth daily. Qty: 30 Tab, Refills: 0         CONTINUE these medications which have CHANGED    Details   atorvastatin (LIPITOR) 40 mg tablet Take 1 Tab by mouth nightly.   Qty: 30 Tab, Refills: 0         CONTINUE these medications which have NOT CHANGED    Details   ALPRAZolam (XANAX) 1 mg tablet take 1 tablet by mouth once daily  Qty: 30 Tab, Refills: 5    Associated Diagnoses: Anxiety      HYDROcodone-acetaminophen (NORCO)  mg tablet Take 1 Tab by mouth every six (6) hours as needed for Pain for up to 30 days. Max Daily Amount: 4 Tabs. Qty: 120 Tab, Refills: 0    Associated Diagnoses: Chronic pain syndrome      lubiPROStone (AMITIZA) 24 mcg capsule Take 1 Cap by mouth two (2) times daily (with meals) for 30 days. Qty: 180 Cap, Refills: 3      albuterol (PROAIR HFA) 90 mcg/actuation inhaler USE 2 INHALATIONS EVERY 4 HOURS AS NEEDED FOR WHEEZING , ACUTE ASTHMA ATTACK  Qty: 4 Inhaler, Refills: 3      rifAXIMin (XIFAXAN) 550 mg tablet 1 p.o. 3 times daily  Qty: 14 Tab, Refills: 0    Associated Diagnoses: Irritable bowel syndrome with diarrhea      levothyroxine (SYNTHROID) 112 mcg tablet Take 1 Tab by mouth Daily (before breakfast). Qty: 90 Tab, Refills: 3    Associated Diagnoses: Hypothyroidism due to acquired atrophy of thyroid      nebivolol (BYSTOLIC) 10 mg tablet Take 1 Tab by mouth daily. Qty: 90 Tab, Refills: 3    Associated Diagnoses: Essential hypertension      venlafaxine-SR (EFFEXOR-XR) 150 mg capsule TAKE 1 CAPSULE EVERY 12 HOURS FOR NERVES  Qty: 180 Cap, Refills: 3    Associated Diagnoses: Moderate episode of recurrent major depressive disorder (HCC)      pantoprazole (PROTONIX) 40 mg tablet Take 1 Tab by mouth daily. Qty: 90 Tab, Refills: 3    Associated Diagnoses: Gastroesophageal reflux disease without esophagitis      lidocaine 5 % gel 1 Dose by Apply Externally route four (4) times daily as needed. Qty: 1 Tube, Refills: 2      fluticasone (FLONASE) 50 mcg/actuation nasal spray 2 Sprays by Both Nostrils route daily. Qty: 1 Bottle, Refills: 0      MULTIVIT,CALC,MINS/FOLIC ACID (ONE-A-DAY PROACTIVE 65 PLUS PO) Take 1 Tab by mouth daily. Stopped 9/6/18  Indications: OTC      aspirin delayed-release 81 mg tablet Take 81 mg by mouth daily.  Stopped 8/24/18--- per pt-- dr Erick Neves  Indications: OTC cyanocobalamin 1,000 mcg tablet Take 1,000 mcg by mouth daily.  Indications: OTC         STOP taking these medications       aluminum-magnesium hydroxide 200-200 mg/5 mL susp 5 mL, diphenhydrAMINE 12.5 mg/5 mL liqd 12.5 mg, lidocaine 2 % soln 5 mL Comments:   Reason for Stopping:         losartan-hydroCHLOROthiazide (HYZAAR) 100-25 mg per tablet Comments:   Reason for Stopping:         butalbital-acetaminophen-caffeine (FIORICET, ESGIC) -40 mg per tablet Comments:   Reason for Stopping:         LIPOFEN 150 mg cap Comments:   Reason for Stopping:         vitamin E acetate (VITAMIN E PO) Comments:   Reason for Stopping:               Activity: no driving until cleared by Neurology   Diet: Cardiac Diet  Wound Care: None needed      Full Code   Surrogate decision maker:      Pneumonia and flu vaccine to be administered at discharge per hospital   protocol     Follow-up  ·   FU PCP 2 days  · FU Neurology, office to call  · DC on ASA, plavix, statin  · Potassium rich diet, BMP Friday with PCP  · Instructed no driving until cleared by Neurology, pt verbalized understanding     Notes, labs, VS, diagnostic testing reviewed  Case discussed with pt, care team, Dr. Cristin Penn      Time spent to discharge patient  45 min    Signed:  Dave Carmona NP  11/27/2019  6:03 PM

## 2019-11-27 NOTE — PROGRESS NOTES
Problem: Neurolinguistics Impaired (Adult)  Goal: *Acute Goals and Plan of Care (Insert Text)  Description  LTG: Patient will increase neuro-linguistic abilities to increase safety and awareness in functional living environment   STG: Patient will participate in moderate level word finding task with 80% accuracy  STG: Patient will follow complex written directions with 80% accuracy with minimal assistance   STG: Patient will solve mathematical problems with 80% accuracy given moderate cueing   STG: Patient will complete reasoning tasks to improve problem solving and safety awareness with 8)% accuracy given moderate cueing  STG: Patient will name 10 items to concrete category in 1 minute with minimal cueing   STG: Patient will plan and prioritize daily tasks with 80%accuracy given minimal cueing  STG: Patient will participate in attention/working memory tasks with 80% accuracy given moderate cueing  STG: Patient will recall relevant verbal information with 80% accuracy with minimal assistance  STG: Patient will utilize memory compensatory strategies to improve recall of functional list/message with 90%accuracy without assistance    Outcome: Progressing Towards Goal       SPEECH LANGUAGE PATHOLOGY: SPEECH-LANGUAGE/COGNITION: Initial Assessment    NAME/AGE/GENDER: Markie Altman is a 68 y.o. female  DATE: 11/27/2019  PRIMARY DIAGNOSIS: TIA (transient ischemic attack) [G45.9]  CVA (cerebral vascular accident) ([facility] [I63.9]       ICD-10: Treatment Diagnosis: R41.841 Cognitive-Communication Deficit    INTERDISCIPLINARY COLLABORATION: n/a   PRECAUTIONS/ALLERGIES: Keflex [cephalexin]; Ambien [zolpidem]; Aspirin; Codeine; Daypro [oxaprozin]; Demerol [meperidine]; Dilaudid [hydromorphone (bulk)]; Mobic [meloxicam];  Onion; and Pcn [penicillins]     SUBJECTIVE   Patient reports improvements today \"the words weren't coming out right yesterday\"     History of Present Injury/Illness: Ms. Feliz De Souza  has a past medical history of Allergic rhinitis, Anxiety, Arthritis, Asthma, Calculus of kidney (x 2-- last over 30 yrs ago), Cancer [facility]), Chronic pain, Depression, Dizziness (2016), Dyslipidemia, Fatigue, Former cigarette smoker, GERD (gastroesophageal reflux disease), Heart murmur (2015), HTN (hypertension), Hypercholesterolemia, Hypothyroid, IBS (irritable bowel syndrome), Migraines, Mitral valve insufficiency, Nausea & vomiting, Peripheral neuropathy, Rheumatic fever, Seizures ([address] and Unspecified adverse effect of anesthesia. . She also  has a past surgical history that includes hx lap cholecystectomy; hx appendectomy; hx hysterectomy; hx tonsil and adenoidectomy; hx colectomy; hx bladder suspension; hx colonoscopy; hx other surgical; hx other surgical; hx breast lumpectomy (Right, ); hx orthopaedic; hx knee arthroscopy (Left); hx knee arthroscopy (Right, 10/16/09; 2015); hx knee replacement (Right, 3/2015); and hx knee replacement (Left, ).      Previous Speech Therapy: NONE REPORTED    Problem List:  (Impairments causing functional limitations):  Cognitive linguistic impairments     Orientation:   Person  Place  Time  Situation     Pain: Pain Scale 1: Numeric (0 - 10)  Pain Intensity 1: 0         OBJECTIVE   SLUMS and informal assessment completed to evaluate cognitive linguistic function  Total score of 15/30 (normal=25-30 with less than high school education)  Patient's highest level of education: 10th grade    Orientation:   Memory: immediate-5/5; delay-0/5 independently; immediate passage recall-6/8  Basic problem solving with calculation: 1/3   Divergent namin items to concrete category in 1 minute   Clock drawin/4 (hour hand on 11)  Figure tasks: 2/2  Digit reversal: 1/2    Responsive namin/5  Sequencing steps to task verbally: ~95%, cues for omitted information        ASSESSMENT   Patient presents with cognitive linguistic deficits in attention, short term memory, verbal fluency, and problem solving. Independent short term memory impaired, however able to recall 5/5 items when given category cue. Patient unable to recall time given during clock drawing task, money amount during basic problem solving, and difficulty with digit manipulation task likely due to impaired working memory and attention. Provided breakdown of basic calculation problem, however still incorrect (I.e.[id number]=\"88\"). Errors noted with basic problem solving/reasoning and decreased insight into errors (I.e. reports she started working for [employer] in 90's,\"so i've been retired about 35 years\"). Patient noted to have either somewhat impaired thought and verbal fluency with noted filler phrases vs. word finding difficulties at conversation level. Needs ongoing assessment. Patient currently functioning below baseline and will benefit from ongoing skilled intervention to improve cognitive linguistic function. Will continue to follow. Tool Used: MODIFIED THERESA SCALE (mRS)   Score   No Symptoms  [] 0   No significant disability despite symptoms; able to carry out all usual duties and activities  [] 1   Slight disability; unable to carry out all previous activities but able to look after own affairs without assistance. [] 2   Moderate disability; requiring some help but able to walk without assistance  [x] 3   Moderately severe disability; unable to walk without assistance and unable to attend to own bodily needs without assistance  [] 4   Severe disability; bedridden, incontinent, and requiring constant nursing care and attention  [] 5      Score:  Initial: 3    Interpretation of Tool: The Modified Henrietta Scale is a 7-point scaled used to quantify level of disability as it relates to a patient's functional abilities. Current Medications:   No current facility-administered medications on file prior to encounter.         Current Outpatient Medications on File Prior to Encounter   Medication Sig Dispense Refill ALPRAZolam (XANAX) 1 mg tablet take 1 tablet by mouth once daily 30 Tab 5    [START ON 12/12/2019] HYDROcodone-acetaminophen (NORCO)  mg tablet Take 1 Tab by mouth every six (6) hours as needed for Pain for up to 30 days. Max Daily Amount: 4 Tabs. 120 Tab 0    HYDROcodone-acetaminophen (NORCO)  mg tablet Take 1 Tab by mouth every six (6) hours as needed for Pain for up to 30 days. Max Daily Amount: 4 Tabs. 120 Tab 0    lubiPROStone (AMITIZA) 24 mcg capsule Take 1 Cap by mouth two (2) times daily (with meals) for 30 days. 180 Cap 3    albuterol (PROAIR HFA) 90 mcg/actuation inhaler USE 2 INHALATIONS EVERY 4 HOURS AS NEEDED FOR WHEEZING , ACUTE ASTHMA ATTACK 4 Inhaler 3    rifAXIMin (XIFAXAN) 550 mg tablet 1 p.o. 3 times daily 14 Tab 0    levothyroxine (SYNTHROID) 112 mcg tablet Take 1 Tab by mouth Daily (before breakfast). 90 Tab 3    atorvastatin (LIPITOR) 20 mg tablet Take 1 Tab by mouth daily. 90 Tab 3    losartan-hydroCHLOROthiazide (HYZAAR) 100-25 mg per tablet Take 1 Tab by mouth daily. 90 Tab 3    nebivolol (BYSTOLIC) 10 mg tablet Take 1 Tab by mouth daily. 90 Tab 3    venlafaxine-SR (EFFEXOR-XR) 150 mg capsule TAKE 1 CAPSULE EVERY 12 HOURS FOR NERVES (Patient taking differently: Take 150 mg by mouth daily. ) 180 Cap 3    pantoprazole (PROTONIX) 40 mg tablet Take 1 Tab by mouth daily. 90 Tab 3    butalbital-acetaminophen-caffeine (FIORICET, ESGIC) -40 mg per tablet TAKE 1 TABLET EVERY 6 HOURS AS NEEDED. MAXIMUM DAILY DOSE 4 TABLETS. 90 Tab 3    LIPOFEN 150 mg cap       vitamin E acetate (VITAMIN E PO) Take  by mouth.      lidocaine 5 % gel 1 Dose by Apply Externally route four (4) times daily as needed. 1 Tube 2    fluticasone (FLONASE) 50 mcg/actuation nasal spray 2 Sprays by Both Nostrils route daily. 1 Bottle 0    MULTIVIT,CALC,MINS/FOLIC ACID (ONE-A-DAY PROACTIVE 65 PLUS PO) Take 1 Tab by mouth daily.  Stopped 9/6/18  Indications: OTC      aspirin delayed-release 81 mg tablet Take 81 mg by mouth daily. Stopped 8/24/18--- per pt-- dr Waleska Burrows  Indications: OTC      cyanocobalamin 1,000 mcg tablet Take 1,000 mcg by mouth daily. Indications: OTC         PLAN    FREQUENCY/DURATION: Continue to follow patient 2 times a week for duration of hospital stay to address above goals. - Recommendations for next treatment session: Next treatment will address cognitive linguistic tasks      REHABILITATION POTENTIAL FOR STATED GOALS: Good         RECOMMENDATIONS   STRATEGIES: memory strategies     EDUCATION:  Recommendations discussed with Patient     RECOMMENDATIONS for CONTINUED SPEECH THERAPY: YES: Anticipate need for ongoing speech therapy during this hospitalization and at next level of care. Compliance with Program/Exercises: Will assess as treatment progresses  Continuation of Skilled Services/Medical Necessity:  Patient is expected to demonstrate progress in cognitive ability to decrease assistance required communication, increase independence with activities of daily living, and increase communication with family/caregivers. Patient continues to require skilled intervention due to impaired cognitive linguistic function.        SAFETY:  After treatment position/precautions:  Upright in bed  Call light within reach    Total Treatment Duration:     Time In: 1306  [facility]: [facility], [facility] MARLON JARAMILLO, [id number] [place] [address]

## 2019-11-27 NOTE — INTERDISCIPLINARY ROUNDS
Interdisciplinary team rounds were held 11/27/2019 with the following team members:  Care Management, Physical Therapy, Nurse Practitioner and . Plan of care discussed. See clinical pathway and/or care plan for interventions and desired outcomes.

## 2019-11-29 ENCOUNTER — PATIENT OUTREACH (OUTPATIENT)
Dept: CASE MANAGEMENT | Age: 69
End: 2019-11-29

## 2019-11-29 NOTE — PROGRESS NOTES
This note will not be viewable in [address] Date/Time of Call: 11/29/19 939am   What was the patient hospitalized for? CVA   Consent for CIARA CHONG Call       Does the patient understand his/her diagnosis and/or treatment and what happened during the hospitalization? Called and spoke with patients spouse. He agrees to call and states that the patient is doing pretty good  Yes, he verbalizes understanding of hospitalization and treatment    Did the patient receive discharge instructions? Yes   CM Assessed Risk for Readmission:         Patient stated Risk for Readmission:  Patient is a moderate to high risk for readmission due to diagnoses and or comorbidities    No concerns at this time      Review any discharge instructions (see discharge instructions/AVS in [facility]). Ask patient if they understand these. Do they have any questions? reviewed DC instructions   Were home services ordered (nursing, PT, OT, ST, etc.)? No    If so, has the first visit occurred? If not, why? (Assist with coordination of services if necessary. )   NA   Was any DME ordered? No    If so, has it been received? If not, why?  (Assist patient in obtaining DME orders &/or equipment if necessary. ) NA   Complete a review of all medications (new, continued and discontinued meds per the D/C instructions and medication tab in [facility]).  Medications Reviewed     START taking:  clopidogrel 75 mg Tab (PLAVIX)  Start taking on: November 28, 2019  losartan 100 mg tablet (COZAAR)  Start taking on: November 28, 2019  CHANGE how you take:  atorvastatin 40 mg tablet (LIPITOR)  HYDROcodone-acetaminophen  mg  tablet (NORCO)  STOP taking:  aluminum-magnesium hydroxide 200-200 mg/5 mL  susp 5 mL, diphenhydrAMINE 12.5 mg/5 mL liqd 12.5  mg, lidocaine 2 % soln 5 mL  butalbital-acetaminophen-caffeine -40 mg per  tablet (FIORICET, ESGIC)  LIPOFEN 150 mg Cap  losartan-hydroCHLOROthiazide 100-25 mg per  tablet (HYZAAR)  VITAMIN E PO   Were all new prescriptions filled? If not, why?  (Assist patient in obtaining medications if necessary  escalate for CCM &/or SW if ongoing issues are verbalized by pt or anticipated)   Yes    Does the patient understand the purpose and dosing instructions for all medications? (If patient has questions, provide explanation and education.)   Patient spouse verbalizes understanding of medications    Does the patient have any problems in performing ADLs? (If patient is unable to perform ADLs  what is the limiting factor(s)? Do they have a support system that can assist? If no support system is present, discuss possible assistance that they may be able to obtain. Escalate for CCM/SW if ongoing issues are verbalized by pt or anticipated)   Patient independent with ADLs at baseline. Family assisting PRN    Does the patient have all follow-up appointments scheduled? 7 day f/up with PCP?   (f/up with PCP may be w/in 14 days if patient has a f/up with their specialist w/in 7 days)    7-14 day f/up with specialist?   (or per discharge instructions)    If f/up has not been made  what actions has the care coordinator made to accomplish this? Has transportation been arranged? No       Spouse states that he will call Dr. Mariel Bence office to schedule FU       Request that Care Coordinator contact [facility] to assist in scheduling FU. Care Coordinator attempted contacting [facility] to request that they contact patient/ spouse to assist in scheduling. Was on hold for over 20 minutes and no option to leave VM. Care Coordinator will continue to reach office for assistance in scheduling. Yes, no concerns    Any other questions or concerns expressed by the patient? Patients spouse denies any questions or concerns at this time. Care Coordinator contact information provided should needs arise.    Schedule next appointment with CIARA Everett or refer to RN Case Manager/ per the workflow guidelines. When is care coordinators next follow-up call scheduled? If referred for CCM  what RN care manager was the referral assigned?  Within 7 - 14 days          Within 7  14 days       NA   JODY Call Completed By: Carley Cole, [address] [place] [address] Coordinator

## 2019-12-02 PROBLEM — E78.5 DYSLIPIDEMIA: Status: RESOLVED | Noted: 2019-12-02 | Resolved: 2019-12-02

## 2019-12-06 ENCOUNTER — PATIENT OUTREACH (OUTPATIENT)
Dept: CASE MANAGEMENT | Age: 69
End: 2019-12-06

## 2019-12-06 NOTE — PROGRESS NOTES
This note will not be viewable in [address] 1st attempt to reach patient for a follow up [address] Call. No answer, left  for returned call. [facility] Coordinator will attempt to reach patient again within the 14 day period.

## 2019-12-13 ENCOUNTER — PATIENT OUTREACH (OUTPATIENT)
Dept: CASE MANAGEMENT | Age: 69
End: 2019-12-13

## 2019-12-13 NOTE — PROGRESS NOTES
This note will not be viewable in [address] Attempted contacting patient for FU. No answer, left VM for returned call. Per CC patient has completed FU's with PCP x2 with FU's scheduled appropriately. No further JODY outreach will be made as patient is following given POC. Episode closed.

## 2020-01-16 ENCOUNTER — HOSPITAL ENCOUNTER (OUTPATIENT)
Dept: PHYSICAL THERAPY | Age: 70
Discharge: HOME | End: 2020-01-16
Attending: OTOLARYNGOLOGY
Payer: MEDICARE

## 2020-01-16 DIAGNOSIS — R42 DIZZINESS: Chronic | ICD-10-CM

## 2020-01-16 PROCEDURE — 97162 PT EVAL MOD COMPLEX 30 MIN: CPT

## 2020-01-16 PROCEDURE — 97110 THERAPEUTIC EXERCISES: CPT

## 2020-01-16 NOTE — THERAPY EVALUATION
Fe Gates  : 1950  [place]  Secondary: [address] [facility]  [address], [address] [facility] [address], [facility] [address]. 91.  [phone]   [phone]          Deb 51 Assessment 2020   ICD-10: Treatment Diagnosis:    Other abnormalities of gait and mobility (R26.89)   Difficulty in walking, not elsewhere classified (R26.2)   Precautions/Allergies:   Keflex [cephalexin]; Ambien [zolpidem]; Aspirin; Codeine; Daypro [oxaprozin]; Demerol [meperidine]; Dilaudid [hydromorphone (bulk)]; Mobic [meloxicam]; Onion; and Pcn [penicillins]   TREATMENT PLAN:  Effective Dates: 2020 TO 4/15/2020 (90 days). Frequency/Duration: 2 times a week for 90 Day(s) MEDICAL/REFERRING DIAGNOSIS:  Dizziness [R42]   DATE OF ONSET: Chronic  REFERRING PHYSICIAN: Carolin Parmar DO MD Orders: Evaluate and Treat  Return MD Appointment: TBD     INITIAL ASSESSMENT:  Ms. Mackenzie Rutledge presents with decreased mobility, decreased strength, and decreased balance secondary to degenerative changes in right hip as well as CVA affecting right side of her body. After discussing with patient, she agreed she would benefit from physical therapy to improve above deficits. Please sign this plan of treatment if you concur. Thank you for the opportunity to serve this patient. PROBLEM LIST (Impacting functional limitations):  1. Decreased Strength  2. Decreased ADL/Functional Activities  3. Decreased Ambulation Ability/Technique  4. Decreased Balance  5. Decreased Activity Tolerance INTERVENTIONS PLANNED: (Treatment may consist of any combination of the following)  1. Balance Exercise  2. Home Exercise Program (HEP)  3. Neuromuscular Re-education/Strengthening  4. Range of Motion (ROM)  5. Therapeutic Activites  6.  Therapeutic Exercise/Strengthening      GOALS: (Goals have been discussed and agreed upon with patient.)  Short-Term Functional Goals: Time Frame: 30 days  1. Patient will be independent with home exercise program without exacerbation of symptoms or cueing needed. Discharge Goals: Time Frame: 90 days  1. Patient will be independent with all ADLs with minimal fear of falling and loss of balance with daily tasks. 2. Patient will report no fear avoidance with social or recreational activities due to fear of falling. 3. Patient will score greater than or equal to 45/56 on Parish Balance Scale with minimal effect of imbalance or weakness on patient's ability to manage every day life activities. 4. Patient will score greater than or equal to 19/24 on Dynamic Gait Index with minimal effect of imbalance or weakness on patient's ability to manage every day life activities. OUTCOME MEASURE:   Tool Used: Parish Balance Scale  Score:  Initial: 34/56 Most Recent: X/56 (Date: -- )   Interpretation of Score: Each section is scored on a 0-4 scale, 0 representing the patients inability to perform the task and 4 representing independence. The scores of each section are added together for a total score of 56. The higher the patients score, the more independent the patient is. Any score below 45 indicates increased risk for falls. Tool Used: Dynamic Gait Index  Score:  Initial: 13/24 Most Recent: X/24 (Date: -- )   Interpretation of Score: Each section is scored on a 0-3 scale, 0 representing the patients inability to perform the task and 3 representing independence. The scores of each section are added together for a total score of 24. Any score below 19 indicates increased risk for falls. MEDICAL NECESSITY:   · Patient is expected to demonstrate progress in balance and coordination to improve safety during daily activities. · Patient demonstrates good rehab potential due to higher previous functional level. · Skilled intervention continues to be required due to decreaesd mobility and vestibular habituation.   REASON FOR SERVICES/OTHER COMMENTS:  · Patient continues to demonstrate capacity to improve overall functional mobility which will increase independence and increase safety. Total Duration:  PT Patient Time In/Time Out  Time In: 1300  Time Out: 1345    Rehabilitation Potential For Stated Goals: Good  Regarding Colleen Valdivias therapy, I certify that the treatment plan above will be carried out by a therapist or under their direction. Thank you for this referral,  Rodrick Jang, PT     Referring Physician Signature: Abhishek Figueroa, DO _______________________________ Date _____________     PAIN/SUBJECTIVE:   Initial: Pain Intensity 1: 3  Pain Location 1: Hip  Pain Orientation 1: Right  Pain Intervention(s) 1: Rest, Medication (see MAR)  Post Session:  0/10   HISTORY:   History of Injury/Illness (Reason for Referral):  Patient reports she was having right hip pain before she had her stroke in November. She reports that several days before she had her stroke she was falling for \"no reason. \"  She reports she spent several days in the hospital.  She reports she was still falling a good bit, but has slowly begun to improve. She reports she would like to improve the strength in her right leg because it hurts when she walks a lot and she starts to drag it. She reports she would like to eventually have that hip replaced.   Past Medical History/Comorbidities:   Ms. Wen Vallejo  has a past medical history of Allergic rhinitis, Anxiety, Arthritis, Asthma, Calculus of kidney (x 2-- last over 30 yrs ago), Cancer [facility]), Chronic pain, Depression, Dizziness (09/08/2016), Dyslipidemia, Fatigue, Former cigarette smoker, GERD (gastroesophageal reflux disease), Heart murmur (11/24/2015), HTN (hypertension), Hypercholesterolemia, Hypothyroid, IBS (irritable bowel syndrome), Migraines, Mitral valve insufficiency, Nausea & vomiting, Peripheral neuropathy, Rheumatic fever, Seizures ([facility] [address] Stroke ([facility] [address] and Unspecified adverse effect of anesthesia. Ms. Lake Wright  has a past surgical history that includes hx lap cholecystectomy; hx appendectomy; hx hysterectomy; hx tonsil and adenoidectomy; hx colectomy; hx bladder suspension; hx colonoscopy; hx other surgical; hx other surgical; hx breast lumpectomy (Right, 1996); hx orthopaedic; hx knee arthroscopy (Left); hx knee arthroscopy (Right, 10/16/09; 03/2015); hx knee replacement (Right, 3/2015); and hx knee replacement (Left, 2016). Social History/Living Environment:   Home Environment: Private residence  Living Alone: No  Support Systems: Family member(s), Friends \ neighbors  Prior Level of Function/Work/Activity:  Independent  Dominant Side:         RIGHT  Personal Factors:          Sex:  female        Age:  68 y.o. Ambulatory/Rehab Services H2 Model Falls Risk Assessment   Risk Factors:       (5)  History of Recent Falls [w/in 3 months] Ability to Rise from Chair:       (1)  Pushes up, successful in one attempt   Falls Prevention Plan:       No modifications necessary   Total: (5 or greater = High Risk): 6   ©2010 [facility] of [employer]. All Rights Reserved. [facility] Patent #[id number]. Federal Law prohibits the replication, distribution or use without written permission from [facility] [employer]   Current Medications:       Current Outpatient Medications:     HYDROcodone-acetaminophen (NORCO)  mg tablet, Take 1 Tab by mouth every six (6) hours as needed for Pain for up to 30 days. Max Daily Amount: 4 Tabs., Disp: 120 Tab, Rfl: 0    potassium chloride (K-DUR, KLOR-CON) 20 mEq tablet, Take 1 Tab by mouth daily. , Disp: 90 Tab, Rfl: 3    omeprazole (PRILOSEC) 40 mg capsule, Take 1 Cap by mouth daily for 30 days. , Disp: 30 Cap, Rfl: 6    atorvastatin (LIPITOR) 40 mg tablet, Take 1 Tab by mouth nightly., Disp: 90 Tab, Rfl: 5    clopidogrel (PLAVIX) 75 mg tab, Take 1 Tab by mouth daily. , Disp: 90 Tab, Rfl: 5    losartan (COZAAR) 100 mg tablet, Take 1 Tab by mouth daily. , Disp: 90 Tab, Rfl: 5    ALPRAZolam (XANAX) 0.5 mg tablet, take 1 tablet by mouth once daily, Disp: 30 Tab, Rfl: 5    albuterol (PROAIR HFA) 90 mcg/actuation inhaler, USE 2 INHALATIONS EVERY 4 HOURS AS NEEDED FOR WHEEZING , ACUTE ASTHMA ATTACK, Disp: 4 Inhaler, Rfl: 3    rifAXIMin (XIFAXAN) 550 mg tablet, 1 p.o. 3 times daily, Disp: 14 Tab, Rfl: 0    levothyroxine (SYNTHROID) 112 mcg tablet, Take 1 Tab by mouth Daily (before breakfast). , Disp: 90 Tab, Rfl: 3    nebivolol (BYSTOLIC) 10 mg tablet, Take 1 Tab by mouth daily. , Disp: 90 Tab, Rfl: 3    venlafaxine-SR (EFFEXOR-XR) 150 mg capsule, TAKE 1 CAPSULE EVERY 12 HOURS FOR NERVES (Patient taking differently: Take 150 mg by mouth daily.), Disp: 180 Cap, Rfl: 3    lidocaine 5 % gel, 1 Dose by Apply Externally route four (4) times daily as needed. , Disp: 1 Tube, Rfl: 2    fluticasone (FLONASE) 50 mcg/actuation nasal spray, 2 Sprays by Both Nostrils route daily. , Disp: 1 Bottle, Rfl: 0    MULTIVIT,CALC,MINS/FOLIC ACID (ONE-A-DAY PROACTIVE 65 PLUS PO), Take 1 Tab by mouth daily. Stopped 9/6/18  Indications: OTC, Disp: , Rfl:     aspirin delayed-release 81 mg tablet, Take 81 mg by mouth daily. Stopped 8/24/18--- per pt-- dr Cortez Toney  Indications: OTC, Disp: , Rfl:     cyanocobalamin 1,000 mcg tablet, Take 1,000 mcg by mouth daily.  Indications: OTC, Disp: , Rfl:    Date Last Reviewed:  1/16/2020    Number of Personal Factors/Comorbidities that affect the Plan of Care: 1-2: MODERATE COMPLEXITY   EXAMINATION:   Observation/Orthostatic Postural Assessment:          Posture Assessment: Rounded shoulders, Forward head   Functional Mobility:         Gait/Mobility:    Base of Support: Center of gravity altered  Speed/Karen: Pace decreased (<100 feet/min)  Step Length: Left shortened, Right shortened  Swing Pattern: Left asymmetrical, Right asymmetrical  Stance: Left increased, Right increased  Gait Abnormalities: Antalgic  Ambulation - Level of Assistance: Modified independent  Assistive Device: Cane, straight  · Interventions: Verbal cues, Safety awareness training          Transfers:     Sit to Stand: Modified independent  Stand to Sit: Modified independent  Stand Pivot Transfers: Modified independent  Bed to Chair: Modified independent  Lateral Transfers: Modified independent         Bed Mobility:     Rolling: Independent  Supine to Sit: Independent  Sit to Supine: Independent  Scooting: Independent       Strength:          UE STRENGTH: 3+/5 shoulder flexion, 3+/5 shoulder abduction, 3+/5 shoulder extension, 3+/5 elbow flexion, 3+/5 elbow extension. LE STRENGTH:  3+/5 hip flexion, 3+/5 hip abduction, 3+/5 hip adduction, 3+/5 hip extension, 3+/5 knee extension, 3+/5 knee flexion, 3/5 ankle dorsiflexion, 3/5 ankle plantarflexion, 3/5 ankle inversion, 3/5 ankle eversion. Sensation:         Intact  Postural Control & Balance:  · Parish Balance Scale:  34/56.   (A score less than 45/56 indicates high risk of falls)     · Dynamic Gait Index:  13/24.   (A score less than or equal to19/24 is abnormal and predictive of falls)        Body Structures Involved:  1. Eyes and Ears Body Functions Affected:  1. Neuromusculoskeletal  2. Movement Related Activities and Participation Affected:  1. Mobility  2.  Self Care   Number of elements (examined above) that affect the Plan of Care: 4+: HIGH COMPLEXITY   CLINICAL PRESENTATION:   Presentation: Evolving clinical presentation with changing clinical characteristics: MODERATE COMPLEXITY   CLINICAL DECISION MAKING:   Use of outcome tool(s) and clinical judgement create a POC that gives a: Questionable prediction of patient's progress: MODERATE COMPLEXITY

## 2020-01-16 NOTE — PROGRESS NOTES
Nubia Head  : 1950  [place]  Secondary: [address] [facility] at [facility]  [address], [address] [facility] [address], [facility] [address]. 91.  [phone]   [phone]        OUTPATIENT PHYSICAL THERAPY: Daily Treatment Note 2020  Visit Count:  1    ICD-10: Treatment Diagnosis:    Other abnormalities of gait and mobility (R26.89)   Difficulty in walking, not elsewhere classified (R26.2)   Precautions/Allergies:   Keflex [cephalexin]; Ambien [zolpidem]; Aspirin; Codeine; Daypro [oxaprozin]; Demerol [meperidine]; Dilaudid [hydromorphone (bulk)]; Mobic [meloxicam]; Onion; and Pcn [penicillins]   TREATMENT PLAN:  Effective Dates: 2020 TO 4/15/2020 (90 days). Frequency/Duration: 2 times a week for 90 Day(s)    Pre-treatment Symptoms/Complaints:  2020: Patient reports her right hip is hurting. She reports she hopes it will get stronger so she can get her hip replaced. Pain: Initial: Pain Intensity 1: 3  Pain Location 1: Hip  Pain Orientation 1: Right  Pain Intervention(s) 1: Rest, Medication (see MAR)  Post Session:  3/10   Medications Last Reviewed:  2020  Updated Objective Findings:  See evaluation note from today  TREATMENT:     THERAPEUTIC EXERCISE: (15 minutes):  Exercises per grid below to improve mobility and strength. Required minimal visual, verbal and manual cues to promote proper body alignment, promote proper body posture and promote proper body mechanics. Progressed resistance, range, repetitions and complexity of movement as indicated. Date:  2020   Activity/Exercise Parameters   Standing hip flexion/march 10 reps  B LE  HEP   Standing hip extension 10 reps  B LE  HEP      Treatment/Session Summary:    · Response to Treatment:  Patient tolerated assessment with minimal complaints of increased back/hip pain. Patient verbalized and demonstrated understanding of HEP.   · Communication/Consultation:  None today  · Equipment provided today:  None today  · Recommendations/Intent for next treatment session: Next visit will focus on improving overall mobility and safety with daily activities.     Total Treatment Billable Duration:  15 minutes + evaluation  PT Patient Time In/Time Out  Time In: 1300  Time Out: 75 Mick Rojas PT    Future Appointments   Date Time Provider Binh Callahan   1/20/2020  2:30 PM Bambi Link, PT [facility] SFE   1/21/2020  2:00 PM Nicki Mazariegos NP BSUG BSUG   1/22/2020  3:40 PM Orlando Roberts MD BSND BSND   1/23/2020  1:00 PM Idalmis David, PT SFEORPT SFE   1/28/2020  1:00 PM Idalmis David, PT SFEORPT SFE   1/29/2020  1:45 PM Gisella Hutton MD SSA UCDG UCD   1/31/2020  3:15 PM Tonya Hanna, PT SFEORPT SFE   3/5/2020  3:30 PM PST LAB SSA PST PST   3/12/2020 10:20 AM DO CHETNA Cuevas PST PST   3/26/2020 11:00 AM Mariely Bingham DO [address] ENT

## 2020-01-20 ENCOUNTER — HOSPITAL ENCOUNTER (OUTPATIENT)
Dept: PHYSICAL THERAPY | Age: 70
Discharge: HOME | End: 2020-01-20
Attending: OTOLARYNGOLOGY
Payer: MEDICARE

## 2020-01-20 NOTE — PROGRESS NOTES
Apolinar Bond  : 1950  [place]  Secondary: [address] [facility]  [address] [facility], [address] [place] [address],[address], [facility] [address] 91.  [phone]   [phone]     OUTPATIENT DAILY NOTE    NAME/AGE/GENDER: Apolinar Bond is a 68 y.o. female. DATE: 2020    Ms. Marisela Crew for today's appointment due to increased pain. Patient reports she is hurting so bad she is unable to walk.     Jaylan Prince, PT

## 2020-01-21 PROBLEM — N39.3 STRESS INCONTINENCE: Status: ACTIVE | Noted: 2020-01-21

## 2020-01-21 PROBLEM — N95.2 VAGINAL ATROPHY: Status: ACTIVE | Noted: 2020-01-21

## 2020-01-21 PROBLEM — N32.81 OAB (OVERACTIVE BLADDER): Status: ACTIVE | Noted: 2020-01-21

## 2020-01-23 ENCOUNTER — HOSPITAL ENCOUNTER (OUTPATIENT)
Dept: PHYSICAL THERAPY | Age: 70
Discharge: HOME | End: 2020-01-23
Attending: OTOLARYNGOLOGY
Payer: MEDICARE

## 2020-01-23 PROCEDURE — 97110 THERAPEUTIC EXERCISES: CPT

## 2020-01-23 NOTE — PROGRESS NOTES
Art Fine  : 1950  [place]  Secondary: [address] [facility]  [address] [facility], [address] [place] [address],[address], [facility] [address].  [phone]   [phone]        OUTPATIENT PHYSICAL THERAPY: Daily Treatment Note 2020  Visit Count:  2    ICD-10: Treatment Diagnosis:    Other abnormalities of gait and mobility (R26.89)   Difficulty in walking, not elsewhere classified (R26.2)   Precautions/Allergies:   Keflex [cephalexin]; Ambien [zolpidem]; Aspirin; Codeine; Daypro [oxaprozin]; Demerol [meperidine]; Dilaudid [hydromorphone (bulk)]; Mobic [meloxicam]; Onion; and Pcn [penicillins]   TREATMENT PLAN:  Effective Dates: 2020 TO 4/15/2020 (90 days). Frequency/Duration: 2 times a week for 90 Day(s)    Pre-treatment Symptoms/Complaints:  2020: Patient reports she just feels depressed about everything. Pain: Initial: Pain Intensity 1: 3  Pain Location 1: Hip  Pain Orientation 1: Right  Pain Intervention(s) 1: Rest, Medication (see MAR)  Post Session:  3/10   Medications Last Reviewed:  2020  Updated Objective Findings:  None Today  TREATMENT:     THERAPEUTIC EXERCISE: (40 minutes):  Exercises per grid below to improve mobility and strength. Required minimal visual, verbal and manual cues to promote proper body alignment, promote proper body posture and promote proper body mechanics. Progressed resistance, range, repetitions and complexity of movement as indicated.    Date:  2020   Activity/Exercise Parameters   Nu-step 6 minutes  Level 3   Standing hip flexion 15 reps  B LE   Standing hip extension 15 reps  B LE   Standing hip abduction 15 reps  B LE   Standing knee flexion 15 reps  B LE   Standing heel raises 15 reps  B LE   Standing toe raises 15 reps  B LE   Step taps 6 inch step  30 reps  Alternating feet   Progressing from B UE support to no UE support   Step overs 1/2 foam roll  30 reps  Alternating feet  Progressing from B UE support to no UE support    Lateral step overs  15 reps  Both directions  No UE support      Treatment/Session Summary:    · Response to Treatment:  Patient tolerated treatment with minimal complaints of increased back/hip pain. Patient easily fatigued today, so several sitting rest breaks. · Communication/Consultation:  None today  · Equipment provided today:  None today  · Recommendations/Intent for next treatment session: Next visit will focus on improving overall mobility and safety with daily activities.     Total Treatment Billable Duration:  40 minutes   PT Patient Time In/Time Out  Time In: 1300  Time Out: 75 Beece Rojas, PT    Future Appointments   Date Time Provider Binh Sindy   1/23/2020  1:00 PM Donna Hopson, PT [facility] SFE   1/28/2020  1:00 PM Donna Hopson, PT [facility] SFE   1/29/2020  1:45 PM Tavares Doss MD SSA UCDG UCD   1/31/2020  3:15 PM Kirt Hanna, JAYDE SFEORPT SFE   2/25/2020  1:30 PM Madelyn Prader, NP BSUG BSUG   3/5/2020  3:30 PM PST LAB SSA PST PST   3/12/2020 10:20 AM Soha Ponce, DO SSA PST PST   3/26/2020 11:00 AM Basil Bingham, DO SSA ENTG [place] ENT   5/28/2020  3:40 PM Jennifer Fuller MD BSND BSND

## 2020-01-28 ENCOUNTER — HOSPITAL ENCOUNTER (OUTPATIENT)
Dept: PHYSICAL THERAPY | Age: 70
Discharge: HOME | End: 2020-01-28
Attending: OTOLARYNGOLOGY
Payer: MEDICARE

## 2020-01-31 ENCOUNTER — HOSPITAL ENCOUNTER (OUTPATIENT)
Dept: PHYSICAL THERAPY | Age: 70
Discharge: HOME | End: 2020-01-31
Attending: OTOLARYNGOLOGY
Payer: MEDICARE

## 2020-01-31 NOTE — PROGRESS NOTES
Mine Ortiz  : 1950  [place]  Secondary: [address] [facility] [place] [address] [facility], [address] [place] [address],[address], [address] [facility]  [phone]   [phone]     OUTPATIENT DAILY NOTE    NAME/AGE/GENDER: Mine Ortiz is a 68 y.o. female. DATE: 2020    Ms. Conway Fus for today's appointment due to illness.     Cena Kawasaki, PT

## 2020-02-25 PROBLEM — R31.9 HEMATURIA: Status: ACTIVE | Noted: 2020-02-25

## 2020-02-27 NOTE — THERAPY DISCHARGE
Rickey Vasquez  : 1950  [place]  Secondary: [address] [facility] at [facility]  [address] [facility], [address] [place] [address],[address], [facility] [place] [address].  [phone]   [phone]          OUTPATIENT PHYSICAL THERAPY:Discontinuation Summary    ICD-10: Treatment Diagnosis:    Other abnormalities of gait and mobility (R26.89)   Difficulty in walking, not elsewhere classified (R26.2)   Precautions/Allergies:   Keflex [cephalexin]; Ambien [zolpidem]; Aspirin; Codeine; Daypro [oxaprozin]; Demerol [meperidine]; Dilaudid [hydromorphone (bulk)]; Mobic [meloxicam]; Onion; and Pcn [penicillins]   TREATMENT PLAN:  Effective Dates: 2020 TO 4/15/2020 (90 days). Frequency/Duration: 2 times a week for 90 Day(s) MEDICAL/REFERRING DIAGNOSIS:  Dizziness and giddiness [R42]   DATE OF ONSET: Chronic  REFERRING PHYSICIAN: Laith Murray DO MD Orders: Evaluate and Treat  Return MD Appointment: TBD     ASSESSMENT:  Ms. Harjinder Tijerina presented with decreased mobility, decreased strength, and decreased balance secondary to degenerative changes in right hip as well as CVA affecting right side of her body. Patient attended a total of 2 scheduled physical therapy visits including initial evaluation on 2020. Treatment consisted of strengthening and balance to improve overall mobility and performance with activities of daily living. Patient did not attend any additional physical therapy visits secondary to unknown reasons. Patient's therapy will be discontinued at this time. We will be happy to re-assess her with a change in her status and a new order from her doctor. Thank you for the opportunity to serve this patient. GOALS: (Goals have been discussed and agreed upon with patient.)  Short-Term Functional Goals: Time Frame: 30 days  1. Patient will be independent with home exercise program without exacerbation of symptoms or cueing needed.   Discharge Goals: Time Frame: 90 days  1. Patient will be independent with all ADLs with minimal fear of falling and loss of balance with daily tasks. 2. Patient will report no fear avoidance with social or recreational activities due to fear of falling. 3. Patient will score greater than or equal to 45/56 on Parish Balance Scale with minimal effect of imbalance or weakness on patient's ability to manage every day life activities. 4. Patient will score greater than or equal to 19/24 on Dynamic Gait Index with minimal effect of imbalance or weakness on patient's ability to manage every day life activities. OUTCOME MEASURE:   Tool Used: Parish Balance Scale  Score:  Initial: 34/56 Most Recent: X/56 (Date: -- )   Interpretation of Score: Each section is scored on a 0-4 scale, 0 representing the patients inability to perform the task and 4 representing independence. The scores of each section are added together for a total score of 56. The higher the patients score, the more independent the patient is. Any score below 45 indicates increased risk for falls. Tool Used: Dynamic Gait Index  Score:  Initial: 13/24 Most Recent: X/24 (Date: -- )   Interpretation of Score: Each section is scored on a 0-3 scale, 0 representing the patients inability to perform the task and 3 representing independence. The scores of each section are added together for a total score of 24. Any score below 19 indicates increased risk for falls.      EXAMINATION:   Observation/Orthostatic Postural Assessment:          Posture Assessment: Rounded shoulders, Forward head   Functional Mobility:         Gait/Mobility:    Base of Support: Center of gravity altered  Speed/Karen: Pace decreased (<100 feet/min)  Step Length: Left shortened, Right shortened  Swing Pattern: Left asymmetrical, Right asymmetrical  Stance: Left increased, Right increased  Gait Abnormalities: Antalgic  Ambulation - Level of Assistance: Modified independent  Assistive Device: Bertram Sultana straight  · Interventions: Verbal cues, Safety awareness training          Transfers:     Sit to Stand: Modified independent  Stand to Sit: Modified independent  Stand Pivot Transfers: Modified independent  Bed to Chair: Modified independent  Lateral Transfers: Modified independent         Bed Mobility:     Rolling: Independent  Supine to Sit: Independent  Sit to Supine: Independent  Scooting: Independent       Strength:          UE STRENGTH: 3+/5 shoulder flexion, 3+/5 shoulder abduction, 3+/5 shoulder extension, 3+/5 elbow flexion, 3+/5 elbow extension. LE STRENGTH:  3+/5 hip flexion, 3+/5 hip abduction, 3+/5 hip adduction, 3+/5 hip extension, 3+/5 knee extension, 3+/5 knee flexion, 3/5 ankle dorsiflexion, 3/5 ankle plantarflexion, 3/5 ankle inversion, 3/5 ankle eversion.   Sensation:         Intact  Postural Control & Balance:  · Parish Balance Scale:  34/56.   (A score less than 45/56 indicates high risk of falls)     · Dynamic Gait Index:  13/24.   (A score less than or equal to19/24 is abnormal and predictive of falls)

## 2020-04-15 PROBLEM — N39.3 STRESS INCONTINENCE: Status: RESOLVED | Noted: 2020-01-21 | Resolved: 2020-04-15

## 2020-04-15 PROBLEM — N34.2 URETHRITIS: Status: RESOLVED | Noted: 2019-05-06 | Resolved: 2020-04-15

## 2020-04-15 PROBLEM — N39.0 UTI (URINARY TRACT INFECTION): Chronic | Status: RESOLVED | Noted: 2019-11-25 | Resolved: 2020-04-15

## 2020-04-15 PROBLEM — R10.2 PELVIC PAIN: Status: RESOLVED | Noted: 2019-05-06 | Resolved: 2020-04-15

## 2020-05-31 NOTE — PROGRESS NOTES
[facility] at [address], [address] [place] [address],[address] [place], [address]  Phone: [phone]   Fax: [phone]    OUTPATIENT DAILY NOTE    NAME/AGE/GENDER: Kika Sellers is a 68 y.o. female. DATE: 1/28/2020    Patient cancelled (less than 24 hours ago) her appointment for today due to reasons not given. Will plan to follow up on next scheduled visit.     Alex Card, PT    Future Appointments   Date Time Provider Binh Callahan   1[facility]28/2020  1:00 [facility]   1/29/2020  1:45 PM Latricia Plata MD SSA UCDG UCD   1/31/2020  3:15 PM Michael Hanna, JAYDE SFEORPT SFE   2/25/2020  1:30 PM Apolinar Longoria, KIT BSUG BSUG   3/5/2020  3:30 PM PST LAB SSA PST PST   3/12/2020 10:20 AM Sriram Geiger, DO SSA PST PST   3/26/2020 11:00 AM Saba Maza, DO [facility]   5/28/2020  3:40 PM Raj Smith MD BSND BSND none

## 2020-09-02 ENCOUNTER — HOSPITAL ENCOUNTER (OUTPATIENT)
Dept: SURGERY | Age: 70
Discharge: HOME | End: 2020-09-02

## 2020-09-02 VITALS — HEIGHT: 66 IN | BODY MASS INDEX: 27.64 KG/M2 | WEIGHT: 172 LBS

## 2020-09-02 NOTE — H&P
History and Physical    Patient: Hiral Nicholas MRN: [id number]  SSN: [id number]    YOB: 1950  Age: 65 y.o. Sex: female        Chief Complaint:  Urinary Frequency and Urgency with Foreign body  History of Present Illness:  Hiral Nicholas is a 65 y.o. female with frequency and urgency. She has been struggling with symptoms of OAB. On a recent cystoscopy in the office a foreign body was seen. Erythema was seen throughout the bladder which was new findings from previous cystoscopy. Allergies:  Keflex [Cephalexin]  Ambien [Zolpidem]  Aspirin  Codeine  Daypro [Oxaprozin]  Demerol [Meperidine]  Dilaudid [Hydromorphone (Bulk)]  Mobic [Meloxicam]  Onion  Pcn [Penicillins]    Medications:  No current facility-administered medications for this encounter. Current Outpatient Medications:     HYDROcodone-acetaminophen (NORCO)  mg tablet, Take 1 Tab by mouth every six (6) hours as needed for Pain for up to 30 days. Max Daily Amount: 4 Tabs., Disp: 120 Tab, Rfl: 0    omeprazole (PRILOSEC) 40 mg capsule, Take 1 Cap by mouth daily. , Disp: 90 Cap, Rfl: 3    ALPRAZolam (XANAX) 1 mg tablet, TAKE 1 TABLET BY MOUTH EVERY DAY (Patient taking differently: Take 1 mg by mouth nightly as needed. TAKE 1 TABLET BY MOUTH EVERY DAY), Disp: 30 Tab, Rfl: 2    atorvastatin (LIPITOR) 40 mg tablet, Take 1 Tab by mouth nightly., Disp: 90 Tab, Rfl: 5    losartan (COZAAR) 100 mg tablet, Take 1 Tab by mouth daily. , Disp: 90 Tab, Rfl: 3    levothyroxine (SYNTHROID) 112 mcg tablet, Take 1 Tab by mouth Daily (before breakfast). , Disp: 90 Tab, Rfl: 3    clopidogreL (PLAVIX) 75 mg tab, Take 1 Tab by mouth daily. , Disp: 90 Tab, Rfl: 3    nebivoloL (Bystolic) 10 mg tablet, Take 1 Tab by mouth daily. , Disp: 90 Tab, Rfl: 3    sucralfate (CARAFATE) 1 gram tablet, Take 1 Tab by mouth four (4) times daily. , Disp: 120 Tab, Rfl: 1    potassium chloride (K-DUR, KLOR-CON) 20 mEq tablet, Take 1 Tab by mouth daily. , Disp: 90 Tab, Rfl: 3    venlafaxine-SR (EFFEXOR-XR) 150 mg capsule, TAKE 1 CAPSULE EVERY 12 HOURS FOR NERVES (Patient taking differently: Take 150 mg by mouth daily.), Disp: 180 Cap, Rfl: 3    lidocaine 5 % gel, 1 Dose by Apply Externally route four (4) times daily as needed. , Disp: 1 Tube, Rfl: 2    fluticasone (FLONASE) 50 mcg/actuation nasal spray, 2 Sprays by Both Nostrils route daily. (Patient taking differently: 2 Sprays by Both Nostrils route daily as needed.), Disp: 1 Bottle, Rfl: 0    MULTIVIT,CALC,MINS/FOLIC ACID (ONE-A-DAY PROACTIVE 65 PLUS PO), Take 1 Tab by mouth daily. Stopped 9/6/18  Indications: OTC, Disp: , Rfl:     aspirin delayed-release 81 mg tablet, Take 81 mg by mouth daily. Stopped 8/24/18--- per pt-- dr Sofia Bunch  Indications: OTC, Disp: , Rfl:     cyanocobalamin 1,000 mcg tablet, Take 1,000 mcg by mouth daily. Indications: OTC, Disp: , Rfl:       Past Medical History:  Past Medical History:   Diagnosis Date    Allergic rhinitis     Anxiety     Arthritis     fingers    Asthma     prn inhaler    Calculus of kidney x 2-- last over 30 yrs ago    Cancer [facility])     melanoma R breast    Chronic pain     feet    Depression     worsening    Dizziness 09/08/2016    not a recent problem    Dyslipidemia     Fatigue     Former cigarette smoker     GERD (gastroesophageal reflux disease)     controlled with med    Heart murmur 11/24/2015    due to MVP-- followed by dr Jason Li History of nuclear stress test 08/06/2020    HTN (hypertension)     controlled with med    Hx of echocardiogram 11/25/2019    LVEF 55-60%    Hypercholesterolemia     Hypothyroid     IBS (irritable bowel syndrome)     Migraines     Mitral valve insufficiency     rheumatic fever as a child --- followed by dr Porsche Muse--- last echo 2017    Nausea & vomiting     post-op    Peripheral neuropathy     bilat feet    Rheumatic fever     as a child    Seizures ([address]     febrile seiz.  as a child , no problems after that.    Stroke [facility])     Unspecified adverse effect of anesthesia     elevated temp after colectomy only per pt       Past Surgical History:  Past Surgical History:   Procedure Laterality Date    HX APPENDECTOMY      HX BLADDER SUSPENSION      HX BREAST LUMPECTOMY Right     melanoma breast - with lymph node biopsies.  HX COLECTOMY      for obstruction - about 14 inches removed    HX COLONOSCOPY      HX HYSTERECTOMY      HX KNEE ARTHROSCOPY Left     HX KNEE ARTHROSCOPY Right 10/16/09; 2015    HX KNEE REPLACEMENT Right 3/2015    HX KNEE REPLACEMENT Left 2016    HX LAP CHOLECYSTECTOMY      HX ORTHOPAEDIC      C- 7 ACDF    HX OTHER SURGICAL      urethra repair    HX OTHER SURGICAL      melanoma resected from right chest wall    HX TONSIL AND ADENOIDECTOMY         Family History:  Family History   Problem Relation Age of Onset   Andres.Julieta Other Mother    Andres.Julieta Delayed Awakening Mother     Thyroid Disease Mother     Diabetes Mother     Cancer Mother         breast cancer    Breast Problems Mother     Heart Disease Father     Hypertension Father     Heart Failure Father         CHF    Delayed Awakening Sister     Thyroid Disease Sister     Breast Problems Sister     Cancer Other         breast    Diabetes Other         type II    High Cholesterol Other     Elevated Lipids Other     Hypertension Other     Thyroid Disease Other         hypo, acquired    Breast Problems Maternal Aunt          Social History:  Social History     Tobacco Use   Smoking Status Former Smoker    Packs/day: 0.25    Years: 4.00    Pack years: 1.00    Last attempt to quit: 2002    Years since quittin.0   Smokeless Tobacco Never Used     Social History     Substance and Sexual Activity   Alcohol Use No     Social History     Substance and Sexual Activity   Drug Use No           Review of Systems:  Review of Systems:    Constitutional: Negative for fatigue and fever. Skin: Negative for rash and itching. HENT: Negative for hearing loss and vertigo. Respiratory: Negative for cough and shortness of breath. Cardiovascular:  Negative for chest pain, leg swelling and palpitations. Gastrointestinal: Negative for abdominal pain, anal bleeding, constipation, diarrhea and hemorrhoids. Genitourinary: Positive for difficulty urinating, dysuria, pelvic pressure and vaginal burning. Negative for dyspareunia, enuresis, frequency, genital sores, hematuria, menstrual problem, pelvic pain, urgency, decreased urine volume, vaginal bleeding, vaginal discharge, vaginal pain, vaginal itching, vaginal odor and vaginal lesion. Neurological: Negative for dizziness, blackouts and headaches. Psychiatric/Behavioral: Negative for behavioral problem, nervous/anxious and insomnia. Endocrine: Negative for polydipsia and polyuria. Female Endocrine: Negative for vaginal dryness, pain with intercourse and decreased libido. Physical Exam:  Physical Exam  Constitutional:       Appearance: Normal appearance. HENT:      Head: Normocephalic. Eyes:      Extraocular Movements: Extraocular movements intact. Pupils: Pupils are equal, round, and reactive to light. Neck:      Musculoskeletal: Normal range of motion and neck supple. Abdominal:      General: Abdomen is flat. Palpations: Abdomen is soft. Comments: Bowel resection  laparotomy   Musculoskeletal: Normal range of motion. Skin:     General: Skin is warm and dry. Neurological:      General: No focal deficit present. Mental Status: She is alert and oriented to person, place, and time.    Psychiatric:         Mood and Affect: Mood normal.         Behavior: Behavior normal.            Female Genitourinary   Vulva:                          Normal. No lesions  Bartholin's Gland:        Bilateral , Normal, nontender  Skenes Gland:            Bilateral, Normal, nontender   Clitoris:                        Normal.   Introitus: Normal.   Urethral Meatus:         Normal appearing, normal size, no lesions, no prolapse  Urethra:           No masses, no tenderness  Vagina:            vaginal atrophy, no discharge, no lesions  Cervix:             surgically absent  Uterus:            surgically absent  Adnexa:           No masses palpated, no tenderness  Bladder:           No tenderness, no masses palpated  Perineum:        Normal, no lesions    Rectal   Anorectal Exam: No hemorrhoids and no masses or lesions of the perineum           POP-Q: (Pelvic Organ Prolapse - Quantification Exam):  No POP     ICS Stage                     Anterior:  Stage 0   Posterior:  Stage 0   Apical:  Stage 0            Pelvic floor muscles: Tender Spasm     Contraction     R. Puborectalis: NO 0 /5   4 /5   L. Puborectalis: NO 0 /5   4 /5   R. Pubococcyg NO 0 /5   4 /5   L. Pubococcyg NO 0 /5   4 /5   R. Ileococcyg: NO 0 /5   4 /5   L. Ileococcyg: NO 0 /5   4 /5   R. Obturator Int: NO 0 /5   4 /5   L. Obturator Int: NO 0 /5   4 /5   R. Coccygeus: NO 0 /5   4 /5   L. Coccygeus: NO 0 /5   4 /5            Supine Stress Test of CACHORRO:  Negative      Neurological Exam:   Sensorineural Exam:               Bulvocavernosus reflex:  Normal               Anal Arvin:  Normal       Assessment and Plan:   Abnormal findings on cystoscopy. I consented Ms. Harley Peña for cystoscopy with bladder wall biopsies. The steps of the procedure were discussed along with the risks and benefits of the procedure. The plan for post-op was also discussed. This was then discussed again with her . This procedure has been fully reviewed with the patient and written informed consent has been obtained.     She is consented for Cystoscopy with bladder wall biopsy. The patient was counseled at length about the risks of vicky Covid-19 during their perioperative period and any recovery window from their procedure.   The patient was made aware that vicky Covid-19  may worsen their prognosis for recovering from their procedure and lend to a higher morbidity and/or mortality risk. All material risks, benefits, and reasonable alternatives including postponing the procedure were discussed. The patient does  wish to proceed with the procedure at this time.             Signed By: Hien Adams DO     September 2, 2020

## 2020-09-02 NOTE — PERIOP NOTES
Patient verified name and . Order for consent not found in EHR. Type 1b surgery, PAT phone assessment complete. Orders not received. Labs per surgeon: None  Labs per anesthesia protocol: None    Patient states had stroke 2019. Had NM stress test 2020, EKG 2019 and ECHO 2019 with LVEF 55-60% and all found in Chart review for anesthesia reference. Patient answered medical/surgical history questions at their best of ability. All prior to admission medications documented in [facility]. Patient instructed to take the following medications the day of surgery according to anesthesia guidelines with a small sip of water:ASA, Synthroid, Nebivolol, Omeprazole, Carafate and Effexor. Hold all vitamins 7 days prior to surgery and NSAIDS 5 days prior to surgery. Prescription meds to hold:Plavix per surgeon instructions    Patient instructed on the following:    > a negative Covid swab result is required to proceed with surgery;  appointments are made by the surgeon office and test should be collected 7 days prior to surgery. The testing center is located at the [address] [place] [address].   > 1 visitor allowed at this time. >Arrive at [facility], time of arrival to be called the day before by 1700  >NPO after midnight including gum, mints, and ice chips  >Responsible adult must drive patient to the hospital, stay during surgery, and patient will need supervision 24 hours after anesthesia  >Use antibacterial soap in shower the night before surgery and on the morning of surgery  >All piercings must be removed prior to arrival.    >Leave all valuables (money and jewelry) at home but bring insurance card and ID on       DOS. >Do not wear make-up, nail polish, lotions, cologne, perfumes, powders, or oil on skin.

## 2020-09-07 ENCOUNTER — ANESTHESIA EVENT (OUTPATIENT)
Dept: SURGERY | Age: 70
End: 2020-09-07
Payer: MEDICARE

## 2020-09-08 ENCOUNTER — HOSPITAL ENCOUNTER (OUTPATIENT)
Age: 70
Setting detail: OUTPATIENT SURGERY
Discharge: HOME | End: 2020-09-08
Attending: OBSTETRICS & GYNECOLOGY | Admitting: OBSTETRICS & GYNECOLOGY
Payer: MEDICARE

## 2020-09-08 ENCOUNTER — ANESTHESIA (OUTPATIENT)
Dept: SURGERY | Age: 70
End: 2020-09-08
Payer: MEDICARE

## 2020-09-08 VITALS
OXYGEN SATURATION: 96 % | BODY MASS INDEX: 28.06 KG/M2 | TEMPERATURE: 98 F | DIASTOLIC BLOOD PRESSURE: 71 MMHG | RESPIRATION RATE: 16 BRPM | HEIGHT: 66 IN | HEART RATE: 72 BPM | SYSTOLIC BLOOD PRESSURE: 154 MMHG | WEIGHT: 174.6 LBS

## 2020-09-08 PROCEDURE — 74011000250 HC RX REV CODE- 250: Performed by: NURSE ANESTHETIST, CERTIFIED REGISTERED

## 2020-09-08 PROCEDURE — 76010000138 HC OR TIME 0.5 TO 1 HR: Performed by: OBSTETRICS & GYNECOLOGY

## 2020-09-08 PROCEDURE — 77030040922 HC BLNKT HYPOTHRM STRY -A: Performed by: ANESTHESIOLOGY

## 2020-09-08 PROCEDURE — 76210000017 HC OR PH I REC 1.5 TO 2 HR: Performed by: OBSTETRICS & GYNECOLOGY

## 2020-09-08 PROCEDURE — 74011250636 HC RX REV CODE- 250/636: Performed by: ANESTHESIOLOGY

## 2020-09-08 PROCEDURE — 74011250636 HC RX REV CODE- 250/636: Performed by: OBSTETRICS & GYNECOLOGY

## 2020-09-08 PROCEDURE — 76060000032 HC ANESTHESIA 0.5 TO 1 HR: Performed by: OBSTETRICS & GYNECOLOGY

## 2020-09-08 PROCEDURE — 74011000258 HC RX REV CODE- 258: Performed by: OBSTETRICS & GYNECOLOGY

## 2020-09-08 PROCEDURE — 74011250637 HC RX REV CODE- 250/637: Performed by: ANESTHESIOLOGY

## 2020-09-08 PROCEDURE — 74011250636 HC RX REV CODE- 250/636: Performed by: NURSE ANESTHETIST, CERTIFIED REGISTERED

## 2020-09-08 PROCEDURE — 77030040361 HC SLV COMPR DVT MDII -B: Performed by: OBSTETRICS & GYNECOLOGY

## 2020-09-08 PROCEDURE — 77030019927 HC TBNG IRR CYSTO BAXT -A: Performed by: OBSTETRICS & GYNECOLOGY

## 2020-09-08 PROCEDURE — 88305 TISSUE EXAM BY PATHOLOGIST: CPT

## 2020-09-08 PROCEDURE — 76210000026 HC REC RM PH II 1 TO 1.5 HR: Performed by: OBSTETRICS & GYNECOLOGY

## 2020-09-08 PROCEDURE — 77030010509 HC AIRWY LMA MSK TELE -A: Performed by: ANESTHESIOLOGY

## 2020-09-08 RX ORDER — MIDAZOLAM HYDROCHLORIDE 1 MG/ML
2 INJECTION, SOLUTION INTRAMUSCULAR; INTRAVENOUS ONCE
Status: DISCONTINUED | OUTPATIENT
Start: 2020-09-08 | End: 2020-09-08 | Stop reason: HOSPADM

## 2020-09-08 RX ORDER — FENTANYL CITRATE 50 UG/ML
INJECTION, SOLUTION INTRAMUSCULAR; INTRAVENOUS AS NEEDED
Status: DISCONTINUED | OUTPATIENT
Start: 2020-09-08 | End: 2020-09-08 | Stop reason: HOSPADM

## 2020-09-08 RX ORDER — SODIUM CHLORIDE, SODIUM LACTATE, POTASSIUM CHLORIDE, CALCIUM CHLORIDE 600; 310; 30; 20 MG/100ML; MG/100ML; MG/100ML; MG/100ML
75 INJECTION, SOLUTION INTRAVENOUS CONTINUOUS
Status: DISCONTINUED | OUTPATIENT
Start: 2020-09-08 | End: 2020-09-08 | Stop reason: HOSPADM

## 2020-09-08 RX ORDER — FENTANYL CITRATE 50 UG/ML
50 INJECTION, SOLUTION INTRAMUSCULAR; INTRAVENOUS
Status: COMPLETED | OUTPATIENT
Start: 2020-09-08 | End: 2020-09-08

## 2020-09-08 RX ORDER — LIDOCAINE HYDROCHLORIDE 10 MG/ML
0.1 INJECTION INFILTRATION; PERINEURAL AS NEEDED
Status: DISCONTINUED | OUTPATIENT
Start: 2020-09-08 | End: 2020-09-08 | Stop reason: HOSPADM

## 2020-09-08 RX ORDER — ONDANSETRON 2 MG/ML
INJECTION INTRAMUSCULAR; INTRAVENOUS AS NEEDED
Status: DISCONTINUED | OUTPATIENT
Start: 2020-09-08 | End: 2020-09-08 | Stop reason: HOSPADM

## 2020-09-08 RX ORDER — CLINDAMYCIN PHOSPHATE 900 MG/50ML
900 INJECTION INTRAVENOUS ONCE
Status: COMPLETED | OUTPATIENT
Start: 2020-09-08 | End: 2020-09-08

## 2020-09-08 RX ORDER — FAMOTIDINE 20 MG/1
20 TABLET, FILM COATED ORAL ONCE
Status: COMPLETED | OUTPATIENT
Start: 2020-09-08 | End: 2020-09-08

## 2020-09-08 RX ORDER — PROPOFOL 10 MG/ML
INJECTION, EMULSION INTRAVENOUS AS NEEDED
Status: DISCONTINUED | OUTPATIENT
Start: 2020-09-08 | End: 2020-09-08 | Stop reason: HOSPADM

## 2020-09-08 RX ORDER — DEXAMETHASONE SODIUM PHOSPHATE 4 MG/ML
INJECTION, SOLUTION INTRA-ARTICULAR; INTRALESIONAL; INTRAMUSCULAR; INTRAVENOUS; SOFT TISSUE AS NEEDED
Status: DISCONTINUED | OUTPATIENT
Start: 2020-09-08 | End: 2020-09-08 | Stop reason: HOSPADM

## 2020-09-08 RX ORDER — LIDOCAINE HYDROCHLORIDE 20 MG/ML
INJECTION, SOLUTION EPIDURAL; INFILTRATION; INTRACAUDAL; PERINEURAL AS NEEDED
Status: DISCONTINUED | OUTPATIENT
Start: 2020-09-08 | End: 2020-09-08 | Stop reason: HOSPADM

## 2020-09-08 RX ADMIN — DEXAMETHASONE SODIUM PHOSPHATE 4 MG: 4 INJECTION, SOLUTION INTRAMUSCULAR; INTRAVENOUS at 11:22

## 2020-09-08 RX ADMIN — CLINDAMYCIN PHOSPHATE 900 MG: 900 INJECTION, SOLUTION INTRAVENOUS at 11:06

## 2020-09-08 RX ADMIN — ONDANSETRON 4 MG: 2 INJECTION INTRAMUSCULAR; INTRAVENOUS at 11:22

## 2020-09-08 RX ADMIN — GENTAMICIN SULFATE 390 MG: 40 INJECTION, SOLUTION INTRAMUSCULAR; INTRAVENOUS at 10:23

## 2020-09-08 RX ADMIN — FENTANYL CITRATE 25 MCG: 50 INJECTION INTRAMUSCULAR; INTRAVENOUS at 11:30

## 2020-09-08 RX ADMIN — FENTANYL CITRATE 25 MCG: 50 INJECTION INTRAMUSCULAR; INTRAVENOUS at 11:23

## 2020-09-08 RX ADMIN — FENTANYL CITRATE 50 MCG: 50 INJECTION INTRAMUSCULAR; INTRAVENOUS at 12:45

## 2020-09-08 RX ADMIN — PROPOFOL 200 MG: 10 INJECTION, EMULSION INTRAVENOUS at 11:15

## 2020-09-08 RX ADMIN — FENTANYL CITRATE 25 MCG: 50 INJECTION INTRAMUSCULAR; INTRAVENOUS at 11:40

## 2020-09-08 RX ADMIN — SODIUM CHLORIDE, SODIUM LACTATE, POTASSIUM CHLORIDE, AND CALCIUM CHLORIDE 75 ML/HR: 600; 310; 30; 20 INJECTION, SOLUTION INTRAVENOUS at 10:20

## 2020-09-08 RX ADMIN — LIDOCAINE HYDROCHLORIDE 100 MG: 20 INJECTION, SOLUTION EPIDURAL; INFILTRATION; INTRACAUDAL; PERINEURAL at 11:15

## 2020-09-08 RX ADMIN — SODIUM CHLORIDE, SODIUM LACTATE, POTASSIUM CHLORIDE, AND CALCIUM CHLORIDE 75 ML/HR: 600; 310; 30; 20 INJECTION, SOLUTION INTRAVENOUS at 12:46

## 2020-09-08 RX ADMIN — FAMOTIDINE 20 MG: 20 TABLET, FILM COATED ORAL at 10:17

## 2020-09-08 RX ADMIN — FENTANYL CITRATE 25 MCG: 50 INJECTION INTRAMUSCULAR; INTRAVENOUS at 11:46

## 2020-09-08 NOTE — ANESTHESIA POSTPROCEDURE EVALUATION
Procedure(s):  CYSTOSCOPY WITH BLADDER BIOPSIES. general    Anesthesia Post Evaluation      Multimodal analgesia: multimodal analgesia used between 6 hours prior to anesthesia start to PACU discharge  Patient location during evaluation: PACU  Patient participation: complete - patient participated  Level of consciousness: awake  Pain management: adequate  Airway patency: patent  Anesthetic complications: no  Cardiovascular status: acceptable and hemodynamically stable  Respiratory status: acceptable  Hydration status: acceptable  Comments: Acceptable for discharge from PACU.   Post anesthesia nausea and vomiting:  none  Final Post Anesthesia Temperature Assessment:  Normothermia (36.0-37.5 degrees C)      INITIAL Post-op Vital signs:   Vitals Value Taken Time   /76 9/8/2020  1:25 PM   Temp 36.7 °C (98.1 °F) 9/8/2020 12:02 PM   Pulse 71 9/8/2020  1:25 PM   Resp 16 9/8/2020  1:25 PM   SpO2 93 % 9/8/2020  1:25 PM

## 2020-09-08 NOTE — PERIOP NOTES
Pt showing 215ml urine on bladder scanner. Pt reports, I don't need to go to the bathroom right now.

## 2020-09-08 NOTE — ANESTHESIA PREPROCEDURE EVALUATION
Relevant Problems   No relevant active problems       Anesthetic History               Review of Systems / Medical History      Pulmonary                   Neuro/Psych       CVA       Cardiovascular    Hypertension: well controlled                   GI/Hepatic/Renal     GERD: well controlled           Endo/Other      Hypothyroidism: well controlled  Arthritis     Other Findings              Physical Exam    Airway  Mallampati: II  TM Distance: > 6 cm  Neck ROM: normal range of motion   Mouth opening: Normal     Cardiovascular    Rhythm: regular  Rate: normal         Dental    Dentition: Full upper dentures     Pulmonary  Breath sounds clear to auscultation               Abdominal         Other Findings            Anesthetic Plan    ASA: 3  Anesthesia type: general            Anesthetic plan and risks discussed with: Patient

## 2020-09-08 NOTE — OP NOTES
PROCEDURES PERFORMED:  1. Cystourethroscopy  2. Bladder Wall Biopsies x4  3. Fulguration of Lesions    PREOPERATIVE DIAGNOSES:  1. Frequency of urination  2. Urgency with urination  3. Refractory OAB  4. Foreign body in bladder    POSTOPERATIVE DIAGNOSES:  1. Frequency of urination  2. Urgency with urination  3. Refractory OAB  4. Foreign body in bladder    Grey Jain MD    EBL: <5cc    Urine Output: N/A    Specimen: Bladder Wall biopsy x4    INTRAOPERATIVE FINDINGS: Intraoperative cystourethroscopy revealed normal bladder and urethral mucosa without evidence of laceration, foreign body, neoplasm, or stone. Both ureters were effluxing urine at the conclusion of the case. INDICATIONS FOR PROCEDURE: This is a 65year-old female with a history of worsening symptomatic urinary urgency, urinary frequency and foreign body found in the bladder. She was extensively counseled on the risks, benefits, indications and alternatives of the procedure. Risks reviewed include bleeding, infection, damage to the bladder, ureters, urethra, bowel, blood vessels, nerves, postoperative urinary retention, postoperative incontinence, pelvic pain, dyspareunia, anesthetic complications and death. The patient expressed understanding and informed consent was obtained. DESCRIPTION OF PROCEDURE: On the day of surgery, the patient was identified in the preop waiting area. Consents were again reviewed. The patient was then taken to the operating room where she was placed in dorsal lithotomy position using the Yellofin stirrups in neurologically safe position. Antithrombotic boots were activated. Anesthesia was induced without difficulty. She was prepped and draped in usual sterile fashion. Timeout was taken to review the patient's procedure and confirm she received appropriate antibiotics. The bladder was drained with a Schwab catheter. The cystoscope was then used to performed. A complete survey was performed.     Bladder wall was biopsied. One biopsy in each quadrant. The areas were then fulgurated using the Bugbee. There was complete hemostasis at the end of the case. The scope was then removed. Sponge, lap, needle and instrument counts were correct. The patient was repositioned in supine position and anesthesia was reversed without difficulty. The patient tolerated procedure well and was taken to recovery in stable condition.

## 2020-09-09 NOTE — PROGRESS NOTES
Please let Ms. Red Horowitz know that her bladder wall biopsies are negative for infection. If she would like to proceed with botox injections we can schedule her for botox in 4 weeks.

## 2020-11-05 ENCOUNTER — HOSPITAL ENCOUNTER (EMERGENCY)
Age: 70
Discharge: HOME | End: 2020-11-05
Attending: EMERGENCY MEDICINE
Payer: MEDICARE

## 2020-11-05 VITALS
WEIGHT: 172 LBS | HEART RATE: 78 BPM | DIASTOLIC BLOOD PRESSURE: 64 MMHG | OXYGEN SATURATION: 98 % | RESPIRATION RATE: 18 BRPM | HEIGHT: 66 IN | SYSTOLIC BLOOD PRESSURE: 159 MMHG | BODY MASS INDEX: 27.64 KG/M2 | TEMPERATURE: 97.9 F

## 2020-11-05 DIAGNOSIS — R19.7 DIARRHEA, UNSPECIFIED TYPE: Primary | ICD-10-CM

## 2020-11-05 LAB
ALBUMIN SERPL-MCNC: 4.6 G/DL (ref 3.2–4.6)
ALBUMIN/GLOB SERPL: 1.3 {RATIO} (ref 1.2–3.5)
ALP SERPL-CCNC: 85 U/L (ref 50–136)
ALT SERPL-CCNC: 20 U/L (ref 12–65)
ANION GAP SERPL CALC-SCNC: 5 MMOL/L (ref 7–16)
AST SERPL-CCNC: 10 U/L (ref 15–37)
BASOPHILS # BLD: 0.1 K/UL (ref 0–0.2)
BASOPHILS NFR BLD: 1 % (ref 0–2)
BILIRUB SERPL-MCNC: 0.9 MG/DL (ref 0.2–1.1)
BUN SERPL-MCNC: 9 MG/DL (ref 8–23)
CALCIUM SERPL-MCNC: 10 MG/DL (ref 8.3–10.4)
CHLORIDE SERPL-SCNC: 110 MMOL/L (ref 98–107)
CO2 SERPL-SCNC: 26 MMOL/L (ref 21–32)
CREAT SERPL-MCNC: 0.99 MG/DL (ref 0.6–1)
DIFFERENTIAL METHOD BLD: NORMAL
EOSINOPHIL # BLD: 0.1 K/UL (ref 0–0.8)
EOSINOPHIL NFR BLD: 1 % (ref 0.5–7.8)
ERYTHROCYTE [DISTWIDTH] IN BLOOD BY AUTOMATED COUNT: 12.6 % (ref 11.9–14.6)
GLOBULIN SER CALC-MCNC: 3.6 G/DL (ref 2.3–3.5)
GLUCOSE SERPL-MCNC: 121 MG/DL (ref 65–100)
HCT VFR BLD AUTO: 43.6 % (ref 35.8–46.3)
HGB BLD-MCNC: 14.4 G/DL (ref 11.7–15.4)
IMM GRANULOCYTES # BLD AUTO: 0.1 K/UL (ref 0–0.5)
IMM GRANULOCYTES NFR BLD AUTO: 1 % (ref 0–5)
LYMPHOCYTES # BLD: 1.8 K/UL (ref 0.5–4.6)
LYMPHOCYTES NFR BLD: 19 % (ref 13–44)
MCH RBC QN AUTO: 31.5 PG (ref 26.1–32.9)
MCHC RBC AUTO-ENTMCNC: 33 G/DL (ref 31.4–35)
MCV RBC AUTO: 95.4 FL (ref 79.6–97.8)
MONOCYTES # BLD: 0.4 K/UL (ref 0.1–1.3)
MONOCYTES NFR BLD: 5 % (ref 4–12)
NEUTS SEG # BLD: 6.9 K/UL (ref 1.7–8.2)
NEUTS SEG NFR BLD: 74 % (ref 43–78)
NRBC # BLD: 0 K/UL (ref 0–0.2)
PLATELET # BLD AUTO: 262 K/UL (ref 150–450)
PMV BLD AUTO: 9.4 FL (ref 9.4–12.3)
POTASSIUM SERPL-SCNC: 4.6 MMOL/L (ref 3.5–5.1)
PROT SERPL-MCNC: 8.2 G/DL (ref 6.3–8.2)
RBC # BLD AUTO: 4.57 M/UL (ref 4.05–5.2)
SODIUM SERPL-SCNC: 141 MMOL/L (ref 138–145)
WBC # BLD AUTO: 9.2 K/UL (ref 4.3–11.1)

## 2020-11-05 PROCEDURE — 80053 COMPREHEN METABOLIC PANEL: CPT

## 2020-11-05 PROCEDURE — 81003 URINALYSIS AUTO W/O SCOPE: CPT

## 2020-11-05 PROCEDURE — 74011250636 HC RX REV CODE- 250/636: Performed by: EMERGENCY MEDICINE

## 2020-11-05 PROCEDURE — 99283 EMERGENCY DEPT VISIT LOW MDM: CPT

## 2020-11-05 PROCEDURE — 85025 COMPLETE CBC W/AUTO DIFF WBC: CPT

## 2020-11-05 RX ORDER — ONDANSETRON 2 MG/ML
4 INJECTION INTRAMUSCULAR; INTRAVENOUS
Status: DISCONTINUED | OUTPATIENT
Start: 2020-11-05 | End: 2020-11-05 | Stop reason: HOSPADM

## 2020-11-05 RX ORDER — ONDANSETRON 4 MG/1
4 TABLET, ORALLY DISINTEGRATING ORAL
Qty: 20 TAB | Refills: 0 | Status: SHIPPED | OUTPATIENT
Start: 2020-11-05 | End: 2021-04-09

## 2020-11-05 RX ADMIN — SODIUM CHLORIDE 1000 ML: 900 INJECTION, SOLUTION INTRAVENOUS at 19:06

## 2020-11-05 NOTE — ED TRIAGE NOTES
Patient arrives ambulatory to triage with mask in place. Patient reports recently being diagnosed with UTI. Was given IM shot at urgent care and was prescribed bactrim. Patient reports diarrhea after taking 2 days of bactrim. Also took prescription of bactrim approx 2 weeks prior. Patient concerned for c.diff. Reports extreme fatigue.

## 2020-11-05 NOTE — ED PROVIDER NOTES
Patient recently diagnosed with UTI. Was given a shot in her hip and took Bactrim at home. A couple of days later developed some diarrhea which has continued to today. Having 6-8 episodes a day. Soft but frequent in consistency. Very foul odor. And watery. Some fatigue and weakness with concerns for dehydration. The history is provided by the patient and the spouse. No  was used. Diarrhea    This is a new problem. The current episode started more than 2 days ago. The problem occurs constantly. The problem has been gradually worsening. The pain is associated with an unknown factor. The patient is experiencing no pain. Associated symptoms include diarrhea and nausea. Pertinent negatives include no fever, no melena, no vomiting, no constipation, no dysuria, no hematuria, no headaches, no chest pain and no back pain. Nothing worsens the pain. The pain is relieved by nothing. Her past medical history is significant for irritable bowel syndrome. The patient's surgical history includes appendectomy, cholecystectomy, hysterectomy and colectomy.        Past Medical History:   Diagnosis Date    Allergic rhinitis     Anxiety     Arthritis     fingers    Asthma     prn inhaler    Calculus of kidney x 2-- last over 30 yrs ago    Cancer [facility])     melanoma R breast    Chronic pain     feet    Depression     worsening    Dizziness 09/08/2016    not a recent problem    Dyslipidemia     Fatigue     Former cigarette smoker     GERD (gastroesophageal reflux disease)     controlled with med    Heart murmur 11/24/2015    due to MVP-- followed by dr Jorden Hamlin History of nuclear stress test 08/06/2020    HTN (hypertension)     controlled with med    Hx of echocardiogram 11/25/2019    LVEF 55-60%    Hypercholesterolemia     Hypothyroid     IBS (irritable bowel syndrome)     Migraines     Mitral valve insufficiency     rheumatic fever as a child --- followed by dr John Lauren--- last echo 2017    Nausea & vomiting     post-op    Peripheral neuropathy     bilat feet    Rheumatic fever     as a child    Seizures ([facility] [address]     febrile seiz. as a child , no problems after that.  Stroke ([facility] [address]     Unspecified adverse effect of anesthesia     elevated temp after colectomy only per pt       Past Surgical History:   Procedure Laterality Date    HX APPENDECTOMY      HX BLADDER SUSPENSION      HX BREAST LUMPECTOMY Right 1996    melanoma breast - with lymph node biopsies.      HX COLECTOMY      for obstruction - about 14 inches removed    HX COLONOSCOPY      HX HYSTERECTOMY      HX KNEE ARTHROSCOPY Left     HX KNEE ARTHROSCOPY Right 10/16/09; 03/2015    HX KNEE REPLACEMENT Right 3/2015    HX KNEE REPLACEMENT Left 2016    HX LAP CHOLECYSTECTOMY      HX ORTHOPAEDIC      C- 7 ACDF    HX OTHER SURGICAL      urethra repair    HX OTHER SURGICAL      melanoma resected from right chest wall    HX TONSIL AND ADENOIDECTOMY           Family History:   Problem Relation Age of Onset   [employer] Other Mother    [employer] Delayed Awakening Mother     Thyroid Disease Mother     Diabetes Mother     Cancer Mother         breast cancer    Breast Problems Mother     Heart Disease Father     Hypertension Father     Heart Failure Father         CHF    Delayed Awakening Sister     Thyroid Disease Sister     Breast Problems Sister     Cancer Other         breast    Diabetes Other         type II    High Cholesterol Other     Elevated Lipids Other     Hypertension Other     Thyroid Disease Other         hypo, acquired    Breast Problems Maternal Aunt        Social History     Socioeconomic History    Marital status:      Spouse name: Not on file    Number of children: Not on file    Years of education: Not on file    Highest education level: Not on file   Occupational History    Not on file   Social Needs    Financial resource strain: Not on file    Food insecurity     Worry: Not on file     Inability: Not on file    Transportation needs     Medical: Not on file     Non-medical: Not on file   Tobacco Use    Smoking status: Former Smoker     Packs/day: 0.25     Years: 4.00     Pack years: 1.00     Last attempt to quit: 2002     Years since quittin.2    Smokeless tobacco: Never Used   Substance and Sexual Activity    Alcohol use: No    Drug use: No    Sexual activity: Not Currently   Lifestyle    Physical activity     Days per week: Not on file     Minutes per session: Not on file    Stress: Not on file   Relationships    Social connections     Talks on phone: Not on file     Gets together: Not on file     Attends Sabianist service: Not on file     Active member of club or organization: Not on file     Attends meetings of clubs or organizations: Not on file     Relationship status: Not on file    Intimate partner violence     Fear of current or ex partner: Not on file     Emotionally abused: Not on file     Physically abused: Not on file     Forced sexual activity: Not on file   Other Topics Concern    Not on file   Social History Narrative    Not on file         ALLERGIES: Keflex [cephalexin]; Ambien [zolpidem]; Aspirin; Codeine; Daypro [oxaprozin]; Demerol [meperidine]; Dilaudid [hydromorphone (bulk)]; Mobic [meloxicam]; Onion; and Pcn [penicillins]    Review of Systems   Constitutional: Positive for fatigue. Negative for chills and fever. HENT: Negative for rhinorrhea and sore throat. Eyes: Negative for pain and redness. Respiratory: Negative for chest tightness, shortness of breath and wheezing. Cardiovascular: Negative for chest pain and leg swelling. Gastrointestinal: Positive for diarrhea and nausea. Negative for abdominal pain, constipation, melena and vomiting. Genitourinary: Negative for dysuria and hematuria. Musculoskeletal: Negative for back pain, gait problem, neck pain and neck stiffness. Skin: Negative for color change and rash.    Neurological: Positive for weakness. Negative for numbness and headaches. Vitals:    11/05/20 1658   BP: (!) 127/91   Pulse: (!) 101   Resp: 18   Temp: 98.1 °F (36.7 °C)   SpO2: 96%   Weight: 78 kg (172 lb)   Height: 5' 6\" (1.676 m)            Physical Exam  Constitutional:       Appearance: Normal appearance. She is well-developed. HENT:      Head: Normocephalic and atraumatic. Neck:      Musculoskeletal: Normal range of motion and neck supple. Cardiovascular:      Rate and Rhythm: Normal rate and regular rhythm. Pulmonary:      Effort: Pulmonary effort is normal.      Breath sounds: Normal breath sounds. Abdominal:      General: Bowel sounds are normal.      Palpations: Abdomen is soft. Tenderness: There is no abdominal tenderness. Musculoskeletal: Normal range of motion. General: No swelling. Skin:     General: Skin is warm and dry. Neurological:      General: No focal deficit present. Mental Status: She is alert and oriented to person, place, and time. MDM  Number of Diagnoses or Management Options  Diagnosis management comments: Urine and blood work negative. Unable to provide stool sample here but her description is nonwatery and not likely to be C. difficile unless it changes.   Discharged with Zofran for the diarrhea and PCP follow-up       Amount and/or Complexity of Data Reviewed  Clinical lab tests: ordered and reviewed  Tests in the medicine section of CPT®: ordered and reviewed    Patient Progress  Patient progress: stable         Procedures        Results Include:    Recent Results (from the past 24 hour(s))   CBC WITH AUTOMATED DIFF    Collection Time: 11/05/20  5:08 PM   Result Value Ref Range    WBC 9.2 4.3 - 11.1 K/uL    RBC 4.57 4.05 - 5.2 M/uL    HGB 14.4 11.7 - 15.4 g/dL    HCT 43.6 35.8 - 46.3 %    MCV 95.4 79.6 - 97.8 FL    MCH 31.5 26.1 - 32.9 PG    MCHC 33.0 31.4 - 35.0 g/dL    RDW 12.6 11.9 - 14.6 %    PLATELET 396 242 - 594 K/uL    MPV 9.4 9.4 - 12.3 FL    ABSOLUTE NRBC 0.00 0.0 - 0.2 K/uL    DF AUTOMATED      NEUTROPHILS 74 43 - 78 %    LYMPHOCYTES 19 13 - 44 %    MONOCYTES 5 4.0 - 12.0 %    EOSINOPHILS 1 0.5 - 7.8 %    BASOPHILS 1 0.0 - 2.0 %    IMMATURE GRANULOCYTES 1 0.0 - 5.0 %    ABS. NEUTROPHILS 6.9 1.7 - 8.2 K/UL    ABS. LYMPHOCYTES 1.8 0.5 - 4.6 K/UL    ABS. MONOCYTES 0.4 0.1 - 1.3 K/UL    ABS. EOSINOPHILS 0.1 0.0 - 0.8 K/UL    ABS. BASOPHILS 0.1 0.0 - 0.2 K/UL    ABS. IMM. GRANS. 0.1 0.0 - 0.5 K/UL   METABOLIC PANEL, COMPREHENSIVE    Collection Time: 11/05/20  5:08 PM   Result Value Ref Range    Sodium 141 138 - 145 mmol/L    Potassium 4.6 3.5 - 5.1 mmol/L    Chloride 110 (H) 98 - 107 mmol/L    CO2 26 21 - 32 mmol/L    Anion gap 5 (L) 7 - 16 mmol/L    Glucose 121 (H) 65 - 100 mg/dL    BUN 9 8 - 23 MG/DL    Creatinine 0.99 0.6 - 1.0 MG/DL    GFR est AA >60 >60 ml/min/1.73m2    GFR est non-AA 59 (L) >60 ml/min/1.73m2    Calcium 10.0 8.3 - 10.4 MG/DL    Bilirubin, total 0.9 0.2 - 1.1 MG/DL    ALT (SGPT) 20 12 - 65 U/L    AST (SGOT) 10 (L) 15 - 37 U/L    Alk. phosphatase 85 50 - 136 U/L    Protein, total 8.2 6.3 - 8.2 g/dL    Albumin 4.6 3.2 - 4.6 g/dL    Globulin 3.6 (H) 2.3 - 3.5 g/dL    A-G Ratio 1.3 1.2 - 3.5         UA neg.

## 2020-11-06 NOTE — DISCHARGE INSTRUCTIONS

## 2020-11-06 NOTE — ED NOTES
I have reviewed discharge instructions with the patient. The patient verbalized understanding. Patient left ED via Discharge Method: ambulatory to Home with self with spouse. Opportunity for questions and clarification provided. Patient given 1 scripts. To continue your aftercare when you leave the hospital, you may receive an automated call from our care team to check in on how you are doing. This is a free service and part of our promise to provide the best care and service to meet your aftercare needs.  If you have questions, or wish to unsubscribe from this service please call [phone]. Thank you for Choosing our [facility] Emergency Department.

## 2021-03-05 ENCOUNTER — HOSPITAL ENCOUNTER (OUTPATIENT)
Dept: CT IMAGING | Age: 71
Discharge: HOME | End: 2021-03-05
Attending: PSYCHIATRY & NEUROLOGY
Payer: MEDICARE

## 2021-03-05 DIAGNOSIS — I72.9 ANEURYSM (HCC): ICD-10-CM

## 2021-03-05 DIAGNOSIS — Z86.73 HISTORY OF CVA (CEREBROVASCULAR ACCIDENT): ICD-10-CM

## 2021-03-05 LAB — CREAT BLD-MCNC: 1.1 MG/DL (ref 0.8–1.5)

## 2021-03-05 PROCEDURE — 74011000636 HC RX REV CODE- 636: Performed by: PSYCHIATRY & NEUROLOGY

## 2021-03-05 PROCEDURE — 70498 CT ANGIOGRAPHY NECK: CPT

## 2021-03-05 PROCEDURE — 82565 ASSAY OF CREATININE: CPT

## 2021-03-05 PROCEDURE — 74011000258 HC RX REV CODE- 258: Performed by: PSYCHIATRY & NEUROLOGY

## 2021-03-05 RX ORDER — SODIUM CHLORIDE 0.9 % (FLUSH) 0.9 %
10 SYRINGE (ML) INJECTION
Status: COMPLETED | OUTPATIENT
Start: 2021-03-05 | End: 2021-03-05

## 2021-03-05 RX ADMIN — IOPAMIDOL 50 ML: 755 INJECTION, SOLUTION INTRAVENOUS at 14:19

## 2021-03-05 RX ADMIN — SODIUM CHLORIDE 100 ML: 900 INJECTION, SOLUTION INTRAVENOUS at 14:19

## 2021-03-05 RX ADMIN — Medication 10 ML: at 14:19

## 2021-05-06 ENCOUNTER — HOSPITAL ENCOUNTER (OUTPATIENT)
Dept: MAMMOGRAPHY | Age: 71
Discharge: HOME | End: 2021-05-06
Attending: FAMILY MEDICINE
Payer: MEDICARE

## 2021-05-06 DIAGNOSIS — Z13.820 SCREENING FOR OSTEOPOROSIS: ICD-10-CM

## 2021-05-06 DIAGNOSIS — Z12.31 SCREENING MAMMOGRAM FOR HIGH-RISK PATIENT: ICD-10-CM

## 2021-05-06 DIAGNOSIS — Z78.0 POSTMENOPAUSAL: ICD-10-CM

## 2021-05-06 PROCEDURE — 77067 SCR MAMMO BI INCL CAD: CPT

## 2021-05-06 PROCEDURE — 77080 DXA BONE DENSITY AXIAL: CPT

## 2021-08-03 PROBLEM — I63.9 CVA (CEREBRAL VASCULAR ACCIDENT) (HCC): Status: RESOLVED | Noted: 2019-11-25 | Resolved: 2021-08-03

## 2021-08-23 ENCOUNTER — APPOINTMENT (OUTPATIENT)
Dept: GENERAL RADIOLOGY | Age: 71
DRG: 481 | End: 2021-08-23
Attending: EMERGENCY MEDICINE
Payer: MEDICARE

## 2021-08-23 ENCOUNTER — APPOINTMENT (OUTPATIENT)
Dept: CT IMAGING | Age: 71
DRG: 481 | End: 2021-08-23
Attending: EMERGENCY MEDICINE
Payer: MEDICARE

## 2021-08-23 ENCOUNTER — HOSPITAL ENCOUNTER (INPATIENT)
Age: 71
LOS: 3 days | Discharge: HOME HEALTH | DRG: 481 | End: 2021-08-26
Attending: EMERGENCY MEDICINE | Admitting: INTERNAL MEDICINE
Payer: MEDICARE

## 2021-08-23 ENCOUNTER — ANESTHESIA EVENT (OUTPATIENT)
Dept: SURGERY | Age: 71
DRG: 481 | End: 2021-08-23
Payer: MEDICARE

## 2021-08-23 DIAGNOSIS — S72.001A CLOSED FRACTURE OF RIGHT HIP, INITIAL ENCOUNTER (HCC): Primary | ICD-10-CM

## 2021-08-23 LAB
ABO + RH BLD: NORMAL
ALBUMIN SERPL-MCNC: 4.2 G/DL (ref 3.2–4.6)
ALBUMIN/GLOB SERPL: 1.4 {RATIO} (ref 1.2–3.5)
ALP SERPL-CCNC: 73 U/L (ref 50–136)
ALT SERPL-CCNC: 26 U/L (ref 12–65)
ANION GAP SERPL CALC-SCNC: 6 MMOL/L (ref 7–16)
AST SERPL-CCNC: 18 U/L (ref 15–37)
ATRIAL RATE: 69 BPM
BASOPHILS # BLD: 0 K/UL (ref 0–0.2)
BASOPHILS NFR BLD: 1 % (ref 0–2)
BILIRUB SERPL-MCNC: 1.1 MG/DL (ref 0.2–1.1)
BLOOD GROUP ANTIBODIES SERPL: NORMAL
BUN SERPL-MCNC: 22 MG/DL (ref 8–23)
CALCIUM SERPL-MCNC: 9.2 MG/DL (ref 8.3–10.4)
CALCULATED P AXIS, ECG09: 43 DEGREES
CALCULATED R AXIS, ECG10: -21 DEGREES
CALCULATED T AXIS, ECG11: 33 DEGREES
CHLORIDE SERPL-SCNC: 110 MMOL/L (ref 98–107)
CO2 SERPL-SCNC: 24 MMOL/L (ref 21–32)
CREAT SERPL-MCNC: 0.95 MG/DL (ref 0.6–1)
DIAGNOSIS, 93000: NORMAL
DIFFERENTIAL METHOD BLD: NORMAL
EOSINOPHIL # BLD: 0 K/UL (ref 0–0.8)
EOSINOPHIL NFR BLD: 1 % (ref 0.5–7.8)
ERYTHROCYTE [DISTWIDTH] IN BLOOD BY AUTOMATED COUNT: 12.7 % (ref 11.9–14.6)
GLOBULIN SER CALC-MCNC: 2.9 G/DL (ref 2.3–3.5)
GLUCOSE SERPL-MCNC: 158 MG/DL (ref 65–100)
HCT VFR BLD AUTO: 38 % (ref 35.8–46.3)
HGB BLD-MCNC: 12.4 G/DL (ref 11.7–15.4)
IMM GRANULOCYTES # BLD AUTO: 0 K/UL (ref 0–0.5)
IMM GRANULOCYTES NFR BLD AUTO: 0 % (ref 0–5)
INR PPP: 1
LYMPHOCYTES # BLD: 1.8 K/UL (ref 0.5–4.6)
LYMPHOCYTES NFR BLD: 26 % (ref 13–44)
MCH RBC QN AUTO: 29.2 PG (ref 26.1–32.9)
MCHC RBC AUTO-ENTMCNC: 32.6 G/DL (ref 31.4–35)
MCV RBC AUTO: 89.6 FL (ref 79.6–97.8)
MONOCYTES # BLD: 0.3 K/UL (ref 0.1–1.3)
MONOCYTES NFR BLD: 5 % (ref 4–12)
NEUTS SEG # BLD: 4.5 K/UL (ref 1.7–8.2)
NEUTS SEG NFR BLD: 67 % (ref 43–78)
NRBC # BLD: 0 K/UL (ref 0–0.2)
P-R INTERVAL, ECG05: 166 MS
PLATELET # BLD AUTO: 169 K/UL (ref 150–450)
PMV BLD AUTO: 10.1 FL (ref 9.4–12.3)
POTASSIUM SERPL-SCNC: 3.6 MMOL/L (ref 3.5–5.1)
PROT SERPL-MCNC: 7.1 G/DL (ref 6.3–8.2)
PROTHROMBIN TIME: 13.8 SEC (ref 12.6–14.5)
Q-T INTERVAL, ECG07: 456 MS
QRS DURATION, ECG06: 98 MS
QTC CALCULATION (BEZET), ECG08: 488 MS
RBC # BLD AUTO: 4.24 M/UL (ref 4.05–5.2)
SODIUM SERPL-SCNC: 140 MMOL/L (ref 136–145)
SPECIMEN EXP DATE BLD: NORMAL
VENTRICULAR RATE, ECG03: 69 BPM
WBC # BLD AUTO: 6.8 K/UL (ref 4.3–11.1)

## 2021-08-23 PROCEDURE — 73030 X-RAY EXAM OF SHOULDER: CPT

## 2021-08-23 PROCEDURE — 74011250637 HC RX REV CODE- 250/637: Performed by: INTERNAL MEDICINE

## 2021-08-23 PROCEDURE — 71045 X-RAY EXAM CHEST 1 VIEW: CPT

## 2021-08-23 PROCEDURE — 85610 PROTHROMBIN TIME: CPT

## 2021-08-23 PROCEDURE — 73502 X-RAY EXAM HIP UNI 2-3 VIEWS: CPT

## 2021-08-23 PROCEDURE — 85025 COMPLETE CBC W/AUTO DIFF WBC: CPT

## 2021-08-23 PROCEDURE — 70450 CT HEAD/BRAIN W/O DYE: CPT

## 2021-08-23 PROCEDURE — 74011250636 HC RX REV CODE- 250/636: Performed by: INTERNAL MEDICINE

## 2021-08-23 PROCEDURE — 51701 INSERT BLADDER CATHETER: CPT

## 2021-08-23 PROCEDURE — 72125 CT NECK SPINE W/O DYE: CPT

## 2021-08-23 PROCEDURE — 65660000000 HC RM CCU STEPDOWN

## 2021-08-23 PROCEDURE — 93005 ELECTROCARDIOGRAM TRACING: CPT | Performed by: EMERGENCY MEDICINE

## 2021-08-23 PROCEDURE — 96375 TX/PRO/DX INJ NEW DRUG ADDON: CPT

## 2021-08-23 PROCEDURE — 80053 COMPREHEN METABOLIC PANEL: CPT

## 2021-08-23 PROCEDURE — 99285 EMERGENCY DEPT VISIT HI MDM: CPT

## 2021-08-23 PROCEDURE — 86901 BLOOD TYPING SEROLOGIC RH(D): CPT

## 2021-08-23 PROCEDURE — 96374 THER/PROPH/DIAG INJ IV PUSH: CPT

## 2021-08-23 PROCEDURE — 73552 X-RAY EXAM OF FEMUR 2/>: CPT

## 2021-08-23 RX ORDER — MORPHINE SULFATE 2 MG/ML
1 INJECTION, SOLUTION INTRAMUSCULAR; INTRAVENOUS
Status: DISCONTINUED | OUTPATIENT
Start: 2021-08-23 | End: 2021-08-24

## 2021-08-23 RX ORDER — ONDANSETRON 2 MG/ML
4 INJECTION INTRAMUSCULAR; INTRAVENOUS
Status: DISCONTINUED | OUTPATIENT
Start: 2021-08-23 | End: 2021-08-26 | Stop reason: HOSPADM

## 2021-08-23 RX ORDER — ATORVASTATIN CALCIUM 40 MG/1
40 TABLET, FILM COATED ORAL
Status: DISCONTINUED | OUTPATIENT
Start: 2021-08-23 | End: 2021-08-26 | Stop reason: HOSPADM

## 2021-08-23 RX ORDER — ACETAMINOPHEN 325 MG/1
650 TABLET ORAL
Status: DISCONTINUED | OUTPATIENT
Start: 2021-08-23 | End: 2021-08-26 | Stop reason: HOSPADM

## 2021-08-23 RX ORDER — SODIUM CHLORIDE 0.9 % (FLUSH) 0.9 %
5-40 SYRINGE (ML) INJECTION AS NEEDED
Status: DISCONTINUED | OUTPATIENT
Start: 2021-08-23 | End: 2021-08-25 | Stop reason: SDUPTHER

## 2021-08-23 RX ORDER — PROMETHAZINE HYDROCHLORIDE 25 MG/1
12.5 TABLET ORAL
Status: DISCONTINUED | OUTPATIENT
Start: 2021-08-23 | End: 2021-08-26 | Stop reason: HOSPADM

## 2021-08-23 RX ORDER — VENLAFAXINE HYDROCHLORIDE 150 MG/1
150 CAPSULE, EXTENDED RELEASE ORAL
Status: DISCONTINUED | OUTPATIENT
Start: 2021-08-24 | End: 2021-08-26 | Stop reason: HOSPADM

## 2021-08-23 RX ORDER — POLYETHYLENE GLYCOL 3350 17 G/17G
17 POWDER, FOR SOLUTION ORAL DAILY PRN
Status: DISCONTINUED | OUTPATIENT
Start: 2021-08-23 | End: 2021-08-26 | Stop reason: HOSPADM

## 2021-08-23 RX ORDER — LEVOTHYROXINE SODIUM 112 UG/1
112 TABLET ORAL
Status: DISCONTINUED | OUTPATIENT
Start: 2021-08-24 | End: 2021-08-26 | Stop reason: HOSPADM

## 2021-08-23 RX ORDER — ASPIRIN 81 MG/1
81 TABLET ORAL DAILY
Status: DISCONTINUED | OUTPATIENT
Start: 2021-08-24 | End: 2021-08-26 | Stop reason: HOSPADM

## 2021-08-23 RX ORDER — SODIUM CHLORIDE 0.9 % (FLUSH) 0.9 %
5-40 SYRINGE (ML) INJECTION EVERY 8 HOURS
Status: DISCONTINUED | OUTPATIENT
Start: 2021-08-23 | End: 2021-08-25 | Stop reason: SDUPTHER

## 2021-08-23 RX ORDER — ACETAMINOPHEN 650 MG/1
650 SUPPOSITORY RECTAL
Status: DISCONTINUED | OUTPATIENT
Start: 2021-08-23 | End: 2021-08-26 | Stop reason: HOSPADM

## 2021-08-23 RX ORDER — LOSARTAN POTASSIUM 50 MG/1
100 TABLET ORAL DAILY
Status: DISCONTINUED | OUTPATIENT
Start: 2021-08-24 | End: 2021-08-26 | Stop reason: HOSPADM

## 2021-08-23 RX ORDER — ONDANSETRON 2 MG/ML
4 INJECTION INTRAMUSCULAR; INTRAVENOUS
Status: DISCONTINUED | OUTPATIENT
Start: 2021-08-23 | End: 2021-08-25 | Stop reason: SDUPTHER

## 2021-08-23 RX ADMIN — ATORVASTATIN CALCIUM 40 MG: 40 TABLET, FILM COATED ORAL at 22:08

## 2021-08-23 RX ADMIN — ONDANSETRON 4 MG: 2 INJECTION INTRAMUSCULAR; INTRAVENOUS at 22:08

## 2021-08-23 RX ADMIN — ONDANSETRON 4 MG: 2 INJECTION INTRAMUSCULAR; INTRAVENOUS at 14:39

## 2021-08-23 RX ADMIN — MORPHINE SULFATE 1 MG: 2 INJECTION, SOLUTION INTRAMUSCULAR; INTRAVENOUS at 22:08

## 2021-08-23 RX ADMIN — MORPHINE SULFATE 1 MG: 2 INJECTION, SOLUTION INTRAMUSCULAR; INTRAVENOUS at 14:38

## 2021-08-23 NOTE — ED PROVIDER NOTES
41-year-old female presenting for right hip injury. She tells me she fell this morning chasing her cat around the house. When she fell she struck her head on the wall. Did not lose consciousness but she is on antiplatelet therapy. She then took after mentioned cat to the  and while there she had to leave the animal inside and way in her car due to current COVID-19 precautions. As she turned she felt like her foot was stuck to the floor and she felt her hip pop and she fell. She has been unable to stand thereafter. Currently biggest complaint is the bump on the side of her head, some pain in her right shoulder and of course the pain in her right hip. The history is provided by the patient. Fall  The accident occurred less than 1 hour ago. The fall occurred while walking. She fell from a height of ground level. She landed on hard floor. There was no blood loss. The point of impact was the right hip and head. The pain is present in the right hip and head. The pain is at a severity of 5/10. The pain is moderate. She was not ambulatory at the scene. There was no entrapment after the fall. There was no drug use involved in the accident. There was no alcohol use involved in the accident. Associated symptoms include headaches. Pertinent negatives include no visual change, no fever, no numbness, no abdominal pain, no bowel incontinence, no nausea, no vomiting, no hematuria, no extremity weakness, no hearing loss, no loss of consciousness, no tingling and no laceration. The risk factors include being elderly. The symptoms are aggravated by activity, rotation, use of injured limb and pressure on injury. She has tried nothing for the symptoms. The treatment provided no relief. The patient's last tetanus shot was less than 5 years ago.        Past Medical History:   Diagnosis Date    Allergic rhinitis     Anxiety     Arthritis     fingers    Asthma     prn inhaler    Calculus of kidney x 2-- last over 30 yrs ago    Cancer [facility])     melanoma R breast    Chronic pain     feet    Depression     worsening    Dizziness 09/08/2016    not a recent problem    Dyslipidemia     Fatigue     Former cigarette smoker     GERD (gastroesophageal reflux disease)     controlled with med    Heart murmur 11/24/2015    due to MVP-- followed by dr Tonya Lennox History of nuclear stress test 08/06/2020    HTN (hypertension)     controlled with med    Hx of echocardiogram 11/25/2019    LVEF 55-60%    Hypercholesterolemia     Hypothyroid     IBS (irritable bowel syndrome)     Migraines     Mitral valve insufficiency     rheumatic fever as a child --- followed by dr Troncoso Gave--- last echo 2017    Nausea & vomiting     post-op    Peripheral neuropathy     bilat feet    Rheumatic fever     as a child    Seizures ([address]     febrile seiz. as a child , no problems after that.  Stroke ([address]     Unspecified adverse effect of anesthesia     elevated temp after colectomy only per pt       Past Surgical History:   Procedure Laterality Date    HX APPENDECTOMY      HX BLADDER SUSPENSION      HX BREAST LUMPECTOMY Right 1996    melanoma breast - with lymph node biopsies.      HX COLONOSCOPY      HX HYSTERECTOMY      HX KNEE ARTHROSCOPY Left     HX KNEE ARTHROSCOPY Right 10/16/09; 03/2015    HX KNEE REPLACEMENT Right 3/2015    HX KNEE REPLACEMENT Left 2016    HX LAP CHOLECYSTECTOMY      HX ORTHOPAEDIC      C- 7 ACDF    HX OTHER SURGICAL      urethra repair    HX OTHER SURGICAL      melanoma resected from right chest wall    HX TONSIL AND ADENOIDECTOMY      HX TOTAL COLECTOMY      for obstruction - about 14 inches removed         Family History:   Problem Relation Age of Onset    Other Mother     Delayed Awakening Mother     Thyroid Disease Mother     Diabetes Mother     Cancer Mother         breast cancer    Breast Problems Mother     Breast Cancer Mother     Heart Disease Father     Hypertension Father  Heart Failure Father         CHF    Delayed Awakening Sister     Thyroid Disease Sister     Breast Problems Sister     Breast Cancer Sister     Cancer Other         breast    Diabetes Other         type II    High Cholesterol Other     Elevated Lipids Other     Hypertension Other     Thyroid Disease Other         hypo, acquired    Breast Problems Maternal Aunt     Breast Cancer Maternal Aunt     Breast Cancer Paternal Aunt        Social History     Socioeconomic History    Marital status:      Spouse name: Not on file    Number of children: Not on file    Years of education: Not on file    Highest education level: Not on file   Occupational History    Not on file   Tobacco Use    Smoking status: Former Smoker     Packs/day: 0.25     Years: 4.00     Pack years: 1.00     Types: Cigarettes     Start date: 1990     Quit date: 2002     Years since quittin.0    Smokeless tobacco: Never Used   Vaping Use    Vaping Use: Never used   Substance and Sexual Activity    Alcohol use: No    Drug use: No    Sexual activity: Not Currently   Other Topics Concern    Not on file   Social History Narrative    Not on file     Social Determinants of Health     Financial Resource Strain:     Difficulty of Paying Living Expenses:    Food Insecurity:     Worried About Running Out of Food in the Last Year:     [address] in the Last Year:    Transportation Needs:     Lack of Transportation (Medical):      Lack of Transportation (Non-Medical):    Physical Activity:     Days of Exercise per Week:     Minutes of Exercise per Session:    Stress:     Feeling of Stress :    Social Connections:     Frequency of Communication with Friends and Family:     Frequency of Social Gatherings with Friends and Family:     Attends Alevism Services:     Active Member of Clubs or Organizations:     Attends Club or Organization Meetings:     Marital Status:    Intimate Partner Violence:     Fear of Current or Ex-Partner:     Emotionally Abused:     Physically Abused:     Sexually Abused: ALLERGIES: Keflex [cephalexin], Ambien [zolpidem], Aspirin, Codeine, Daypro [oxaprozin], Demerol [meperidine], Dilaudid [hydromorphone (bulk)], Mobic [meloxicam], Onion, and Pcn [penicillins]    Review of Systems   Constitutional: Negative for fever. Gastrointestinal: Negative for abdominal pain, bowel incontinence, nausea and vomiting. Genitourinary: Negative for hematuria. Musculoskeletal: Positive for arthralgias, back pain, gait problem and joint swelling. Negative for extremity weakness. Neurological: Positive for headaches. Negative for tingling, loss of consciousness and numbness. All other systems reviewed and are negative. Vitals:    08/23/21 1117 08/23/21 1118   BP: (!) 150/73    Pulse: 69    Resp: 18    SpO2: (!) 87% 96%   Weight: 77.1 kg (170 lb)    Height: 5' 6\" (1.676 m)             Physical Exam  Vitals and nursing note reviewed. Constitutional:       Appearance: She is well-developed. HENT:      Head: Normocephalic. Comments: Bruise on right forehead  Eyes:      Conjunctiva/sclera: Conjunctivae normal.      Pupils: Pupils are equal, round, and reactive to light. Cardiovascular:      Rate and Rhythm: Normal rate and regular rhythm. Pulmonary:      Effort: Pulmonary effort is normal.      Breath sounds: Normal breath sounds. Abdominal:      General: Bowel sounds are normal.      Palpations: Abdomen is soft. Musculoskeletal:         General: Swelling, tenderness, deformity and signs of injury present. Normal range of motion. Cervical back: Neck supple. Comments: Large hematoma over the right trochanter. Leg kept in flexion for comfort, some movement without tenderness but tenderness over the trochanter with palpation   Skin:     General: Skin is warm and dry. Findings: No laceration.    Neurological:      Mental Status: She is alert and oriented to person, place, and time. MDM  Number of Diagnoses or Management Options  Closed fracture of right hip, initial encounter [facility])  Diagnosis management comments: 68-year-old female on Plavix presenting for right hip injury plus head injury. EKG was performed upon arrival and shows normal sinus rhythm rate 69, left axis deviation, OK is 166, QRS is 98, QTc is 489 with no acute ischemic change       Amount and/or Complexity of Data Reviewed  Clinical lab tests: ordered and reviewed (Results for orders placed or performed during the hospital encounter of 08/23/21  -CBC WITH AUTOMATED DIFF       Result                      Value             Ref Range           WBC                         6.8               4.3 - 11.1 K*       RBC                         4.24              4.05 - 5.2 M*       HGB                         12.4              11.7 - 15.4 *       HCT                         38.0              35.8 - 46.3 %       MCV                         89.6              79.6 - 97.8 *       MCH                         29.2              26.1 - 32.9 *       MCHC                        32.6              31.4 - 35.0 *       RDW                         12.7              11.9 - 14.6 %       PLATELET                    169               150 - 450 K/*       MPV                         10.1              9.4 - 12.3 FL       ABSOLUTE NRBC               0.00              0.0 - 0.2 K/*       DF                          AUTOMATED                             NEUTROPHILS                 67                43 - 78 %           LYMPHOCYTES                 26                13 - 44 %           MONOCYTES                   5                 4.0 - 12.0 %        EOSINOPHILS                 1                 0.5 - 7.8 %         BASOPHILS                   1                 0.0 - 2.0 %         IMMATURE GRANULOCYTES       0                 0.0 - 5.0 %         ABS. NEUTROPHILS            4.5               1.7 - 8.2 K/*       ABS.  LYMPHOCYTES 1.8               0.5 - 4.6 K/*       ABS. MONOCYTES              0.3               0.1 - 1.3 K/*       ABS. EOSINOPHILS            0.0               0.0 - 0.8 K/*       ABS. BASOPHILS              0.0               0.0 - 0.2 K/*       ABS. IMM. GRANS.            0.0               0.0 - 0.5 K/*  -METABOLIC PANEL, COMPREHENSIVE       Result                      Value             Ref Range           Sodium                      140               136 - 145 mm*       Potassium                   3.6               3.5 - 5.1 mm*       Chloride                    110 (H)           98 - 107 mmo*       CO2                         24                21 - 32 mmol*       Anion gap                   6 (L)             7 - 16 mmol/L       Glucose                     158 (H)           65 - 100 mg/*       BUN                         22                8 - 23 MG/DL        Creatinine                  0.95              0.6 - 1.0 MG*       GFR est AA                  >60               >60 ml/min/1*       GFR est non-AA              >60               >60 ml/min/1*       Calcium                     9.2               8.3 - 10.4 M*       Bilirubin, total            1.1               0.2 - 1.1 MG*       ALT (SGPT)                  26                12 - 65 U/L         AST (SGOT)                  18                15 - 37 U/L         Alk.  phosphatase            73                50 - 136 U/L        Protein, total              7.1               6.3 - 8.2 g/*       Albumin                     4.2               3.2 - 4.6 g/*       Globulin                    2.9               2.3 - 3.5 g/*       A-G Ratio                   1.4               1.2 - 3.5      -PROTHROMBIN TIME + INR       Result                      Value             Ref Range           Prothrombin time            13.8              12.6 - 14.5 *       INR                         1.0                              -EKG, 12 LEAD, INITIAL       Result                      Value Ref Range           Ventricular Rate            69                BPM                 Atrial Rate                 69                BPM                 P-R Interval                166               ms                  QRS Duration                98                ms                  Q-T Interval                456               ms                  QTC Calculation (Bezet)     488               ms                  Calculated P Axis           43                degrees             Calculated R Axis           -21               degrees             Calculated T Axis           33                degrees             Diagnosis                                                     Normal sinus rhythm   Incomplete right bundle branch block   Minimal voltage criteria for LVH, may be normal variant   Prolonged QT   When compared with ECG of 26-NOV-2019 12:57,   ST no longer depressed in Inferior leads   Confirmed by Jonna Westfall MD, [facility] ([id number]) on 8/23/2021 1:36:19 PM     -TYPE & SCREEN       Result                      Value             Ref Range           Crossmatch Expiration                                         08/26/2021,2359       ABO/Rh(D)                   B NEGATIVE                            Antibody screen             NEG                              )  Tests in the radiology section of CPT®: ordered and reviewed (XR CHEST SNGL V    Result Date: 8/23/2021  EXAMINATION: CHEST RADIOGRAPH 8/23/2021 12:42 PM ACCESSION NUMBER: [id number] COMPARISON: 11/25/2019 INDICATION: Right hip fracture TECHNIQUE: A single view of the chest was obtained. FINDINGS: Support Devices: None Lungs: No focal airspace disease. Cardiac Silhouette: Within normal limits in size. Mediastinum: Calcifications are seen in the aorta. Upper Abdomen: Normal Miscellaneous: There are no lytic and blastic lesions. 1. No acute abnormality 2.  Atherosclerosis     XR SHOULDER RT AP/LAT MIN 2 V    Result Date: 8/23/2021  EXAMINATION: XR SHOULDER RT AP/LAT MIN 2 V 8/23/2021 12:42 PM COMPARISON: None available. INDICATION: Right shoulder and right hip pain after falls TECHNIQUE: 3 views of the right shoulder were obtained FINDINGS: There is no fracture or acute abnormality. There are mild degenerative changes in the glenohumeral joint. Bony mineralization is preserved. The surrounding soft tissues are normal.     1. No acute abnormality. 2. Glenohumeral degenerative change. .     XR HIP RT W OR WO PELV 2-3 VWS    Result Date: 8/23/2021  EXAMINATION: XR HIP RT W OR WO PELV 2-3 VWS 8/23/2021 12:42 PM COMPARISON: None available. INDICATION: Right hip pain TECHNIQUE: A frontal view of the pelvis with  2 views of the right hip were obtained FINDINGS: There is an impacted subcapital fracture in the right femoral neck. No additional fracture is identified. The femoral acetabular relationship is preserved. Bony mineralization is preserved. The surrounding soft tissues are normal.     1. Subcapital right proximal femur fracture. XR FEMUR RT 2 VS    Result Date: 8/23/2021  EXAMINATION: XR FEMUR RT 2 VS 8/23/2021 12:42 PM COMPARISON: None available. INDICATION: fracture TECHNIQUE: 2 views of the right femur were obtained FINDINGS: There is a subcapital impacted fracture in the proximal femur. There is no additional fracture. There is previous right knee replacement. No complications evident. Bony mineralization is preserved. The surrounding soft tissues are normal.     1. Subcapital proximal femur fracture with no additional fracture. CT HEAD WO CONT    Result Date: 8/23/2021  EXAMINATION: CT HEAD WO CONT 8/23/2021 11:46 AM COMPARISON: 03/05/2021  INDICATION: Fall, hit for head, right hip pain TECHNIQUE: Multiple-row detector helical CT examination of the head without intravenous contrast. Radiation dose reduction techniques were used for this study.  Our CT scanners use one or all of the following: Automated exposure control, adjustment of the mA and/or kV according to patient size, iterative reconstruction. FINDINGS: Brain: There is no mass, mass effect, evidence of an acute stroke, or intracranial hemorrhage. Hypodensity is seen in the periventricular and deep white matter. A lacunar infarct is seen in the left basal ganglia Ventricles: There is prominence of the ventricles with concomitant sulcal enlargement. Vasculature:  Calcifications are seen in the intracranial internal carotid arteries. Bones: Normal Surrounding soft tissues:  Normal     1. No acute intracranial abnormality. 2.  Senescent changes     CT SPINE CERV WO CONT    Result Date: 8/23/2021  EXAMINATION: CT SPINE CERV WO CONT 8/23/2021 11:46 AM ACCESSION NUMBER: [id number] COMPARISON: None available INDICATION: Fall Trauma TECHNIQUE: Multiple-row detector helical CT examination of the cervical spine was performed without intravenous contrast. Multiplanar reformats were provided. Radiation dose reduction techniques were used for this study. Our CT scanners use one or all of the following: Automated exposure control, adjustment of the mA and/or kV according to patient size, iterative reconstruction. FINDINGS: Acute abnormality: None Alignment: Preserved Vertebral body height: Preserved Degenerative changes: There are multilevel degenerative changes with sclerosis and osteophyte formation the posterior facets. Degenerative disc changes are seen as well most notable in C6-7. Surgical changes: There is previous anterior fusion of C5-6. There is no evidence of hardware failure. Bony mineralization: Normal Miscellaneous:  Normal     1. No acute abnormality.  2.  Multilevel degenerative changes.     )  Tests in the medicine section of CPT®: ordered and reviewed  Discuss the patient with other providers: yes (Discussed with Ortho    Discussed with hospitalist)  Independent visualization of images, tracings, or specimens: yes (Reviewed imaging which shows a hip fracture)    Risk of Complications, Morbidity, and/or Mortality  Presenting problems: high  Diagnostic procedures: high  Management options: high  General comments: I personally reviewed the patient's vital signs, laboratory tests, and/or radiological findings. I discussed these findings with the patient and their significance. I answered all questions and explained that given these findings there is significant concern for increased morbidity and/or mortality without immediate intervention. As a result, I recommended admission to the hospital, consulted the appropriate service, and transitioned care to that service in improved condition      Patient Progress  Patient progress: improved    ED Course as of Aug 23 1340   Mon Aug 23, 2021   1257 X-ray looks like an impacted femoral neck fracture.     [JS]      ED Course User Index  [JS] Pau Wasserman MD       Procedures

## 2021-08-23 NOTE — H&P
[facility]   History and Physical       Name:  Laurita Elias  Age: 65 y.o. Sex: female   :  1950    MRN:  [id number]   PCP:  Maegan Espinoza DO      Admit Date:  2021 11:00 AM   Presenting Complaint: Fall      Reason for Admission:  Closed displaced fracture of right femoral neck ([facility] [address] [S72.001A]    Assessment & Plan:   Right Femoral Neck Fracture  - Orthopedics consulted  - hold plavix (last dose  3 AM)  - NPO for now  - Pain control  - DVT ppx as per Ortho    HTN // HLD   Prior CVA without residual deficits  - ASA, lipitor, coozar    Hypothyroidism: on synthroid    Depression // Anxiety: continue with PRN xanax and scheduled Effexor    Diet: NPO   VTE ppx: SCD  CODE STATUS: FULL      History of Presenting Illness:     Laurita Elias is a 65 y.o. female with medical history of anxiety, depression, pretension, hyperlipidemia, GERD hypothyroidism  who presented to ED with right hip pain. Patient reports that she fell this morning after chasing her cat around the house. She also struck her head during the fall without any loss of consciousness. She then took the cat to the  but did a pop in her hip in the parking lot and fell. She has been unable to stand or bear weight since then. Planes of right hip pain, right shoulder and pain on the head. Pain is 5 out of 10 to 10 out of 10 on the right hip. She is on antiplatelet therapy with Plavix. Last dose this morning  She reports that she is in normal state of health prior to the fall. Denies any fever, chills, shortness of breath, chest pain, nausea, vomiting, abdominal pain, cough. She has not been vaccinated. In the ED, patient is hemodynamically stable and saturating well on room air during my encounter. Laboratory work up is un-remarkable. CT head without any acute intracranial abnormalities. CT C-spine without any acute abnormalities but shows multilevel degenerative changes.   Chest x-ray without any acute cardiopulmonary normalities. X-ray of the right hip shows subcapital proximal femur fracture. X-ray of right shoulder shows glenohumeral degenerative changes. EKG with NSR w/ incomplete RBBB. Review of Systems: 10 systems reviewed and negative except as noted in HPI. Past Medical History:   Diagnosis Date    Allergic rhinitis     Anxiety     Arthritis     fingers    Asthma     prn inhaler    Calculus of kidney x 2-- last over 30 yrs ago    Cancer [facility])     melanoma R breast    Chronic pain     feet    Depression     worsening    Dizziness 09/08/2016    not a recent problem    Dyslipidemia     Fatigue     Former cigarette smoker     GERD (gastroesophageal reflux disease)     controlled with med    Heart murmur 11/24/2015    due to MVP-- followed by dr Carleen Mcardle History of nuclear stress test 08/06/2020    HTN (hypertension)     controlled with med    Hx of echocardiogram 11/25/2019    LVEF 55-60%    Hypercholesterolemia     Hypothyroid     IBS (irritable bowel syndrome)     Migraines     Mitral valve insufficiency     rheumatic fever as a child --- followed by dr Barbra Herman--- last echo 2017    Nausea & vomiting     post-op    Peripheral neuropathy     bilat feet    Rheumatic fever     as a child    Seizures ([address]     febrile seiz. as a child , no problems after that.  Stroke ([address]     Unspecified adverse effect of anesthesia     elevated temp after colectomy only per pt       Past Surgical History:   Procedure Laterality Date    HX APPENDECTOMY      HX BLADDER SUSPENSION      HX BREAST LUMPECTOMY Right 1996    melanoma breast - with lymph node biopsies.      HX COLONOSCOPY      HX HYSTERECTOMY      HX KNEE ARTHROSCOPY Left     HX KNEE ARTHROSCOPY Right 10/16/09; 03/2015    HX KNEE REPLACEMENT Right 3/2015    HX KNEE REPLACEMENT Left 2016    HX LAP CHOLECYSTECTOMY      HX ORTHOPAEDIC      C- 7 ACDF    HX OTHER SURGICAL      urethra repair    HX OTHER SURGICAL      melanoma resected from right chest wall    HX TONSIL AND ADENOIDECTOMY      HX TOTAL COLECTOMY      for obstruction - about 14 inches removed       Family History : reviewed. Family History   Problem Relation Age of Onset   Gina Askew Other Mother     Delayed Awakening Mother     Thyroid Disease Mother     Diabetes Mother     Cancer Mother         breast cancer    Breast Problems Mother    Gina Askew Breast Cancer Mother     Heart Disease Father     Hypertension Father     Heart Failure Father         CHF    Delayed Awakening Sister     Thyroid Disease Sister     Breast Problems Sister     Breast Cancer Sister     Cancer Other         breast    Diabetes Other         type II    High Cholesterol Other     Elevated Lipids Other     Hypertension Other     Thyroid Disease Other         hypo, acquired    Breast Problems Maternal Aunt     Breast Cancer Maternal Aunt     Breast Cancer Paternal Aunt         Social History     Tobacco Use    Smoking status: Former Smoker     Packs/day: 0.25     Years: 4.00     Pack years: 1.00     Types: Cigarettes     Start date: 1990     Quit date: 2002     Years since quittin.0    Smokeless tobacco: Never Used   Substance Use Topics    Alcohol use: No       Allergies   Allergen Reactions    Keflex [Cephalexin] Shortness of Breath     \" stop my breathing \"     Ambien [Zolpidem] Unknown (comments)    Aspirin Itching     \" can take low dose \"     Codeine Anaphylaxis     \"full codeine injection\" states has had lortab since without reaction.  Daypro [Oxaprozin] Swelling    Demerol [Meperidine] Other (comments)     Difficulty breathing.   States has had since without reaction    Dilaudid [Hydromorphone (Bulk)] Itching and Nausea Only    Mobic [Meloxicam] Swelling    Onion Other (comments)     headache    Pcn [Penicillins] Unknown (comments)     Pt states not allergic         Immunization History   Administered Date(s) Administered    Influenza Vaccine 10/29/2014, 10/21/2016    Influenza Vaccine (Quad) Mdck Pf (>4 Yrs Flucelvax QUAD 00583) 11/03/2017    Influenza Vaccine (Quad) PF (>6 Mo Flulaval, Fluarix, and >3 Yrs Afluria, Fluzone 72019) 11/16/2018, 11/08/2019, 11/27/2019    Influenza Vaccine PF 09/25/2015    Influenza, High-dose, Quadrivalent (>65 Yrs Fluzone High Dose Quad 94850) 11/19/2020    Pneumococcal Polysaccharide (PPSV-23) 09/25/2015    Pneumococcal Vaccine (Unspecified Type) 12/01/2010    TB Skin Test (PPD) Intradermal 03/12/2015, 03/24/2016         PTA Medications:  Current Outpatient Medications   Medication Instructions    ALPRAZolam (XANAX) 1 mg tablet TAKE 1 TABLET BY MOUTH EVERY DAY    ALPRAZolam (XANAX) 2 mg tablet 1 p.o. as needed anxiety and 1 p.o. as needed insomnia    aspirin delayed-release 81 mg, Oral, DAILY, Stopped 8/24/18--- per pt-- dr Danielle Son    atorvastatin (LIPITOR) 40 mg tablet TAKE 1 TABLET BY MOUTH EVERY NIGHT    Bystolic 10 mg tablet TAKE 1 TABLET DAILY    cholecalciferol, vitamin D3, (VITAMIN D3 PO) Oral    clopidogreL (PLAVIX) 75 mg tab TAKE 1 TABLET DAILY    cyanocobalamin 1,000 mcg, Oral, DAILY    fluticasone (FLONASE) 50 mcg/actuation nasal spray 2 Sprays, Both Nostrils, DAILY    HYDROcodone-acetaminophen (NORCO)  mg tablet 1 Tablet, Oral, EVERY 6 HOURS AS NEEDED    HYDROcodone-acetaminophen (NORCO)  mg tablet 1 Tablet, Oral, EVERY 6 HOURS AS NEEDED    hydrocortisone (ANUSOL-HC) 2.5 % rectal cream Rectal, 4 TIMES DAILY    losartan (COZAAR) 100 mg tablet TAKE 1 TABLET DAILY    losartan-hydroCHLOROthiazide (HYZAAR) 100-25 mg per tablet 1 Tablet, Oral, DAILY    MULTIVIT,CALC,MINS/FOLIC ACID (ONE-A-DAY PROACTIVE 65 PLUS PO) 1 Tablet, Oral, DAILY, Stopped 9/6/18    omeprazole (PRILOSEC) 40 mg capsule TAKE 1 CAPSULE BY MOUTH DAILY    potassium chloride (K-DUR, KLOR-CON) 20 mEq tablet 20 mEq, Oral, DAILY    saliva substitution (Biotene Dry Mouth Oral Rinse) mwsh mouthwash 15 mL, Mucous Membrane, AS NEEDED    sucralfate (CARAFATE) 1 g, Oral, 4 TIMES DAILY    Synthroid 112 mcg tablet TAKE 1 TABLET DAILY BEFORE BREAKFAST    venlafaxine-SR (EFFEXOR-XR) 150 mg capsule TAKE ONE CAPSULE BY MOUTH EVERY 12 HOURS FOR NERVES       Objective:     Patient Vitals for the past 24 hrs:   Temp Pulse Resp BP SpO2   08/23/21 1336  75 19  98 %   08/23/21 1118     96 %   08/23/21 1117 98.7 °F (37.1 °C) 69 18 (!) 150/73 (!) 87 %       Oxygen Therapy  O2 Sat (%): 98 % (08/23/21 1336)  O2 Device: Nasal cannula (08/23/21 1118)  O2 Flow Rate (L/min): 3 l/min (08/23/21 1118)    Body mass index is 27.44 kg/m². Physical Exam:     General:     alert, awake, no acute distress. On 3L but saturating at 98%. HENT:   normocephalic, atraumatic. Eyes:   anicteric sclerae, moist conjunctiva, EOMI  Neck:    supple, non-tender. Trachea midline. Lungs:   CTAB, no wheezing  Cardiac:   RRR, Normal S1 and S2   Abdomen:   Soft, non distended, nontender, +BS, no guarding/rebound  Extremities:   No edema , pedal pulses present. RLE externally rotated and shortened. TTP of right hip and pain with movement. Skin:   normnal turgor and texture  Neuro:  AAOx3. No gross focal neurological deficit  Psychiatric:  No anxiety, calm, cooperative    Data Reviewed: I have reviewed all labs, meds, and studies.     Recent Results (from the past 24 hour(s))   EKG, 12 LEAD, INITIAL    Collection Time: 08/23/21 11:16 AM   Result Value Ref Range    Ventricular Rate 69 BPM    Atrial Rate 69 BPM    P-R Interval 166 ms    QRS Duration 98 ms    Q-T Interval 456 ms    QTC Calculation (Bezet) 488 ms    Calculated P Axis 43 degrees    Calculated R Axis -21 degrees    Calculated T Axis 33 degrees    Diagnosis       Normal sinus rhythm  Incomplete right bundle branch block  Minimal voltage criteria for LVH, may be normal variant  Prolonged QT  When compared with ECG of 26-NOV-2019 12:57,  ST no longer depressed in Inferior leads  Confirmed by Ashly Pérez MD, Kailey Pang ([id number]) on 8/23/2021 1:36:19 PM     CBC WITH AUTOMATED DIFF    Collection Time: 08/23/21 11:21 AM   Result Value Ref Range    WBC 6.8 4.3 - 11.1 K/uL    RBC 4.24 4.05 - 5.2 M/uL    HGB 12.4 11.7 - 15.4 g/dL    HCT 38.0 35.8 - 46.3 %    MCV 89.6 79.6 - 97.8 FL    MCH 29.2 26.1 - 32.9 PG    MCHC 32.6 31.4 - 35.0 g/dL    RDW 12.7 11.9 - 14.6 %    PLATELET 492 985 - 869 K/uL    MPV 10.1 9.4 - 12.3 FL    ABSOLUTE NRBC 0.00 0.0 - 0.2 K/uL    DF AUTOMATED      NEUTROPHILS 67 43 - 78 %    LYMPHOCYTES 26 13 - 44 %    MONOCYTES 5 4.0 - 12.0 %    EOSINOPHILS 1 0.5 - 7.8 %    BASOPHILS 1 0.0 - 2.0 %    IMMATURE GRANULOCYTES 0 0.0 - 5.0 %    ABS. NEUTROPHILS 4.5 1.7 - 8.2 K/UL    ABS. LYMPHOCYTES 1.8 0.5 - 4.6 K/UL    ABS. MONOCYTES 0.3 0.1 - 1.3 K/UL    ABS. EOSINOPHILS 0.0 0.0 - 0.8 K/UL    ABS. BASOPHILS 0.0 0.0 - 0.2 K/UL    ABS. IMM. GRANS. 0.0 0.0 - 0.5 K/UL   METABOLIC PANEL, COMPREHENSIVE    Collection Time: 08/23/21 11:21 AM   Result Value Ref Range    Sodium 140 136 - 145 mmol/L    Potassium 3.6 3.5 - 5.1 mmol/L    Chloride 110 (H) 98 - 107 mmol/L    CO2 24 21 - 32 mmol/L    Anion gap 6 (L) 7 - 16 mmol/L    Glucose 158 (H) 65 - 100 mg/dL    BUN 22 8 - 23 MG/DL    Creatinine 0.95 0.6 - 1.0 MG/DL    GFR est AA >60 >60 ml/min/1.73m2    GFR est non-AA >60 >60 ml/min/1.73m2    Calcium 9.2 8.3 - 10.4 MG/DL    Bilirubin, total 1.1 0.2 - 1.1 MG/DL    ALT (SGPT) 26 12 - 65 U/L    AST (SGOT) 18 15 - 37 U/L    Alk.  phosphatase 73 50 - 136 U/L    Protein, total 7.1 6.3 - 8.2 g/dL    Albumin 4.2 3.2 - 4.6 g/dL    Globulin 2.9 2.3 - 3.5 g/dL    A-G Ratio 1.4 1.2 - 3.5     PROTHROMBIN TIME + INR    Collection Time: 08/23/21 11:21 AM   Result Value Ref Range    Prothrombin time 13.8 12.6 - 14.5 sec    INR 1.0     TYPE & SCREEN    Collection Time: 08/23/21 11:24 AM   Result Value Ref Range    Crossmatch Expiration 08/26/2021,2912     ABO/Rh(D) B NEGATIVE     Antibody screen NEG        All Micro Results     None Other Studies:  XR CHEST SNGL V    Result Date: 8/23/2021  EXAMINATION: CHEST RADIOGRAPH 8/23/2021 12:42 PM ACCESSION NUMBER: [id number] COMPARISON: 11/25/2019 INDICATION: Right hip fracture TECHNIQUE: A single view of the chest was obtained. FINDINGS: Support Devices: None Lungs: No focal airspace disease. Cardiac Silhouette: Within normal limits in size. Mediastinum: Calcifications are seen in the aorta. Upper Abdomen: Normal Miscellaneous: There are no lytic and blastic lesions. 1. No acute abnormality 2. Atherosclerosis     XR SHOULDER RT AP/LAT MIN 2 V    Result Date: 8/23/2021  EXAMINATION: XR SHOULDER RT AP/LAT MIN 2 V 8/23/2021 12:42 PM COMPARISON: None available. INDICATION: Right shoulder and right hip pain after falls TECHNIQUE: 3 views of the right shoulder were obtained FINDINGS: There is no fracture or acute abnormality. There are mild degenerative changes in the glenohumeral joint. Bony mineralization is preserved. The surrounding soft tissues are normal.     1. No acute abnormality. 2. Glenohumeral degenerative change. .     XR HIP RT W OR WO PELV 2-3 VWS    Result Date: 8/23/2021  EXAMINATION: XR HIP RT W OR WO PELV 2-3 VWS 8/23/2021 12:42 PM COMPARISON: None available. INDICATION: Right hip pain TECHNIQUE: A frontal view of the pelvis with  2 views of the right hip were obtained FINDINGS: There is an impacted subcapital fracture in the right femoral neck. No additional fracture is identified. The femoral acetabular relationship is preserved. Bony mineralization is preserved. The surrounding soft tissues are normal.     1. Subcapital right proximal femur fracture. XR FEMUR RT 2 VS    Result Date: 8/23/2021  EXAMINATION: XR FEMUR RT 2 VS 8/23/2021 12:42 PM COMPARISON: None available. INDICATION: fracture TECHNIQUE: 2 views of the right femur were obtained FINDINGS: There is a subcapital impacted fracture in the proximal femur. There is no additional fracture.  There is previous right knee replacement. No complications evident. Bony mineralization is preserved. The surrounding soft tissues are normal.     1. Subcapital proximal femur fracture with no additional fracture. CT HEAD WO CONT    Result Date: 8/23/2021  EXAMINATION: CT HEAD WO CONT 8/23/2021 11:46 AM COMPARISON: 03/05/2021  INDICATION: Fall, hit for head, right hip pain TECHNIQUE: Multiple-row detector helical CT examination of the head without intravenous contrast. Radiation dose reduction techniques were used for this study. Our CT scanners use one or all of the following: Automated exposure control, adjustment of the mA and/or kV according to patient size, iterative reconstruction. FINDINGS: Brain: There is no mass, mass effect, evidence of an acute stroke, or intracranial hemorrhage. Hypodensity is seen in the periventricular and deep white matter. A lacunar infarct is seen in the left basal ganglia Ventricles: There is prominence of the ventricles with concomitant sulcal enlargement. Vasculature:  Calcifications are seen in the intracranial internal carotid arteries. Bones: Normal Surrounding soft tissues:  Normal     1. No acute intracranial abnormality. 2.  Senescent changes     CT SPINE CERV WO CONT    Result Date: 8/23/2021  EXAMINATION: CT SPINE CERV WO CONT 8/23/2021 11:46 AM ACCESSION NUMBER: [id number] COMPARISON: None available INDICATION: Fall Trauma TECHNIQUE: Multiple-row detector helical CT examination of the cervical spine was performed without intravenous contrast. Multiplanar reformats were provided. Radiation dose reduction techniques were used for this study. Our CT scanners use one or all of the following: Automated exposure control, adjustment of the mA and/or kV according to patient size, iterative reconstruction. FINDINGS: Acute abnormality: None Alignment: Preserved Vertebral body height: Preserved Degenerative changes:  There are multilevel degenerative changes with sclerosis and osteophyte formation the posterior facets. Degenerative disc changes are seen as well most notable in C6-7. Surgical changes: There is previous anterior fusion of C5-6. There is no evidence of hardware failure. Bony mineralization: Normal Miscellaneous:  Normal     1. No acute abnormality. 2.  Multilevel degenerative changes. Medications:      Problem List:     Hospital Problems as of 8/23/2021 Date Reviewed: 7/7/2021        Codes Class Noted - Resolved POA    Closed displaced fracture of right femoral neck ([facility] [address] ICD-10-CM: S72.001A  ICD-9-CM: 820.8  8/23/2021 - Present Yes        History of CVA (cerebrovascular accident) ICD-10-CM: Z86.73  ICD-9-CM: V12.54  Unknown - Present Yes        Recurrent depression ([facility] [address] ICD-10-CM: F33.9  ICD-9-CM: 296.30  12/22/2017 - Present Yes        GERD (gastroesophageal reflux disease) ICD-10-CM: K21.9  ICD-9-CM: 530.81  Unknown - Present Yes    Overview Signed 6/5/2015  4:23 PM by Morenita Odom     protonix              Depression ICD-10-CM: F32.9  ICD-9-CM: 805  Unknown - Present     Overview Signed 6/5/2015  4:23 PM by Morenita Odom     worsening             Anxiety ICD-10-CM: F41.9  ICD-9-CM: 300.00  Unknown - Present Yes        Hypothyroid ICD-10-CM: E03.9  ICD-9-CM: 244.9  Unknown - Present Yes        Hypercholesterolemia ICD-10-CM: E78.00  ICD-9-CM: 272.0  Unknown - Present Yes        HTN (hypertension) ICD-10-CM: I10  ICD-9-CM: 401.9  Unknown - Present Yes                 Part of this note was written by using a voice dictation software and the note has been proof read but may still contain some grammatical/other typographical errors.        Va Askew MD  [address] [place], [place] [address]ist Service  August 23, 2021    1:56 PM

## 2021-08-23 NOTE — PROGRESS NOTES
Consult Received:     Closed right hip impacted femoral neck fracture. I have notified Dr. Brandin Peguero. Hospitalist to admit. Hold blood thinners.

## 2021-08-23 NOTE — ED NOTES
Arrives via ems. Pt had x2 falls this AM. First fall pt stepped on cord and had a fall hitting her right forehead, no LOC. Second fall was on vet apt, shoe got stuck on the floor and had fall. No LOC. With that fall pt reports right hip pain. +PMS to RLE . Knot to right hip.  Pt able to left right leg for EMS unable to assess shortening/rotation due to position   EMS /80 HR 84 O2 94% RA   Pt on blood thinners   100mcg fentanyl given per EMS   4mg zofran given

## 2021-08-24 ENCOUNTER — APPOINTMENT (OUTPATIENT)
Dept: GENERAL RADIOLOGY | Age: 71
DRG: 481 | End: 2021-08-24
Attending: ORTHOPAEDIC SURGERY
Payer: MEDICARE

## 2021-08-24 ENCOUNTER — ANESTHESIA (OUTPATIENT)
Dept: SURGERY | Age: 71
DRG: 481 | End: 2021-08-24
Payer: MEDICARE

## 2021-08-24 ENCOUNTER — APPOINTMENT (OUTPATIENT)
Dept: GENERAL RADIOLOGY | Age: 71
DRG: 481 | End: 2021-08-24
Attending: PHYSICIAN ASSISTANT
Payer: MEDICARE

## 2021-08-24 LAB
ANION GAP SERPL CALC-SCNC: 7 MMOL/L (ref 7–16)
BUN SERPL-MCNC: 14 MG/DL (ref 8–23)
CALCIUM SERPL-MCNC: 9.1 MG/DL (ref 8.3–10.4)
CHLORIDE SERPL-SCNC: 109 MMOL/L (ref 98–107)
CO2 SERPL-SCNC: 24 MMOL/L (ref 21–32)
CREAT SERPL-MCNC: 0.66 MG/DL (ref 0.6–1)
ERYTHROCYTE [DISTWIDTH] IN BLOOD BY AUTOMATED COUNT: 13 % (ref 11.9–14.6)
GLUCOSE SERPL-MCNC: 113 MG/DL (ref 65–100)
HCT VFR BLD AUTO: 35.9 % (ref 35.8–46.3)
HGB BLD-MCNC: 11.8 G/DL (ref 11.7–15.4)
INR PPP: 1
MCH RBC QN AUTO: 29.6 PG (ref 26.1–32.9)
MCHC RBC AUTO-ENTMCNC: 32.9 G/DL (ref 31.4–35)
MCV RBC AUTO: 90 FL (ref 79.6–97.8)
NRBC # BLD: 0 K/UL (ref 0–0.2)
PLATELET # BLD AUTO: 159 K/UL (ref 150–450)
PMV BLD AUTO: 10.1 FL (ref 9.4–12.3)
POTASSIUM SERPL-SCNC: 3.6 MMOL/L (ref 3.5–5.1)
PROTHROMBIN TIME: 13.9 SEC (ref 12.6–14.5)
RBC # BLD AUTO: 3.99 M/UL (ref 4.05–5.2)
SODIUM SERPL-SCNC: 140 MMOL/L (ref 136–145)
WBC # BLD AUTO: 7.2 K/UL (ref 4.3–11.1)

## 2021-08-24 PROCEDURE — C1713 ANCHOR/SCREW BN/BN,TIS/BN: HCPCS | Performed by: ORTHOPAEDIC SURGERY

## 2021-08-24 PROCEDURE — 74011000250 HC RX REV CODE- 250: Performed by: NURSE ANESTHETIST, CERTIFIED REGISTERED

## 2021-08-24 PROCEDURE — 74011250637 HC RX REV CODE- 250/637: Performed by: ANESTHESIOLOGY

## 2021-08-24 PROCEDURE — 74011250636 HC RX REV CODE- 250/636: Performed by: INTERNAL MEDICINE

## 2021-08-24 PROCEDURE — 27236 TREAT THIGH FRACTURE: CPT | Performed by: ORTHOPAEDIC SURGERY

## 2021-08-24 PROCEDURE — 73110 X-RAY EXAM OF WRIST: CPT

## 2021-08-24 PROCEDURE — 77030000032 HC CUF TRNQT ZIMM -B: Performed by: ORTHOPAEDIC SURGERY

## 2021-08-24 PROCEDURE — 85027 COMPLETE CBC AUTOMATED: CPT

## 2021-08-24 PROCEDURE — 36415 COLL VENOUS BLD VENIPUNCTURE: CPT

## 2021-08-24 PROCEDURE — 74011250637 HC RX REV CODE- 250/637: Performed by: INTERNAL MEDICINE

## 2021-08-24 PROCEDURE — 77030040830 HC CATH URETH FOL MDII -A

## 2021-08-24 PROCEDURE — 74011250636 HC RX REV CODE- 250/636: Performed by: NURSE ANESTHETIST, CERTIFIED REGISTERED

## 2021-08-24 PROCEDURE — 73502 X-RAY EXAM HIP UNI 2-3 VIEWS: CPT

## 2021-08-24 PROCEDURE — 74011250636 HC RX REV CODE- 250/636: Performed by: ANESTHESIOLOGY

## 2021-08-24 PROCEDURE — 2709999900 HC NON-CHARGEABLE SUPPLY: Performed by: ORTHOPAEDIC SURGERY

## 2021-08-24 PROCEDURE — 77030019908 HC STETH ESOPH SIMS -A: Performed by: ANESTHESIOLOGY

## 2021-08-24 PROCEDURE — 65270000029 HC RM PRIVATE

## 2021-08-24 PROCEDURE — 77030008462 HC STPLR SKN PROX J&J -A: Performed by: ORTHOPAEDIC SURGERY

## 2021-08-24 PROCEDURE — 86580 TB INTRADERMAL TEST: CPT | Performed by: INTERNAL MEDICINE

## 2021-08-24 PROCEDURE — 77030039425 HC BLD LARYNG TRULITE DISP TELE -A: Performed by: ANESTHESIOLOGY

## 2021-08-24 PROCEDURE — 76210000006 HC OR PH I REC 0.5 TO 1 HR: Performed by: ORTHOPAEDIC SURGERY

## 2021-08-24 PROCEDURE — 85610 PROTHROMBIN TIME: CPT

## 2021-08-24 PROCEDURE — 76010000160 HC OR TIME 0.5 TO 1 HR INTENSV-TIER 1: Performed by: ORTHOPAEDIC SURGERY

## 2021-08-24 PROCEDURE — 74011000250 HC RX REV CODE- 250: Performed by: INTERNAL MEDICINE

## 2021-08-24 PROCEDURE — 76060000032 HC ANESTHESIA 0.5 TO 1 HR: Performed by: ORTHOPAEDIC SURGERY

## 2021-08-24 PROCEDURE — 77030020782 HC GWN BAIR PAWS FLX 3M -B: Performed by: ANESTHESIOLOGY

## 2021-08-24 PROCEDURE — 99232 SBSQ HOSP IP/OBS MODERATE 35: CPT | Performed by: ORTHOPAEDIC SURGERY

## 2021-08-24 PROCEDURE — 2709999900 HC NON-CHARGEABLE SUPPLY

## 2021-08-24 PROCEDURE — 77030037088 HC TUBE ENDOTRACH ORAL NSL COVD-A: Performed by: ANESTHESIOLOGY

## 2021-08-24 PROCEDURE — 77030036696 HC BOOT TRACT BUCKS S2SG -A

## 2021-08-24 PROCEDURE — 73501 X-RAY EXAM HIP UNI 1 VIEW: CPT

## 2021-08-24 PROCEDURE — 77030031139 HC SUT VCRL2 J&J -A: Performed by: ORTHOPAEDIC SURGERY

## 2021-08-24 PROCEDURE — 0QS60ZZ REPOSITION RIGHT UPPER FEMUR, OPEN APPROACH: ICD-10-PCS | Performed by: ORTHOPAEDIC SURGERY

## 2021-08-24 PROCEDURE — 80048 BASIC METABOLIC PNL TOTAL CA: CPT

## 2021-08-24 PROCEDURE — 74011250636 HC RX REV CODE- 250/636: Performed by: ORTHOPAEDIC SURGERY

## 2021-08-24 DEVICE — SCREW BONE L36MM DIA5MM ST LCK W/ T25 STARDRV: Type: IMPLANTABLE DEVICE | Site: FEMUR | Status: FUNCTIONAL

## 2021-08-24 DEVICE — BOLT FOR FEMORAL NECK SYSTEM 100MM LENGTH-STERILE: Type: IMPLANTABLE DEVICE | Site: FEMUR | Status: FUNCTIONAL

## 2021-08-24 DEVICE — FEMORAL NECK SYSTEM PLATE 1 HOLE - STERILE: Type: IMPLANTABLE DEVICE | Site: FEMUR | Status: FUNCTIONAL

## 2021-08-24 DEVICE — ANTIROTATION SCREW FOR FEMORAL NECK SYS 100MM LENGTH - STER: Type: IMPLANTABLE DEVICE | Site: FEMUR | Status: FUNCTIONAL

## 2021-08-24 RX ORDER — PROPOFOL 10 MG/ML
INJECTION, EMULSION INTRAVENOUS AS NEEDED
Status: DISCONTINUED | OUTPATIENT
Start: 2021-08-24 | End: 2021-08-24 | Stop reason: HOSPADM

## 2021-08-24 RX ORDER — SODIUM CHLORIDE, SODIUM LACTATE, POTASSIUM CHLORIDE, CALCIUM CHLORIDE 600; 310; 30; 20 MG/100ML; MG/100ML; MG/100ML; MG/100ML
75 INJECTION, SOLUTION INTRAVENOUS CONTINUOUS
Status: DISCONTINUED | OUTPATIENT
Start: 2021-08-24 | End: 2021-08-24 | Stop reason: HOSPADM

## 2021-08-24 RX ORDER — FENTANYL CITRATE 50 UG/ML
INJECTION, SOLUTION INTRAMUSCULAR; INTRAVENOUS AS NEEDED
Status: DISCONTINUED | OUTPATIENT
Start: 2021-08-24 | End: 2021-08-24 | Stop reason: HOSPADM

## 2021-08-24 RX ORDER — CLINDAMYCIN PHOSPHATE 900 MG/50ML
900 INJECTION INTRAVENOUS
Status: COMPLETED | OUTPATIENT
Start: 2021-08-24 | End: 2021-08-24

## 2021-08-24 RX ORDER — MORPHINE SULFATE 2 MG/ML
2 INJECTION, SOLUTION INTRAMUSCULAR; INTRAVENOUS
Status: DISCONTINUED | OUTPATIENT
Start: 2021-08-24 | End: 2021-08-25

## 2021-08-24 RX ORDER — ONDANSETRON 2 MG/ML
INJECTION INTRAMUSCULAR; INTRAVENOUS AS NEEDED
Status: DISCONTINUED | OUTPATIENT
Start: 2021-08-24 | End: 2021-08-24 | Stop reason: HOSPADM

## 2021-08-24 RX ORDER — LIDOCAINE HYDROCHLORIDE 10 MG/ML
0.1 INJECTION INFILTRATION; PERINEURAL AS NEEDED
Status: DISCONTINUED | OUTPATIENT
Start: 2021-08-24 | End: 2021-08-26 | Stop reason: HOSPADM

## 2021-08-24 RX ORDER — HYDROMORPHONE HYDROCHLORIDE 2 MG/ML
0.5 INJECTION, SOLUTION INTRAMUSCULAR; INTRAVENOUS; SUBCUTANEOUS
Status: DISCONTINUED | OUTPATIENT
Start: 2021-08-24 | End: 2021-08-24 | Stop reason: ALTCHOICE

## 2021-08-24 RX ORDER — SODIUM CHLORIDE, SODIUM LACTATE, POTASSIUM CHLORIDE, CALCIUM CHLORIDE 600; 310; 30; 20 MG/100ML; MG/100ML; MG/100ML; MG/100ML
100 INJECTION, SOLUTION INTRAVENOUS CONTINUOUS
Status: DISPENSED | OUTPATIENT
Start: 2021-08-24 | End: 2021-08-25

## 2021-08-24 RX ORDER — ONDANSETRON 2 MG/ML
4 INJECTION INTRAMUSCULAR; INTRAVENOUS ONCE
Status: ACTIVE | OUTPATIENT
Start: 2021-08-24 | End: 2021-08-24

## 2021-08-24 RX ORDER — MIDAZOLAM HYDROCHLORIDE 1 MG/ML
2 INJECTION, SOLUTION INTRAMUSCULAR; INTRAVENOUS
Status: ACTIVE | OUTPATIENT
Start: 2021-08-24 | End: 2021-08-25

## 2021-08-24 RX ORDER — ROCURONIUM BROMIDE 10 MG/ML
INJECTION, SOLUTION INTRAVENOUS AS NEEDED
Status: DISCONTINUED | OUTPATIENT
Start: 2021-08-24 | End: 2021-08-24 | Stop reason: HOSPADM

## 2021-08-24 RX ORDER — ACETAMINOPHEN 500 MG
1000 TABLET ORAL ONCE
Status: COMPLETED | OUTPATIENT
Start: 2021-08-24 | End: 2021-08-24

## 2021-08-24 RX ORDER — NALOXONE HYDROCHLORIDE 0.4 MG/ML
0.1 INJECTION, SOLUTION INTRAMUSCULAR; INTRAVENOUS; SUBCUTANEOUS AS NEEDED
Status: DISCONTINUED | OUTPATIENT
Start: 2021-08-24 | End: 2021-08-24 | Stop reason: HOSPADM

## 2021-08-24 RX ORDER — MIDAZOLAM HYDROCHLORIDE 1 MG/ML
2 INJECTION, SOLUTION INTRAMUSCULAR; INTRAVENOUS ONCE
Status: ACTIVE | OUTPATIENT
Start: 2021-08-24 | End: 2021-08-24

## 2021-08-24 RX ORDER — DIPHENHYDRAMINE HYDROCHLORIDE 50 MG/ML
12.5 INJECTION, SOLUTION INTRAMUSCULAR; INTRAVENOUS ONCE
Status: ACTIVE | OUTPATIENT
Start: 2021-08-24 | End: 2021-08-24

## 2021-08-24 RX ORDER — DEXAMETHASONE SODIUM PHOSPHATE 4 MG/ML
INJECTION, SOLUTION INTRA-ARTICULAR; INTRALESIONAL; INTRAMUSCULAR; INTRAVENOUS; SOFT TISSUE AS NEEDED
Status: DISCONTINUED | OUTPATIENT
Start: 2021-08-24 | End: 2021-08-24 | Stop reason: HOSPADM

## 2021-08-24 RX ORDER — FENTANYL CITRATE 50 UG/ML
100 INJECTION, SOLUTION INTRAMUSCULAR; INTRAVENOUS ONCE
Status: ACTIVE | OUTPATIENT
Start: 2021-08-24 | End: 2021-08-24

## 2021-08-24 RX ORDER — OXYCODONE HYDROCHLORIDE 5 MG/1
5 TABLET ORAL
Status: DISCONTINUED | OUTPATIENT
Start: 2021-08-24 | End: 2021-08-24 | Stop reason: HOSPADM

## 2021-08-24 RX ORDER — OXYCODONE HYDROCHLORIDE 5 MG/1
10 TABLET ORAL
Status: COMPLETED | OUTPATIENT
Start: 2021-08-24 | End: 2021-08-24

## 2021-08-24 RX ADMIN — LOSARTAN POTASSIUM 100 MG: 50 TABLET, FILM COATED ORAL at 08:49

## 2021-08-24 RX ADMIN — MORPHINE SULFATE 2 MG: 2 INJECTION, SOLUTION INTRAMUSCULAR; INTRAVENOUS at 15:32

## 2021-08-24 RX ADMIN — SODIUM CHLORIDE, SODIUM LACTATE, POTASSIUM CHLORIDE, AND CALCIUM CHLORIDE 100 ML/HR: 600; 310; 30; 20 INJECTION, SOLUTION INTRAVENOUS at 15:32

## 2021-08-24 RX ADMIN — VENLAFAXINE HYDROCHLORIDE 150 MG: 150 CAPSULE, EXTENDED RELEASE ORAL at 08:49

## 2021-08-24 RX ADMIN — Medication 10 ML: at 21:39

## 2021-08-24 RX ADMIN — MORPHINE SULFATE 2 MG: 2 INJECTION, SOLUTION INTRAMUSCULAR; INTRAVENOUS at 07:53

## 2021-08-24 RX ADMIN — TUBERCULIN PURIFIED PROTEIN DERIVATIVE 5 UNITS: 5 INJECTION, SOLUTION INTRADERMAL at 16:17

## 2021-08-24 RX ADMIN — ACETAMINOPHEN 1000 MG: 500 TABLET, FILM COATED ORAL at 11:45

## 2021-08-24 RX ADMIN — SODIUM CHLORIDE, SODIUM LACTATE, POTASSIUM CHLORIDE, AND CALCIUM CHLORIDE 100 ML/HR: 600; 310; 30; 20 INJECTION, SOLUTION INTRAVENOUS at 08:39

## 2021-08-24 RX ADMIN — ONDANSETRON 4 MG: 2 INJECTION INTRAMUSCULAR; INTRAVENOUS at 13:40

## 2021-08-24 RX ADMIN — Medication 10 ML: at 05:10

## 2021-08-24 RX ADMIN — ROCURONIUM BROMIDE 30 MG: 10 INJECTION, SOLUTION INTRAVENOUS at 13:18

## 2021-08-24 RX ADMIN — ONDANSETRON 4 MG: 2 INJECTION INTRAMUSCULAR; INTRAVENOUS at 02:41

## 2021-08-24 RX ADMIN — FENTANYL CITRATE 50 MCG: 50 INJECTION INTRAMUSCULAR; INTRAVENOUS at 13:18

## 2021-08-24 RX ADMIN — ATORVASTATIN CALCIUM 40 MG: 40 TABLET, FILM COATED ORAL at 21:39

## 2021-08-24 RX ADMIN — DEXAMETHASONE SODIUM PHOSPHATE 4 MG: 4 INJECTION, SOLUTION INTRAMUSCULAR; INTRAVENOUS at 13:40

## 2021-08-24 RX ADMIN — CLINDAMYCIN PHOSPHATE 900 MG: 900 INJECTION, SOLUTION INTRAVENOUS at 13:11

## 2021-08-24 RX ADMIN — PROPOFOL 30 MG: 10 INJECTION, EMULSION INTRAVENOUS at 13:44

## 2021-08-24 RX ADMIN — MORPHINE SULFATE 2 MG: 2 INJECTION, SOLUTION INTRAMUSCULAR; INTRAVENOUS at 02:35

## 2021-08-24 RX ADMIN — LEVOTHYROXINE SODIUM 112 MCG: 0.11 TABLET ORAL at 05:11

## 2021-08-24 RX ADMIN — ONDANSETRON 4 MG: 2 INJECTION INTRAMUSCULAR; INTRAVENOUS at 15:45

## 2021-08-24 RX ADMIN — OXYCODONE 10 MG: 5 TABLET ORAL at 08:48

## 2021-08-24 RX ADMIN — PROPOFOL 100 MG: 10 INJECTION, EMULSION INTRAVENOUS at 13:18

## 2021-08-24 RX ADMIN — FENTANYL CITRATE 25 MCG: 50 INJECTION INTRAMUSCULAR; INTRAVENOUS at 13:56

## 2021-08-24 RX ADMIN — FENTANYL CITRATE 25 MCG: 50 INJECTION INTRAMUSCULAR; INTRAVENOUS at 14:00

## 2021-08-24 RX ADMIN — SUGAMMADEX 200 MG: 100 INJECTION, SOLUTION INTRAVENOUS at 13:54

## 2021-08-24 NOTE — PROGRESS NOTES
Problem: Falls - Risk of  Goal: *Absence of Falls  Description: Document Elder Brito Fall Risk and appropriate interventions in the flowsheet.   8/24/2021 0157 by Barrie Sanchez RN  Outcome: Progressing Towards Goal  Note: Fall Risk Interventions:  Mobility Interventions: Bed/chair exit alarm, Patient to call before getting OOB         Medication Interventions: Bed/chair exit alarm, Patient to call before getting OOB, Teach patient to arise slowly         History of Falls Interventions: Bed/chair exit alarm, Door open when patient unattended      8/24/2021 0156 by Barrie Sanchez RN  Outcome: Progressing Towards Goal  Note: Fall Risk Interventions:  Mobility Interventions: Bed/chair exit alarm, Patient to call before getting OOB         Medication Interventions: Bed/chair exit alarm, Patient to call before getting OOB, Teach patient to arise slowly         History of Falls Interventions: Bed/chair exit alarm, Door open when patient unattended         Problem: Patient Education: Go to Patient Education Activity  Goal: Patient/Family Education  8/24/2021 0157 by Barrie Sanchez RN  Outcome: Progressing Towards Goal  8/24/2021 0156 by Barrie Sanchez RN  Outcome: Progressing Towards Goal     Problem: Patient Education: Go to Patient Education Activity  Goal: Patient/Family Education  Outcome: Progressing Towards Goal     Problem: Hip Fracture:Day of Admission Pre-op  Goal: Off Pathway (Use only if patient is Off Pathway)  Outcome: Progressing Towards Goal  Goal: Activity/Safety  Outcome: Progressing Towards Goal  Goal: Consults, if ordered  Outcome: Progressing Towards Goal  Goal: Diagnostic Test/Procedures  Outcome: Progressing Towards Goal  Goal: Nutrition/Diet  Outcome: Progressing Towards Goal  Goal: Medications  Outcome: Progressing Towards Goal  Goal: Respiratory  Outcome: Progressing Towards Goal  Goal: Treatments/Interventions/Procedures  Outcome: Progressing Towards Goal  Goal: Psychosocial  Outcome: Progressing Towards Goal  Goal: *Labs/Tests Within Defined Limits in Preparation for Surgery  Outcome: Progressing Towards Goal  Goal: *Optimal pain control at patient's stated goal  Outcome: Progressing Towards Goal  Goal: *Hemodynamically stable  Outcome: Progressing Towards Goal     Problem: Hip Fracture: Day of Surgery Post-op Care  Goal: Off Pathway (Use only if patient is Off Pathway)  Outcome: Progressing Towards Goal  Goal: Activity/Safety  Outcome: Progressing Towards Goal  Goal: Consults, if ordered  Outcome: Progressing Towards Goal  Goal: Diagnostic Test/Procedures  Outcome: Progressing Towards Goal  Goal: Nutrition/Diet  Outcome: Progressing Towards Goal  Goal: Medications  Outcome: Progressing Towards Goal  Goal: Respiratory  Outcome: Progressing Towards Goal  Goal: Treatments/Interventions/Procedures  Outcome: Progressing Towards Goal  Goal: Psychosocial  Outcome: Progressing Towards Goal  Goal: *Absence of skin breakdown  Outcome: Progressing Towards Goal  Goal: *Optimal pain control at patient's stated goal  Outcome: Progressing Towards Goal  Goal: *Hemodynamically stable  Outcome: Progressing Towards Goal     Problem: Hip Fracture: Post-Op Day 1  Goal: Off Pathway (Use only if patient is Off Pathway)  Outcome: Progressing Towards Goal  Goal: Activity/Safety  Outcome: Progressing Towards Goal  Goal: Diagnostic Test/Procedures  Outcome: Progressing Towards Goal  Goal: Nutrition/Diet  Outcome: Progressing Towards Goal  Goal: Medications  Outcome: Progressing Towards Goal  Goal: Respiratory  Outcome: Progressing Towards Goal  Goal: Treatments/Interventions/Procedures  Outcome: Progressing Towards Goal  Goal: Psychosocial  Outcome: Progressing Towards Goal  Goal: Discharge Planning  Outcome: Progressing Towards Goal  Goal: *Demonstrates progressive activity  Outcome: Progressing Towards Goal  Goal: *Optimal pain control at patient's stated goal  Outcome: Progressing Towards Goal  Goal: *Hemodynamically stable  Outcome: Progressing Towards Goal  Goal: *Discharge plan identified  Outcome: Progressing Towards Goal  Goal: *Absence of skin breakdown  Outcome: Progressing Towards Goal     Problem: Hip Fracture: Post-Op Day 2  Goal: Off Pathway (Use only if patient is Off Pathway)  Outcome: Progressing Towards Goal  Goal: Activity/Safety  Outcome: Progressing Towards Goal  Goal: Diagnostic Test/Procedures  Outcome: Progressing Towards Goal  Goal: Nutrition/Diet  Outcome: Progressing Towards Goal  Goal: Medications  Outcome: Progressing Towards Goal  Goal: Respiratory  Outcome: Progressing Towards Goal  Goal: Treatments/Interventions/Procedures  Outcome: Progressing Towards Goal  Goal: Psychosocial  Outcome: Progressing Towards Goal  Goal: Discharge Planning  Outcome: Progressing Towards Goal  Goal: *Optimal pain control at patient's stated goal  Outcome: Progressing Towards Goal  Goal: *Hemodynamically stable  Outcome: Progressing Towards Goal  Goal: *Adequate oxygenation  Outcome: Progressing Towards Goal  Goal: *Tolerates increased activity  Outcome: Progressing Towards Goal  Goal: *Tolerates nutrition therapy  Outcome: Progressing Towards Goal  Goal: *Absence of skin breakdown  Outcome: Progressing Towards Goal     Problem: Hip Fracture: Post-Op Day 3  Goal: Off Pathway (Use only if patient is Off Pathway)  Outcome: Progressing Towards Goal  Goal: Activity/Safety  Outcome: Progressing Towards Goal  Goal: Diagnostic Test/Procedures  Outcome: Progressing Towards Goal  Goal: Nutrition/Diet  Outcome: Progressing Towards Goal  Goal: Medications  Outcome: Progressing Towards Goal  Goal: Respiratory  Outcome: Progressing Towards Goal  Goal: Treatments/Interventions/Procedures  Outcome: Progressing Towards Goal  Goal: Psychosocial  Outcome: Progressing Towards Goal  Goal: Discharge Planning  Outcome: Progressing Towards Goal  Goal: *Met physical therapy criteria for discharge to next level of care  Outcome: Progressing Towards Goal  Goal: *Optimal pain control at patient's stated goal  Outcome: Progressing Towards Goal  Goal: *Hemodynamically stable  Outcome: Progressing Towards Goal  Goal: *Tolerating diet  Outcome: Progressing Towards Goal  Goal: *Active bowel function  Outcome: Progressing Towards Goal  Goal: *Adequate urinary output  Outcome: Progressing Towards Goal  Goal: *Absence of skin breakdown  Outcome: Progressing Towards Goal  Goal: *Patient verbalizes understanding of discharge instructions  Outcome: Progressing Towards Goal     Problem: Hip Fracture: Post-Op Day 4  Goal: Off Pathway (Use only if patient is Off Pathway)  Outcome: Progressing Towards Goal  Goal: Activity/Safety  Outcome: Progressing Towards Goal  Goal: Diagnostic Test/Procedures  Outcome: Progressing Towards Goal  Goal: Nutrition/Diet  Outcome: Progressing Towards Goal  Goal: Medications  Outcome: Progressing Towards Goal  Goal: Respiratory  Outcome: Progressing Towards Goal  Goal: Treatments/Interventions/Procedures  Outcome: Progressing Towards Goal  Goal: Psychosocial  Outcome: Progressing Towards Goal  Goal: Discharge Planning  Outcome: Progressing Towards Goal  Goal: *Met physical therapy criteria for discharge to next level of care  Outcome: Progressing Towards Goal  Goal: *Optimal pain control at patient's stated goal  Outcome: Progressing Towards Goal  Goal: *Hemodynamically stable  Outcome: Progressing Towards Goal  Goal: *Tolerating diet  Outcome: Progressing Towards Goal  Goal: *Active bowel function  Outcome: Progressing Towards Goal  Goal: *Adequate urinary output  Outcome: Progressing Towards Goal  Goal: *Absence of skin breakdown  Outcome: Progressing Towards Goal  Goal: *Patient verbalizes understanding of discharge instructions  Outcome: Progressing Towards Goal

## 2021-08-24 NOTE — PROGRESS NOTES
TRANSFER - IN REPORT:    Verbal report received from [facility] NISHANT RN(name) on Nathaliefamilia Kellogg  being received from ED(unit) for routine progression of care      Report consisted of patients Situation, Background, Assessment and   Recommendations(SBAR). Information from the following report(s) SBAR, ED Summary, [facility] and Recent Results was reviewed with the receiving nurse. Opportunity for questions and clarification was provided. Assessment completed upon patients arrival to unit and care assumed.

## 2021-08-24 NOTE — PROGRESS NOTES
[facility] Progress [facility]            Assessment/Plan:      Principal Problem:  1- Right femur fracture - appreciate ortho's assistance, plan for screw/plate placement later today; pain ~controlled     Active Problems:  2- HTN - acceptable control; cont losartan as dosed    3- Dyslipidemia - cont statin tx; ASA held in anticipation for surgery    4- Hypothyroidism - synthroid cont'd     Dispo: Anticipate >2 midnight hospitalization; may need STR on dc              Brief history of presenting illness      Leonela Casey is a 65 y.o. female with medical history of anxiety, depression, pretension, hyperlipidemia, GERD hypothyroidism  who presented to ED with right hip pain. Patient reports that she fell this morning after chasing her cat around the house. She also struck her head during the fall without any loss of consciousness. She then took the cat to the  but did a pop in her hip in the parking lot and fell. Thereafter pt unable to wt bear RLE and therefore presented to the ER. Pt was found with a subcapital right femur fx. CT head without any acute intracranial abnormalities. CT C-spine without any acute abnormalities but shows multilevel degenerative changes. Chest x-ray without any acute cardiopulmonary normalities and right shoulder xray revealed glenohumeral degenerative changes, no acute fx. She has not been vaccinated for COVID viral infection. Subjective:   Daily Progress Note: 8/24/2021 11:01 AM    \"I hurt in my leg (right) when I move. \"  Pleasant 70y.o. WF in NAD. Pt admits to having pain in her RUE and RLE with mvmt but denies LOC, fever, chills, shortness of breath, chest pain, nausea, vomiting, abdominal pain, cough.         Current Facility-Administered Medications   Medication Dose Route Frequency    lidocaine (XYLOCAINE) 10 mg/mL (1 %) injection 0.1 mL  0.1 mL SubCUTAneous PRN    lactated Ringers infusion  100 mL/hr IntraVENous CONTINUOUS    acetaminophen (TYLENOL) tablet 1,000 mg  1,000 mg Oral ONCE    fentaNYL citrate (PF) injection 100 mcg  100 mcg IntraVENous ONCE    midazolam (VERSED) injection 2 mg  2 mg IntraVENous ONCE PRN    midazolam (VERSED) injection 2 mg  2 mg IntraVENous ONCE    lactated Ringers infusion 75 mL  75 mL IntraVENous CONTINUOUS    oxyCODONE IR (ROXICODONE) tablet 5 mg  5 mg Oral ONCE PRN    naloxone (NARCAN) injection 0.1 mg  0.1 mg IntraVENous PRN    ondansetron (ZOFRAN) injection 4 mg  4 mg IntraVENous ONCE    diphenhydrAMINE (BENADRYL) injection 12.5 mg  12.5 mg IntraVENous ONCE    morphine injection 2 mg  2 mg IntraVENous Q2H PRN    clindamycin (CLEOCIN) 900mg D5W 50mL IVPB (premix)  900 mg IntraVENous ON CALL TO OR    tuberculin injection 5 Units  5 Units IntraDERMal ONCE    ondansetron (ZOFRAN) injection 4 mg  4 mg IntraVENous Q4H PRN    sodium chloride (NS) flush 5-40 mL  5-40 mL IntraVENous Q8H    sodium chloride (NS) flush 5-40 mL  5-40 mL IntraVENous PRN    acetaminophen (TYLENOL) tablet 650 mg  650 mg Oral Q6H PRN    Or    acetaminophen (TYLENOL) suppository 650 mg  650 mg Rectal Q6H PRN    polyethylene glycol (MIRALAX) packet 17 g  17 g Oral DAILY PRN    promethazine (PHENERGAN) tablet 12.5 mg  12.5 mg Oral Q6H PRN    Or    ondansetron (ZOFRAN) injection 4 mg  4 mg IntraVENous Q6H PRN    [Held by provider] aspirin delayed-release tablet 81 mg  81 mg Oral DAILY    atorvastatin (LIPITOR) tablet 40 mg  40 mg Oral QHS    losartan (COZAAR) tablet 100 mg  100 mg Oral DAILY    levothyroxine (SYNTHROID) tablet 112 mcg  112 mcg Oral ACB    venlafaxine-SR (EFFEXOR-XR) capsule 150 mg  150 mg Oral DAILY WITH BREAKFAST        Review of Systems  A comprehensive review of systems was negative except for that written in the HPI.     Objective:     Visit Vitals  /67 (BP 1 Location: Left arm)   Pulse 89   Temp 98 °F (36.7 °C)   Resp 20   Ht 5' 6\" (1.676 m)   Wt 77.1 kg (170 lb)   SpO2 97%   BMI 27.44 kg/m²    O2 Flow Rate (L/min): 3 l/min O2 Device: Nasal cannula    Temp (24hrs), Av.3 °F (36.8 °C), Min:98 °F (36.7 °C), Max:98.7 °F (37.1 °C)      No intake/output data recorded.  1901 -  0700  In: -   Out: 450 [Urine:450]  Physical Exam  Vitals reviewed. Constitutional:       General: She is not in acute distress. Appearance: Normal appearance. She is normal weight. She is not ill-appearing, toxic-appearing or diaphoretic. HENT:      Head: Normocephalic and atraumatic. Right Ear: External ear normal.      Left Ear: External ear normal.      Nose: Nose normal.      Mouth/Throat:      Mouth: Mucous membranes are moist.      Pharynx: Oropharynx is clear. Eyes:      Extraocular Movements: Extraocular movements intact. Conjunctiva/sclera: Conjunctivae normal.      Pupils: Pupils are equal, round, and reactive to light. Cardiovascular:      Rate and Rhythm: Normal rate and regular rhythm. Pulses: Normal pulses. Pulmonary:      Effort: Pulmonary effort is normal. No respiratory distress. Breath sounds: Normal breath sounds. No wheezing or rhonchi. Abdominal:      General: Bowel sounds are normal.      Palpations: Abdomen is soft. Musculoskeletal:         General: Swelling and deformity present. Cervical back: Normal range of motion. No tenderness. Comments: RLE tenderness to palpation and manipulation   Skin:     General: Skin is warm. Neurological:      General: No focal deficit present. Mental Status: She is alert and oriented to person, place, and time. Mental status is at baseline. Psychiatric:         Mood and Affect: Mood normal.         Behavior: Behavior normal.         Thought Content:  Thought content normal.         Data Review    Recent Results (from the past 24 hour(s))   EKG, 12 LEAD, INITIAL    Collection Time: 21 11:16 AM   Result Value Ref Range    Ventricular Rate 69 BPM    Atrial Rate 69 BPM    P-R Interval 166 ms    QRS Duration 98 ms    Q-T Interval 456 ms    QTC Calculation (Bezet) 488 ms    Calculated P Axis 43 degrees    Calculated R Axis -21 degrees    Calculated T Axis 33 degrees    Diagnosis       Normal sinus rhythm  Incomplete right bundle branch block  Minimal voltage criteria for LVH, may be normal variant  Prolonged QT  When compared with ECG of 26-NOV-2019 12:57,  ST no longer depressed in Inferior leads  Confirmed by Karon Brothers MD, Chrissy Harvey ([id number]) on 8/23/2021 1:36:19 PM     CBC WITH AUTOMATED DIFF    Collection Time: 08/23/21 11:21 AM   Result Value Ref Range    WBC 6.8 4.3 - 11.1 K/uL    RBC 4.24 4.05 - 5.2 M/uL    HGB 12.4 11.7 - 15.4 g/dL    HCT 38.0 35.8 - 46.3 %    MCV 89.6 79.6 - 97.8 FL    MCH 29.2 26.1 - 32.9 PG    MCHC 32.6 31.4 - 35.0 g/dL    RDW 12.7 11.9 - 14.6 %    PLATELET 123 976 - 128 K/uL    MPV 10.1 9.4 - 12.3 FL    ABSOLUTE NRBC 0.00 0.0 - 0.2 K/uL    DF AUTOMATED      NEUTROPHILS 67 43 - 78 %    LYMPHOCYTES 26 13 - 44 %    MONOCYTES 5 4.0 - 12.0 %    EOSINOPHILS 1 0.5 - 7.8 %    BASOPHILS 1 0.0 - 2.0 %    IMMATURE GRANULOCYTES 0 0.0 - 5.0 %    ABS. NEUTROPHILS 4.5 1.7 - 8.2 K/UL    ABS. LYMPHOCYTES 1.8 0.5 - 4.6 K/UL    ABS. MONOCYTES 0.3 0.1 - 1.3 K/UL    ABS. EOSINOPHILS 0.0 0.0 - 0.8 K/UL    ABS. BASOPHILS 0.0 0.0 - 0.2 K/UL    ABS. IMM. GRANS. 0.0 0.0 - 0.5 K/UL   METABOLIC PANEL, COMPREHENSIVE    Collection Time: 08/23/21 11:21 AM   Result Value Ref Range    Sodium 140 136 - 145 mmol/L    Potassium 3.6 3.5 - 5.1 mmol/L    Chloride 110 (H) 98 - 107 mmol/L    CO2 24 21 - 32 mmol/L    Anion gap 6 (L) 7 - 16 mmol/L    Glucose 158 (H) 65 - 100 mg/dL    BUN 22 8 - 23 MG/DL    Creatinine 0.95 0.6 - 1.0 MG/DL    GFR est AA >60 >60 ml/min/1.73m2    GFR est non-AA >60 >60 ml/min/1.73m2    Calcium 9.2 8.3 - 10.4 MG/DL    Bilirubin, total 1.1 0.2 - 1.1 MG/DL    ALT (SGPT) 26 12 - 65 U/L    AST (SGOT) 18 15 - 37 U/L    Alk.  phosphatase 73 50 - 136 U/L    Protein, total 7.1 6.3 - 8.2 g/dL    Albumin 4.2 3.2 - 4.6 g/dL    Globulin 2.9 2.3 - 3.5 g/dL    A-G Ratio 1.4 1.2 - 3.5     PROTHROMBIN TIME + INR    Collection Time: 08/23/21 11:21 AM   Result Value Ref Range    Prothrombin time 13.8 12.6 - 14.5 sec    INR 1.0     TYPE & SCREEN    Collection Time: 08/23/21 11:24 AM   Result Value Ref Range    Crossmatch Expiration 08/26/2021,2359     ABO/Rh(D) B NEGATIVE     Antibody screen NEG    METABOLIC PANEL, BASIC    Collection Time: 08/24/21  6:23 AM   Result Value Ref Range    Sodium 140 136 - 145 mmol/L    Potassium 3.6 3.5 - 5.1 mmol/L    Chloride 109 (H) 98 - 107 mmol/L    CO2 24 21 - 32 mmol/L    Anion gap 7 7 - 16 mmol/L    Glucose 113 (H) 65 - 100 mg/dL    BUN 14 8 - 23 MG/DL    Creatinine 0.66 0.6 - 1.0 MG/DL    GFR est AA >60 >60 ml/min/1.73m2    GFR est non-AA >60 >60 ml/min/1.73m2    Calcium 9.1 8.3 - 10.4 MG/DL   CBC W/O DIFF    Collection Time: 08/24/21  6:23 AM   Result Value Ref Range    WBC 7.2 4.3 - 11.1 K/uL    RBC 3.99 (L) 4.05 - 5.2 M/uL    HGB 11.8 11.7 - 15.4 g/dL    HCT 35.9 35.8 - 46.3 %    MCV 90.0 79.6 - 97.8 FL    MCH 29.6 26.1 - 32.9 PG    MCHC 32.9 31.4 - 35.0 g/dL    RDW 13.0 11.9 - 14.6 %    PLATELET 596 767 - 305 K/uL    MPV 10.1 9.4 - 12.3 FL    ABSOLUTE NRBC 0.00 0.0 - 0.2 K/uL   PROTHROMBIN TIME + INR    Collection Time: 08/24/21  6:23 AM   Result Value Ref Range    Prothrombin time 13.9 12.6 - 14.5 sec    INR 1.0           Yumiko Young PA-C  [employer]

## 2021-08-24 NOTE — PROGRESS NOTES
Pt is a 64 yo female admitted for surgical repair of a right femoral neck fracture she sustained in a fall. Preliminary chart review complete. Pt is undergoing surgery today. PT/OT evals will be ordered post-op. PPD ordered today. SW will continue to follow to assist with pt's dc needs. Care Management Interventions  PCP Verified by CM:  Yes  Last Visit to PCP: 07/07/21  Transition of Care Consult (CM Consult): Discharge Planning  Discharge Durable Medical Equipment: No  Physical Therapy Consult: Yes  Occupational Therapy Consult: Yes  Speech Therapy Consult: No  Current Support Network: Own Home, Lives with Spouse  Confirm Follow Up Transport: Family  Discharge Location  Discharge Placement: Unable to determine at this time

## 2021-08-24 NOTE — PERIOP NOTES
TRANSFER - OUT REPORT:    Verbal report given to Joie Moreno on Laurita Elias  being transferred to [facility] [phone] for routine post - op       Report consisted of patients Situation, Background, Assessment and   Recommendations(SBAR). Information from the following report(s) SBAR, OR Summary, Procedure Summary, Intake/Output and MAR was reviewed with the receiving nurse. Lines:   Peripheral IV 08/23/21 Left Forearm (Active)   Site Assessment Clean, dry, & intact 08/24/21 1406   Phlebitis Assessment 0 08/24/21 1406   Infiltration Assessment 0 08/24/21 1406   Dressing Status Clean, dry, & intact; Occlusive 08/24/21 1406   Dressing Type Transparent;Tape 08/24/21 1406   Hub Color/Line Status Pink;Patent 08/24/21 1406   Alcohol Cap Used No 08/24/21 1406        Opportunity for questions and clarification was provided. Patient transported with:   O2 @ 2 liters    VTE prophylaxis orders have been written for Laurita Elias. Patient and family given floor number and nurses name. Family updated re: pt status after security code verified.

## 2021-08-24 NOTE — PROGRESS NOTES
Problem: Falls - Risk of  Goal: *Absence of Falls  Description: Document Mercedes Bynum Fall Risk and appropriate interventions in the flowsheet.   Outcome: Progressing Towards Goal  Note: Fall Risk Interventions:  Mobility Interventions: Bed/chair exit alarm, Patient to call before getting OOB         Medication Interventions: Bed/chair exit alarm, Patient to call before getting OOB    Elimination Interventions: Bed/chair exit alarm, Call light in reach, Patient to call for help with toileting needs    History of Falls Interventions: Bed/chair exit alarm, Door open when patient unattended         Problem: Patient Education: Go to Patient Education Activity  Goal: Patient/Family Education  Outcome: Progressing Towards Goal     Problem: Patient Education: Go to Patient Education Activity  Goal: Patient/Family Education  Outcome: Progressing Towards Goal     Problem: Hip Fracture:Day of Admission Pre-op  Goal: Off Pathway (Use only if patient is Off Pathway)  Outcome: Progressing Towards Goal  Goal: Activity/Safety  Outcome: Progressing Towards Goal  Goal: Consults, if ordered  Outcome: Progressing Towards Goal  Goal: Diagnostic Test/Procedures  Outcome: Progressing Towards Goal  Goal: Nutrition/Diet  Outcome: Progressing Towards Goal  Goal: Medications  Outcome: Progressing Towards Goal  Goal: Respiratory  Outcome: Progressing Towards Goal  Goal: Treatments/Interventions/Procedures  Outcome: Progressing Towards Goal  Goal: Psychosocial  Outcome: Progressing Towards Goal  Goal: *Labs/Tests Within Defined Limits in Preparation for Surgery  Outcome: Progressing Towards Goal  Goal: *Optimal pain control at patient's stated goal  Outcome: Progressing Towards Goal  Goal: *Hemodynamically stable  Outcome: Progressing Towards Goal

## 2021-08-24 NOTE — ANESTHESIA POSTPROCEDURE EVALUATION
Procedure(s):  RIGHT  HIP FNS /CHOICE ANES TO BE ADMITTED 8/23/21. general    Anesthesia Post Evaluation      Multimodal analgesia: multimodal analgesia used between 6 hours prior to anesthesia start to PACU discharge  Patient location during evaluation: bedside  Patient participation: complete - patient participated  Level of consciousness: responsive to verbal stimuli  Pain management: adequate  Airway patency: patent  Anesthetic complications: no  Cardiovascular status: hemodynamically stable  Respiratory status: spontaneous ventilation  Hydration status: stable    Final Post Anesthesia Temperature Assessment:  Normothermia (36.0-37.5 degrees C)      INITIAL Post-op Vital signs:   Vitals Value Taken Time   /65 08/24/21 1406   Temp 36.4 °C (97.6 °F) 08/24/21 1406   Pulse 80 08/24/21 1406   Resp 16 08/24/21 1406   SpO2 98 % 08/24/21 1406   Vitals shown include unvalidated device data.

## 2021-08-24 NOTE — OP NOTES
Operative Report    Patient: Yelitza Lujan MRN: [id number]  SSN: [id number]    YOB: 1950  Age: 65 y.o. Sex: female       Date of Surgery: 8/24/2021     History:  Yelitza Lujan is a 65 y.o. female who fell and injured her right hip. She was seen emergency room and found to have a valgus impacted right femoral neck fracture. I talked to her about the need for operative intervention. I felt with the fact that this was valgus impacted it would best be treated with open treatment with plate and screw fixation I try to explain exactly what this would involve. After talking her about this she seemed to feel comfortable consenting. I talked to the patient and/or their representative and explained the exact nature the procedure. I also went through a detailed list of the material risks associated with  the procedure which included risk of bleeding, infection, injury to nearby structures, worsening the situation, as well as the risks associate with anesthesia and finally death. Also talked with him regarding the benefits and alternatives to the procedure. Preoperative Diagnosis: Closed fracture of right hip, initial encounter ([facility] [S72.001A]     Postoperative Diagnosis: Closed fracture of right hip, initial encounter ([facility] [S72.001A]     Surgeon(s) and Role:     * Silvia Huang MD - Primary    Anesthesia: General     Procedure: Procedure(s):  Open treatment of right femoral neck fracture with plate and screw fixation    Procedure in Detail: After the successful induction of general anesthesia the right lower extremity was placed in boot traction on a fracture table. At that point we made sure we could visualize the hip on both the AP and lateral projection with the C arm image intensifier. Once this was confirmed we then prepped and draped the ostoeporotic  hip.   After this was done I then used a guidewire to localize my incision and then made a small incision laterally over the right hip. I placed a guidewire into the center the femoral head on both the AP and the lateral projection. After placing the guidewire and confirmed its position I then measured for a 100 mm screw. The proximal femur was then drilled corresponding to the length of the screw. Once the screw and plate construct was assembled we then placed this with an impacter and confirmed its position on both AP and lateral projection. I then drilled for and placed an antirotation screw corresponding to the same length of the screw through the plate which in this case was a 100 mm screw. I then used the outrigger device and drilled for and placed a single screw in the shaft portion of the plate. Once this was in appropriate position I checked my final reduction as well as the placement of my hardware on both AP and lateral projection. I was pleased with this. I then remove the insertion device and closed my incision with 2.0 Monocryl for the subcutaneous tissue and staples for the skin. Dressings were applied and the patient was awakened and taken to the recovery room in stable condition. Estimated Blood Loss: 120 cc    Tourniquet Time: * No tourniquets in log *      Implants:   Implant Name Type Inv.  Item Serial No.  Lot No. LRB No. Used Action   PLATE FEM NECK SYS NS 1H STRL --  - [id number]  PLATE FEM NECK SYS NS 1H STRL --   [employer] [place] [id number] Right 1 Implanted   SCR FEM NECK SYS 100MM STRL -- ANTIROTATION - [id number]  SCR FEM NECK SYS 100MM STRL -- ANTIROTATION  [employer] [place] [id number] Right 1 Implanted   BOLT FEM NECK SYS 100MM STRL --  - [id number]  BOLT FEM NECK SYS 100MM STRL --   [employer] [place] [id number] Right 1 Implanted   SCREW BONE L36MM DIA5MM ST LCK W/ T25 STARDRV - [id number]  SCREW BONE L36MM DIA5MM ST LCK W/ T25 STARDRV  DEPUY [employer]THES [place] [id number] Right 1 Implanted               Specimens: * No specimens in log *        Drains: None                Complications: None    Counts: Sponge and needle counts were correct times two.     Signed By:  Dania Steele MD     August 24, 2021

## 2021-08-24 NOTE — CONSULTS
Consult    Patient: Lauren Guo MRN: [id number]  SSN: [id number]    YOB: 1950  Age: 65 y.o. Sex: female      Subjective:      Lauren Guo is a 65 y.o. female who fell onto her right side while chasing her cat around. She was seen in the emergency room and found to have a valgus impacted right femoral neck fracture. She is also complaining of some shoulder pain this morning. She did have shoulder x-rays in the emergency room that were negative. Besides that this only thing that she really complains of now. No problems with her left upper extremity or left lower extremity. No previous history of problems with her right hip. No previous pain with her right hip. Past Medical History:   Diagnosis Date    Allergic rhinitis     Anxiety     Arthritis     fingers    Asthma     prn inhaler    Calculus of kidney x 2-- last over 30 yrs ago    Cancer [facility])     melanoma R breast    Chronic pain     feet    Depression     worsening    Dizziness 09/08/2016    not a recent problem    Dyslipidemia     Fatigue     Former cigarette smoker     GERD (gastroesophageal reflux disease)     controlled with med    Heart murmur 11/24/2015    due to MVP-- followed by dr Claire Montoya History of nuclear stress test 08/06/2020    HTN (hypertension)     controlled with med    Hx of echocardiogram 11/25/2019    LVEF 55-60%    Hypercholesterolemia     Hypothyroid     IBS (irritable bowel syndrome)     Migraines     Mitral valve insufficiency     rheumatic fever as a child --- followed by dr Mitchell Davis--- last echo 2017    Nausea & vomiting     post-op    Peripheral neuropathy     bilat feet    Rheumatic fever     as a child    Seizures ([address]     febrile seiz. as a child , no problems after that.     Stroke ([address]     Unspecified adverse effect of anesthesia     elevated temp after colectomy only per pt     Past Surgical History:   Procedure Laterality Date    HX APPENDECTOMY      HX BLADDER SUSPENSION      HX BREAST LUMPECTOMY Right     melanoma breast - with lymph node biopsies.      HX COLONOSCOPY      HX HYSTERECTOMY      HX KNEE ARTHROSCOPY Left     HX KNEE ARTHROSCOPY Right 10/16/09; 2015    HX KNEE REPLACEMENT Right 3/2015    HX KNEE REPLACEMENT Left 2016    HX LAP CHOLECYSTECTOMY      HX ORTHOPAEDIC      C- 7 ACDF    HX OTHER SURGICAL      urethra repair    HX OTHER SURGICAL      melanoma resected from right chest wall    HX TONSIL AND ADENOIDECTOMY      HX TOTAL COLECTOMY      for obstruction - about 14 inches removed      FAMHX -No history of inflammatory arthritis   Social History     Tobacco Use    Smoking status: Former Smoker     Packs/day: 0.25     Years: 4.00     Pack years: 1.00     Types: Cigarettes     Start date: 1990     Quit date: 2002     Years since quittin.0    Smokeless tobacco: Never Used   Substance Use Topics    Alcohol use: No      Current Facility-Administered Medications   Medication Dose Route Frequency Provider Last Rate Last Admin    lidocaine (XYLOCAINE) 10 mg/mL (1 %) injection 0.1 mL  0.1 mL SubCUTAneous PRN Juan Jose Harris MD        lactated Ringers infusion  100 mL/hr IntraVENous CONTINUOUS Juan Jose Harris MD        acetaminophen (TYLENOL) tablet 1,000 mg  1,000 mg Oral ONCE Juan Jose Harris MD        fentaNYL citrate (PF) injection 100 mcg  100 mcg IntraVENous ONCE Juan Jose Harris MD        midazolam (VERSED) injection 2 mg  2 mg IntraVENous ONCE PRN Juan Jose Harris MD        midazolam (VERSED) injection 2 mg  2 mg IntraVENous ONCE Juan Jose Harris MD        lactated Ringers infusion 75 mL  75 mL IntraVENous CONTINUOUS Juan Jose Harris MD        oxyCODONE IR (ROXICODONE) tablet 5 mg  5 mg Oral ONCE PRN Juan Jose Harris MD        oxyCODONE IR (ROXICODONE) tablet 10 mg  10 mg Oral ONCE PRN Juan Jose Harris MD        naloxone [facility]) injection 0.1 mg  0.1 mg IntraVENous PRN Guillermina Verma MD        ondansetron [facility]) injection 4 mg  4 mg IntraVENous ONCE Ramona Harris MD        diphenhydrAMINE (BENADRYL) injection 12.5 mg  12.5 mg IntraVENous ONCE Ramona Harris MD        morphine injection 2 mg  2 mg IntraVENous Q2H PRN Ciesco, Tevin, DO   2 mg at 08/24/21 0235    ondansetron (ZOFRAN) injection 4 mg  4 mg IntraVENous Q4H PRN Lily Guaman MD   4 mg at 08/24/21 0241    sodium chloride (NS) flush 5-40 mL  5-40 mL IntraVENous Q8H Lily Guaman MD   10 mL at 08/24/21 0510    sodium chloride (NS) flush 5-40 mL  5-40 mL IntraVENous PRN Lily Guaman MD        acetaminophen (TYLENOL) tablet 650 mg  650 mg Oral Q6H PRN Lily Guaman MD        Or    acetaminophen (TYLENOL) suppository 650 mg  650 mg Rectal Q6H PRN Lily Guaman MD        polyethylene glycol (MIRALAX) packet 17 g  17 g Oral DAILY PRN Lily Guaman MD        promethazine (PHENERGAN) tablet 12.5 mg  12.5 mg Oral Q6H PRN Lily Guaman MD        Or    ondansetron (ZOFRAN) injection 4 mg  4 mg IntraVENous Q6H PRN Lily Guaman MD        [Held by provider] aspirin delayed-release tablet 81 mg  81 mg Oral DAILY Lily Guaman MD        atorvastatin (LIPITOR) tablet 40 mg  40 mg Oral QHS Lily Guaman MD   40 mg at 08/23/21 2208    losartan (COZAAR) tablet 100 mg  100 mg Oral DAILY Lily Guaman MD        levothyroxine (SYNTHROID) tablet 112 mcg  112 mcg Oral ACB Lily Guaman MD   112 mcg at 08/24/21 0511    venlafaxine-SR (EFFEXOR-XR) capsule 150 mg  150 mg Oral DAILY WITH BREAKFAST Lily Guaman MD            Allergies   Allergen Reactions    Keflex [Cephalexin] Shortness of Breath     \" stop my breathing \"     Ambien [Zolpidem] Unknown (comments)    Aspirin Itching     \" can take low dose \"     Codeine Anaphylaxis     \"full codeine injection\" states has had lortab since without reaction.  Daypro [Oxaprozin] Swelling    Demerol [Meperidine] Other (comments)     Difficulty breathing. States has had since without reaction    Dilaudid [Hydromorphone (Bulk)] Itching and Nausea Only    Mobic [Meloxicam] Swelling    Onion Other (comments)     headache    Pcn [Penicillins] Unknown (comments)     Pt states not allergic         Review of Systems:  A comprehensive review of systems was negative except for that written in the History of Present Illness. Objective:     Vitals:    08/24/21 0002 08/24/21 0052 08/24/21 0054 08/24/21 0508   BP: (!) 149/70 98/68 105/78 134/69   Pulse: 80 82  85   Resp: 19 18  18   Temp:  98.5 °F (36.9 °C)  98.1 °F (36.7 °C)   SpO2: 95% 91%  98%   Weight:       Height:            Physical Exam:  Physical Exam:  General:  Alert, cooperative, no distress, appears stated age. Orientation she is alert and oriented person place time and situation   Eyes:  Conjunctivae/corneas clear. PERRL, EOMs intact. Fundi benign   Ears:  Normal TMs and external ear canals both ears. Nose: Nares normal. Septum midline. Mucosa normal. No drainage or sinus tenderness. Mouth/Throat: Lips, mucosa, and tongue normal. Teeth and gums normal.   Neck: Supple, symmetrical, trachea midline, no adenopathy, thyroid: no enlargment/tenderness/nodules, no carotid bruit and no JVD. Back:   Symmetric, no curvature. ROM normal. No CVA tenderness. Lungs:   Clear to auscultation bilaterally. Heart:  Regular rate and rhythm, S1, S2 normal, no murmur, click, rub or gallop. Abdomen:   Soft, non-tender. Bowel sounds normal. No masses,  No organomegaly. No lymphadenopathy in all 4 extremities  Alignment- pt has some obvious deformity of the right hip  Range of motion- with any range of motion right hip  Vascular-distal pulses palpable in right lower extremity  Sensory/motor-deep tendon reflexes normal right lower extremity. Motor and sensory function intact.   Stability- is difficult to assess stability because of the presence of the fracture  Tenderness to palpation over the right greater trochanter area  Skin- no rashes ulcerations or open wounds right lower extremity  Gait-pt cannot put any weight on the right lower extremity      Assessment:     Hospital Problems  Date Reviewed: 8/23/2021        Codes Class Noted POA    * (Principal) Closed displaced fracture of right femoral neck ([facility] ICD-10-CM: S72.001A  ICD-9-CM: 820.8  8/23/2021 Yes        History of CVA (cerebrovascular accident) ICD-10-CM: Z86.73  ICD-9-CM: V12.54  Unknown Yes        Recurrent depression ([facility] ICD-10-CM: F33.9  ICD-9-CM: 296.30  12/22/2017 Yes        GERD (gastroesophageal reflux disease) ICD-10-CM: K21.9  ICD-9-CM: 530.81  Unknown Yes    Overview Signed 6/5/2015  4:23 PM by Mann Flanagan     protonix              Anxiety ICD-10-CM: F41.9  ICD-9-CM: 300.00  Unknown Yes        Hypothyroid ICD-10-CM: E03.9  ICD-9-CM: 244.9  Unknown Yes        Hypercholesterolemia ICD-10-CM: E78.00  ICD-9-CM: 272.0  Unknown Yes        HTN (hypertension) ICD-10-CM: I10  ICD-9-CM: 401.9  Unknown Yes            Right closed displaced osteoporotic valgus impacted femoral neck fracture    Xrays and or studies:    AP and lateral views of the right hip are reviewed which show a valgus impacted right femoral neck fracture  Plan:     I spoke with the patient regarding different treatment options. The treatment options would be hemiarthroplasty or femoral fixation of the right femoral neck. Because she does have a valgus impacted femoral neck fracture which appears to be rather stable and she is on Plavix I think the farley thing would be to treat this with plate and screw fixation. This can be done through minimal incisions and I think would have less risk associated in a hemiarthroplasty. I have explained to her that the main downside of this would be that if she has trouble healing then she may have to have an arthroplasty type procedure in the future.   After talking her about this she seems understand the thinking along these lines and is okay proceeding with open treatment of right femoral neck fracture with plate and screw fixation    Signed By: Courtney Lange MD

## 2021-08-24 NOTE — PROGRESS NOTES
08/24/21 0052   Dual Skin Pressure Injury Assessment   Dual Skin Pressure Injury Assessment WDL   Second Care Provider (Based on Facility Policy) sakshi gilliland   Skin Integumentary   Skin Integumentary (WDL) WDL    Pressure  Injury Documentation No Pressure Injury Noted-Pressure Ulcer Prevention Initiated   Skin Color Appropriate for ethnicity   Skin Condition/Temp Dry; Warm   Skin Integrity Intact   Turgor Non-tenting   Hair Growth Present   Nails WDL   Varicosities Absent   No open wounds or red areas. Scars to bilateral knees. Skin intact.

## 2021-08-24 NOTE — ROUTINE PROCESS
TRANSFER - OUT REPORT:    Verbal report given to Ramsey(name) on Tai Victor  being transferred to 7th floor(unit) for routine progression of care       Report consisted of patients Situation, Background, Assessment and   Recommendations(SBAR). Information from the following report(s) SBAR was reviewed with the receiving nurse. Lines:   Peripheral IV 08/23/21 Left Forearm (Active)        Opportunity for questions and clarification was provided.       Patient transported with:   judo

## 2021-08-24 NOTE — PERIOP NOTES
TRANSFER - IN REPORT:    Verbal report received from EDYTA Brownlee(name) on Jason Dubin  being received from 711(unit) for routine progression of care      Report consisted of patients Situation, Background, Assessment and   Recommendations(SBAR). Information from the following report(s) Kardex, MAR and Recent Results was reviewed with the receiving nurse. Opportunity for questions and clarification was provided. Assessment completed upon patients arrival to unit and care assumed.

## 2021-08-25 ENCOUNTER — HOME HEALTH ADMISSION (OUTPATIENT)
Dept: HOME HEALTH SERVICES | Facility: HOME HEALTH | Age: 71
End: 2021-08-25
Payer: MEDICARE

## 2021-08-25 ENCOUNTER — APPOINTMENT (OUTPATIENT)
Dept: CT IMAGING | Age: 71
DRG: 481 | End: 2021-08-25
Payer: MEDICARE

## 2021-08-25 LAB
ANION GAP SERPL CALC-SCNC: 7 MMOL/L (ref 7–16)
BUN SERPL-MCNC: 12 MG/DL (ref 8–23)
CALCIUM SERPL-MCNC: 8.5 MG/DL (ref 8.3–10.4)
CHLORIDE SERPL-SCNC: 109 MMOL/L (ref 98–107)
CO2 SERPL-SCNC: 25 MMOL/L (ref 21–32)
CREAT SERPL-MCNC: 0.65 MG/DL (ref 0.6–1)
ERYTHROCYTE [DISTWIDTH] IN BLOOD BY AUTOMATED COUNT: 13.1 % (ref 11.9–14.6)
GLUCOSE SERPL-MCNC: 96 MG/DL (ref 65–100)
HCT VFR BLD AUTO: 26.8 % (ref 35.8–46.3)
HGB BLD-MCNC: 8.9 G/DL (ref 11.7–15.4)
MAGNESIUM SERPL-MCNC: 1.8 MG/DL (ref 1.8–2.4)
MCH RBC QN AUTO: 30.8 PG (ref 26.1–32.9)
MCHC RBC AUTO-ENTMCNC: 33.2 G/DL (ref 31.4–35)
MCV RBC AUTO: 92.7 FL (ref 79.6–97.8)
MM INDURATION POC: 0 MM (ref 0–5)
NRBC # BLD: 0 K/UL (ref 0–0.2)
PLATELET # BLD AUTO: 132 K/UL (ref 150–450)
PMV BLD AUTO: 10.5 FL (ref 9.4–12.3)
POTASSIUM SERPL-SCNC: 3.3 MMOL/L (ref 3.5–5.1)
PPD POC: NEGATIVE NEGATIVE
RBC # BLD AUTO: 2.89 M/UL (ref 4.05–5.2)
SARS-COV-2, COV2: NORMAL
SARS-COV-2, COV2: NOT DETECTED
SODIUM SERPL-SCNC: 141 MMOL/L (ref 136–145)
SPECIMEN SOURCE, FCOV2M: NORMAL
WBC # BLD AUTO: 6.5 K/UL (ref 4.3–11.1)

## 2021-08-25 PROCEDURE — 2709999900 HC NON-CHARGEABLE SUPPLY

## 2021-08-25 PROCEDURE — 74011250636 HC RX REV CODE- 250/636: Performed by: ORTHOPAEDIC SURGERY

## 2021-08-25 PROCEDURE — 94760 N-INVAS EAR/PLS OXIMETRY 1: CPT

## 2021-08-25 PROCEDURE — 77010033678 HC OXYGEN DAILY

## 2021-08-25 PROCEDURE — 74011250637 HC RX REV CODE- 250/637: Performed by: PHYSICIAN ASSISTANT

## 2021-08-25 PROCEDURE — 74011250637 HC RX REV CODE- 250/637: Performed by: ORTHOPAEDIC SURGERY

## 2021-08-25 PROCEDURE — 85027 COMPLETE CBC AUTOMATED: CPT

## 2021-08-25 PROCEDURE — 97161 PT EVAL LOW COMPLEX 20 MIN: CPT | Performed by: PHYSICAL THERAPIST

## 2021-08-25 PROCEDURE — 74011250637 HC RX REV CODE- 250/637: Performed by: INTERNAL MEDICINE

## 2021-08-25 PROCEDURE — 97165 OT EVAL LOW COMPLEX 30 MIN: CPT

## 2021-08-25 PROCEDURE — 97530 THERAPEUTIC ACTIVITIES: CPT | Performed by: PHYSICAL THERAPIST

## 2021-08-25 PROCEDURE — U0005 INFEC AGEN DETEC AMPLI PROBE: HCPCS

## 2021-08-25 PROCEDURE — 74011250636 HC RX REV CODE- 250/636: Performed by: INTERNAL MEDICINE

## 2021-08-25 PROCEDURE — 65270000029 HC RM PRIVATE

## 2021-08-25 PROCEDURE — 73200 CT UPPER EXTREMITY W/O DYE: CPT

## 2021-08-25 PROCEDURE — 97112 NEUROMUSCULAR REEDUCATION: CPT

## 2021-08-25 PROCEDURE — 36415 COLL VENOUS BLD VENIPUNCTURE: CPT

## 2021-08-25 PROCEDURE — 80048 BASIC METABOLIC PNL TOTAL CA: CPT

## 2021-08-25 PROCEDURE — 83735 ASSAY OF MAGNESIUM: CPT

## 2021-08-25 PROCEDURE — 77030038269 HC DRN EXT URIN PURWCK BARD -A

## 2021-08-25 RX ORDER — POTASSIUM CHLORIDE 20 MEQ/1
40 TABLET, EXTENDED RELEASE ORAL
Status: COMPLETED | OUTPATIENT
Start: 2021-08-25 | End: 2021-08-25

## 2021-08-25 RX ORDER — FERROUS SULFATE, DRIED 160(50) MG
1 TABLET, EXTENDED RELEASE ORAL
Status: DISCONTINUED | OUTPATIENT
Start: 2021-08-25 | End: 2021-08-26 | Stop reason: HOSPADM

## 2021-08-25 RX ORDER — SODIUM CHLORIDE 0.9 % (FLUSH) 0.9 %
5-40 SYRINGE (ML) INJECTION EVERY 8 HOURS
Status: DISCONTINUED | OUTPATIENT
Start: 2021-08-25 | End: 2021-08-26 | Stop reason: HOSPADM

## 2021-08-25 RX ORDER — SODIUM CHLORIDE 0.9 % (FLUSH) 0.9 %
5-40 SYRINGE (ML) INJECTION AS NEEDED
Status: DISCONTINUED | OUTPATIENT
Start: 2021-08-25 | End: 2021-08-26 | Stop reason: HOSPADM

## 2021-08-25 RX ORDER — MORPHINE SULFATE 4 MG/ML
4 INJECTION INTRAVENOUS
Status: DISCONTINUED | OUTPATIENT
Start: 2021-08-25 | End: 2021-08-26 | Stop reason: HOSPADM

## 2021-08-25 RX ORDER — OXYCODONE HYDROCHLORIDE 5 MG/1
10 TABLET ORAL
Status: DISCONTINUED | OUTPATIENT
Start: 2021-08-25 | End: 2021-08-26 | Stop reason: HOSPADM

## 2021-08-25 RX ORDER — CLOPIDOGREL BISULFATE 75 MG/1
75 TABLET ORAL DAILY
Status: DISCONTINUED | OUTPATIENT
Start: 2021-08-25 | End: 2021-08-26 | Stop reason: HOSPADM

## 2021-08-25 RX ORDER — MAG HYDROX/ALUMINUM HYD/SIMETH 200-200-20
30 SUSPENSION, ORAL (FINAL DOSE FORM) ORAL
Status: DISCONTINUED | OUTPATIENT
Start: 2021-08-25 | End: 2021-08-26 | Stop reason: HOSPADM

## 2021-08-25 RX ADMIN — Medication 10 ML: at 22:01

## 2021-08-25 RX ADMIN — VENLAFAXINE HYDROCHLORIDE 150 MG: 150 CAPSULE, EXTENDED RELEASE ORAL at 09:04

## 2021-08-25 RX ADMIN — ONDANSETRON 4 MG: 2 INJECTION INTRAMUSCULAR; INTRAVENOUS at 08:28

## 2021-08-25 RX ADMIN — ATORVASTATIN CALCIUM 40 MG: 40 TABLET, FILM COATED ORAL at 22:00

## 2021-08-25 RX ADMIN — ACETAMINOPHEN 650 MG: 325 TABLET ORAL at 04:09

## 2021-08-25 RX ADMIN — ASPIRIN 81 MG: 81 TABLET ORAL at 09:04

## 2021-08-25 RX ADMIN — POTASSIUM CHLORIDE 40 MEQ: 20 TABLET, EXTENDED RELEASE ORAL at 10:32

## 2021-08-25 RX ADMIN — OXYCODONE 10 MG: 5 TABLET ORAL at 22:03

## 2021-08-25 RX ADMIN — Medication 1 TABLET: at 12:55

## 2021-08-25 RX ADMIN — OXYCODONE 10 MG: 5 TABLET ORAL at 10:40

## 2021-08-25 RX ADMIN — LOSARTAN POTASSIUM 100 MG: 50 TABLET, FILM COATED ORAL at 09:04

## 2021-08-25 RX ADMIN — MORPHINE SULFATE 2 MG: 2 INJECTION, SOLUTION INTRAMUSCULAR; INTRAVENOUS at 04:09

## 2021-08-25 RX ADMIN — MORPHINE SULFATE 2 MG: 2 INJECTION, SOLUTION INTRAMUSCULAR; INTRAVENOUS at 01:30

## 2021-08-25 RX ADMIN — Medication 1 TABLET: at 16:36

## 2021-08-25 RX ADMIN — MORPHINE SULFATE 4 MG: 4 INJECTION INTRAVENOUS at 08:28

## 2021-08-25 RX ADMIN — OXYCODONE 10 MG: 5 TABLET ORAL at 16:39

## 2021-08-25 RX ADMIN — CLOPIDOGREL BISULFATE 75 MG: 75 TABLET ORAL at 09:04

## 2021-08-25 RX ADMIN — ONDANSETRON 4 MG: 2 INJECTION INTRAMUSCULAR; INTRAVENOUS at 01:30

## 2021-08-25 RX ADMIN — Medication 10 ML: at 13:49

## 2021-08-25 RX ADMIN — Medication 1 TABLET: at 09:03

## 2021-08-25 RX ADMIN — MORPHINE SULFATE 4 MG: 4 INJECTION INTRAVENOUS at 12:55

## 2021-08-25 RX ADMIN — PROMETHAZINE HYDROCHLORIDE 12.5 MG: 25 TABLET ORAL at 04:09

## 2021-08-25 RX ADMIN — LEVOTHYROXINE SODIUM 112 MCG: 0.11 TABLET ORAL at 04:10

## 2021-08-25 NOTE — PROGRESS NOTES
Physician Progress Note      PATIENT:               Axel Barnes  CSN #:                  [id number]  :                       1950  ADMIT DATE:       2021 11:00 AM  [address] [place] DATE:  RESPONDING  PROVIDER #:        ADEEL LORD MD          QUERY TEXT:    Pt admitted with right hip fracture and has had drop in hgb. If possible, please document in progress notes and discharge summary further specificity regarding the acuity and type of anemia:    The medical record reflects the following:  Risk Factors: s/p surgery  Clinical Indicators: 8- hgb 12.4 8- hgb 8.9,  ml  Treatment: serial labs  Options provided:  -- Anemia due to acute blood loss  -- Anemia due to postoperative blood loss  -- Other - I will add my own diagnosis  -- Disagree - Not applicable / Not valid  -- Disagree - Clinically unable to determine / Unknown  -- Refer to Clinical Documentation Reviewer    PROVIDER RESPONSE TEXT:    This patient has acute blood loss anemia.     Query created by: Marcelina Plata on 2021 1:00 PM      Electronically signed by:  Eneida LORD MD 2021 5:09 PM

## 2021-08-25 NOTE — PROGRESS NOTES
PT/OT evals complete with the recommendation for STR at dc. SW met with pt to discuss dc needs. Demographics, insurance and PCP confirmed. Pt does not want to go to a rehab facility. She stated that she has 2 daughters who live nearby who will assist her at dc. She is agreeable to [facility] services and a referral to [facility] Referral submitted for RN/PT/OT services. Pt stated she has all needed DME in the home. No other dc needs or concerns identified or reported at present. SW to continue to follow to assist as needed. Care Management Interventions  PCP Verified by CM: Yes  Last Visit to PCP: 07/07/21  Mode of Transport at Discharge:  Other (see comment) (family)  Transition of Care Consult (CM Consult): [address]: Yes  Discharge Durable Medical Equipment: No  Physical Therapy Consult: Yes  Occupational Therapy Consult: Yes  Speech Therapy Consult: No  Current Support Network: Own Home, Family Lives Nearby, Lives with Spouse  Confirm Follow Up Transport: Family  The Plan for Transition of Care is Related to the Following Treatment Goals : Home health nursing and therapy services to improve pt's strength and functional abilities  The Patient and/or Patient Representative was Provided with a Choice of Provider and Agrees with the Discharge Plan?: Yes  Freedom of Choice List was Provided with Basic Dialogue that Supports the Patient's Individualized Plan of Care/Goals, Treatment Preferences and Shares the Quality Data Associated with the Providers?: Yes  Discharge Location  Discharge Placement: Home with [facility])

## 2021-08-25 NOTE — PROGRESS NOTES
ACUTE OT GOALS:  (Developed with and agreed upon by patient and/or caregiver.)  1. Patient will complete functional transfers with modified independence. 2. Patient will bathe and dress lower body with modified independence and adaptive device as needed  3. Patient will toilet with modified independence and adaptive device as needed. 4. Patient will tolerate 30 minutes of OT treatment with up to 2 rest breaks to increase endurance for ADLs. 5. Patient will demonstrate modified independence with therapeutic exercise HEP to increase strength in BUEs for increased safety and independence with functional tasks. 6. Patient will groom in standing with modified independence and adaptive device as needed. 7. Patient will complete functional mobility for ADLs with modified independence and adaptive device as needed.     Timeframe: 7 visits WBAT RLE     OCCUPATIONAL THERAPY ASSESSMENT: Initial Assessment and Daily Note OT Treatment Day # 1    Katarina Gipson is a 66 y.o. female   PRIMARY DIAGNOSIS: Closed displaced fracture of right femoral neck (HCC)  Closed displaced fracture of right femoral neck (HCC) [S72.001A]  Procedure(s) (LRB):  RIGHT  HIP FNS /CHOICE ANES TO BE ADMITTED 8/23/21 (Right)  1 Day Post-Op  Reason for Referral:    ICD-10: Treatment Diagnosis: Pain in Right Hip (M25.551)  Stiffness of Right Hip, Not elsewhere classified (M25.651)  History of falling (Z91.81)  INPATIENT: Pay[place] / Plan[place] PART A [place] B / Product Type: Medicare /   ASSESSMENT:     REHAB RECOMMENDATIONS:   Recommendation to date pending progress:  Setting:   Short-term Rehab  Equipment:    Rolling Walker     PRIOR LEVEL OF FUNCTION:  (Prior to Hospitalization)  INITIAL/CURRENT LEVEL OF FUNCTION:  (Based on today's evaluation)   Bathing:   Independent  Dressing:   Independent  Feeding/Grooming:   Independent  Toileting:   Independent  Functional Mobility:   Independent Bathing:   Minimal Assistance  Dressing:   Moderate Assistance  Feeding/Grooming:   Contact Guard Assistance  Toileting:   Minimal Assistance  Functional Mobility:   Minimal Assistance x 2     ASSESSMENT:  Ms. Nona Vergara presents after fall with R femur fx, now s/p plate and screw fixation, WBAT RLE. Hx CVA with slight residual R sided deficits (poor dorsiflexion RLE). At baseline pt lives with spouse, completes I/ADLs, ambulates, and drives with independence. Pt with deficits in pain, strength, balance, endurance, and mobility impacting ADLs. Pt practiced bed mobility, functional transfers, and steps to chair with MinAx2, cues for posture, balance, technique, safety, RW management. Pt with limited R shoulder AROM 2° fall. Pt is functioning below baseline and would benefit from continued OT to increase safety and independence. SUBJECTIVE:   Ms. Nona Vergara states, \"She looks like a cat, she has lil squeaky eyes. \"    SOCIAL HISTORY/LIVING ENVIRONMENT: Hx CVA with slight residual R sided deficits (poor dorsiflexion RLE). At baseline pt lives with spouse, completes I/ADLs, ambulates, and drives with independence.    Home Environment: Private residence  # Steps to Enter: 2  One/Two Story Residence: One story  Living Alone: No  Support Systems: Spouse/Significant Other/Partner    OBJECTIVE:     PAIN: VITAL SIGNS: LINES/DRAINS:   Pre Treatment: Pain Screen  Pain Scale 1: Numeric (0 - 10)  Pain Intensity 1: 2  Post Treatment: same Vital Signs  O2 Device: None (Room air) IV  O2 Device: None (Room air)     GROSS EVALUATION:  BUEs Within Functional Limits Abnormal/ Functional Abnormal/ Non-Functional (see comments) Not Tested Comments:   AROM [] [x] [] [] Limited in R shoulder 2° pain s/p fall   PROM [x] [] [] []    Strength [] [x] [] []    Balance [] [x] [] []    Posture [] [x] [] []    Sensation [] [] [] []    Coordination [] [x] [] []    Tone [] [] [] []    Edema [] [] [] []    Activity Tolerance [] [x] [] [] 2° pain    [] [] [] [] COGNITION/  PERCEPTION: Intact Impaired   (see comments) Comments:   Orientation [x] []    Vision [x] []    Hearing [x] []    Judgment/ Insight [] []    Attention [x] []    Memory [] []    Command Following [x] []    Emotional Regulation [x] []     [] []      ACTIVITIES OF DAILY LIVING: I Mod I S SBA CGA Min Mod Max Total NT Comments   BASIC ADLs:              Bathing/ Showering [] [] [] [] [] [] [] [] [] [x]    Toileting [] [] [] [] [] [] [] [] [] [x]    Dressing [] [] [] [] [] [] [] [] [] [x]    Feeding [] [] [] [] [] [] [] [] [] [x]    Grooming [] [] [] [] [] [] [] [] [] [x]    Personal Device Care [] [] [] [] [] [] [] [] [] []    Functional Mobility [] [] [] [] [] [x] [] [] [] [] x2   I=Independent, Mod I=Modified Independent, S=Supervision, SBA=Standby Assistance, CGA=Contact Guard Assistance,   Min=Minimal Assistance, Mod=Moderate Assistance, Max=Maximal Assistance, Total=Total Assistance, NT=Not Tested    MOBILITY: I Mod I S SBA CGA Min Mod Max Total  NT x2 Comments:   Supine to sit [] [] [] [] [] [x] [] [] [] [] [x]    Sit to supine [] [] [] [] [] [] [] [] [] [x] []    Sit to stand [] [] [] [] [] [x] [] [] [] [] [x]    Bed to chair [] [] [] [] [] [x] [] [] [] [] [x] RW   I=Independent, Mod I=Modified Independent, S=Supervision, SBA=Standby Assistance, CGA=Contact Guard Assistance,   Min=Minimal Assistance, Mod=Moderate Assistance, Max=Maximal Assistance, Total=Total Assistance, NT=Not [facility] 6 Clicks   Daily Activity Inpatient Short Form        How much help from another person does the patient currently need. .. Total A Lot A Little None   1. Putting on and taking off regular lower body clothing? [] 1   [x] 2   [] 3   [] 4   2. Bathing (including washing, rinsing, drying)? [] 1   [] 2   [x] 3   [] 4   3. Toileting, which includes using toilet, bedpan or urinal?   [] 1   [] 2   [x] 3   [] 4   4. Putting on and taking off regular upper body clothing?    [] 1   [] 2 [x] 3   [] 4   5. Taking care of personal grooming such as brushing teeth? [] 1   [] 2   [x] 3   [] 4   6. Eating meals? [] 1   [] 2   [] 3   [x] 4   © 2007, Trustees of [address] [place] [address] [address] [id number], under license to [employer]. All rights reserved     Score:  Initial: 18 8/25/21 Most Recent: X (Date: -- )   Interpretation of Tool:  Represents activities that are increasingly more difficult (i.e. Bed mobility, Transfers, Gait). PLAN:   FREQUENCY/DURATION: OT Plan of Care: 6 times/week for duration of hospital stay or until stated goals are met, whichever comes first.    PROBLEM LIST:   (Skilled intervention is medically necessary to address:)  1. Decreased ADL/Functional Activities  2. Decreased Activity Tolerance  3. Decreased AROM/PROM  4. Decreased Balance  5. Decreased Coordination  6. Decreased Gait Ability  7. Decreased Strength  8. Decreased Transfer Abilities  9. Increased Pain   INTERVENTIONS PLANNED:   (Benefits and precautions of occupational therapy have been discussed with the patient.)  1. Self Care Training  2. Therapeutic Activity  3. Therapeutic Exercise/HEP  4. Neuromuscular Re-education  5. Education     TREATMENT:     EVALUATION: Low Complexity : (Untimed Charge)    TREATMENT:   ( $$ Neuromuscular Re-Education: 8-22 mins   )  Co-Treatment PT/OT necessary due to patient's decreased overall endurance/tolerance levels, as well as need for high level skilled assistance to complete functional transfers/mobility and functional tasks  Neuromuscular Re-education (10 Minutes): Neuromuscular Re-education included Balance Training, Coordination training, Functional mobility with facilitation, Postural training and Standing balance training to improve Balance, Coordination, Functional Mobility and Postural Control.     TREATMENT GRID:  N/A    AFTER TREATMENT POSITION/PRECAUTIONS:  Chair, Needs within reach and RN notified    INTERDISCIPLINARY COLLABORATION:  RN/PCT, PT/PTA and OT/SALES    TOTAL TREATMENT DURATION:  OT Patient Time In/Time Out  Time In: 0913  Time Out: 0935    Emely Eaton OTR/L

## 2021-08-25 NOTE — PROGRESS NOTES
[facility] Hospitalist Progress Note            Assessment/Plan:      Principal Problem:  1- Right femur fracture - POD#1; pain to be better managed, pt advised to use IV morphine for break through pain only; she wants home PT/OT and declined STR; anticipate dc next 24hrs if medically stable     Active Problems:  2- HTN - cont losartan as dosed; low-normotensive with IV morphine    3- ABLA - closely monitor hemodynamics; f/u AM CBC    4- Right shoulder pain - CT right shoulder without acute fx, pain from fall and underlying osteoarthritis; ice pack to right shoulder    5- Dyslipidemia - cont statin tx; ASA resumed    6- Hypothyroidism - synthroid cont'd    7- Hypokalemia - repleted; f/u AM BMP, mag levels wnl     Dispo: Anticipate >2 midnight hospitalization; pt declining STR, hopefully home next 1-2 days  DVT ppx: SCD  Diet: regular  CODE status: Full              Brief history of presenting illness      Lidia Gutierrez is a 65 y.o. female with medical history of anxiety, depression, pretension, hyperlipidemia, GERD hypothyroidism  who presented to ED with right hip pain. Patient reports that she fell this morning after chasing her cat around the house. She also struck her head during the fall without any loss of consciousness. She then took the cat to the  but did a pop in her hip in the parking lot and fell. Thereafter pt unable to wt bear RLE and therefore presented to the ER. Pt was found with a subcapital right femur fx. CT head without any acute intracranial abnormalities. CT C-spine without any acute abnormalities but shows multilevel degenerative changes. Chest x-ray without any acute cardiopulmonary normalities and right shoulder xray revealed glenohumeral degenerative changes, no acute fx. She has not been vaccinated for COVID viral infection. Subjective:   Daily Progress Note: 8/25/2021 11:01 AM    \"Honestly my right shoulder hurts more than my leg. \"  Good spirited pleasant female in NAD. C/o right shoulder pain and limited ROM; denies LOC, fever, chills, shortness of breath, chest pain, nausea, vomiting, abdominal pain, cough. Current Facility-Administered Medications   Medication Dose Route Frequency    morphine injection 4 mg  4 mg IntraVENous Q1H PRN    sodium chloride (NS) flush 5-40 mL  5-40 mL IntraVENous Q8H    sodium chloride (NS) flush 5-40 mL  5-40 mL IntraVENous PRN    alum-mag hydroxide-simeth (MYLANTA) oral suspension 30 mL  30 mL Oral Q4H PRN    calcium-vitamin D (OS-SIDDHARTH +D3) 500 mg-200 unit per tablet 1 Tablet  1 Tablet Oral TID WITH MEALS    oxyCODONE IR (ROXICODONE) tablet 10 mg  10 mg Oral Q3H PRN    clopidogreL (PLAVIX) tablet 75 mg  75 mg Oral DAILY    lidocaine (XYLOCAINE) 10 mg/mL (1 %) injection 0.1 mL  0.1 mL SubCUTAneous PRN    tuberculin injection 5 Units  5 Units IntraDERMal ONCE    acetaminophen (TYLENOL) tablet 650 mg  650 mg Oral Q6H PRN    Or    acetaminophen (TYLENOL) suppository 650 mg  650 mg Rectal Q6H PRN    polyethylene glycol (MIRALAX) packet 17 g  17 g Oral DAILY PRN    promethazine (PHENERGAN) tablet 12.5 mg  12.5 mg Oral Q6H PRN    Or    ondansetron (ZOFRAN) injection 4 mg  4 mg IntraVENous Q6H PRN    aspirin delayed-release tablet 81 mg  81 mg Oral DAILY    atorvastatin (LIPITOR) tablet 40 mg  40 mg Oral QHS    losartan (COZAAR) tablet 100 mg  100 mg Oral DAILY    levothyroxine (SYNTHROID) tablet 112 mcg  112 mcg Oral ACB    venlafaxine-SR (EFFEXOR-XR) capsule 150 mg  150 mg Oral DAILY WITH BREAKFAST        Review of Systems  A comprehensive review of systems was negative except for that written in the HPI.     Objective:     Visit Vitals  BP (!) 97/54 (BP 1 Location: Left arm)   Pulse 89   Temp 98.6 °F (37 °C)   Resp 19   Ht 5' 6\" (1.676 m)   Wt 77.1 kg (170 lb)   SpO2 98%   BMI 27.44 kg/m²    O2 Flow Rate (L/min): 2 l/min O2 Device: None (Room air)    Temp (24hrs), Av.4 °F (36.9 °C), Min:97.6 °F (36.4 °C), Max:99.5 °F (37.5 °C)      No intake/output data recorded. 08/23 1901 - 08/25 0700  In: 800 [I.V.:800]  Out: 1400 [Urine:1350]  Physical Exam  Vitals reviewed. Constitutional:       General: She is not in acute distress. Appearance: Normal appearance. She is normal weight. She is not ill-appearing, toxic-appearing or diaphoretic. HENT:      Head: Normocephalic and atraumatic. Right Ear: External ear normal.      Left Ear: External ear normal.      Nose: Nose normal.      Mouth/Throat:      Mouth: Mucous membranes are moist.      Pharynx: Oropharynx is clear. Eyes:      Extraocular Movements: Extraocular movements intact. Conjunctiva/sclera: Conjunctivae normal.      Pupils: Pupils are equal, round, and reactive to light. Cardiovascular:      Rate and Rhythm: Normal rate and regular rhythm. Pulses: Normal pulses. Pulmonary:      Effort: Pulmonary effort is normal. No respiratory distress. Breath sounds: Normal breath sounds. No wheezing or rhonchi. Abdominal:      General: Bowel sounds are normal.      Palpations: Abdomen is soft. Musculoskeletal:         General: Swelling and deformity present. Cervical back: Normal range of motion. No tenderness. Comments: RUE limited ROM at shoulder with tenderness  Improved tenderness RLE; bandage in place, no gross bleeding  Sensation intact x 4   Skin:     General: Skin is warm. Neurological:      General: No focal deficit present. Mental Status: She is alert and oriented to person, place, and time. Mental status is at baseline. Psychiatric:         Mood and Affect: Mood normal.         Behavior: Behavior normal.         Thought Content:  Thought content normal.         Data Review    Recent Results (from the past 24 hour(s))   METABOLIC PANEL, BASIC    Collection Time: 08/25/21  4:51 AM   Result Value Ref Range    Sodium 141 136 - 145 mmol/L    Potassium 3.3 (L) 3.5 - 5.1 mmol/L    Chloride 109 (H) 98 - 107 mmol/L CO2 25 21 - 32 mmol/L    Anion gap 7 7 - 16 mmol/L    Glucose 96 65 - 100 mg/dL    BUN 12 8 - 23 MG/DL    Creatinine 0.65 0.6 - 1.0 MG/DL    GFR est AA >60 >60 ml/min/1.73m2    GFR est non-AA >60 >60 ml/min/1.73m2    Calcium 8.5 8.3 - 10.4 MG/DL   CBC W/O DIFF    Collection Time: 08/25/21  4:51 AM   Result Value Ref Range    WBC 6.5 4.3 - 11.1 K/uL    RBC 2.89 (L) 4.05 - 5.2 M/uL    HGB 8.9 (L) 11.7 - 15.4 g/dL    HCT 26.8 (L) 35.8 - 46.3 %    MCV 92.7 79.6 - 97.8 FL    MCH 30.8 26.1 - 32.9 PG    MCHC 33.2 31.4 - 35.0 g/dL    RDW 13.1 11.9 - 14.6 %    PLATELET 104 (L) 108 - 450 K/uL    MPV 10.5 9.4 - 12.3 FL    ABSOLUTE NRBC 0.00 0.0 - 0.2 K/uL   MAGNESIUM    Collection Time: 08/25/21  4:51 AM   Result Value Ref Range    Magnesium 1.8 1.8 - 2.4 mg/dL   SARS-COV-2    Collection Time: 08/25/21  1:03 PM   Result Value Ref Range    SARS-CoV-2 Please find results under separate order           Yumiko Young PA-C  [facility] OF [facility]

## 2021-08-25 NOTE — PROGRESS NOTES
August 25, 2021         Post Op day: 1 Day Post-Op Procedure(s) (LRB):  RIGHT  HIP FNS /CHOICE ANES TO BE ADMITTED 8/23/21 (Right)      Admit Date: 8/23/2021  Admit Diagnosis: Closed displaced fracture of right femoral neck ([address] [S72.001A]       Principle Problem: Closed displaced fracture of right femoral neck ([address] Subjective: Doing well, No complaints, No SOB, No Chest Pain, No Nausea or Vomiting     Objective:   Vital Signs are Stable, No Acute Distress, Alert,  Dressing is Dry,  Neurovascular exam is normal.     Assessment / Plan :  Patient Active Problem List   Diagnosis Code    Localized osteoarthrosis not specified whether primary or secondary, lower leg M17.10    S/P total knee replacement Z96.659    Asthma J45.909    GERD (gastroesophageal reflux disease) K21.9    Arthritis M19.90    Depression F32.9    Anxiety F41.9    Hypothyroid E03.9    Peripheral neuropathy G62.9    Allergic rhinitis J30.9    Chronic pain syndrome G89.4    Hypercholesterolemia E78.00    HTN (hypertension) I10    IBS (irritable bowel syndrome) K58.9    Migraines G43.909    Heart murmur R01.1    Arthritis of knee, left M17.12    S/P total knee arthroplasty Z96.659    Dizziness R42    Chest pain R07.9    Shortness of breath R06.02    Hyperglycemia R73.9    Recurrent depression (HCC) F33.9    External hemorrhoid, bleeding K64.4    Abnormal findings on diagnostic imaging of other specified body structures R93.89    Constipation K59.00    Personal history of colonic polyps Z86.010    Rectal bleeding K62.5    SBO (small bowel obstruction) (HCC) K56.609    Peripheral vascular disease (HCC) I73.9    Ataxia R27.0    Falls [address]19. [place]    Thrush B37.0    Polycythemia D75.1    Hypokalemia E87.6    Elevated bilirubin R17    OAB (overactive bladder) N32.81    Vaginal atrophy N95.2    Hematuria R31.9    History of CVA (cerebrovascular accident) Z86.73    Closed displaced fracture of right femoral neck [facility]) S72.001A      Patient Vitals for the past 8 hrs:   BP Temp Pulse Resp SpO2   21 0738 115/74 98.6 °F (37 °C) 94 19 98 %   21 0530     91 %   21 0406 (!) 140/68 99.5 °F (37.5 °C) 90 17 96 %    Temp (24hrs), Av.1 °F (36.7 °C), Min:97.6 °F (36.4 °C), Max:99.5 °F (37.5 °C)    Body mass index is 27.44 kg/m².     Lab Results   Component Value Date/Time    HGB 8.9 (L) 2021 04:51 AM          S/P Procedure(s) (LRB):  RIGHT  HIP FNS /CHOICE ANES TO BE ADMITTED 21 (Right)      Medical Mgmt per hospitalist  Anticoagulation plan: plavix and ASA  Continue PT  Fall Precautions  DC disp: per sW  F/U: 2 weeks postop for wound check and staple removal        Signed By: MEIR Haywood  2021,  8:13 AM

## 2021-08-25 NOTE — PROGRESS NOTES
Late Entry    Spiritual Care visit. Initial Visit, Pre Surgery Consult. Visit and prayer before patient goes to surgery.     Visit by Teressa Gottlieb M.Ed., Th.B. ,Staff

## 2021-08-25 NOTE — PROGRESS NOTES
ACUTE PHYSICAL THERAPY GOALS:  (Developed with and agreed upon by patient and/or caregiver. )    LTG:  (1.)Ms. Vincent Tejeda will move from supine to sit and sit to supine , scoot up and down and roll side to side in bed with INDEPENDENT within 7 treatment day(s). (2.)Ms. Vincent Tejeda will transfer from bed to chair and chair to bed with CONTACT GUARD ASSIST using the least restrictive device within 7 treatment day(s). (3.)Ms. Vincent Tejeda will ambulate with CONTACT GUARD ASSIST for 25 feet with the least restrictive device within 7 treatment day(s). (4.)Ms. Vincent Tejeda will tolerate at least 23 min of dynamic standing activity to assist standing ADLs with the least restrictive device within 7 treatment days. (5.)Ms. Vincent Tejeda will climb at least 2 steps with MODERATE ASSIST with the least restrictive device within 7 treatment days.     ________________________________________________________________________________________________        PHYSICAL THERAPY ASSESSMENT: Initial Assessment, Daily Note and AM PT Treatment Day # 1 WBAT      Sajan Weiss is a 66 y.o. female   PRIMARY DIAGNOSIS: Closed displaced fracture of right femoral neck (HCC)  Closed displaced fracture of right femoral neck (HCC) [S72.001A]  Procedure(s) (LRB):  RIGHT  HIP FNS /CHOICE ANES TO BE ADMITTED 8/23/21 (Right)  1 Day Post-Op  Reason for Referral:    ICD-10: Treatment Diagnosis: Generalized Muscle Weakness (M62.81)  Other lack of cordination (R27.8)  Difficulty in walking, Not elsewhere classified (R26.2)  Other abnormalities of gait and mobility (R26.89)  Repeated Falls (R29.6)  History of falling (Z91.81)  Unspecified Lack of Coordination (R27.9)  INPAT[place]: [place] / [place] / Product Type: Medicare /     ASSESSMENT:     REHAB RECOMMENDATIONS:   Recommendation to date pending progress:  Setting:   Short-term Rehab  Equipment:    Rolling Walker     PRIOR LEVEL OF FUNCTION:  (Prior to Hospitalization) INITIAL/CURRENT LEVEL OF FUNCTION:  (Most Recently Demonstrated)   Bed Mobility:   Independent  Sit to Stand:   Independent  Transfers:   Independent  Gait/Mobility:   Independent with frequent falls Bed Mobility:   Minimal Assistance x 2  Sit to Stand:   Minimal Assistance x 2  Transfers:   Minimal Assistance x 2  Gait/Mobility:   Minimal Assistance x 2     ASSESSMENT:  Ms. Elena Grace presents in supine, A&Ox4. She reports 3 recent falls due to her R foot \" getting caught\". Pt had previous right sided CVA two years ago and is noted to have greatly reduced R active dorsiflexion. Upon entering, Pnt is agreeable to PT treatment. she reports 6/10 pain in her hip and R shoulder at rest. Pnt performed supine > sit with min Ax2 and extra time, sitting EOB with fair+ sitting balance control. Sit > stand with min Ax2 using RW. Gait x 5-6 ft with min Ax2 and extra time, cues for weightbearing and posture. Gait is noted to be very slow w/ decreased R stance time. Stand > sit with min Ax2, followed by positioning for comfort. At end of session pt up in bedside chair with all needs within reach, alarm activated for safety, RN notified. Overall, she presents as functioning below her baseline, with deficits in mobility including transfers, gait, balance, and activity tolerance. Pt will continue to benefit from skilled therapy services to address stated deficits to promote return to highest level of function, independence, and safety. Will continue to follow.          SUBJECTIVE:   Ms. Elena Grace states, \"I've had much better birthdays than today\"    SOCIAL HISTORY/LIVING ENVIRONMENT: lives w/  who is in poor health, but has two helpful adult daughters who live nearby, 1 story, 2 ANYA, Right sided CVA 2 years ago  Home Environment: Independent living  # Steps to Enter: 4  One/Two Story Residence: One story  Living Alone: No  Support Systems: Spouse/Significant Other/Partner  OBJECTIVE:     PAIN: VITAL SIGNS: LINES/DRAINS:   Pre Treatment: Pain Screen  Pain Scale 1: Numeric (0 - 10)  Pain Intensity 1: 6  Post Treatment: 6   IV and Purewick  O2 Device: Nasal cannula     GROSS EVALUATION:   Within Functional Limits Abnormal/ Functional Abnormal/ Non-Functional (see comments) Not Tested Comments:   AROM [] [] [x] [] Decreased global R shoulder and hip pain, decreased R dorsiflexion   PROM [] [] [x] []    Strength [] [x] [] []    Balance [] [x] [] [] Mild posterior lean   Posture [] [x] [] []    Sensation [] [x] [] []    Coordination [] [x] [] []    Tone [] [x] [] []    Edema [x] [] [] []    Activity Tolerance [x] [] [] []     [] [] [] []      COGNITION/  PERCEPTION: Intact Impaired   (see comments) Comments:   Orientation [x] []    Vision [x] []    Hearing [x] []    Command Following [x] []    Safety Awareness [x] []     [] []      MOBILITY: I Mod I S SBA CGA Min Mod Max Total  NT x2 Comments:   Bed Mobility    Rolling [] [] [] [] [] [x] [] [] [] [] [x]    Supine to Sit [] [] [] [] [] [x] [] [] [] [] [x]    Scooting [] [] [] [] [] [x] [] [] [] [] [x]    Sit to Supine [] [] [] [] [] [x] [] [] [] [] [x]    Transfers    Sit to Stand [] [] [] [] [] [x] [] [] [] [] [x]    Bed to Chair [] [] [] [] [] [x] [] [] [] [] [x]    Stand to Sit [] [] [] [] [] [x] [] [] [] [] [x]    I=Independent, Mod I=Modified Independent, S=Supervision, SBA=Standby Assistance, CGA=Contact Guard Assistance,   Min=Minimal Assistance, Mod=Moderate Assistance, Max=Maximal Assistance, Total=Total Assistance, NT=Not Tested  GAIT: I Mod I S SBA CGA Min Mod Max Total  NT x2 Comments:   Level of Assistance [] [] [] [] [] [x] [] [] [] [] [x]    Distance x5-6'    DME Rolling Walker and Gait Belt    Gait Quality Slow, shuffled    Weightbearing Status WBAT     I=Independent, Mod I=Modified Independent, S=Supervision, SBA=Standby Assistance, CGA=Contact Guard Assistance,   Min=Minimal Assistance, Mod=Moderate Assistance, Max=Maximal Assistance, Total=Total Assistance, NT=Not Tested    [address] [place] [address] [address] [id number] AM-PAC 6 Clicks   Basic Mobility Inpatient Short Form       How much difficulty does the patient currently have. .. Unable A Lot A Little None   1. Turning over in bed (including adjusting bedclothes, sheets and blankets)? [] 1   [] 2   [x] 3   [] 4   2. Sitting down on and standing up from a chair with arms ( e.g., wheelchair, bedside commode, etc.)   [] 1   [] 2   [x] 3   [] 4   3. Moving from lying on back to sitting on the side of the bed? [] 1   [] 2   [x] 3   [] 4   How much help from another person does the patient currently need. .. Total A Lot A Little None   4. Moving to and from a bed to a chair (including a wheelchair)? [] 1   [] 2   [x] 3   [] 4   5. Need to walk in hospital room? [] 1   [] 2   [x] 3   [] 4   6. Climbing 3-5 steps with a railing? [] 1   [x] 2   [] 3   [] 4   © 2007, Trustees of [address] [place], [place] [id number], under license to [employer]. All rights reserved     Score:  Initial: 17 Most Recent: X (Date: -- )    Interpretation of Tool:  Represents activities that are increasingly more difficult (i.e. Bed mobility, Transfers, Gait). PLAN:   FREQUENCY/DURATION: PT Plan of Care: BID for duration of hospital stay or until stated goals are met, whichever comes first.    PROBLEM LIST:   (Skilled intervention is medically necessary to address:)  1. Decreased ADL/Functional Activities  2. Decreased Activity Tolerance  3. Decreased AROM/PROM  4. Decreased Balance  5. Decreased Coordination  6. Decreased Gait Ability  7. Decreased Strength  8. Decreased Transfer Abilities   INTERVENTIONS PLANNED:   (Benefits and precautions of physical therapy have been discussed with the patient.)  1. Therapeutic Activity  2. Therapeutic Exercise/HEP  3. Neuromuscular Re-education  4. Gait Training  5. Manual Therapy  6.  Education     TREATMENT:     EVALUATION: Low Complexity : (Untimed Charge)    TREATMENT:   ($$ Therapeutic Activity: 8-22 mins    )  Co-Treatment PT/OT necessary due to patient's decreased overall endurance/tolerance levels, as well as need for high level skilled assistance to complete functional transfers/mobility and functional tasks  Therapeutic Activity (13 Minutes): Therapeutic activity included Rolling, Supine to Sit, Scooting, Transfer Training, Ambulation on level ground, Sitting balance  and Standing balance to improve functional Mobility, Strength, ROM and Activity tolerance.     TREATMENT GRID:  N/A    AFTER TREATMENT POSITION/PRECAUTIONS:  Chair, Needs within reach and RN notified    INTERDISCIPLINARY COLLABORATION:  RN/PCT, PT/PTA and OT/SALES    TOTAL TREATMENT DURATION:  PT Patient Time In/Time Out  Time In: 5819  Time Out: 927 [facility]

## 2021-08-25 NOTE — PROGRESS NOTES
ACUTE PHYSICAL THERAPY GOALS:  (Developed with and agreed upon by patient and/or caregiver. )  LTG:  (1.)Ms. Gay Litten will move from supine to sit and sit to supine , scoot up and down and roll side to side in bed with INDEPENDENT within 7 treatment day(s). (2.)Ms. Gay Litten will transfer from bed to chair and chair to bed with CONTACT GUARD ASSIST using the least restrictive device within 7 treatment day(s). (3.)Ms. Gay Litten will ambulate with CONTACT GUARD ASSIST for 25 feet with the least restrictive device within 7 treatment day(s). (4.)Ms. Gay Litten will tolerate at least 23 min of dynamic standing activity to assist standing ADLs with the least restrictive device within 7 treatment days. (5.)Ms. Gay Litten will climb at least 2 steps with MODERATE ASSIST with the least restrictive device within 7 treatment days. PHYSICAL THERAPY: Daily Note and PM Treatment Day # 1    Laurita Elias is a 66 y.o. female   PRIMARY DIAGNOSIS: Closed displaced fracture of right femoral neck (HCC)  Closed displaced fracture of right femoral neck (HCC) [S72.001A]  Procedure(s) (LRB):  RIGHT  HIP FNS /CHOICE ANES TO BE ADMITTED 8/23/21 (Right)  1 Day Post-Op    ASSESSMENT:     REHAB RECOMMENDATIONS: CURRENT LEVEL OF FUNCTION:  (Most Recently Demonstrated)   Recommendation to date pending progress:  Setting:   Short-term Rehab  Equipment:    To Be Determined Bed Mobility:   Contact Guard Assistance  Sit to Stand:   Minimal Assistance x 2  Transfers:   Minimal Assistance x 2  Gait/Mobility:   Minimal Assistance x 2     ASSESSMENT:  Ms. Gay Litten presents in supine. Upon entering, Pnt is agreeable to PT treatment. she reports 4/10 pain in her hip at rest. Pnt performed supine > sit with CGA and extra time, sitting EOB with fair sitting balance control. Sit > stand with min Ax2 using RW. Gait x 25 ft with min Ax2 and chair, cues for posture and step clearance. Gait is noted to be slow and shuffled.  Stand > sit with min A, followed by positioning for comfort. At end of session pt supine in bed with all needs within reach, alarm activated for safety, RN notified. Overall, good progress today as pnt improved in ambulation distance. Pnt continues to present as functioning below her baseline, with deficits in mobility including transfers, gait, balance, and activity tolerance. Pt will continue to benefit from skilled therapy services to address stated deficits to promote return to highest level of function, independence, and safety. Will continue to follow.          SUBJECTIVE:   Ms. Barry Villalta states, \"I'm not really hungry yet\"    SOCIAL HISTORY/ LIVING ENVIRONMENT:   Home Environment: Private residence  # Steps to Enter: 2  One/Two Story Residence: One story  Living Alone: No  Support Systems: Spouse/Significant Other/Partner  OBJECTIVE:     PAIN: VITAL SIGNS: LINES/DRAINS:   Pre Treatment: Pain Screen  Pain Scale 1: Numeric (0 - 10)  Pain Intensity 1: 4  Post Treatment: 4   Purewick  O2 Device: None (Room air)     MOBILITY: I Mod I S SBA CGA Min Mod Max Total  NT x2 Comments:   Bed Mobility    Rolling [] [] [] [] [x] [] [] [] [] [] []    Supine to Sit [] [] [] [] [x] [] [] [] [] [] []    Scooting [] [] [] [] [x] [] [] [] [] [] []    Sit to Supine [] [] [] [] [x] [] [] [] [] [] []    Transfers    Sit to Stand [] [] [] [] [x] [] [] [] [] [] [x]    Bed to Chair [] [] [] [] [] [] [] [] [] [x] []    Stand to Sit [] [] [] [] [x] [] [] [] [] [] [x]    I=Independent, Mod I=Modified Independent, S=Supervision, SBA=Standby Assistance, CGA=Contact Guard Assistance,   Min=Minimal Assistance, Mod=Moderate Assistance, Max=Maximal Assistance, Total=Total Assistance, NT=Not Tested    BALANCE: Good Fair+ Fair Fair- Poor NT Comments   Sitting Static [] [x] [] [] [] []    Sitting Dynamic [] [x] [] [] [] []              Standing Static [] [x] [] [] [] []    Standing Dynamic [] [] [] [] [] []      GAIT: I Mod I S SBA CGA Min Mod Max Total  NT x2 Comments:   Level of Assistance [] [] [] [] [] [x] [] [] [] [] [x]    Distance x25'    DME Rolling Walker, Gait Belt and chair follow    Gait Quality Slow, shuffled    Weightbearing  Status WBAT     I=Independent, Mod I=Modified Independent, S=Supervision, SBA=Standby Assistance, CGA=Contact Guard Assistance,   Min=Minimal Assistance, Mod=Moderate Assistance, Max=Maximal Assistance, Total=Total Assistance, NT=Not Tested    PLAN:   FREQUENCY/DURATION: PT Plan of Care: BID for duration of hospital stay or until stated goals are met, whichever comes first.  TREATMENT:     TREATMENT:   ($$ Therapeutic Activity: 8-22 mins    )  Therapeutic Activity (21 Minutes): Therapeutic activity included Rolling, Supine to Sit, Sit to Supine, Scooting, Transfer Training, Ambulation on level ground, Sitting balance  and Standing balance to improve functional Mobility, Strength, ROM and Activity tolerance.     TREATMENT GRID:  N/A    AFTER TREATMENT POSITION/PRECAUTIONS:  Alarm Activated, Bed, Needs within reach and RN notified    INTERDISCIPLINARY COLLABORATION:  RN/PCT, PT/PTA and Rehab Attendant     TOTAL TREATMENT DURATION:  PT Patient Time In/Time Out  Time In: 1308  Time Out: [place]

## 2021-08-26 VITALS
SYSTOLIC BLOOD PRESSURE: 109 MMHG | OXYGEN SATURATION: 96 % | WEIGHT: 170 LBS | TEMPERATURE: 98.6 F | DIASTOLIC BLOOD PRESSURE: 63 MMHG | BODY MASS INDEX: 27.32 KG/M2 | HEIGHT: 66 IN | RESPIRATION RATE: 17 BRPM | HEART RATE: 71 BPM

## 2021-08-26 PROBLEM — D62 ACUTE POSTOPERATIVE ANEMIA DUE TO GREATER THAN EXPECTED BLOOD LOSS: Status: ACTIVE | Noted: 2021-08-26

## 2021-08-26 LAB
ANION GAP SERPL CALC-SCNC: 5 MMOL/L (ref 7–16)
BASOPHILS # BLD: 0.1 K/UL (ref 0–0.2)
BASOPHILS NFR BLD: 1 % (ref 0–2)
BUN SERPL-MCNC: 15 MG/DL (ref 8–23)
CALCIUM SERPL-MCNC: 8.6 MG/DL (ref 8.3–10.4)
CHLORIDE SERPL-SCNC: 109 MMOL/L (ref 98–107)
CO2 SERPL-SCNC: 27 MMOL/L (ref 21–32)
CREAT SERPL-MCNC: 0.75 MG/DL (ref 0.6–1)
DIFFERENTIAL METHOD BLD: ABNORMAL
EOSINOPHIL # BLD: 0.3 K/UL (ref 0–0.8)
EOSINOPHIL NFR BLD: 4 % (ref 0.5–7.8)
ERYTHROCYTE [DISTWIDTH] IN BLOOD BY AUTOMATED COUNT: 13.1 % (ref 11.9–14.6)
GLUCOSE SERPL-MCNC: 114 MG/DL (ref 65–100)
HCT VFR BLD AUTO: 26.6 % (ref 35.8–46.3)
HGB BLD-MCNC: 8.6 G/DL (ref 11.7–15.4)
IMM GRANULOCYTES # BLD AUTO: 0 K/UL (ref 0–0.5)
IMM GRANULOCYTES NFR BLD AUTO: 1 % (ref 0–5)
LYMPHOCYTES # BLD: 1.6 K/UL (ref 0.5–4.6)
LYMPHOCYTES NFR BLD: 20 % (ref 13–44)
MCH RBC QN AUTO: 29.5 PG (ref 26.1–32.9)
MCHC RBC AUTO-ENTMCNC: 32.3 G/DL (ref 31.4–35)
MCV RBC AUTO: 91.1 FL (ref 79.6–97.8)
MONOCYTES # BLD: 0.6 K/UL (ref 0.1–1.3)
MONOCYTES NFR BLD: 8 % (ref 4–12)
NEUTS SEG # BLD: 5.2 K/UL (ref 1.7–8.2)
NEUTS SEG NFR BLD: 67 % (ref 43–78)
NRBC # BLD: 0 K/UL (ref 0–0.2)
PLATELET # BLD AUTO: 135 K/UL (ref 150–450)
PMV BLD AUTO: 10.4 FL (ref 9.4–12.3)
POTASSIUM SERPL-SCNC: 3.7 MMOL/L (ref 3.5–5.1)
RBC # BLD AUTO: 2.92 M/UL (ref 4.05–5.2)
SODIUM SERPL-SCNC: 141 MMOL/L (ref 136–145)
WBC # BLD AUTO: 7.7 K/UL (ref 4.3–11.1)

## 2021-08-26 PROCEDURE — 97535 SELF CARE MNGMENT TRAINING: CPT

## 2021-08-26 PROCEDURE — 74011250637 HC RX REV CODE- 250/637: Performed by: ORTHOPAEDIC SURGERY

## 2021-08-26 PROCEDURE — 36415 COLL VENOUS BLD VENIPUNCTURE: CPT

## 2021-08-26 PROCEDURE — 80048 BASIC METABOLIC PNL TOTAL CA: CPT

## 2021-08-26 PROCEDURE — 74011250637 HC RX REV CODE- 250/637: Performed by: INTERNAL MEDICINE

## 2021-08-26 PROCEDURE — 97530 THERAPEUTIC ACTIVITIES: CPT

## 2021-08-26 PROCEDURE — 74011250637 HC RX REV CODE- 250/637: Performed by: PHYSICIAN ASSISTANT

## 2021-08-26 PROCEDURE — 85025 COMPLETE CBC W/AUTO DIFF WBC: CPT

## 2021-08-26 RX ORDER — FERROUS SULFATE, DRIED 160(50) MG
1 TABLET, EXTENDED RELEASE ORAL
Qty: 90 TABLET | Refills: 0 | Status: SHIPPED | OUTPATIENT
Start: 2021-08-26 | End: 2021-09-25

## 2021-08-26 RX ADMIN — Medication 10 ML: at 14:23

## 2021-08-26 RX ADMIN — LEVOTHYROXINE SODIUM 112 MCG: 0.11 TABLET ORAL at 05:46

## 2021-08-26 RX ADMIN — CLOPIDOGREL BISULFATE 75 MG: 75 TABLET ORAL at 09:09

## 2021-08-26 RX ADMIN — OXYCODONE 10 MG: 5 TABLET ORAL at 09:09

## 2021-08-26 RX ADMIN — Medication 10 ML: at 05:49

## 2021-08-26 RX ADMIN — OXYCODONE 10 MG: 5 TABLET ORAL at 12:12

## 2021-08-26 RX ADMIN — Medication 1 TABLET: at 12:09

## 2021-08-26 RX ADMIN — VENLAFAXINE HYDROCHLORIDE 150 MG: 150 CAPSULE, EXTENDED RELEASE ORAL at 09:09

## 2021-08-26 RX ADMIN — ASPIRIN 81 MG: 81 TABLET ORAL at 09:09

## 2021-08-26 RX ADMIN — OXYCODONE 10 MG: 5 TABLET ORAL at 02:26

## 2021-08-26 RX ADMIN — Medication 1 TABLET: at 09:09

## 2021-08-26 RX ADMIN — OXYCODONE 10 MG: 5 TABLET ORAL at 05:46

## 2021-08-26 RX ADMIN — LOSARTAN POTASSIUM 100 MG: 50 TABLET, FILM COATED ORAL at 09:09

## 2021-08-26 NOTE — DISCHARGE SUMMARY
Hospitalist Discharge Summary     Admit Date:  2021 11:00 AM   DC note date: 2021  Name:  Seferino Marroquin   Age:  66 y.o.  :  1950   MRN:  [id number]   PCP:  Ayala Green DO    Presenting Complaint: Fall    Initial Admission Diagnosis: Closed displaced fracture of right femoral neck ([facility] [S72.001A]     Problem List for this Hospitalization:  Hospital Problems as of 2021 Date Reviewed: 2021        Codes Class Noted - Resolved POA    Acute postoperative anemia due to greater than expected blood loss ICD-10-CM: D62  ICD-9-CM: 285.1  2021 - Present Yes        * (Principal) Closed displaced fracture of right femoral neck ([facility] ICD-10-CM: S72.001A  ICD-9-CM: 820.8  2021 - Present Yes        History of CVA (cerebrovascular accident) ICD-10-CM: Z86.73  ICD-9-CM: V12.54  Unknown - Present Yes        Recurrent depression ([facility] ICD-10-CM: F33.9  ICD-9-CM: 296.30  2017 - Present Yes        GERD (gastroesophageal reflux disease) ICD-10-CM: K21.9  ICD-9-CM: 530.81  Unknown - Present Yes    Overview Signed 2015  4:23 PM by Rosanne Roman     protonix              Depression ICD-10-CM: F32.9  ICD-9-CM: 249  Unknown - Present     Overview Signed 2015  4:23 PM by Rosanne Roman     worsening             Anxiety ICD-10-CM: F41.9  ICD-9-CM: 300.00  Unknown - Present Yes        Hypothyroid ICD-10-CM: E03.9  ICD-9-CM: 244.9  Unknown - Present Yes        Hypercholesterolemia ICD-10-CM: E78.00  ICD-9-CM: 272.0  Unknown - Present Yes        HTN (hypertension) ICD-10-CM: I10  ICD-9-CM: 401.9  Unknown - Present Yes            Did Patient have Sepsis (YES OR NO): no    Admission HPI from 2021:    \"  Seferino Marroquin is a 65 y.o. female with medical history of anxiety, depression, pretension, hyperlipidemia, GERD hypothyroidism  who presented to ED with right hip pain. Patient reports that she fell this morning after chasing her cat around the house.   She also struck her head during the fall without any loss of consciousness. She then took the cat to the  but did a pop in her hip in the parking lot and fell. She has been unable to stand or bear weight since then. Planes of right hip pain, right shoulder and pain on the head. Pain is 5 out of 10 to 10 out of 10 on the right hip. She is on antiplatelet therapy with Plavix. Last dose this morning  She reports that she is in normal state of health prior to the fall. Denies any fever, chills, shortness of breath, chest pain, nausea, vomiting, abdominal pain, cough. She has not been vaccinated.     In the ED, patient is hemodynamically stable and saturating well on room air during my encounter. Laboratory work up is un-remarkable. CT head without any acute intracranial abnormalities. CT C-spine without any acute abnormalities but shows multilevel degenerative changes. Chest x-ray without any acute cardiopulmonary normalities. X-ray of the right hip shows subcapital proximal femur fracture. X-ray of right shoulder shows glenohumeral degenerative changes. EKG with NSR w/ incomplete RBBB. \"    Hospital Course:  Pt was found with a subcapital right femur fx. CT head without any acute intracranial abnormalities.  CT C-spine without any acute abnormalities but shows multilevel degenerative changes.  Chest x-ray without any acute cardiopulmonary normalities and right shoulder xray revealed glenohumeral degenerative changes, no acute fx. She has not been vaccinated for COVID viral infection. She went to OR 8/24. Had ABLA postop but no complications. PT/OT recommending home health and I have no objections to discharge today. No changes to meds except calcium/vit D supplement. Ortho concerned about possible R rotator cuff tear but they will address at follow up. Disposition: Home Health Care Svc  Diet: ADULT DIET Regular  Code Status: Full Code    Follow Up Orders:   Follow-up Appointments   Procedures    FOLLOW UP VISIT Appointment in: Two Weeks Staple removal in office. Call  [phone] for appointment     Staple removal in office. Call  [phone] for appointment     Standing Status:   Standing     Number of Occurrences:   1     Order Specific Question:   Appointment in     Answer: Two Weeks       Follow-up Information     Follow up With Specialties Details [facility]. A home health representative will contact you to schedule the initial home visit. [address] Liliana Swanson, DO Family Medicine Call As needed [address] [place] [address]  [phone]            Time spent in patient discharge and coordination 33 minutes. Plan was discussed with pt. All questions answered. Patient was stable at time of discharge. Given instructions to call a physician or return if any concerns. Discharge Info:   Current Discharge Medication List      START taking these medications    Details   calcium-vitamin D (OS-SIDDHARTH +D3) 500 mg-200 unit per tablet Take 1 Tablet by mouth three (3) times daily (with meals) for 30 days. Qty: 90 Tablet, Refills: 0  Start date: 8/26/2021, End date: 9/25/2021         CONTINUE these medications which have NOT CHANGED    Details   HYDROcodone-acetaminophen (NORCO)  mg tablet Take 1 Tablet by mouth every six (6) hours as needed for Pain for up to 30 days. Max Daily Amount: 4 Tablets.   Qty: 120 Tablet, Refills: 0  Start date: 8/25/2021, End date: 9/24/2021    Associated Diagnoses: Chronic pain syndrome      Bystolic 10 mg tablet TAKE 1 TABLET DAILY  Qty: 90 Tablet, Refills: 3    Associated Diagnoses: Essential hypertension      !! ALPRAZolam (XANAX) 2 mg tablet 1 p.o. as needed anxiety and 1 p.o. as needed insomnia  Qty: 30 Tablet, Refills: 0    Associated Diagnoses: Grieving      atorvastatin (LIPITOR) 40 mg tablet TAKE 1 TABLET BY MOUTH EVERY NIGHT  Qty: 30 Tablet, Refills: 11    Associated Diagnoses: Hyperlipidemia, unspecified hyperlipidemia type      omeprazole (PRILOSEC) 40 mg capsule TAKE 1 CAPSULE BY MOUTH DAILY  Qty: 30 Capsule, Refills: 11    Associated Diagnoses: Gastroesophageal reflux disease without esophagitis      venlafaxine-SR (EFFEXOR-XR) 150 mg capsule TAKE ONE CAPSULE BY MOUTH EVERY 12 HOURS FOR NERVES  Qty: 60 Capsule, Refills: 11    Associated Diagnoses: Moderate episode of recurrent major depressive disorder ([facility]      ! ! ALPRAZolam (XANAX) 1 mg tablet TAKE 1 TABLET BY MOUTH EVERY DAY  Qty: 30 Tab, Refills: 5    Associated Diagnoses: Anxiety      Synthroid 112 mcg tablet TAKE 1 TABLET DAILY BEFORE BREAKFAST  Qty: 90 Tab, Refills: 3    Associated Diagnoses: Hypothyroidism due to acquired atrophy of thyroid      clopidogreL (PLAVIX) 75 mg tab TAKE 1 TABLET DAILY  Qty: 90 Tab, Refills: 3    Associated Diagnoses: Peripheral vascular disease (HCC)      losartan (COZAAR) 100 mg tablet TAKE 1 TABLET DAILY  Qty: 90 Tab, Refills: 3      potassium chloride (K-DUR, KLOR-CON) 20 mEq tablet Take 1 Tab by mouth daily. Qty: 90 Tab, Refills: 3    Associated Diagnoses: Hypokalemia      sucralfate (Carafate) 1 gram tablet Take 1 Tab by mouth four (4) times daily. Qty: 120 Tab, Refills: 3    Associated Diagnoses: Gastroesophageal reflux disease without esophagitis      saliva substitution (Biotene Dry Mouth Oral Rinse) mwsh mouthwash 15 mL by Mucous Membrane route as needed for Other (Dry mouth). Qty: 237 mL, Refills: 1      cholecalciferol, vitamin D3, (VITAMIN D3 PO) Take  by mouth. fluticasone (FLONASE) 50 mcg/actuation nasal spray 2 Sprays by Both Nostrils route daily. Qty: 1 Bottle, Refills: 0      MULTIVIT,CALC,MINS/FOLIC ACID (ONE-A-DAY PROACTIVE 65 PLUS PO) Take 1 Tab by mouth daily. Stopped 9/6/18  Indications: OTC      aspirin delayed-release 81 mg tablet Take 81 mg by mouth daily.  Stopped 8/24/18--- per pt-- dr Nahomy Diaz  Indications: OTC      hydrocortisone (ANUSOL-HC) 2.5 % rectal cream Insert  into rectum four (4) times daily. Qty: 30 g, Refills: 3    Associated Diagnoses: External hemorrhoid, bleeding; Grade I hemorrhoids      cyanocobalamin 1,000 mcg tablet Take 1,000 mcg by mouth daily. Indications: OTC       !! - Potential duplicate medications found. Please discuss with provider. STOP taking these medications       losartan-hydroCHLOROthiazide (HYZAAR) 100-25 mg per tablet Comments:   Reason for Stopping:               Procedures done this admission:  Procedure(s):  RIGHT  HIP FNS /CHOICE ANES TO BE ADMITTED 8/23/21    Consults this admission:  None    Echocardiogram/EKG results:  No results found for this visit on 08/23/21. EKG Results     Procedure 720 Value Units Date/Time    EKG 12 LEAD INITIAL [[id number]] Collected: 08/23/21 1116    Order Status: Completed Updated: 08/23/21 1336     Ventricular Rate 69 BPM      Atrial Rate 69 BPM      P-R Interval 166 ms      QRS Duration 98 ms      Q-T Interval 456 ms      QTC Calculation (Bezet) 488 ms      Calculated P Axis 43 degrees      Calculated R Axis -21 degrees      Calculated T Axis 33 degrees      Diagnosis --     Normal sinus rhythm  Incomplete right bundle branch block  Minimal voltage criteria for LVH, may be normal variant  Prolonged QT  When compared with ECG of 26-NOV-2019 12:57,  ST no longer depressed in Inferior leads  Confirmed by Brian Villatoro MD, Areli Herman ([id number]) on 8/23/2021 1:36:19 PM            Diagnostic Imaging/Tests:   XR CHEST SNGL V    Result Date: 8/23/2021  EXAMINATION: CHEST RADIOGRAPH 8/23/2021 12:42 PM ACCESSION NUMBER: [id number] COMPARISON: 11/25/2019 INDICATION: Right hip fracture TECHNIQUE: A single view of the chest was obtained. FINDINGS: Support Devices: None Lungs: No focal airspace disease. Cardiac Silhouette: Within normal limits in size. Mediastinum: Calcifications are seen in the aorta.  Upper Abdomen: Normal Miscellaneous: There are no lytic and blastic lesions. 1. No acute abnormality 2. Atherosclerosis     XR SHOULDER RT AP/LAT MIN 2 V    Result Date: 8/23/2021  EXAMINATION: XR SHOULDER RT AP/LAT MIN 2 V 8/23/2021 12:42 PM COMPARISON: None available. INDICATION: Right shoulder and right hip pain after falls TECHNIQUE: 3 views of the right shoulder were obtained FINDINGS: There is no fracture or acute abnormality. There are mild degenerative changes in the glenohumeral joint. Bony mineralization is preserved. The surrounding soft tissues are normal.     1. No acute abnormality. 2. Glenohumeral degenerative change. .     XR WRIST RT AP/LAT/OBL MIN 3V    Result Date: 8/24/2021  History: Right wrist pain after fall yesterday EXAM: 3 views right wrist FINDINGS: No fracture, dislocation, or additional acute bony abnormality. There is osteopenia. Osteopenia. No acute posttraumatic sequela. XR HIP RT W OR WO PELV 2-3 VWS    Result Date: 8/24/2021  EXAMINATION: XR HIP RT W OR WO PELV 2-3 VWS 8/24/2021 3:00 PM COMPARISON: None available. INDICATION: Postop right hip ORIF TECHNIQUE: A frontal view of the pelvis with  2 views of the right hip were obtained FINDINGS: Interval fixation of right femoral fracture with improved alignment. Intact hardware. No unexpected radiopaque foreign body. Unremarkable postoperative right hip radiograph     XR HIP RT W OR WO PELV 2-3 VWS    Result Date: 8/23/2021  EXAMINATION: XR HIP RT W OR WO PELV 2-3 VWS 8/23/2021 12:42 PM COMPARISON: None available. INDICATION: Right hip pain TECHNIQUE: A frontal view of the pelvis with  2 views of the right hip were obtained FINDINGS: There is an impacted subcapital fracture in the right femoral neck. No additional fracture is identified. The femoral acetabular relationship is preserved. Bony mineralization is preserved. The surrounding soft tissues are normal.     1. Subcapital right proximal femur fracture.      XR HIP RT DURING OR PROC    Result Date: 8/24/2021  History: Right hip fixation device placement EXAM: 2 views right hip, fluoroscopic FINDINGS: 1.14 minutes fluoroscopy time utilized. There is surgical hardware exiting through the right femoral head and neck with screw fixation in the proximal femur. The alignment is anatomic. Postsurgical change as described. XR FEMUR RT 2 VS    Result Date: 8/23/2021  EXAMINATION: XR FEMUR RT 2 VS 8/23/2021 12:42 PM COMPARISON: None available. INDICATION: fracture TECHNIQUE: 2 views of the right femur were obtained FINDINGS: There is a subcapital impacted fracture in the proximal femur. There is no additional fracture. There is previous right knee replacement. No complications evident. Bony mineralization is preserved. The surrounding soft tissues are normal.     1. Subcapital proximal femur fracture with no additional fracture. CT HEAD WO CONT    Result Date: 8/23/2021  EXAMINATION: CT HEAD WO CONT 8/23/2021 11:46 AM COMPARISON: 03/05/2021  INDICATION: Fall, hit for head, right hip pain TECHNIQUE: Multiple-row detector helical CT examination of the head without intravenous contrast. Radiation dose reduction techniques were used for this study. Our CT scanners use one or all of the following: Automated exposure control, adjustment of the mA and/or kV according to patient size, iterative reconstruction. FINDINGS: Brain: There is no mass, mass effect, evidence of an acute stroke, or intracranial hemorrhage. Hypodensity is seen in the periventricular and deep white matter. A lacunar infarct is seen in the left basal ganglia Ventricles: There is prominence of the ventricles with concomitant sulcal enlargement. Vasculature:  Calcifications are seen in the intracranial internal carotid arteries. Bones: Normal Surrounding soft tissues:  Normal     1. No acute intracranial abnormality.  2.  Senescent changes     CT SPINE CERV WO CONT    Result Date: 8/23/2021  EXAMINATION: CT SPINE CERV WO CONT 8/23/2021 11:46 AM ACCESSION NUMBER: [id number] COMPARISON: None available INDICATION: Fall Trauma TECHNIQUE: Multiple-row detector helical CT examination of the cervical spine was performed without intravenous contrast. Multiplanar reformats were provided. Radiation dose reduction techniques were used for this study. Our CT scanners use one or all of the following: Automated exposure control, adjustment of the mA and/or kV according to patient size, iterative reconstruction. FINDINGS: Acute abnormality: None Alignment: Preserved Vertebral body height: Preserved Degenerative changes: There are multilevel degenerative changes with sclerosis and osteophyte formation the posterior facets. Degenerative disc changes are seen as well most notable in C6-7. Surgical changes: There is previous anterior fusion of C5-6. There is no evidence of hardware failure. Bony mineralization: Normal Miscellaneous:  Normal     1. No acute abnormality. 2.  Multilevel degenerative changes. CT SHOULDER RT WO CONT    Result Date: 8/25/2021  CT RIGHT SHOULDER WITHOUT CONTRAST. HISTORY: Right shoulder pain and limited range of motion following fall. COMPARISON: X-rays from 2 days prior TECHNIQUE: 2.0mm axial scans through the joint with sagittal and coronal reconstructions. Radiation dose reduction techniques were used for this study. Our CT scanners use one or more of the following:  Automated exposure control, adjustment of the mA and or kV according to patient size, iterative reconstruction. FINDINGS: Acromioclavicular joint: Osteophytes, inferiorly projecting. Humeral head: No fractures. There are osteophytes. Glenoid fossa, no fractures. Small soft tissue calcifications, likely degenerative. Osteophytes off the anterior superior glenoid. Coracoid: Intact. Humeral acromial joint space, 9 mm thickness. Included right ribs unremarkable. Included right lung unremarkable. Shoulder soft tissues unremarkable.  Degenerative changes thoracic spine, status post cervical spinal fusion surgery. No fracture. Osteoarthritis at the acromioclavicular joint and glenohumeral joint. All Micro Results     Procedure Component Value Units Date/Time    SARS-COV-2, PCR [[id number]] Collected: 08/25/21 1303    Order Status: Completed Specimen: Nasopharyngeal Updated: 08/25/21 2047     Specimen source Nasopharyngeal        SARS-CoV-2 Not detected        Comment:      The specimen is NEGATIVE for SARS-CoV-2, the novel coronavirus associated with COVID-19. This test has been authorized by the FDA under an Emergency Use Authorization (EUA) for use by authorized laboratories. Fact sheet for Healthcare Providers: BuildHer.es       Fact sheet for Patients: BuildHer.es       Methodology: RT-PCR               Labs: Results:       BMP, Mg, Phos Recent Labs     08/26/21 0427 08/25/21 0451 08/24/21  0623    141 140   K 3.7 3.3* 3.6   * 109* 109*   CO2 27 25 24   AGAP 5* 7 7   BUN 15 12 14   CREA 0.75 0.65 0.66   CA 8.6 8.5 9.1   * 96 113*   MG  --  1.8  --       CBC Recent Labs     08/26/21 0427 08/25/21  0451 08/24/21  0623 08/23/21  1121 08/23/21  1121   WBC 7.7 6.5 7.2   < > 6.8   RBC 2.92* 2.89* 3.99*   < > 4.24   HGB 8.6* 8.9* 11.8   < > 12.4   HCT 26.6* 26.8* 35.9   < > 38.0   * 132* 159   < > 169   GRANS 67  --   --   --  67   LYMPH 20  --   --   --  26   EOS 4  --   --   --  1   MONOS 8  --   --   --  5   BASOS 1  --   --   --  1   IG 1  --   --   --  0   ANEU 5.2  --   --   --  4.5   ABL 1.6  --   --   --  1.8   MARCEL 0.3  --   --   --  0.0   ABM 0.6  --   --   --  0.3   ABB 0.1  --   --   --  0.0   AIG 0.0  --   --   --  0.0    < > = values in this interval not displayed.       LFT Recent Labs     08/23/21  1121   ALT 26   AP 73   TP 7.1   ALB 4.2   GLOB 2.9   AGRAT 1.4      Cardiac Testing No results found for: BNPP, BNP, CPK, RCK1, RCK2, RCK3, RCK4, CKMB, CKNDX, CKND1, TROPT, TROIQ   Coagulation Tests Lab Results   Component Value Date/Time    Prothrombin time 13.9 08/24/2021 06:23 AM    Prothrombin time 13.8 08/23/2021 11:21 AM    Prothrombin time 13.2 11/25/2019 03:38 PM    INR 1.0 08/24/2021 06:23 AM    INR 1.0 08/23/2021 11:21 AM    INR 1.0 11/25/2019 03:38 PM    aPTT 26.4 03/17/2016 02:30 PM    aPTT 26.2 02/16/2015 09:45 AM      A1c Lab Results   Component Value Date/Time    Hemoglobin A1c 5.4 11/26/2019 04:53 AM    Hemoglobin A1c 5.4 10/27/2017 10:13 AM      Lipid Panel Lab Results   Component Value Date/Time    Cholesterol, total 123 04/14/2021 10:06 AM    HDL Cholesterol 41 04/14/2021 10:06 AM    LDL, calculated 35 04/14/2021 10:06 AM    LDL, calculated 27 03/05/2020 03:06 AM    VLDL, calculated 47 (H) 04/14/2021 10:06 AM    VLDL, calculated 33 03/05/2020 03:06 AM    Triglyceride 316 (H) 04/14/2021 10:06 AM    CHOL/HDL Ratio 3.0 11/26/2019 04:53 AM      Thyroid Panel Lab Results   Component Value Date/Time    TSH 2.750 04/14/2021 10:06 AM    TSH 7.330 (H) 03/05/2020 03:06 AM        Most Recent UA Lab Results   Component Value Date/Time    Color YELLOW 11/26/2019 08:54 AM    Appearance CLEAR 11/26/2019 08:54 AM    Specific gravity 1.034 (H) 11/26/2019 08:54 AM    pH (UA) 6.0 11/26/2019 08:54 AM    Protein NEGATIVE  11/26/2019 08:54 AM    Glucose NEGATIVE  11/26/2019 08:54 AM    Ketone NEGATIVE  11/26/2019 08:54 AM    Bilirubin NEGATIVE  11/26/2019 08:54 AM    Blood NEGATIVE  11/26/2019 08:54 AM    Urobilinogen 0.2 11/26/2019 08:54 AM    Nitrites NEGATIVE  11/26/2019 08:54 AM    Leukocyte Esterase NEGATIVE  11/26/2019 08:54 AM    WBC 3-5 03/17/2016 02:35 PM    RBC 0-3 03/17/2016 02:35 PM    Epithelial cells 0-3 03/17/2016 02:35 PM    Bacteria 0 03/17/2016 02:35 PM    Casts 3-5 03/17/2016 02:35 PM    Crystals, urine RESULTS VERIFIED MANUALLY 02/16/2015 09:45 AM    Mucus 1+ (H) 02/16/2015 09:45 AM          All Labs from Last 24 Hrs:  Recent Results (from the past 24 hour(s))   SARS-COV-2    Collection Time: 08/25/21  1:03 PM   Result Value Ref Range    SARS-CoV-2 Please find results under separate order     SARS-COV-2, PCR    Collection Time: 08/25/21  1:03 PM    Specimen: Nasopharyngeal   Result Value Ref Range    Specimen source Nasopharyngeal      SARS-CoV-2 Not detected NOTD     PLEASE READ & DOCUMENT PPD TEST IN 24 HRS    Collection Time: 08/25/21  4:10 PM   Result Value Ref Range    PPD Negative Negative    mm Induration 0 0 - 5 mm   CBC WITH AUTOMATED DIFF    Collection Time: 08/26/21  4:27 AM   Result Value Ref Range    WBC 7.7 4.3 - 11.1 K/uL    RBC 2.92 (L) 4.05 - 5.2 M/uL    HGB 8.6 (L) 11.7 - 15.4 g/dL    HCT 26.6 (L) 35.8 - 46.3 %    MCV 91.1 79.6 - 97.8 FL    MCH 29.5 26.1 - 32.9 PG    MCHC 32.3 31.4 - 35.0 g/dL    RDW 13.1 11.9 - 14.6 %    PLATELET 619 (L) 488 - 450 K/uL    MPV 10.4 9.4 - 12.3 FL    ABSOLUTE NRBC 0.00 0.0 - 0.2 K/uL    DF AUTOMATED      NEUTROPHILS 67 43 - 78 %    LYMPHOCYTES 20 13 - 44 %    MONOCYTES 8 4.0 - 12.0 %    EOSINOPHILS 4 0.5 - 7.8 %    BASOPHILS 1 0.0 - 2.0 %    IMMATURE GRANULOCYTES 1 0.0 - 5.0 %    ABS. NEUTROPHILS 5.2 1.7 - 8.2 K/UL    ABS. LYMPHOCYTES 1.6 0.5 - 4.6 K/UL    ABS. MONOCYTES 0.6 0.1 - 1.3 K/UL    ABS. EOSINOPHILS 0.3 0.0 - 0.8 K/UL    ABS. BASOPHILS 0.1 0.0 - 0.2 K/UL    ABS. IMM.  GRANS. 0.0 0.0 - 0.5 K/UL   METABOLIC PANEL, BASIC    Collection Time: 08/26/21  4:27 AM   Result Value Ref Range    Sodium 141 136 - 145 mmol/L    Potassium 3.7 3.5 - 5.1 mmol/L    Chloride 109 (H) 98 - 107 mmol/L    CO2 27 21 - 32 mmol/L    Anion gap 5 (L) 7 - 16 mmol/L    Glucose 114 (H) 65 - 100 mg/dL    BUN 15 8 - 23 MG/DL    Creatinine 0.75 0.6 - 1.0 MG/DL    GFR est AA >60 >60 ml/min/1.73m2    GFR est non-AA >60 >60 ml/min/1.73m2    Calcium 8.6 8.3 - 10.4 MG/DL       Recent Vital Data:  Patient Vitals for the past 24 hrs:   Temp Pulse Resp BP SpO2   08/26/21 0743 98.6 °F (37 °C) 71 17 109/63 96 %   08/26/21 0440 98.9 °F (37.2 °C) 93 16 127/64 92 %   08/25/21 2305 98.4 °F (36.9 °C) (!) 101 17 123/75 92 %   08/25/21 1858 99.3 °F (37.4 °C) (!) 109 18 110/64 90 %   08/25/21 1434 98.6 °F (37 °C) 89 19 (!) 97/54 98 %   08/25/21 1054 98.5 °F (36.9 °C) 81 18 120/66 96 %     Oxygen Therapy  O2 Sat (%): 96 % (08/26/21 0743)  Pulse via Oximetry: 83 beats per minute (08/25/21 0530)  O2 Device: None (Room air) (08/25/21 1920)  O2 Flow Rate (L/min): 2 l/min (08/25/21 0530)  O2 Temperature: 35.6 °F (2 °C) (08/24/21 1420)    Estimated body mass index is 27.44 kg/m² as calculated from the following:    Height as of this encounter: 5' 6\" (1.676 m). Weight as of this encounter: 77.1 kg (170 lb). Intake/Output Summary (Last 24 hours) at 8/26/2021 1025  Last data filed at 8/26/2021 0500  Gross per 24 hour   Intake    Output 950 ml   Net -950 ml         Physical Exam:  General:    Well nourished. No overt distress  Head:  Normocephalic, atraumatic  Eyes:  Sclerae appear normal.  Pupils equally round. HENT:  Nares appear normal, no drainage. Moist mucous membranes  Neck:  No restricted ROM. Trachea midline  CV:   RRR. No m/r/g. Lungs:   CTAB. Even, unlabored  Abdomen:   Soft, nontender, nondistended  Extremities: Warm and dry. No cyanosis or clubbing. No edema. Skin:     No rashes. Normal coloration  Neuro:  Cranial nerves II-XII grossly intact. Psych:  Normal mood and affect.     Current Med List in Hospital:   Current Facility-Administered Medications   Medication Dose Route Frequency    morphine injection 4 mg  4 mg IntraVENous Q1H PRN    sodium chloride (NS) flush 5-40 mL  5-40 mL IntraVENous Q8H    sodium chloride (NS) flush 5-40 mL  5-40 mL IntraVENous PRN    alum-mag hydroxide-simeth (MYLANTA) oral suspension 30 mL  30 mL Oral Q4H PRN    calcium-vitamin D (OS-SIDDHARTH +D3) 500 mg-200 unit per tablet 1 Tablet  1 Tablet Oral TID WITH MEALS    oxyCODONE IR (ROXICODONE) tablet 10 mg  10 mg Oral Q3H PRN    clopidogreL (PLAVIX) tablet 75 mg  75 mg Oral DAILY    lidocaine (XYLOCAINE) 10 mg/mL (1 %) injection 0.1 mL  0.1 mL SubCUTAneous PRN    acetaminophen (TYLENOL) tablet 650 mg  650 mg Oral Q6H PRN    Or    acetaminophen (TYLENOL) suppository 650 mg  650 mg Rectal Q6H PRN    polyethylene glycol (MIRALAX) packet 17 g  17 g Oral DAILY PRN    promethazine (PHENERGAN) tablet 12.5 mg  12.5 mg Oral Q6H PRN    Or    ondansetron (ZOFRAN) injection 4 mg  4 mg IntraVENous Q6H PRN    aspirin delayed-release tablet 81 mg  81 mg Oral DAILY    atorvastatin (LIPITOR) tablet 40 mg  40 mg Oral QHS    losartan (COZAAR) tablet 100 mg  100 mg Oral DAILY    levothyroxine (SYNTHROID) tablet 112 mcg  112 mcg Oral ACB    venlafaxine-SR (EFFEXOR-XR) capsule 150 mg  150 mg Oral DAILY WITH BREAKFAST       Allergies   Allergen Reactions    Keflex [Cephalexin] Shortness of Breath     \" stop my breathing \"     Ambien [Zolpidem] Unknown (comments)    Aspirin Itching     \" can take low dose \"     Codeine Anaphylaxis     \"full codeine injection\" states has had lortab since without reaction.  Daypro [Oxaprozin] Swelling    Demerol [Meperidine] Other (comments)     Difficulty breathing.   States has had since without reaction    Dilaudid [Hydromorphone (Bulk)] Itching and Nausea Only    Mobic [Meloxicam] Swelling    Onion Other (comments)     headache    Pcn [Penicillins] Unknown (comments)     Pt states not allergic       Immunization History   Administered Date(s) Administered    Influenza Vaccine 10/29/2014, 10/21/2016    Influenza Vaccine (Quad) Mdck Pf (>4 Yrs Flucelvax QUAD [id number]) 11/03/2017    Influenza Vaccine [employer]) PF (>6 Mo Flulaval, Fluarix, and >3 Yrs Afluria, Fluzone [id number]) 11/16/2018, 11/08/2019, 11/27/2019    Influenza Vaccine PF 09/25/2015    Influenza, High-dose, Quadrivalent (>65 Yrs Fluzone High Dose Quad [id number]) 11/19/2020    Pneumococcal Polysaccharide (PPSV-23) 09/25/2015    Pneumococcal Vaccine (Unspecified Type) 12/01/2010    TB Skin Test (PPD) Intradermal 03/12/2015, 03/24/2016, 08/24/2021       Signed:  Db Westbrook MD    Part of this note may have been written by using a voice dictation software. The note has been proof read but may still contain some grammatical/other typographical errors.

## 2021-08-26 NOTE — PROGRESS NOTES
ACUTE OT GOALS:  (Developed with and agreed upon by patient and/or caregiver.)  1. Patient will complete functional transfers with modified independence. 2. Patient will bathe and dress lower body with modified independence and adaptive device as needed  3. Patient will toilet with modified independence and adaptive device as needed. 4. Patient will tolerate 30 minutes of OT treatment with up to 2 rest breaks to increase endurance for ADLs. 5. Patient will demonstrate modified independence with therapeutic exercise HEP to increase strength in BUEs for increased safety and independence with functional tasks. 6. Patient will groom in standing with modified independence and adaptive device as needed. 7. Patient will complete functional mobility for ADLs with modified independence and adaptive device as needed.     Timeframe: 7 visits WBAT RLE     OCCUPATIONAL THERAP: Daily Note OT Treatment Day # 2    Tai Victor is a 66 y.o. female   PRIMARY DIAGNOSIS: Closed displaced fracture of right femoral neck (HCC)  Closed displaced fracture of right femoral neck (HCC) [S72.001A]  Procedure(s) (LRB):  RIGHT  HIP FNS /CHOICE ANES TO BE ADMITTED 8/23/21 (Right)  2 Days Post-Op  Reason for Referral:    ICD-10: Treatment Diagnosis: Pain in Right Hip (M25.551)  Stiffness of Right Hip, Not elsewhere classified (M25.651)  History of falling (Z91.81)  [place]PATIENT[place] / [place] PART [place] / Product Type: Medicare /   ASSESSMENT:     REHAB RECOMMENDATIONS:   Recommendation to date pending progress:  Setting:   Short-term Rehab  Equipment:    Rolling Walker     PRIOR LEVEL OF FUNCTION:  (Prior to Hospitalization)  INITIAL/CURRENT LEVEL OF FUNCTION:  (Based on today's assessment)   Bathing:   Independent  Dressing:   Independent  Feeding/Grooming:   Independent  Toileting:   Independent  Functional Mobility:   Independent Bathing:   Moderate Assistance  Dressing:   Moderate Assistance  Feeding/Grooming:   Contact Guard Assistance  Toileting:   Not tested  Functional Mobility:   Minimal Assistance     ASSESSMENT:  Ms. Hilario Peña presents after fall with R femur fx, now s/p plate and screw fixation, WBAT RLE. Hx CVA with slight residual R sided deficits (poor dorsiflexion RLE). At baseline pt lives with spouse, completes I/ADLs, ambulates, and drives with independence. Pt was supine in bed upon arrival. Pt completed bed mobility with min A. Pt completed sponge bath and dressing with the assistance listed below. Pt completed min A for functional tranfer using rolling walker. Continue  POC. SUBJECTIVE:   Ms. Hilario Peña states, \"I was staking my cat to the doctor. \"    SOCIAL HISTORY/LIVING ENVIRONMENT: Hx CVA with slight residual R sided deficits (poor dorsiflexion RLE). At baseline pt lives with spouse, completes I/ADLs, ambulates, and drives with independence.    Home Environment: Private residence  # Steps to Enter: 2  One/Two Story Residence: One story  Living Alone: No  Support Systems: Spouse/Significant Other/Partner    OBJECTIVE:     PAIN: VITAL SIGNS: LINES/DRAINS:   Pre Treatment:    Post Treatment: same   IV  O2 Device: None (Room air)     ACTIVITIES OF DAILY LIVING: I Mod I S SBA CGA Min Mod Max Total NT Comments   BASIC ADLs:              Bathing/ Showering [] [] [] [] [] [] [x] [] [] [] LB bathing    Toileting [] [] [] [] [] [] [] [] [] [x]    Dressing [] [] [] [] [] [] [x] [] [] [] LB dressing    Feeding [] [] [] [] [] [] [] [] [] [x]    Grooming [] [] [x] [] [] [] [] [] [] []    Personal Device Care [] [] [] [] [] [] [] [] [] []    Functional Mobility [] [] [] [] [] [x] [] [] [] [] x2 RW   I=Independent, Mod I=Modified Independent, S=Supervision, SBA=Standby Assistance, CGA=Contact Guard Assistance,   Min=Minimal Assistance, Mod=Moderate Assistance, Max=Maximal Assistance, Total=Total Assistance, NT=Not Tested    MOBILITY: I Mod I S SBA CGA Min Mod Max Total  NT x2 Comments: Supine to sit [] [] [] [] [] [x] [] [] [] [] []    Sit to supine [] [] [] [] [] [] [] [] [] [x] []    Sit to stand [] [] [] [] [] [x] [] [] [] [] []    Bed to chair [] [] [] [] [] [x] [] [] [] [] [] RW   I=Independent, Mod I=Modified Independent, S=Supervision, SBA=Standby Assistance, CGA=Contact Guard Assistance,   Min=Minimal Assistance, Mod=Moderate Assistance, Max=Maximal Assistance, Total=Total Assistance, NT=Not Tested    [facility]   Daily Activity Inpatient Short Form        How much help from another person does the patient currently need. .. Total A Lot A Little None   1. Putting on and taking off regular lower body clothing? [] 1   [x] 2   [] 3   [] 4   2. Bathing (including washing, rinsing, drying)? [] 1   [] 2   [x] 3   [] 4   3. Toileting, which includes using toilet, bedpan or urinal?   [] 1   [] 2   [x] 3   [] 4   4. Putting on and taking off regular upper body clothing? [] 1   [] 2   [x] 3   [] 4   5. Taking care of personal grooming such as brushing teeth? [] 1   [] 2   [x] 3   [] 4   6. Eating meals? [] 1   [] 2   [] 3   [x] 4   © 2007, Trustees of [address] [place] [address] [address] [id number], under license to [employer]. All rights reserved     Score:  Initial: 18 8/25/21 Most Recent: X (Date: -- )   Interpretation of Tool:  Represents activities that are increasingly more difficult (i.e. Bed mobility, Transfers, Gait). PLAN:   FREQUENCY/DURATION: OT Plan of Care: 6 times/week for duration of hospital stay or until stated goals are met, whichever comes first.    PROBLEM LIST:   (Skilled intervention is medically necessary to address:)  1. Decreased ADL/Functional Activities  2. Decreased Activity Tolerance  3. Decreased AROM/PROM  4. Decreased Balance  5. Decreased Coordination  6. Decreased Gait Ability  7. Decreased Strength  8. Decreased Transfer Abilities  9.  Increased Pain   INTERVENTIONS PLANNED:   (Benefits and precautions of occupational therapy have been discussed with the patient.)  1. Self Care Training  2. Therapeutic Activity  3. Therapeutic Exercise/HEP  4. Neuromuscular Re-education  5. Education     TREATMENT:     TREATMENT:   ($$ Self Care/Home Management: 23-37 mins    )  Co-Treatment PT/OT necessary due to patient's decreased overall endurance/tolerance levels, as well as need for high level skilled assistance to complete functional transfers/mobility and functional tasks  Self Care (25 Minutes): Self care including Upper Body Bathing, Lower Body Bathing, Upper Body Dressing, Lower Body Dressing and Grooming to increase independence and decrease level of assistance required.     TREATMENT GRID:  N/A    AFTER TREATMENT POSITION/PRECAUTIONS:  Alarm Activated, Chair, Needs within reach and RN notified    INTERDISCIPLINARY COLLABORATION:  RN/PCT, PT/PTA and OT/SALES    TOTAL TREATMENT DURATION:   Time in: 0845  Time out: [address]

## 2021-08-26 NOTE — PROGRESS NOTES
Problem: Falls - Risk of  Goal: *Absence of Falls  Description: Document Imelda Krause Fall Risk and appropriate interventions in the flowsheet. Outcome: Progressing Towards Goal  Note: Fall Risk Interventions:  Mobility Interventions: Patient to call before getting OOB         Medication Interventions: Patient to call before getting OOB    Elimination Interventions: Call light in reach    History of Falls Interventions: Bed/chair exit alarm         Problem: Patient Education: Go to Patient Education Activity  Goal: Patient/Family Education  Outcome: Progressing Towards Goal     Problem: Patient Education: Go to Patient Education Activity  Goal: Patient/Family Education  Outcome: Progressing Towards Goal     Problem: Hip Fracture: Post-Op Day 1  Goal: Activity/Safety  Outcome: Progressing Towards Goal  Goal: Diagnostic Test/Procedures  Outcome: Progressing Towards Goal  Goal: Nutrition/Diet  Outcome: Progressing Towards Goal  Goal: Medications  Outcome: Progressing Towards Goal  Goal: Respiratory  Outcome: Progressing Towards Goal  Goal: Treatments/Interventions/Procedures  Outcome: Progressing Towards Goal  Goal: Psychosocial  Outcome: Progressing Towards Goal  Goal: Discharge Planning  Outcome: Progressing Towards Goal  Goal: *Demonstrates progressive activity  Outcome: Progressing Towards Goal  Goal: *Optimal pain control at patient's stated goal  Outcome: Progressing Towards Goal  Goal: *Hemodynamically stable  Outcome: Progressing Towards Goal  Goal: *Discharge plan identified  Outcome: Progressing Towards Goal  Goal: *Absence of skin breakdown  Outcome: Progressing Towards Goal     Problem: Pressure Injury - Risk of  Goal: *Prevention of pressure injury  Description: Document Cuco Scale and appropriate interventions in the flowsheet.   Outcome: Progressing Towards Goal  Note: Pressure Injury Interventions:       Moisture Interventions: Maintain skin hydration (lotion/cream), Limit adult briefs    Activity Interventions: Increase time out of bed    Mobility Interventions: Pressure redistribution bed/mattress (bed type)    Nutrition Interventions: Document food/fluid/supplement intake, Offer support with meals,snacks and hydration    Friction and Shear Interventions: Apply protective barrier, creams and emollients                Problem: Patient Education: Go to Patient Education Activity  Goal: Patient/Family Education  Outcome: Progressing Towards Goal     Problem: Patient Education: Go to Patient Education Activity  Goal: Patient/Family Education  Outcome: Progressing Towards Goal

## 2021-08-26 NOTE — PROGRESS NOTES
August 26, 2021         Post Op day: 2 Days Post-Op Procedure(s) (LRB):  RIGHT  HIP FNS /CHOICE ANES TO BE ADMITTED 8/23/21 (Right)      Admit Date: 8/23/2021  Admit Diagnosis: Closed displaced fracture of right femoral neck ([address] [S72.001A]       Principle Problem: Closed displaced fracture of right femoral neck ([address] Subjective: patient reports right shoulder weakness since her fall. Prior to the injury she was able to have full ROM of the right shoulder. X-rays were negative for fracture. No SOB, No Chest Pain, No Nausea or Vomiting     Objective:   Vital Signs are Stable, No Acute Distress, Alert,  Dressing is Dry, right shoulder weakness. Unable to flex more than 40 degrees. Neurovascular exam is normal.     Assessment / Plan :  Patient Active Problem List   Diagnosis Code    Localized osteoarthrosis not specified whether primary or secondary, lower leg M17.10    S/P total knee replacement Z96.659    Asthma J45.909    GERD (gastroesophageal reflux disease) K21.9    Arthritis M19.90    Depression F32.9    Anxiety F41.9    Hypothyroid E03.9    Peripheral neuropathy G62.9    Allergic rhinitis J30.9    Chronic pain syndrome G89.4    Hypercholesterolemia E78.00    HTN (hypertension) I10    IBS (irritable bowel syndrome) K58.9    Migraines G43.909    Heart murmur R01.1    Arthritis of knee, left M17.12    S/P total knee arthroplasty Z96.659    Dizziness R42    Chest pain R07.9    Shortness of breath R06.02    Hyperglycemia R73.9    Recurrent depression (HCC) F33.9    External hemorrhoid, bleeding K64.4    Abnormal findings on diagnostic imaging of other specified body structures R93.89    Constipation K59.00    Personal history of colonic polyps Z86.010    Rectal bleeding K62.5    SBO (small bowel obstruction) (HCC) K56.609    Peripheral vascular disease (HCC) I73.9    Ataxia R27.0    Falls [address]    Thrush B37.0    Polycythemia D75.1    Hypokalemia E87.6    Elevated bilirubin R17    OAB (overactive bladder) N32.81    Vaginal atrophy N95.2    Hematuria R31.9    History of CVA (cerebrovascular accident) Z86.73    Closed displaced fracture of right femoral neck ([address] S72.001A      Patient Vitals for the past 8 hrs:   BP Temp Pulse Resp SpO2   21 0743 109/63 98.6 °F (37 °C) 71 17 96 %   21 0440 127/64 98.9 °F (37.2 °C) 93 16 92 %    Temp (24hrs), Av.7 °F (37.1 °C), Min:98.4 °F (36.9 °C), Max:99.3 °F (37.4 °C)    Body mass index is 27.44 kg/m². Lab Results   Component Value Date/Time    HGB 8.6 (L) 2021 04:27 AM          S/P Procedure(s) (LRB):  RIGHT  HIP FNS /CHOICE ANES TO BE ADMITTED 21 (Right)       Suspect right shoulder rotator cuff tear: patient will need MRI of the right shoulder to confirm. Hopefully will be able to arrange this at her follow up appt when we remove her staples.      Medical Mgmt per hospitalist  Anticoagulation plan: plavix and ASA  Continue PT  Fall Precautions  DC disp: per sW  F/U: 2 weeks postop for wound check and staple removal      Signed By: MEIR Del Cid  2021,  9:31 AM

## 2021-08-26 NOTE — PROGRESS NOTES
Candi notified and picked up scripts x 4 at ...[facility]     Her discharge instructions were discussed at great length and all of her concerns addressed. Her IV was removed without any issues. Her dressing was changed also before she left.

## 2021-08-26 NOTE — PROGRESS NOTES
Pt is medically cleared for dc to home today with [facility] RN/PT/OT services through [facility]. No other dc needs or concerns identified or reported. SW to continue to follow to assist if needed. Care Management Interventions  PCP Verified by CM: Yes  Last Visit to PCP: 07/07/21  Mode of Transport at Discharge:  Other (see comment) (family)  Transition of Care Consult (CM Consult): [address]: Yes  Discharge Durable Medical Equipment: No  Physical Therapy Consult: Yes  Occupational Therapy Consult: Yes  Speech Therapy Consult: No  Current Support Network: Own Home, Family Lives Nearby, Lives with Spouse  Confirm Follow Up Transport: Family  The Plan for Transition of Care is Related to the Following Treatment Goals : Home health nursing and therapy services to improve pt's strength and functional abilities  The Patient and/or Patient Representative was Provided with a Choice of Provider and Agrees with the Discharge Plan?: Yes  Freedom of Choice List was Provided with Basic Dialogue that Supports the Patient's Individualized Plan of Care/Goals, Treatment Preferences and Shares the Quality Data Associated with the Providers?: Yes  Discharge Location  Discharge Placement: Home with [facility]

## 2021-08-26 NOTE — PROGRESS NOTES
ACUTE PHYSICAL THERAPY GOALS:  (Developed with and agreed upon by patient and/or caregiver. )  LTG:  (1.)Ms. Blanca Carpio will move from supine to sit and sit to supine , scoot up and down and roll side to side in bed with INDEPENDENT within 7 treatment day(s). (2.)Ms. Blanca Carpio will transfer from bed to chair and chair to bed with CONTACT GUARD ASSIST using the least restrictive device within 7 treatment day(s). (3.)Ms. Blanca Carpio will ambulate with CONTACT GUARD ASSIST for 25 feet with the least restrictive device within 7 treatment day(s). (4.)Ms. Blanca Carpio will tolerate at least 23 min of dynamic standing activity to assist standing ADLs with the least restrictive device within 7 treatment days. (5.)Ms. Blanca Carpio will climb at least 2 steps with MODERATE ASSIST with the least restrictive device within 7 treatment days. PHYSICAL THERAPY: Daily Note and AM Treatment Day # 2    Wendi Estrada is a 66 y.o. female   PRIMARY DIAGNOSIS: Closed displaced fracture of right femoral neck (HCC)  Closed displaced fracture of right femoral neck (HCC) [S72.001A]  Procedure(s) (LRB):  RIGHT  HIP FNS /CHOICE ANES TO BE ADMITTED 8/23/21 (Right)  2 Days Post-Op    ASSESSMENT:     REHAB RECOMMENDATIONS: CURRENT LEVEL OF FUNCTION:  (Most Recently Demonstrated)   Recommendation to date pending progress:  Setting:   Short-term Rehab  Equipment:    To Be Determined Bed Mobility:   Contact Guard Assistance  Sit to Stand:   Minimal Assistance x 2  Transfers:   Minimal Assistance x 2  Gait/Mobility:   Minimal Assistance x 2     ASSESSMENT:  Ms. Blanca Carpio is making steady progress towards PT goals although she is complaining of R shoulder pain this morning limiting ability to utilize rolling walker. Patient transferred to standing with min assist and ambulated 15' with rolling walker (light pressure to R hand) and cues for posture/sequencing. Patient returned to recliner chair where she participated in LE exercises. Will continue efforts. SUBJECTIVE:   Ms. Helen Bonilla states, \"I can try\"    SOCIAL HISTORY/ LIVING ENVIRONMENT:   Home Environment: Private residence  # Steps to Enter: 2  One/Two Story Residence: One story  Living Alone: No  Support Systems: Spouse/Significant Other/Partner  OBJECTIVE:     PAIN: VITAL SIGNS: LINES/DRAINS:   Pre Treatment: Pain Screen  Pain Scale 1: FLACC  Pain Intensity 1: 0  Post Treatment: 4   None  O2 Device: None (Room air)     MOBILITY: I Mod I S SBA CGA Min Mod Max Total  NT x2 Comments:   Bed Mobility    Rolling [] [] [] [] [] [] [] [] [] [] []    Supine to Sit [] [] [] [] [] [] [] [] [] [] []    Scooting [] [] [] [] [] [] [] [] [] [] []    Sit to Supine [] [] [] [] [] [] [] [] [] [] []    Transfers    Sit to Stand [] [] [] [] [] [x] [] [] [] [] []    Bed to Chair [] [] [] [] [] [] [] [] [] [] []    Stand to Sit [] [] [] [] [] [x] [] [] [] [] []    I=Independent, Mod I=Modified Independent, S=Supervision, SBA=Standby Assistance, CGA=Contact Guard Assistance,   Min=Minimal Assistance, Mod=Moderate Assistance, Max=Maximal Assistance, Total=Total Assistance, NT=Not Tested    BALANCE: Good Fair+ Fair Fair- Poor NT Comments   Sitting Static [x] [] [] [] [] []    Sitting Dynamic [] [x] [] [] [] []              Standing Static [] [x] [] [] [] []    Standing Dynamic [] [x] [] [] [] []      GAIT: I Mod I S SBA CGA Min Mod Max Total  NT x2 Comments:   Level of Assistance [] [] [] [] [] [x] [] [] [] [] [x]    Distance x15'    DME Rolling Walker, Gait Belt and chair follow    Gait Quality Slow, shuffled    Weightbearing  Status WBAT     I=Independent, Mod I=Modified Independent, S=Supervision, SBA=Standby Assistance, CGA=Contact Guard Assistance,   Min=Minimal Assistance, Mod=Moderate Assistance, Max=Maximal Assistance, Total=Total Assistance, NT=Not Tested    PLAN:   FREQUENCY/DURATION: PT Plan of Care: BID for duration of hospital stay or until stated goals are met, whichever comes first.  TREATMENT:     TREATMENT:   ($$ Therapeutic Activity: 23-37 mins    )  Therapeutic Activity (25 Minutes): Therapeutic activity included Scooting, Transfer Training, Ambulation on level ground, Sitting balance , Standing balance and LE exercises to improve functional Mobility, Strength, ROM and Activity tolerance.     TREATMENT GRID:  N/A    AFTER TREATMENT POSITION/PRECAUTIONS:  Alarm Activated, Chair, Needs within reach and RN notified    INTERDISCIPLINARY COLLABORATION:  RN/PCT, PT/PTA and Rehab Attendant     TOTAL TREATMENT DURATION:  PT Patient Time In/Time Out  Time In: 0935  Time Out: Tomás Rico PTA

## 2021-08-27 ENCOUNTER — HOME CARE VISIT (OUTPATIENT)
Dept: SCHEDULING | Facility: HOME HEALTH | Age: 71
End: 2021-08-27
Payer: MEDICARE

## 2021-08-27 VITALS
DIASTOLIC BLOOD PRESSURE: 72 MMHG | OXYGEN SATURATION: 92 % | TEMPERATURE: 98 F | HEART RATE: 84 BPM | SYSTOLIC BLOOD PRESSURE: 138 MMHG | RESPIRATION RATE: 16 BRPM

## 2021-08-27 PROCEDURE — 400013 HH SOC

## 2021-08-27 PROCEDURE — G0151 HHCP-SERV OF PT,EA 15 MIN: HCPCS

## 2021-08-27 PROCEDURE — 3331090001 HH PPS REVENUE CREDIT

## 2021-08-27 PROCEDURE — 3331090002 HH PPS REVENUE DEBIT

## 2021-08-27 PROCEDURE — 400018 HH-NO PAY CLAIM PROCEDURE

## 2021-08-28 PROCEDURE — 3331090002 HH PPS REVENUE DEBIT

## 2021-08-28 PROCEDURE — 3331090001 HH PPS REVENUE CREDIT

## 2021-08-29 PROCEDURE — 3331090002 HH PPS REVENUE DEBIT

## 2021-08-29 PROCEDURE — 3331090001 HH PPS REVENUE CREDIT

## 2021-08-30 PROCEDURE — 3331090002 HH PPS REVENUE DEBIT

## 2021-08-30 PROCEDURE — 3331090001 HH PPS REVENUE CREDIT

## 2021-08-31 ENCOUNTER — HOME CARE VISIT (OUTPATIENT)
Dept: SCHEDULING | Facility: HOME HEALTH | Age: 71
End: 2021-08-31
Payer: MEDICARE

## 2021-08-31 VITALS
OXYGEN SATURATION: 96 % | DIASTOLIC BLOOD PRESSURE: 80 MMHG | TEMPERATURE: 97.8 F | RESPIRATION RATE: 17 BRPM | SYSTOLIC BLOOD PRESSURE: 124 MMHG | HEART RATE: 78 BPM

## 2021-08-31 PROCEDURE — G0157 HHC PT ASSISTANT EA 15: HCPCS

## 2021-08-31 PROCEDURE — 3331090001 HH PPS REVENUE CREDIT

## 2021-08-31 PROCEDURE — 3331090002 HH PPS REVENUE DEBIT

## 2021-09-01 PROCEDURE — 3331090001 HH PPS REVENUE CREDIT

## 2021-09-01 PROCEDURE — 3331090002 HH PPS REVENUE DEBIT

## 2021-09-02 ENCOUNTER — HOME CARE VISIT (OUTPATIENT)
Dept: SCHEDULING | Facility: HOME HEALTH | Age: 71
End: 2021-09-02
Payer: MEDICARE

## 2021-09-02 VITALS
DIASTOLIC BLOOD PRESSURE: 80 MMHG | OXYGEN SATURATION: 99 % | RESPIRATION RATE: 17 BRPM | TEMPERATURE: 98.4 F | SYSTOLIC BLOOD PRESSURE: 118 MMHG | HEART RATE: 80 BPM

## 2021-09-02 PROCEDURE — 3331090002 HH PPS REVENUE DEBIT

## 2021-09-02 PROCEDURE — 3331090001 HH PPS REVENUE CREDIT

## 2021-09-02 PROCEDURE — G0157 HHC PT ASSISTANT EA 15: HCPCS

## 2021-09-03 PROCEDURE — 3331090001 HH PPS REVENUE CREDIT

## 2021-09-03 PROCEDURE — 3331090002 HH PPS REVENUE DEBIT

## 2021-09-04 PROCEDURE — 3331090001 HH PPS REVENUE CREDIT

## 2021-09-04 PROCEDURE — 3331090002 HH PPS REVENUE DEBIT

## 2021-09-05 PROCEDURE — 3331090002 HH PPS REVENUE DEBIT

## 2021-09-05 PROCEDURE — 3331090001 HH PPS REVENUE CREDIT

## 2021-09-06 PROCEDURE — 3331090001 HH PPS REVENUE CREDIT

## 2021-09-06 PROCEDURE — 3331090002 HH PPS REVENUE DEBIT

## 2021-09-07 ENCOUNTER — HOME CARE VISIT (OUTPATIENT)
Dept: SCHEDULING | Facility: HOME HEALTH | Age: 71
End: 2021-09-07
Payer: MEDICARE

## 2021-09-07 VITALS
OXYGEN SATURATION: 98 % | HEART RATE: 90 BPM | DIASTOLIC BLOOD PRESSURE: 88 MMHG | TEMPERATURE: 97.5 F | RESPIRATION RATE: 17 BRPM | SYSTOLIC BLOOD PRESSURE: 140 MMHG

## 2021-09-07 PROCEDURE — G0157 HHC PT ASSISTANT EA 15: HCPCS

## 2021-09-07 PROCEDURE — 3331090001 HH PPS REVENUE CREDIT

## 2021-09-07 PROCEDURE — 3331090002 HH PPS REVENUE DEBIT

## 2021-09-08 PROCEDURE — 3331090002 HH PPS REVENUE DEBIT

## 2021-09-08 PROCEDURE — 3331090001 HH PPS REVENUE CREDIT

## 2021-09-09 PROCEDURE — 3331090001 HH PPS REVENUE CREDIT

## 2021-09-09 PROCEDURE — 3331090002 HH PPS REVENUE DEBIT

## 2021-09-10 ENCOUNTER — HOME CARE VISIT (OUTPATIENT)
Dept: HOME HEALTH SERVICES | Facility: HOME HEALTH | Age: 71
End: 2021-09-10
Payer: MEDICARE

## 2021-09-10 PROCEDURE — 3331090002 HH PPS REVENUE DEBIT

## 2021-09-10 PROCEDURE — 3331090001 HH PPS REVENUE CREDIT

## 2021-09-11 PROCEDURE — 3331090001 HH PPS REVENUE CREDIT

## 2021-09-11 PROCEDURE — 3331090002 HH PPS REVENUE DEBIT

## 2021-09-12 PROCEDURE — 3331090002 HH PPS REVENUE DEBIT

## 2021-09-12 PROCEDURE — 3331090001 HH PPS REVENUE CREDIT

## 2021-09-13 PROCEDURE — 3331090002 HH PPS REVENUE DEBIT

## 2021-09-13 PROCEDURE — 3331090001 HH PPS REVENUE CREDIT

## 2021-09-14 ENCOUNTER — HOME CARE VISIT (OUTPATIENT)
Dept: SCHEDULING | Facility: HOME HEALTH | Age: 71
End: 2021-09-14
Payer: MEDICARE

## 2021-09-14 VITALS
HEART RATE: 78 BPM | DIASTOLIC BLOOD PRESSURE: 80 MMHG | SYSTOLIC BLOOD PRESSURE: 120 MMHG | RESPIRATION RATE: 17 BRPM | TEMPERATURE: 98.3 F | OXYGEN SATURATION: 97 %

## 2021-09-14 PROCEDURE — G0157 HHC PT ASSISTANT EA 15: HCPCS

## 2021-09-14 PROCEDURE — 3331090001 HH PPS REVENUE CREDIT

## 2021-09-14 PROCEDURE — 3331090002 HH PPS REVENUE DEBIT

## 2021-09-15 PROCEDURE — 3331090001 HH PPS REVENUE CREDIT

## 2021-09-15 PROCEDURE — 3331090002 HH PPS REVENUE DEBIT

## 2021-09-16 ENCOUNTER — HOME CARE VISIT (OUTPATIENT)
Dept: SCHEDULING | Facility: HOME HEALTH | Age: 71
End: 2021-09-16
Payer: MEDICARE

## 2021-09-16 VITALS
TEMPERATURE: 97.4 F | RESPIRATION RATE: 18 BRPM | DIASTOLIC BLOOD PRESSURE: 80 MMHG | HEART RATE: 88 BPM | SYSTOLIC BLOOD PRESSURE: 138 MMHG | OXYGEN SATURATION: 98 %

## 2021-09-16 PROCEDURE — 3331090002 HH PPS REVENUE DEBIT

## 2021-09-16 PROCEDURE — G0151 HHCP-SERV OF PT,EA 15 MIN: HCPCS

## 2021-09-16 PROCEDURE — 3331090001 HH PPS REVENUE CREDIT

## 2021-11-05 PROBLEM — F11.99 OPIOID USE, UNSPECIFIED WITH UNSPECIFIED OPIOID-INDUCED DISORDER (HCC): Status: ACTIVE | Noted: 2021-11-05

## 2022-02-28 NOTE — PROCEDURE: OTHER
Note Text (......Xxx Chief Complaint.): This diagnosis correlates with the
Detail Level: Zone
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 Patient will have labs done and is going to be seen by pcp tomorrow.
Pt here for admission for lymphedema. VSS. Will obtain labs, give abx per primary team.

## 2022-03-18 PROBLEM — K62.5 RECTAL BLEEDING: Status: ACTIVE | Noted: 2018-11-16

## 2022-03-18 PROBLEM — F33.9 RECURRENT DEPRESSION (HCC): Status: ACTIVE | Noted: 2017-12-22

## 2022-03-18 PROBLEM — R07.9 CHEST PAIN: Status: ACTIVE | Noted: 2017-02-03

## 2022-03-18 PROBLEM — K56.609 SBO (SMALL BOWEL OBSTRUCTION) (HCC): Status: ACTIVE | Noted: 2019-01-10

## 2022-03-18 PROBLEM — R27.0 ATAXIA: Status: ACTIVE | Noted: 2019-11-25

## 2022-03-18 PROBLEM — B37.0 THRUSH: Status: ACTIVE | Noted: 2019-11-25

## 2022-03-18 PROBLEM — D62 ACUTE POSTOPERATIVE ANEMIA DUE TO GREATER THAN EXPECTED BLOOD LOSS: Status: ACTIVE | Noted: 2021-08-26

## 2022-03-18 PROBLEM — R06.02 SHORTNESS OF BREATH: Status: ACTIVE | Noted: 2017-02-03

## 2022-03-19 PROBLEM — R93.89 ABNORMAL FINDINGS ON DIAGNOSTIC IMAGING OF OTHER SPECIFIED BODY STRUCTURES: Status: ACTIVE | Noted: 2018-11-16

## 2022-03-19 PROBLEM — K64.4 EXTERNAL HEMORRHOID, BLEEDING: Status: ACTIVE | Noted: 2018-08-16

## 2022-03-19 PROBLEM — D75.1 POLYCYTHEMIA: Status: ACTIVE | Noted: 2019-11-25

## 2022-03-19 PROBLEM — Z86.0100 PERSONAL HISTORY OF COLONIC POLYPS: Status: ACTIVE | Noted: 2018-11-16

## 2022-03-19 PROBLEM — Z86.010 PERSONAL HISTORY OF COLONIC POLYPS: Status: ACTIVE | Noted: 2018-11-16
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
## 2022-03-19 PROBLEM — R17 ELEVATED BILIRUBIN: Status: ACTIVE | Noted: 2019-11-25

## 2022-03-19 PROBLEM — S72.001A CLOSED DISPLACED FRACTURE OF RIGHT FEMORAL NECK (HCC): Status: ACTIVE | Noted: 2021-08-23

## 2022-03-19 PROBLEM — R29.6 FALLS: Status: ACTIVE | Noted: 2019-11-25

## 2022-03-19 PROBLEM — I73.9 PERIPHERAL VASCULAR DISEASE (HCC): Status: ACTIVE | Noted: 2019-11-08

## 2022-03-19 PROBLEM — F11.99 OPIOID USE, UNSPECIFIED WITH UNSPECIFIED OPIOID-INDUCED DISORDER (HCC): Status: ACTIVE | Noted: 2021-11-05

## 2022-03-19 PROBLEM — E87.6 HYPOKALEMIA: Status: ACTIVE | Noted: 2019-11-25

## 2022-03-20 PROBLEM — N32.81 OAB (OVERACTIVE BLADDER): Status: ACTIVE | Noted: 2020-01-21

## 2022-03-20 PROBLEM — K59.00 CONSTIPATION: Status: ACTIVE | Noted: 2018-11-16

## 2022-03-20 PROBLEM — N95.2 VAGINAL ATROPHY: Status: ACTIVE | Noted: 2020-01-21

## 2022-03-20 PROBLEM — R31.9 HEMATURIA: Status: ACTIVE | Noted: 2020-02-25

## 2022-03-21 ENCOUNTER — HOSPITAL ENCOUNTER (OUTPATIENT)
Dept: SURGERY | Age: 72
Discharge: HOME | End: 2022-03-21
Payer: MEDICARE

## 2022-03-21 ENCOUNTER — HOSPITAL ENCOUNTER (OUTPATIENT)
Dept: REHABILITATION | Age: 72
Discharge: HOME | End: 2022-03-21
Payer: MEDICARE

## 2022-03-21 LAB
ANION GAP SERPL CALC-SCNC: 6 MMOL/L (ref 7–16)
APTT PPP: 28.3 SEC (ref 24.1–35.1)
BACTERIA SPEC CULT: NORMAL
BASOPHILS # BLD: 0 K/UL (ref 0–0.2)
BASOPHILS NFR BLD: 1 % (ref 0–2)
BUN SERPL-MCNC: 22 MG/DL (ref 8–23)
CALCIUM SERPL-MCNC: 8.9 MG/DL (ref 8.3–10.4)
CHLORIDE SERPL-SCNC: 109 MMOL/L (ref 98–107)
CO2 SERPL-SCNC: 24 MMOL/L (ref 21–32)
CREAT SERPL-MCNC: 1.29 MG/DL (ref 0.6–1)
DIFFERENTIAL METHOD BLD: ABNORMAL
EOSINOPHIL # BLD: 0.1 K/UL (ref 0–0.8)
EOSINOPHIL NFR BLD: 3 % (ref 0.5–7.8)
ERYTHROCYTE [DISTWIDTH] IN BLOOD BY AUTOMATED COUNT: 13.3 % (ref 11.9–14.6)
EST. AVERAGE GLUCOSE BLD GHB EST-MCNC: 108 MG/DL
GLUCOSE SERPL-MCNC: 103 MG/DL (ref 65–100)
HBA1C MFR BLD: 5.4 % (ref 4.2–6.3)
HCT VFR BLD AUTO: 39.2 % (ref 35.8–46.3)
HGB BLD-MCNC: 12.8 G/DL (ref 11.7–15.4)
IMM GRANULOCYTES # BLD AUTO: 0 K/UL (ref 0–0.5)
IMM GRANULOCYTES NFR BLD AUTO: 0 % (ref 0–5)
INR PPP: 1
LYMPHOCYTES # BLD: 2.7 K/UL (ref 0.5–4.6)
LYMPHOCYTES NFR BLD: 55 % (ref 13–44)
MCH RBC QN AUTO: 29.7 PG (ref 26.1–32.9)
MCHC RBC AUTO-ENTMCNC: 32.7 G/DL (ref 31.4–35)
MCV RBC AUTO: 91 FL (ref 79.6–97.8)
MONOCYTES # BLD: 0.5 K/UL (ref 0.1–1.3)
MONOCYTES NFR BLD: 10 % (ref 4–12)
NEUTS SEG # BLD: 1.5 K/UL (ref 1.7–8.2)
NEUTS SEG NFR BLD: 31 % (ref 43–78)
NRBC # BLD: 0 K/UL (ref 0–0.2)
PLATELET # BLD AUTO: 185 K/UL (ref 150–450)
PMV BLD AUTO: 9.6 FL (ref 9.4–12.3)
POTASSIUM SERPL-SCNC: 4.5 MMOL/L (ref 3.5–5.1)
PROTHROMBIN TIME: 13.5 SEC (ref 12.6–14.5)
RBC # BLD AUTO: 4.31 M/UL (ref 4.05–5.2)
SERVICE CMNT-IMP: NORMAL
SODIUM SERPL-SCNC: 139 MMOL/L (ref 136–145)
WBC # BLD AUTO: 4.9 K/UL (ref 4.3–11.1)

## 2022-03-21 PROCEDURE — 77030012341 HC CHMB SPCR OPTC MDI VYRM -A

## 2022-03-21 PROCEDURE — 87641 MR-STAPH DNA AMP PROBE: CPT

## 2022-03-21 PROCEDURE — 85025 COMPLETE CBC W/AUTO DIFF WBC: CPT

## 2022-03-21 PROCEDURE — 83036 HEMOGLOBIN GLYCOSYLATED A1C: CPT

## 2022-03-21 PROCEDURE — 97161 PT EVAL LOW COMPLEX 20 MIN: CPT

## 2022-03-21 PROCEDURE — 94664 DEMO&/EVAL PT USE INHALER: CPT

## 2022-03-21 PROCEDURE — 77030027138 HC INCENT SPIROMETER -A

## 2022-03-21 PROCEDURE — 80048 BASIC METABOLIC PNL TOTAL CA: CPT

## 2022-03-21 PROCEDURE — 85610 PROTHROMBIN TIME: CPT

## 2022-03-21 PROCEDURE — 93005 ELECTROCARDIOGRAM TRACING: CPT | Performed by: PHYSICIAN ASSISTANT

## 2022-03-21 PROCEDURE — 85730 THROMBOPLASTIN TIME PARTIAL: CPT

## 2022-03-21 NOTE — PERIOP NOTES
Amena Junes, DO    Your patient recently had labs drawn during a hospital appointment due to an upcoming surgery. The results are attached. If you have any questions or concerns please reach out to your patient for a follow-up in your office. Please do not respond to this message as my mailbox is not monitored. You may call [phone] with questions or concerns. Recent Results (from the past 12 hour(s))   CBC WITH AUTOMATED DIFF    Collection Time: 03/21/22  1:06 PM   Result Value Ref Range    WBC 4.9 4.3 - 11.1 K/uL    RBC 4.31 4.05 - 5.2 M/uL    HGB 12.8 11.7 - 15.4 g/dL    HCT 39.2 35.8 - 46.3 %    MCV 91.0 79.6 - 97.8 FL    MCH 29.7 26.1 - 32.9 PG    MCHC 32.7 31.4 - 35.0 g/dL    RDW 13.3 11.9 - 14.6 %    PLATELET 670 060 - 288 K/uL    MPV 9.6 9.4 - 12.3 FL    ABSOLUTE NRBC 0.00 0.0 - 0.2 K/uL    DF AUTOMATED      NEUTROPHILS 31 (L) 43 - 78 %    LYMPHOCYTES 55 (H) 13 - 44 %    MONOCYTES 10 4.0 - 12.0 %    EOSINOPHILS 3 0.5 - 7.8 %    BASOPHILS 1 0.0 - 2.0 %    IMMATURE GRANULOCYTES 0 0.0 - 5.0 %    ABS. NEUTROPHILS 1.5 (L) 1.7 - 8.2 K/UL    ABS. LYMPHOCYTES 2.7 0.5 - 4.6 K/UL    ABS. MONOCYTES 0.5 0.1 - 1.3 K/UL    ABS. EOSINOPHILS 0.1 0.0 - 0.8 K/UL    ABS. BASOPHILS 0.0 0.0 - 0.2 K/UL    ABS. IMM.  GRANS. 0.0 0.0 - 0.5 K/UL   HEMOGLOBIN A1C WITH EAG    Collection Time: 03/21/22  1:06 PM   Result Value Ref Range    Hemoglobin A1c 5.4 4.20 - 6.30 %    Est. average glucose 499 mg/dL   METABOLIC PANEL, BASIC    Collection Time: 03/21/22  1:06 PM   Result Value Ref Range    Sodium 139 136 - 145 mmol/L    Potassium 4.5 3.5 - 5.1 mmol/L    Chloride 109 (H) 98 - 107 mmol/L    CO2 24 21 - 32 mmol/L    Anion gap 6 (L) 7 - 16 mmol/L    Glucose 103 (H) 65 - 100 mg/dL    BUN 22 8 - 23 MG/DL    Creatinine 1.29 (H) 0.6 - 1.0 MG/DL    GFR est AA 52 (L) >60 ml/min/1.73m2    GFR est non-AA 43 (L) >60 ml/min/1.73m2    Calcium 8.9 8.3 - 10.4 MG/DL   PROTHROMBIN TIME + INR    Collection Time: 03/21/22  1:06 PM   Result Value Ref Range    Prothrombin time 13.5 12.6 - 14.5 sec    INR 1.0     PTT    Collection Time: 03/21/22  1:06 PM   Result Value Ref Range    aPTT 28.3 24.1 - 35.1 SEC

## 2022-03-21 NOTE — PERIOP NOTES
Patient verified name and . Order for consent found in EHR and matches case posting; patient verified. Type 3 surgery, joint camp assessment complete. Labs per surgeon: CBC, BMP, PT/PTT, HGB-A1C ; results within anesthesia protocols. Labs per anesthesia protocol: no additional labs needed. EKG: completed today; within anesthesia protocols. Stress test (21), EKG (21), Echo (19), CTA head/neck (3/5/21), [facility] Neurology note (22), [facility] cardiology note (22), and PCP note (2/15/22) available in EHR for reference. Patient has upcoming appointment with Dr. Melinda Bee ([facility] neurosurgery) on 22 to discuss treatment/plan of care regarding aneurysm. Charge nurse to follow up. Anesthesia to review CTA of head and neck dated 3/5/21. Patient prescribed Plavix by Dr. Jesse Linda at [facility] Neurology for history of stroke. Office called ([phone]) for permission to hold Plavix for 7 days prior to surgery. Charge nurse to follow up wit response. MRSA/MSSA swab collected; pharmacy to review and dose antibiotic as appropriate. Hospital approved surgical skin cleanser and instructions to return bottle on DOS given per hospital policy. Patient provided with handouts including Guide to Surgery, Pain Management, Hand Hygiene, Blood Transfusion Education, and [facility] Brochure. Patient answered medical/surgical history questions at their best of ability. All prior to admission medications documented in [facility] Care. Original medication prescription bottles NOT visualized during patient appointment. Patient instructed to hold all vitamins 3 weeks prior to surgery and NSAIDS 5 days prior to surgery. Patient teach back successful and patient demonstrates knowledge of instruction.

## 2022-03-21 NOTE — PROGRESS NOTES
Refugio Arias  : [id number](1 y.o.) [facility] Camp at [address], [place] [address].  [phone]       Physical Therapy Prehab Plan of Treatment and Evaluation Summary:3/21/2022    ICD-10: Treatment Diagnosis:   · Pain in Right Hip (M25.551)  · Stiffness of Right Hip, Not elsewhere classified (M25.651)  · Difficulty in walking, Not elsewhere classified (R26.2)  Precautions/Allergies:   Keflex [cephalexin], Ambien [zolpidem], Aspirin, Codeine, Daypro [oxaprozin], Demerol [meperidine], Dilaudid [hydromorphone (bulk)], Meperidine hcl, Mobic [meloxicam], Onion, and Pcn [penicillins]  MEDICAL/REFERRING DIAGNOSIS:  Fracture of unspecified part of neck of right femur, initial encounter for closed fracture [S72.001A]  REFERRING PHYSICIAN: Orville Wilcox,*  DATE OF SURGERY: 22    Assessment:   Comments:  Patient presents prior to conversion of R hip ORIF to KATHY. She had her ORIF in August of last year. She has had B TKAs. Patient came to prehab using a cane in her R hand and was fairly unsteady. Educated her on use of cane in L hand, which she was able to do, but recommended she use her walker for now for better stability. Patient in agreement. She will have help from her spouse and family at d/c. PROBLEM LIST (Impacting functional limitations):  Ms. Collins Gileltte presents with the following right lower extremity(s) problems:  1. Transfers  2. Gait  3. Strength  4. Range of Motion  5. Balance  6. Home Exercise Program  7. Pain   INTERVENTIONS PLANNED:  1. Home Exercise Program  2. Educational Discussion      TREATMENT PLAN: Effective Dates: 3/21/2022 TO 3/21/2022. Frequency/Duration: Patient to continue to perform home exercise program at least twice per day up until her surgery. GOALS: (Goals have been discussed and agreed upon with patient.)  Discharge Goals: Time Frame: 1 Day  1.  Patient will demonstrate independence with a home exercise program designed to increase strength, range of motion, balance, coordination, functional technique and pain control to minimize functional deficits and optimize patient for total joint replacement. Rehabilitation Potential For Stated Goals: Good  Regarding Arya Delatorre Andres's therapy, I certify that the treatment plan above will be carried out by a therapist or under their direction. Thank you for this referral,  Sally Hutton, PT               HISTORY:   Present Symptoms:  Pain Intensity 1: 6   History of Present Injury/Illness (Reason for Referral):  Medical/Referring Diagnosis: Fracture of unspecified part of neck of right femur, initial encounter for closed fracture [S72.001A]   Past Medical History/Comorbidities:   Ms. Alex Issa  has a past medical history of Allergic rhinitis, Aneurysm ([facility] [address] Anxiety, Arthritis, Asthma, Calculus of kidney, Cancer ([address] Chronic pain, Depression, Dizziness (09/08/2016), Dyslipidemia, Fatigue, Former cigarette smoker, GERD (gastroesophageal reflux disease), Heart murmur (11/24/2015), History of nuclear stress test (05/11/2021), HTN (hypertension), echocardiogram (11/25/2019), Hypercholesterolemia, Hypothyroid, IBS (irritable bowel syndrome), Migraines, Mitral valve insufficiency, Nausea & vomiting, Peripheral neuropathy, Rheumatic fever, Seizures ([address] Stroke ([address] (11/25/2019), and Unspecified adverse effect of anesthesia. Ms. Alex Issa  has a past surgical history that includes hx lap cholecystectomy; hx appendectomy; hx hysterectomy; hx tonsil and adenoidectomy; hx total colectomy; hx bladder suspension; hx colonoscopy; hx other surgical; hx other surgical; hx breast lumpectomy (Right, 1996); hx orthopaedic; hx knee arthroscopy (Left); hx knee arthroscopy (Right, 10/16/09; 03/2015); hx knee replacement (Right, 3/2015); hx knee replacement (Left, 2016); hx orthopaedic (Right, 08/24/2021); and hx urological (2020).   Social History/Living Environment:   Home Environment: Private residence  # Steps to Enter: 4  Rails to Enter: Yes  [employer] : Right  One/Two Story Residence: Other (Comment)  # of Interior Steps: 2  Interior Rails: Both  Living Alone: No  Support Systems: Spouse/Significant Other; Other Family Member(s)  Patient Expects to be Discharged to[de-identified] Home  Current DME Used/Available at Home: David Mabry, rollator; David Mabry, rolling; Nicole Bain, straight  Tub or Shower Type: Tub/Shower combination    Work/Activity:  retired  Dominant Side:  RIGHT  Current Medications:  See Pre-assessment nursing note   Number of Personal Factors/Comorbidities that affect the Plan of Care: 0: LOW COMPLEXITY   EXAMINATION:   ADLs (Current Functional Status):   Ambulation:  [] Independent  [] Walk Indoors Only  [] Walk Outdoors  [x] Use Assistive Device  [] Use Wheelchair Only Dressing:  [] [address] from Someone for:  [x] Sock/Shoes  [] Pants  [] Everything   Bathing/Showering:   [x] Independent  [] Requires Assistance from Someone  [] [address]:  [] Routine house and yard work  [x] Light Housework Only  [] None   Observation/Orthostatic Postural Assessment:       ROM/Flexibility:   AROM: Generally decreased, functional                           Strength:   Strength: Generally decreased, functional              RLE Strength  R Hip Flexion: 2+  R Knee Flexion: 4  R Knee Extension: 4   Functional Mobility:    Coordination: Within functional limits    Stand to Sit: Modified independent,Additional time  Sit to Stand: Modified independent,Additional time  Distance (ft): 200 Feet (ft)  Ambulation - Level of Assistance: Modified independent  Assistive Device: Cane, straight  Base of Support: Center of gravity altered  Speed/Karen: Slow  Step Length: Left shortened;Right shortened  Stance: Right decreased  Gait Abnormalities: Antalgic          Balance:    Sitting: Intact  Standing: Impaired  Standing - Static: Good  Standing - Dynamic : Fair   Body Structures Involved:  1. Joints  2.  Muscles Body Functions Affected:  1. Movement Related Activities and Participation Affected:  1. Mobility   Number of elements that affect the Plan of Care: 4+: HIGH COMPLEXITY   CLINICAL PRESENTATION:   Presentation: Stable and uncomplicated: LOW COMPLEXITY   CLINICAL DECISION MAKING:   Tool Used: Hip dysfunction and Osteoarthritis Outcome Score for Joint Replacement (HOOS, JR)  Score:  Initial: 15 (Interval: 43.335) 3/21/2022 Most Recent: TBD   Interpretation of Score: The HOOS, JR contains 6 items from the original HOOS survey. Items are coded from 0 to 4, none to extreme respectively. Radha Phillips is scored by summing the raw response (range 0-24) and then converting it to an interval score using the table provided below. The interval score ranges from 0 to 100 where 0 represents total hip disability and 100 represents perfect hip health. Medical Necessity:   · Ms. Nathalie Hartman is expected to optimize her lower extremity strength and ROM in preparation for joint replacement surgery. Reason for Services/Other Comments:  · Achieve baseline assesment of musculoskeletal system, functional mobility and home environment. , educate in PT HEP in preparation for surgery, educate in hospital plan of care. Use of outcome tool(s) and clinical judgement create a POC that gives a: Clear prediction of patient's progress: LOW COMPLEXITY   TREATMENT:   Treatment/Session Assessment:  Patient was instructed in PT- HEP to increase strength and ROM in LEs. Answered all questions. · Post session pain:  6  · Compliance with Program/Exercises: Will assess as treatment progresses.   Total Treatment Duration:  PT Patient Time In/Time Out  Time In: 1315  Time Out: [facility], PT

## 2022-03-22 LAB
ATRIAL RATE: 68 BPM
CALCULATED P AXIS, ECG09: 2 DEGREES
CALCULATED R AXIS, ECG10: -32 DEGREES
CALCULATED T AXIS, ECG11: 13 DEGREES
DIAGNOSIS, 93000: NORMAL
P-R INTERVAL, ECG05: 156 MS
Q-T INTERVAL, ECG07: 424 MS
QRS DURATION, ECG06: 98 MS
QTC CALCULATION (BEZET), ECG08: 450 MS
VENTRICULAR RATE, ECG03: 68 BPM

## 2022-03-22 NOTE — ADVANCED PRACTICE NURSE
Total Joint Surgery Preoperative Chart Review      Patient ID:  Suleiman Cee  [id number]  8 y.o.  1950  Surgeon: Dr. Mindy Yusuf  Date of Surgery: 4/7/2022  Procedure: Total Right Hip Arthroplasty  Primary Care Physician: Ilene Dominguez [place] [phone]  Specialty Physician(s):      Subjective:   Suleiman Cee is a 66 y.o. WHITE/NON- female who presents for preoperative evaluation for Total Right Hip arthroplasty. This is a preoperative chart review note based on data collected by the nurse at the surgical Pre-Assessment visit. Past Medical History:   Diagnosis Date    Allergic rhinitis     Aneurysm ([facility] [address]     per CTA of neck and head (3/5/21)=small 3 mm saccular aneurysm in the right ICA near the skull base in an area of tortuosity; followed by [address] [place] [address] and has referral to Dr. Taylor [facility]     denies; \"they thought I had it but it was a panic attack\"    Calculus of kidney     Cancer ([address]     melanoma R breast    Chronic pain     Depression     worsening    Dizziness 09/08/2016    not a recent problem    Dyslipidemia     Fatigue     Former cigarette smoker     GERD (gastroesophageal reflux disease)     controlled with med    Heart murmur 11/24/2015    last echo=11/25/2019    History of nuclear stress test 05/11/2021    Normal Perfusion     HTN (hypertension)     controlled with med    Hx of echocardiogram 11/25/2019    LVEF 55-60%    Hypercholesterolemia     on med for control     Hypothyroid     on med for control     IBS (irritable bowel syndrome)     Migraines     HX no current problems     Mitral valve insufficiency     rheumatic fever as a child --- followed by dr Nathaly Navas    Nausea & vomiting     post-op    Peripheral neuropathy     bilat feet    Rheumatic fever     as a child; age 10-6    Seizures ([address]     febrile seiz. as a child , no problems after that.     Stroke ([address] 11/25/2019    left hemispheric internal capsule stroke 19; has right sided weakness; followed by [facility]     Unspecified adverse effect of anesthesia     elevated temp after colectomy only per pt      Past Surgical History:   Procedure Laterality Date    HX APPENDECTOMY      HX BLADDER SUSPENSION      HX BREAST LUMPECTOMY Right     melanoma breast - with lymph node biopsies.      HX COLONOSCOPY      HX HYSTERECTOMY      HX KNEE ARTHROSCOPY Left     HX KNEE ARTHROSCOPY Right 10/16/09; 2015    HX KNEE REPLACEMENT Right 3/2015    HX KNEE REPLACEMENT Left 2016    HX LAP CHOLECYSTECTOMY      HX ORTHOPAEDIC      C- 7 ACDF    HX ORTHOPAEDIC Right 2021    RIGHT  HIP FNS     HX OTHER SURGICAL      urethra repair    HX OTHER SURGICAL      melanoma resected from right chest wall    HX TONSIL AND ADENOIDECTOMY      HX TOTAL COLECTOMY      for obstruction - about 14 inches removed    HX UROLOGICAL  2020    CYSTOSCOPY WITH BLADDER BIOPSIES      Family History   Problem Relation Age of Onset   [place]ers Other Mother    Davis Landers Delayed Awakening Mother     Thyroid Disease Mother     Diabetes Mother     Cancer Mother         breast cancer    Breast Problems Mother    Davis Landers Breast Cancer Mother     Heart Disease Father     Hypertension Father     Heart Failure Father         CHF    Delayed Awakening Sister     Thyroid Disease Sister     Breast Problems Sister     Breast Cancer Sister     Cancer Other         breast    Diabetes Other         type II    High Cholesterol Other     Elevated Lipids Other     Hypertension Other     Thyroid Disease Other         hypo, acquired    Breast Problems Maternal Aunt     Breast Cancer Maternal Aunt     Breast Cancer Paternal Aunt       Social History     Tobacco Use    Smoking status: Former Smoker     Packs/day: 0.25     Years: 4.00     Pack years: 1.00     Types: Cigarettes     Start date: 1990     Quit date: 2002     Years since quittin.5    Smokeless tobacco: Never Used   Substance Use Topics    Alcohol use: No       Prior to Admission medications    Medication Sig Start Date End Date Taking? Authorizing Provider   clopidogreL (Plavix) 75 mg tab Take 1 Tablet by mouth daily. 2/28/22  Yes Messi SOUTH,    phenazopyridine (Pyridium) 200 mg tablet Take 1 Tablet by mouth three (3) times daily as needed for Pain. 2/1/22  Yes Bill SHELTON, DO   losartan-hydroCHLOROthiazide (HYZAAR) 100-25 mg per tablet Take 1 Tablet by mouth daily. Indications: high blood pressure   Yes Provider, Historical   omeprazole (PRILOSEC) 40 mg capsule Take 40 mg by mouth daily. Yes Provider, Historical   potassium chloride (K-DUR, KLOR-CON M20) 20 mEq tablet TAKE 1 TABLET BY MOUTH DAILY  Patient taking differently: Take 20 mEq by mouth two (2) times a day. 12/6/21  Yes Raymon Bowling, DO   ALPRAZolam Evetta Valentin) 1 mg tablet TAKE 1 TABLET BY MOUTH EVERY DAY 11/29/21  Yes Bill SHELTON DO   acetaminophen (TYLENOL) 500 mg tablet Take 1,000 mg by mouth every six (6) hours as needed for Pain. Yes Provider, Historical   Bystolic 10 mg tablet TAKE 1 TABLET DAILY  Patient taking differently: Take 10 mg by mouth daily. 8/9/21  Yes Bill SHELTON DO   atorvastatin (LIPITOR) 40 mg tablet TAKE 1 TABLET BY MOUTH EVERY NIGHT 5/28/21  Yes Bill SHELTON DO   venlafaxine-SR (EFFEXOR-XR) 150 mg capsule TAKE ONE CAPSULE BY MOUTH EVERY 12 HOURS FOR NERVES 5/28/21  Yes Bill SHELTON DO   Synthroid 112 mcg tablet TAKE 1 TABLET DAILY BEFORE BREAKFAST  Patient taking differently: TAKE 1 TABLET BY MOUTH DAILY BEFORE BREAKFAST 4/30/21  Yes Bill SHELTON DO   sucralfate (Carafate) 1 gram tablet Take 1 Tab by mouth four (4) times daily. 4/22/21  Yes Bill SHELTON DO   hydrocortisone (ANUSOL-HC) 2.5 % rectal cream Insert  into rectum four (4) times daily. Patient taking differently: Insert 1 g into rectum four (4) times daily.  4/9/21  Yes Bill SHELTON DO   saliva substitution (Biotene Dry Mouth Oral Rinse) mwsh mouthwash 15 mL by Mucous Membrane route as needed for Other (Dry mouth). 12/29/20  Yes Jamison Arceo P, DO   cholecalciferol, vitamin D3, (VITAMIN D3 PO) Take 1 Tablet by mouth daily. Yes Provider, Historical   fluticasone (FLONASE) 50 mcg/actuation nasal spray 2 Sprays by Both Nostrils route daily. Patient taking differently: 2 Sprays by Both Nostrils route daily as needed for Allergies. 7/9/18  Yes Jimbo Bowling P, DO   MULTIVIT,CALC,MINS/FOLIC ACID (ONE-A-DAY PROACTIVE 65 PLUS PO) Take 1 Tablet by mouth daily. Indications: OTC   Yes Provider, Historical   aspirin delayed-release 81 mg tablet Take 81 mg by mouth daily. Indications: OTC   Yes Provider, Historical   cyanocobalamin 1,000 mcg tablet Take 1,000 mcg by mouth daily. Indications: OTC   Yes Provider, Historical   HYDROcodone-acetaminophen (NORCO)  mg tablet Take 1 Tablet by mouth every six (6) hours as needed for Pain for up to 30 days. Max Daily Amount: 4 Tablets. 3/25/22 4/24/22  Maria Isabel Null MD     Allergies   Allergen Reactions    Keflex [Cephalexin] Shortness of Breath     \" stop my breathing \"     Ambien [Zolpidem] Unknown (comments)    Aspirin Itching     \" can take low dose \"     Codeine Anaphylaxis     \"full codeine injection\" states has had lortab since without reaction.  Daypro [Oxaprozin] Swelling    Demerol [Meperidine] Other (comments)     Difficulty breathing.   States has had since without reaction    Dilaudid [Hydromorphone (Bulk)] Itching and Nausea Only    Meperidine Hcl Shortness of Breath    Mobic [Meloxicam] Swelling    Onion Other (comments)     headache    Pcn [Penicillins] Unknown (comments)     Pt states not allergic            Objective:     Physical Exam:   Patient Vitals for the past 24 hrs:   Temp Pulse BP SpO2   03/21/22 1213 97.9 °F (36.6 °C) 74 131/79 94 %       ECG:    EKG Results     Procedure 720 Value Units Date/Time    EKG, 12 LEAD, INITIAL [[id number]] Collected: 03/21/22 1314    Order Status: Completed Updated: 03/22/22 0602     Ventricular Rate 68 BPM      Atrial Rate 68 BPM      P-R Interval 156 ms      QRS Duration 98 ms      Q-T Interval 424 ms      QTC Calculation (Bezet) 450 ms      Calculated P Axis 2 degrees      Calculated R Axis -32 degrees      Calculated T Axis 13 degrees      Diagnosis --     Normal sinus rhythm  Left axis deviation  Incomplete right bundle branch block  Moderate voltage criteria for LVH, may be normal variant  Abnormal ECG  When compared with ECG of 23 AUG 2021 11:16:05. No significant change was found  Confirmed by Monserrat Bonilla ([id number]) on 3/22/2022 6:02:43 AM            Data Review:   Labs:   Recent Results (from the past 24 hour(s))   CBC WITH AUTOMATED DIFF    Collection Time: 03/21/22  1:06 PM   Result Value Ref Range    WBC 4.9 4.3 - 11.1 K/uL    RBC 4.31 4.05 - 5.2 M/uL    HGB 12.8 11.7 - 15.4 g/dL    HCT 39.2 35.8 - 46.3 %    MCV 91.0 79.6 - 97.8 FL    MCH 29.7 26.1 - 32.9 PG    MCHC 32.7 31.4 - 35.0 g/dL    RDW 13.3 11.9 - 14.6 %    PLATELET 833 542 - 013 K/uL    MPV 9.6 9.4 - 12.3 FL    ABSOLUTE NRBC 0.00 0.0 - 0.2 K/uL    DF AUTOMATED      NEUTROPHILS 31 (L) 43 - 78 %    LYMPHOCYTES 55 (H) 13 - 44 %    MONOCYTES 10 4.0 - 12.0 %    EOSINOPHILS 3 0.5 - 7.8 %    BASOPHILS 1 0.0 - 2.0 %    IMMATURE GRANULOCYTES 0 0.0 - 5.0 %    ABS. NEUTROPHILS 1.5 (L) 1.7 - 8.2 K/UL    ABS. LYMPHOCYTES 2.7 0.5 - 4.6 K/UL    ABS. MONOCYTES 0.5 0.1 - 1.3 K/UL    ABS. EOSINOPHILS 0.1 0.0 - 0.8 K/UL    ABS. BASOPHILS 0.0 0.0 - 0.2 K/UL    ABS. IMM.  GRANS. 0.0 0.0 - 0.5 K/UL   HEMOGLOBIN A1C WITH EAG    Collection Time: 03/21/22  1:06 PM   Result Value Ref Range    Hemoglobin A1c 5.4 4.20 - 6.30 %    Est. average glucose 079 mg/dL   METABOLIC PANEL, BASIC    Collection Time: 03/21/22  1:06 PM   Result Value Ref Range    Sodium 139 136 - 145 mmol/L    Potassium 4.5 3.5 - 5.1 mmol/L    Chloride 109 (H) 98 - 107 mmol/L    CO2 24 21 - 32 mmol/L    Anion gap 6 (L) 7 - 16 mmol/L    Glucose 103 (H) 65 - 100 mg/dL    BUN 22 8 - 23 MG/DL    Creatinine 1.29 (H) 0.6 - 1.0 MG/DL    GFR est AA 52 (L) >60 ml/min/1.73m2    GFR est non-AA 43 (L) >60 ml/min/1.73m2    Calcium 8.9 8.3 - 10.4 MG/DL   MSSA/MRSA SC BY PCR, NASAL SWAB    Collection Time: 03/21/22  1:06 PM    Specimen: Nasal swab   Result Value Ref Range    Special Requests: NO SPECIAL REQUESTS      Culture result:        SA target not detected. A MRSA NEGATIVE, SA NEGATIVE test result does not preclude MRSA or SA nasal colonization. PROTHROMBIN TIME + INR    Collection Time: 03/21/22  1:06 PM   Result Value Ref Range    Prothrombin time 13.5 12.6 - 14.5 sec    INR 1.0     PTT    Collection Time: 03/21/22  1:06 PM   Result Value Ref Range    aPTT 28.3 24.1 - 35.1 SEC   EKG, 12 LEAD, INITIAL    Collection Time: 03/21/22  1:14 PM   Result Value Ref Range    Ventricular Rate 68 BPM    Atrial Rate 68 BPM    P-R Interval 156 ms    QRS Duration 98 ms    Q-T Interval 424 ms    QTC Calculation (Bezet) 450 ms    Calculated P Axis 2 degrees    Calculated R Axis -32 degrees    Calculated T Axis 13 degrees    Diagnosis       Normal sinus rhythm  Left axis deviation  Incomplete right bundle branch block  Moderate voltage criteria for LVH, may be normal variant  Abnormal ECG  When compared with ECG of 23 AUG 2021 11:16:05. No significant change was found  Confirmed by Paulette Malcolm [id number]) on 3/22/2022 6:02:43 AM           Problem List:  )  Patient Active Problem List   Diagnosis Code    Localized osteoarthrosis not specified whether primary or secondary, lower leg M17.10    S/P total knee replacement Z96.659    Asthma J45.909    GERD (gastroesophageal reflux disease) K21.9    Arthritis M19.90    Depression F32. A    Anxiety F41.9    Hypothyroid E03.9    Peripheral neuropathy G62.9    Allergic rhinitis J30.9    Chronic pain syndrome G89.4    Hypercholesterolemia E78.00    HTN (hypertension) I10    IBS (irritable bowel syndrome) K58.9    Migraines G43.909    Heart murmur R01.1    Arthritis of knee, left M17.12    S/P total knee arthroplasty Z96.659    Dizziness R42    Chest pain R07.9    Shortness of breath R06.02    Hyperglycemia R73.9    Recurrent depression (HCC) F33.9    External hemorrhoid, bleeding K64.4    Abnormal findings on diagnostic imaging of other specified body structures R93.89    Constipation K59.00    Personal history of colonic polyps Z86.010    Rectal bleeding K62.5    SBO (small bowel obstruction) ([facility]) K56.609    Peripheral vascular disease (HCC) I73.9    Ataxia R27.0    Falls W19. Shawna Fitch    Thrush B37.0    Polycythemia D75.1    Hypokalemia E87.6    Elevated bilirubin R17    OAB (overactive bladder) N32.81    Vaginal atrophy N95.2    Hematuria R31.9    History of CVA (cerebrovascular accident) Z86.73    Closed displaced fracture of right femoral neck ([facility]) S72.001A    Acute postoperative anemia due to greater than expected blood loss D62    Opioid use, unspecified with unspecified opioid-induced disorder F11.99       Total Joint Surgery Pre-Assessment Recommendations:           Continuous saturation monitoring during hospitalization. O2 prn per respiratory protocol. Albuterol every 6 hours as need during hospitalization.      Signed By: GIULIANO Lal    March 22, 2022

## 2022-03-23 VITALS
SYSTOLIC BLOOD PRESSURE: 131 MMHG | WEIGHT: 161 LBS | BODY MASS INDEX: 25.88 KG/M2 | OXYGEN SATURATION: 97 % | DIASTOLIC BLOOD PRESSURE: 79 MMHG | HEART RATE: 74 BPM | TEMPERATURE: 97.9 F | HEIGHT: 66 IN

## 2022-03-23 NOTE — PROGRESS NOTES
22 1325   Oxygen Therapy   O2 Sat (%) 97 %   Pulse via Oximetry 70 beats per minute   O2 Device None (Room air)   Pre-Treatment   Breath Sounds Bilateral Clear   Pre FEV1 (liters) 2.1 liters   % Predicted 89     Initial respiratory Assessment completed with pt. Pt was interviewed and evaluated in [place] prior to surgery. Patient ID:  Suleiman Cee  [id number]  9 y.o.  1950  Surgeon: Dr. Mindy Yusuf  Date of Surgery: 2022  Procedure: Total Right Hip Arthroplasty  Primary Care Physician: Dea Bishop [place] [phone]  Specialists:     Pt taught proper COUGH technique  DIAPHRAGMATIC BREATHING EXERCISE INSTRUCTIONS GIVEN    History of smokin PACK/WEEK FOR 20 YEARS                 Quit date:         Secondhand smoke:FATHER    Past procedures with Oxygen desaturation or delayed awakening:DENIES    Past Medical History:   Diagnosis Date    Allergic rhinitis     Aneurysm ([facility] [address]     per CTA of neck and head (3/5/21)=small 3 mm saccular aneurysm in the right ICA near the skull base in an area of tortuosity; followed by [address] [place] [address] Neuro and has referral to Dr. Taylor Orlando Asthma     denies;  \"they thought I had it but it was a panic attack\"    Calculus of kidney     Cancer ([facility] [address]     melanoma R breast    Chronic pain     Depression     worsening    Dizziness 2016    not a recent problem    Dyslipidemia     Fatigue     Former cigarette smoker     GERD (gastroesophageal reflux disease)     controlled with med    Heart murmur 2015    last echo=2019    History of nuclear stress test 2021    Normal Perfusion     HTN (hypertension)     controlled with med    Hx of echocardiogram 2019    LVEF 55-60%    Hypercholesterolemia     on med for control     Hypothyroid     on med for control     IBS (irritable bowel syndrome)     Migraines     HX no current problems     Mitral valve insufficiency rheumatic fever as a child --- followed by dr Raina Zhu    Nausea & vomiting     post-op    Peripheral neuropathy     bilat feet    Rheumatic fever     as a child; age 10-6    Seizures ([facility] [address]     febrile seiz. as a child , no problems after that.  Stroke ([facility] [address] 11/25/2019    left hemispheric internal capsule stroke 11/25/19; has right sided weakness; followed by [facility] Neuro     Unspecified adverse effect of anesthesia     elevated temp after colectomy only per pt      ASTHMA AS A CHILD  RLL PNA 2/11/2020   Respiratory history:DENIES SOB                                                                  Respiratory meds: PT uses MDI PRN with PROAIR. MDI instructions given verbally & written along with spacer. Pt to use home MDI's morning of surgery & bring to [address]:            DAUGHTER   PAST SLEEP STUDY:                        DENIES  HX OF ELVIA:                                          DENIES  ELVIA assessment:                                               SLEEPS ON SIDE         PHYSICAL EXAM   Body mass index is 25.99 kg/m².    Visit Vitals  /79 (BP 1 Location: Left upper arm, BP Patient Position: At rest;Sitting)   Pulse 74   Temp 97.9 °F (36.6 °C)   Ht 5' 6\" (1.676 m)   Wt 73 kg (161 lb)   SpO2 (P) 97%   BMI 25.99 kg/m²     Neck circumference:  37.5  cm    Loud snoring:                                                 YES             Witnessed apnea or wakening gasping or choking:        DENIES        Awakens with headaches:                                               DENIES  Morning or daytime tiredness/ sleepiness:                              TIRED  Dry mouth or sore throat in morning:            YES                                             Greenberg stage: 4                                    SACS score:9  Stop Bang   STOP-BANG  Does the patient snore loudly (louder than talking or loud enough to be heard through closed doors)?: Yes  Does the patient often feel tired, fatigued, or sleepy during the daytime, even after a \"good\" night's sleep?: Yes  Has anyone ever observed the patient stop breathing during their sleep? : No  Does the patient have or are they being treated for high blood pressure?: Yes  Is the patient's BMI greater than 35?: No  Is your neck circumference greater than 17 inches (Male) or 16 inches (Female)?: No  Is the patient older than 48?: Yes  Is the patient male?: No  ELVIA Score: 4  Has the patient been referred to Sleep Medicine?: No  Has the patient previously been diagnosed with Obstructive Sleep Apnea?: No                                 PT TO STAY 1 NIGHT         CS HS  RESPIRATORY ASSESSMENT Q SHIFT   O2 PRN    ALBUTEROL  NEBULIZER Q6 PRN WHEEZING                                    Referrals:  DECLINED  Pt.  Phone Number:

## 2022-04-01 NOTE — H&P
[id number] [facility]  History and Physical Exam    Patient ID:  Sai Maxwell  [id number]    47 y.o.  1950    Today: April 1, 2022    Vitals Signs: Reviewed as noted in medical record. Allergies: Allergies   Allergen Reactions    Keflex [Cephalexin] Shortness of Breath     \" stop my breathing \"     Ambien [Zolpidem] Unknown (comments)    Aspirin Itching     \" can take low dose \"     Codeine Anaphylaxis     \"full codeine injection\" states has had lortab since without reaction.  Daypro [Oxaprozin] Swelling    Demerol [Meperidine] Other (comments)     Difficulty breathing. States has had since without reaction    Dilaudid [Hydromorphone (Bulk)] Itching and Nausea Only    Meperidine Hcl Shortness of Breath    Mobic [Meloxicam] Swelling    Onion Other (comments)     headache    Pcn [Penicillins] Unknown (comments)     Pt states not allergic         CC: Right hip pain    HPI:  Pt complains of right hip pain and difficulty ambulating. Relevant PMH:   Past Medical History:   Diagnosis Date    Allergic rhinitis     Aneurysm ([facility] [address]     per CTA of neck and head (3/5/21)=small 3 mm saccular aneurysm in the right ICA near the skull base in an area of tortuosity; followed by [employer] Neuro and has referral to Dr. Kendal Lewis Asthma     denies;  \"they thought I had it but it was a panic attack\"    Calculus of kidney     Cancer ([facility] [address]     melanoma R breast    Chronic pain     Depression     worsening    Dizziness 09/08/2016    not a recent problem    Dyslipidemia     Fatigue     Former cigarette smoker     GERD (gastroesophageal reflux disease)     controlled with med    Heart murmur 11/24/2015    last echo=11/25/2019    History of nuclear stress test 05/11/2021    Normal Perfusion     HTN (hypertension)     controlled with med    Hx of echocardiogram 11/25/2019    LVEF 55-60%    Hypercholesterolemia     on med for control  Hypothyroid     on med for control     IBS (irritable bowel syndrome)     Migraines     HX no current problems     Mitral valve insufficiency     rheumatic fever as a child --- followed by dr Andrew Morocho    Nausea & vomiting     post-op    Peripheral neuropathy     bilat feet    Rheumatic fever     as a child; age 10-6    Seizures ([facility] [address]     febrile seiz. as a child , no problems after that.  Stroke ([facility] [address] 11/25/2019    left hemispheric internal capsule stroke 11/25/19; has right sided weakness; followed by [facility] Neuro     Unspecified adverse effect of anesthesia     elevated temp after colectomy only per pt       Objective:                    HEENT: NC/AT                   Lungs:  clear                   Heart:   rrr                   Abdomen: soft                   Extremities:  Pain with rom of the right hip joint    Radiographs: reveal right hip osteoarthritis with loss of joint space and bone spurs, s/p ORIF of healed right femoral neck fracture. Assessment: Closed hip fracture requiring operative repair, right, initial encounter ([facility] [S72.001A]    Plan:  Proceed with scheduled Procedure(s) (LRB):  HIP ARTHROPLASTY TOTAL CONVERSION/ RIGHT/ DEPUY- WILL NEED TO CUT PLATE (Right) . The patient has failed conservative treatment including NSAIDS, and injections. Due to the amount of pain the patient is experiencing we will proceed with scheduled procedure. Will plan for same days discharge.     Signed By: Carlitos Dodson  April 1, 2022

## 2022-04-05 NOTE — PERIOP NOTES
Plavix hold found in Encounters from Dr. Fausto Browning and reads :  \"She can hold the plavix but it does increase the risk of a stroke.  She should be off for 7 days prior to surgery\"

## 2022-04-06 ENCOUNTER — ANESTHESIA EVENT (OUTPATIENT)
Dept: SURGERY | Age: 72
DRG: 470 | End: 2022-04-06
Payer: MEDICARE

## 2022-04-06 NOTE — PERIOP NOTES
Dr. Demetris Baker reviewed chart - Neuro Endovascular Clearance Note 04/05/22. No order received. Ok to proceed.

## 2022-04-07 ENCOUNTER — ANESTHESIA (OUTPATIENT)
Dept: SURGERY | Age: 72
DRG: 470 | End: 2022-04-07
Payer: MEDICARE

## 2022-04-07 ENCOUNTER — HOME HEALTH ADMISSION (OUTPATIENT)
Dept: HOME HEALTH SERVICES | Facility: HOME HEALTH | Age: 72
End: 2022-04-07
Payer: MEDICARE

## 2022-04-07 ENCOUNTER — APPOINTMENT (OUTPATIENT)
Dept: GENERAL RADIOLOGY | Age: 72
DRG: 470 | End: 2022-04-07
Attending: PHYSICIAN ASSISTANT
Payer: MEDICARE

## 2022-04-07 ENCOUNTER — HOSPITAL ENCOUNTER (INPATIENT)
Age: 72
LOS: 1 days | Discharge: HOME HEALTH | DRG: 470 | End: 2022-04-09
Attending: ORTHOPAEDIC SURGERY | Admitting: ORTHOPAEDIC SURGERY
Payer: MEDICARE

## 2022-04-07 DIAGNOSIS — T84.090D OTHER MECHANICAL COMPLICATION OF INTERNAL RIGHT HIP PROSTHESIS, SUBSEQUENT ENCOUNTER: ICD-10-CM

## 2022-04-07 PROBLEM — M16.11 ARTHRITIS OF RIGHT HIP: Status: ACTIVE | Noted: 2022-04-07

## 2022-04-07 LAB
ABO + RH BLD: NORMAL
BLOOD GROUP ANTIBODIES SERPL: NORMAL
HGB BLD-MCNC: 10.5 G/DL (ref 11.7–15.4)
POTASSIUM BLD-SCNC: 4.5 MMOL/L (ref 3.5–5.1)
SPECIMEN EXP DATE BLD: NORMAL

## 2022-04-07 PROCEDURE — 77030002933 HC SUT MCRYL J&J -A: Performed by: ORTHOPAEDIC SURGERY

## 2022-04-07 PROCEDURE — 74011250636 HC RX REV CODE- 250/636: Performed by: ORTHOPAEDIC SURGERY

## 2022-04-07 PROCEDURE — 74011250637 HC RX REV CODE- 250/637: Performed by: PHYSICIAN ASSISTANT

## 2022-04-07 PROCEDURE — 86900 BLOOD TYPING SEROLOGIC ABO: CPT

## 2022-04-07 PROCEDURE — 76060000035 HC ANESTHESIA 2 TO 2.5 HR: Performed by: ORTHOPAEDIC SURGERY

## 2022-04-07 PROCEDURE — 85018 HEMOGLOBIN: CPT

## 2022-04-07 PROCEDURE — 97110 THERAPEUTIC EXERCISES: CPT

## 2022-04-07 PROCEDURE — 97535 SELF CARE MNGMENT TRAINING: CPT

## 2022-04-07 PROCEDURE — 2709999900 HC NON-CHARGEABLE SUPPLY: Performed by: ORTHOPAEDIC SURGERY

## 2022-04-07 PROCEDURE — 77030003665 HC NDL SPN BBMI -A: Performed by: ANESTHESIOLOGY

## 2022-04-07 PROCEDURE — 77030019557 HC ELECTRD VES SEAL MEDT -F: Performed by: ORTHOPAEDIC SURGERY

## 2022-04-07 PROCEDURE — 77030007880 HC KT SPN EPDRL BBMI -B: Performed by: ANESTHESIOLOGY

## 2022-04-07 PROCEDURE — C1776 JOINT DEVICE (IMPLANTABLE): HCPCS | Performed by: ORTHOPAEDIC SURGERY

## 2022-04-07 PROCEDURE — 76210000006 HC OR PH I REC 0.5 TO 1 HR: Performed by: ORTHOPAEDIC SURGERY

## 2022-04-07 PROCEDURE — 97161 PT EVAL LOW COMPLEX 20 MIN: CPT

## 2022-04-07 PROCEDURE — 74011000250 HC RX REV CODE- 250

## 2022-04-07 PROCEDURE — 74011250636 HC RX REV CODE- 250/636

## 2022-04-07 PROCEDURE — 77030018547 HC SUT ETHBND1 J&J -B: Performed by: ORTHOPAEDIC SURGERY

## 2022-04-07 PROCEDURE — 74011250637 HC RX REV CODE- 250/637: Performed by: ANESTHESIOLOGY

## 2022-04-07 PROCEDURE — 77030012935 HC DRSG AQUACEL BMS -B: Performed by: ORTHOPAEDIC SURGERY

## 2022-04-07 PROCEDURE — 74011250636 HC RX REV CODE- 250/636: Performed by: ANESTHESIOLOGY

## 2022-04-07 PROCEDURE — 74011000258 HC RX REV CODE- 258: Performed by: ORTHOPAEDIC SURGERY

## 2022-04-07 PROCEDURE — 77030006835 HC BLD SAW SAG STRY -B: Performed by: ORTHOPAEDIC SURGERY

## 2022-04-07 PROCEDURE — 0QP604Z REMOVAL OF INTERNAL FIXATION DEVICE FROM RIGHT UPPER FEMUR, OPEN APPROACH: ICD-10-PCS | Performed by: ORTHOPAEDIC SURGERY

## 2022-04-07 PROCEDURE — 74011000250 HC RX REV CODE- 250: Performed by: ANESTHESIOLOGY

## 2022-04-07 PROCEDURE — 27132 TOTAL HIP ARTHROPLASTY: CPT | Performed by: ORTHOPAEDIC SURGERY

## 2022-04-07 PROCEDURE — 2709999900 HC NON-CHARGEABLE SUPPLY

## 2022-04-07 PROCEDURE — 77030040922 HC BLNKT HYPOTHRM STRY -A: Performed by: ANESTHESIOLOGY

## 2022-04-07 PROCEDURE — 74011250636 HC RX REV CODE- 250/636: Performed by: PHYSICIAN ASSISTANT

## 2022-04-07 PROCEDURE — 84132 ASSAY OF SERUM POTASSIUM: CPT

## 2022-04-07 PROCEDURE — 36415 COLL VENOUS BLD VENIPUNCTURE: CPT

## 2022-04-07 PROCEDURE — 97165 OT EVAL LOW COMPLEX 30 MIN: CPT

## 2022-04-07 PROCEDURE — 94762 N-INVAS EAR/PLS OXIMTRY CONT: CPT

## 2022-04-07 PROCEDURE — 74011000250 HC RX REV CODE- 250: Performed by: PHYSICIAN ASSISTANT

## 2022-04-07 PROCEDURE — 0SR904A REPLACEMENT OF RIGHT HIP JOINT WITH CERAMIC ON POLYETHYLENE SYNTHETIC SUBSTITUTE, UNCEMENTED, OPEN APPROACH: ICD-10-PCS | Performed by: ORTHOPAEDIC SURGERY

## 2022-04-07 PROCEDURE — 27132 TOTAL HIP ARTHROPLASTY: CPT | Performed by: PHYSICIAN ASSISTANT

## 2022-04-07 PROCEDURE — 77030018836 HC SOL IRR NACL ICUM -A: Performed by: ORTHOPAEDIC SURGERY

## 2022-04-07 PROCEDURE — 77010033678 HC OXYGEN DAILY

## 2022-04-07 PROCEDURE — 76010000171 HC OR TIME 2 TO 2.5 HR INTENSV-TIER 1: Performed by: ORTHOPAEDIC SURGERY

## 2022-04-07 PROCEDURE — 77030002966 HC SUT PDS J&J -A: Performed by: ORTHOPAEDIC SURGERY

## 2022-04-07 PROCEDURE — 72170 X-RAY EXAM OF PELVIS: CPT

## 2022-04-07 PROCEDURE — 74011000250 HC RX REV CODE- 250: Performed by: ORTHOPAEDIC SURGERY

## 2022-04-07 DEVICE — SCREW BNE L25MM DIA6.5MM CANC HIP S STL GRIPTION FULL THRD: Type: IMPLANTABLE DEVICE | Site: HIP | Status: FUNCTIONAL

## 2022-04-07 DEVICE — CUP ACET DIA52MM 10 H HIP TI GRIPTION MULT H II VIP TAPR: Type: IMPLANTABLE DEVICE | Site: HIP | Status: FUNCTIONAL

## 2022-04-07 DEVICE — HEAD FEM DIA36MM +8MM OFFSET 12/14 TAPR HIP CERAMIC BIOLOX: Type: IMPLANTABLE DEVICE | Site: HIP | Status: FUNCTIONAL

## 2022-04-07 DEVICE — LINER ACET OD52MM ID36MM HIP ALTRX PINN: Type: IMPLANTABLE DEVICE | Site: HIP | Status: FUNCTIONAL

## 2022-04-07 DEVICE — HIP H2 TOT ADV OTHER HD IMPL CAPPED SYNTHES: Type: IMPLANTABLE DEVICE | Status: FUNCTIONAL

## 2022-04-07 DEVICE — STEM FEM SZ 6 L107MM 12/14 TAPR STD OFFSET HIP DUOFIX CLLRD: Type: IMPLANTABLE DEVICE | Site: HIP | Status: FUNCTIONAL

## 2022-04-07 RX ORDER — VENLAFAXINE HYDROCHLORIDE 150 MG/1
150 CAPSULE, EXTENDED RELEASE ORAL EVERY 12 HOURS
Status: DISCONTINUED | OUTPATIENT
Start: 2022-04-07 | End: 2022-04-09 | Stop reason: HOSPADM

## 2022-04-07 RX ORDER — OXYCODONE HYDROCHLORIDE 5 MG/1
5 TABLET ORAL
Status: DISCONTINUED | OUTPATIENT
Start: 2022-04-07 | End: 2022-04-07

## 2022-04-07 RX ORDER — EPHEDRINE SULFATE/0.9% NACL/PF 50 MG/5 ML
SYRINGE (ML) INTRAVENOUS AS NEEDED
Status: DISCONTINUED | OUTPATIENT
Start: 2022-04-07 | End: 2022-04-07 | Stop reason: HOSPADM

## 2022-04-07 RX ORDER — ONDANSETRON 4 MG/1
4 TABLET, ORALLY DISINTEGRATING ORAL
Qty: 30 TABLET | Refills: 0 | Status: SHIPPED | OUTPATIENT
Start: 2022-04-07

## 2022-04-07 RX ORDER — MIDAZOLAM HYDROCHLORIDE 1 MG/ML
INJECTION, SOLUTION INTRAMUSCULAR; INTRAVENOUS AS NEEDED
Status: DISCONTINUED | OUTPATIENT
Start: 2022-04-07 | End: 2022-04-07 | Stop reason: HOSPADM

## 2022-04-07 RX ORDER — DIPHENHYDRAMINE HCL 25 MG
25 CAPSULE ORAL
Status: DISCONTINUED | OUTPATIENT
Start: 2022-04-07 | End: 2022-04-09 | Stop reason: HOSPADM

## 2022-04-07 RX ORDER — OXYCODONE HYDROCHLORIDE 5 MG/1
10 TABLET ORAL
Status: DISCONTINUED | OUTPATIENT
Start: 2022-04-07 | End: 2022-04-07

## 2022-04-07 RX ORDER — SODIUM CHLORIDE 0.9 % (FLUSH) 0.9 %
5-40 SYRINGE (ML) INJECTION EVERY 8 HOURS
Status: DISCONTINUED | OUTPATIENT
Start: 2022-04-07 | End: 2022-04-09 | Stop reason: HOSPADM

## 2022-04-07 RX ORDER — VANCOMYCIN HYDROCHLORIDE 1 G/20ML
INJECTION, POWDER, LYOPHILIZED, FOR SOLUTION INTRAVENOUS AS NEEDED
Status: DISCONTINUED | OUTPATIENT
Start: 2022-04-07 | End: 2022-04-07 | Stop reason: HOSPADM

## 2022-04-07 RX ORDER — PANTOPRAZOLE SODIUM 40 MG/1
40 TABLET, DELAYED RELEASE ORAL
Status: DISCONTINUED | OUTPATIENT
Start: 2022-04-08 | End: 2022-04-09 | Stop reason: HOSPADM

## 2022-04-07 RX ORDER — SODIUM CHLORIDE, SODIUM LACTATE, POTASSIUM CHLORIDE, CALCIUM CHLORIDE 600; 310; 30; 20 MG/100ML; MG/100ML; MG/100ML; MG/100ML
75 INJECTION, SOLUTION INTRAVENOUS CONTINUOUS
Status: DISCONTINUED | OUTPATIENT
Start: 2022-04-07 | End: 2022-04-07

## 2022-04-07 RX ORDER — CEFAZOLIN SODIUM/WATER 2 G/20 ML
2 SYRINGE (ML) INTRAVENOUS ONCE
Status: COMPLETED | OUTPATIENT
Start: 2022-04-07 | End: 2022-04-07

## 2022-04-07 RX ORDER — ACETAMINOPHEN 500 MG
1000 TABLET ORAL EVERY 6 HOURS
Status: DISCONTINUED | OUTPATIENT
Start: 2022-04-07 | End: 2022-04-07 | Stop reason: ALTCHOICE

## 2022-04-07 RX ORDER — NALOXONE HYDROCHLORIDE 0.4 MG/ML
0.1 INJECTION, SOLUTION INTRAMUSCULAR; INTRAVENOUS; SUBCUTANEOUS
Status: DISCONTINUED | OUTPATIENT
Start: 2022-04-07 | End: 2022-04-07

## 2022-04-07 RX ORDER — LOSARTAN POTASSIUM 50 MG/1
100 TABLET ORAL DAILY
Status: DISCONTINUED | OUTPATIENT
Start: 2022-04-08 | End: 2022-04-09 | Stop reason: HOSPADM

## 2022-04-07 RX ORDER — HYDROMORPHONE HYDROCHLORIDE 1 MG/ML
0.5 INJECTION, SOLUTION INTRAMUSCULAR; INTRAVENOUS; SUBCUTANEOUS
Status: DISCONTINUED | OUTPATIENT
Start: 2022-04-07 | End: 2022-04-07

## 2022-04-07 RX ORDER — ROPIVACAINE HYDROCHLORIDE 2 MG/ML
INJECTION, SOLUTION EPIDURAL; INFILTRATION; PERINEURAL AS NEEDED
Status: DISCONTINUED | OUTPATIENT
Start: 2022-04-07 | End: 2022-04-07 | Stop reason: HOSPADM

## 2022-04-07 RX ORDER — FLUMAZENIL 0.1 MG/ML
0.2 INJECTION INTRAVENOUS AS NEEDED
Status: DISCONTINUED | OUTPATIENT
Start: 2022-04-07 | End: 2022-04-07

## 2022-04-07 RX ORDER — AMOXICILLIN 250 MG
2 CAPSULE ORAL DAILY
Status: DISCONTINUED | OUTPATIENT
Start: 2022-04-08 | End: 2022-04-09 | Stop reason: HOSPADM

## 2022-04-07 RX ORDER — HYDROCODONE BITARTRATE AND ACETAMINOPHEN 10; 325 MG/1; MG/1
1 TABLET ORAL
Qty: 30 TABLET | Refills: 0 | Status: SHIPPED | OUTPATIENT
Start: 2022-04-07 | End: 2022-04-29 | Stop reason: SDUPTHER

## 2022-04-07 RX ORDER — DEXAMETHASONE SODIUM PHOSPHATE 4 MG/ML
INJECTION, SOLUTION INTRA-ARTICULAR; INTRALESIONAL; INTRAMUSCULAR; INTRAVENOUS; SOFT TISSUE AS NEEDED
Status: DISCONTINUED | OUTPATIENT
Start: 2022-04-07 | End: 2022-04-07 | Stop reason: HOSPADM

## 2022-04-07 RX ORDER — ASPIRIN 81 MG/1
81 TABLET ORAL EVERY 12 HOURS
Status: DISCONTINUED | OUTPATIENT
Start: 2022-04-07 | End: 2022-04-09 | Stop reason: HOSPADM

## 2022-04-07 RX ORDER — KETOROLAC TROMETHAMINE 30 MG/ML
INJECTION, SOLUTION INTRAMUSCULAR; INTRAVENOUS AS NEEDED
Status: DISCONTINUED | OUTPATIENT
Start: 2022-04-07 | End: 2022-04-07 | Stop reason: HOSPADM

## 2022-04-07 RX ORDER — HYDROCODONE BITARTRATE AND ACETAMINOPHEN 10; 325 MG/1; MG/1
1 TABLET ORAL
Status: DISCONTINUED | OUTPATIENT
Start: 2022-04-07 | End: 2022-04-08

## 2022-04-07 RX ORDER — NEBIVOLOL 10 MG/1
10 TABLET ORAL DAILY
Status: DISCONTINUED | OUTPATIENT
Start: 2022-04-08 | End: 2022-04-09 | Stop reason: HOSPADM

## 2022-04-07 RX ORDER — POTASSIUM CHLORIDE 20 MEQ/1
20 TABLET, EXTENDED RELEASE ORAL 2 TIMES DAILY
Status: DISCONTINUED | OUTPATIENT
Start: 2022-04-07 | End: 2022-04-09 | Stop reason: HOSPADM

## 2022-04-07 RX ORDER — BUPIVACAINE HYDROCHLORIDE 7.5 MG/ML
INJECTION, SOLUTION INTRASPINAL
Status: COMPLETED | OUTPATIENT
Start: 2022-04-07 | End: 2022-04-07

## 2022-04-07 RX ORDER — ALPRAZOLAM 0.5 MG/1
1 TABLET ORAL DAILY
Status: DISCONTINUED | OUTPATIENT
Start: 2022-04-08 | End: 2022-04-09 | Stop reason: HOSPADM

## 2022-04-07 RX ORDER — ACETAMINOPHEN 650 MG/1
650 SUPPOSITORY RECTAL ONCE
Status: DISCONTINUED | OUTPATIENT
Start: 2022-04-07 | End: 2022-04-07 | Stop reason: SDUPTHER

## 2022-04-07 RX ORDER — ALBUTEROL SULFATE 0.83 MG/ML
2.5 SOLUTION RESPIRATORY (INHALATION)
Status: DISCONTINUED | OUTPATIENT
Start: 2022-04-07 | End: 2022-04-09 | Stop reason: HOSPADM

## 2022-04-07 RX ORDER — SODIUM CHLORIDE 0.9 % (FLUSH) 0.9 %
5-40 SYRINGE (ML) INJECTION AS NEEDED
Status: DISCONTINUED | OUTPATIENT
Start: 2022-04-07 | End: 2022-04-09 | Stop reason: HOSPADM

## 2022-04-07 RX ORDER — DEXAMETHASONE SODIUM PHOSPHATE 100 MG/10ML
10 INJECTION INTRAMUSCULAR; INTRAVENOUS ONCE
Status: ACTIVE | OUTPATIENT
Start: 2022-04-08 | End: 2022-04-09

## 2022-04-07 RX ORDER — ACETAMINOPHEN 500 MG
1000 TABLET ORAL ONCE
Status: COMPLETED | OUTPATIENT
Start: 2022-04-07 | End: 2022-04-07

## 2022-04-07 RX ORDER — LEVOTHYROXINE SODIUM 112 UG/1
112 TABLET ORAL
Status: DISCONTINUED | OUTPATIENT
Start: 2022-04-08 | End: 2022-04-09 | Stop reason: HOSPADM

## 2022-04-07 RX ORDER — NALOXONE HYDROCHLORIDE 0.4 MG/ML
.2-.4 INJECTION, SOLUTION INTRAMUSCULAR; INTRAVENOUS; SUBCUTANEOUS
Status: DISCONTINUED | OUTPATIENT
Start: 2022-04-07 | End: 2022-04-09 | Stop reason: HOSPADM

## 2022-04-07 RX ORDER — LIDOCAINE HYDROCHLORIDE 10 MG/ML
0.1 INJECTION INFILTRATION; PERINEURAL AS NEEDED
Status: DISCONTINUED | OUTPATIENT
Start: 2022-04-07 | End: 2022-04-07

## 2022-04-07 RX ORDER — PROPOFOL 10 MG/ML
INJECTION, EMULSION INTRAVENOUS AS NEEDED
Status: DISCONTINUED | OUTPATIENT
Start: 2022-04-07 | End: 2022-04-07 | Stop reason: HOSPADM

## 2022-04-07 RX ORDER — CEFAZOLIN SODIUM/WATER 2 G/20 ML
2 SYRINGE (ML) INTRAVENOUS EVERY 8 HOURS
Status: DISCONTINUED | OUTPATIENT
Start: 2022-04-07 | End: 2022-04-09 | Stop reason: HOSPADM

## 2022-04-07 RX ORDER — ONDANSETRON 4 MG/1
4 TABLET, ORALLY DISINTEGRATING ORAL
Status: DISCONTINUED | OUTPATIENT
Start: 2022-04-07 | End: 2022-04-09 | Stop reason: HOSPADM

## 2022-04-07 RX ORDER — ACETAMINOPHEN 325 MG/1
975 TABLET ORAL ONCE
Status: DISCONTINUED | OUTPATIENT
Start: 2022-04-07 | End: 2022-04-07 | Stop reason: SDUPTHER

## 2022-04-07 RX ORDER — CLINDAMYCIN PHOSPHATE 900 MG/50ML
900 INJECTION INTRAVENOUS ONCE
Status: DISCONTINUED | OUTPATIENT
Start: 2022-04-07 | End: 2022-04-07

## 2022-04-07 RX ORDER — MORPHINE SULFATE 10 MG/ML
6 INJECTION, SOLUTION INTRAMUSCULAR; INTRAVENOUS
Status: DISCONTINUED | OUTPATIENT
Start: 2022-04-07 | End: 2022-04-09 | Stop reason: HOSPADM

## 2022-04-07 RX ORDER — ONDANSETRON 2 MG/ML
INJECTION INTRAMUSCULAR; INTRAVENOUS AS NEEDED
Status: DISCONTINUED | OUTPATIENT
Start: 2022-04-07 | End: 2022-04-07 | Stop reason: HOSPADM

## 2022-04-07 RX ORDER — PROPOFOL 10 MG/ML
INJECTION, EMULSION INTRAVENOUS
Status: DISCONTINUED | OUTPATIENT
Start: 2022-04-07 | End: 2022-04-07 | Stop reason: HOSPADM

## 2022-04-07 RX ORDER — SODIUM CHLORIDE 9 MG/ML
100 INJECTION, SOLUTION INTRAVENOUS CONTINUOUS
Status: DISPENSED | OUTPATIENT
Start: 2022-04-07 | End: 2022-04-09

## 2022-04-07 RX ADMIN — PROPOFOL 25 MG: 10 INJECTION, EMULSION INTRAVENOUS at 12:11

## 2022-04-07 RX ADMIN — POTASSIUM CHLORIDE 20 MEQ: 20 TABLET, EXTENDED RELEASE ORAL at 20:17

## 2022-04-07 RX ADMIN — SODIUM CHLORIDE, SODIUM LACTATE, POTASSIUM CHLORIDE, AND CALCIUM CHLORIDE: 600; 310; 30; 20 INJECTION, SOLUTION INTRAVENOUS at 12:25

## 2022-04-07 RX ADMIN — PHENYLEPHRINE HYDROCHLORIDE 100 MCG: 10 INJECTION INTRAVENOUS at 12:26

## 2022-04-07 RX ADMIN — PROPOFOL 100 MCG/KG/MIN: 10 INJECTION, EMULSION INTRAVENOUS at 12:05

## 2022-04-07 RX ADMIN — PROPOFOL 25 MG: 10 INJECTION, EMULSION INTRAVENOUS at 12:04

## 2022-04-07 RX ADMIN — MIDAZOLAM 1 MG: 1 INJECTION INTRAMUSCULAR; INTRAVENOUS at 11:56

## 2022-04-07 RX ADMIN — Medication 30 MCG/MIN: at 12:53

## 2022-04-07 RX ADMIN — Medication 3 AMPULE: at 09:36

## 2022-04-07 RX ADMIN — DEXAMETHASONE SODIUM PHOSPHATE 10 MG: 4 INJECTION, SOLUTION INTRAMUSCULAR; INTRAVENOUS at 12:12

## 2022-04-07 RX ADMIN — Medication 81 MG: at 20:17

## 2022-04-07 RX ADMIN — SODIUM CHLORIDE, SODIUM LACTATE, POTASSIUM CHLORIDE, AND CALCIUM CHLORIDE 75 ML/HR: 600; 310; 30; 20 INJECTION, SOLUTION INTRAVENOUS at 14:23

## 2022-04-07 RX ADMIN — ONDANSETRON 4 MG: 2 INJECTION INTRAMUSCULAR; INTRAVENOUS at 12:12

## 2022-04-07 RX ADMIN — SODIUM CHLORIDE, SODIUM LACTATE, POTASSIUM CHLORIDE, AND CALCIUM CHLORIDE 75 ML/HR: 600; 310; 30; 20 INJECTION, SOLUTION INTRAVENOUS at 09:45

## 2022-04-07 RX ADMIN — Medication 2 G: at 12:08

## 2022-04-07 RX ADMIN — CEFAZOLIN SODIUM 2 G: 10 INJECTION, POWDER, FOR SOLUTION INTRAVENOUS at 20:18

## 2022-04-07 RX ADMIN — PHENYLEPHRINE HYDROCHLORIDE 100 MCG: 10 INJECTION INTRAVENOUS at 12:34

## 2022-04-07 RX ADMIN — MIDAZOLAM 1 MG: 1 INJECTION INTRAMUSCULAR; INTRAVENOUS at 11:59

## 2022-04-07 RX ADMIN — VENLAFAXINE HYDROCHLORIDE 150 MG: 150 CAPSULE, EXTENDED RELEASE ORAL at 20:17

## 2022-04-07 RX ADMIN — PHENYLEPHRINE HYDROCHLORIDE 75 MCG: 10 INJECTION INTRAVENOUS at 12:41

## 2022-04-07 RX ADMIN — HYDROCODONE BITARTRATE AND ACETAMINOPHEN 1 TABLET: 10; 325 TABLET ORAL at 16:10

## 2022-04-07 RX ADMIN — ACETAMINOPHEN 1000 MG: 500 TABLET, FILM COATED ORAL at 09:35

## 2022-04-07 RX ADMIN — Medication 10 MG: at 12:53

## 2022-04-07 RX ADMIN — PHENYLEPHRINE HYDROCHLORIDE 100 MCG: 10 INJECTION INTRAVENOUS at 12:47

## 2022-04-07 RX ADMIN — SODIUM CHLORIDE, PRESERVATIVE FREE 10 ML: 5 INJECTION INTRAVENOUS at 20:19

## 2022-04-07 RX ADMIN — Medication 1 AMPULE: at 20:17

## 2022-04-07 RX ADMIN — Medication 1 G: at 13:04

## 2022-04-07 RX ADMIN — Medication 1 G: at 12:11

## 2022-04-07 RX ADMIN — BUPIVACAINE HYDROCHLORIDE IN DEXTROSE 13.5 MG: 7.5 INJECTION, SOLUTION SUBARACHNOID at 11:59

## 2022-04-07 RX ADMIN — Medication 10 MG: at 12:59

## 2022-04-07 RX ADMIN — MORPHINE SULFATE 6 MG: 10 INJECTION INTRAVENOUS at 20:18

## 2022-04-07 NOTE — PROGRESS NOTES
Problem: Mobility Impaired (Adult and Pediatric)  Goal: *Acute Goals and Plan of Care (Insert Text)  Outcome: Progressing Towards Goal  Note: GOALS (1-4 days):  (1.)Ms. Leslie Martin will move from supine to sit and sit to supine  in bed with SUPERVISION. (2.)Ms. Leslie Martin will transfer from bed to chair and chair to bed with SUPERVISION using the least restrictive device. (3.)Ms. Leslie Martin will ambulate with STAND BY ASSIST for 300 feet with the least restrictive device. (4.)Ms. Leslie Martin will ambulate up/down 4 steps with bilateral  railing with CONTACT GUARD ASSIST with no device. (5.)Ms. Leslie Martin will state/observe KATHY precautions with 0 verbal cues. ________________________________________________________________________________________________       PHYSICAL THERAPY JOINT CAMP KATHY: Initial Assessment, Treatment Day: Day of Assessment, and PM 4/7/2022  OUTPATIENT: Hospital Day: 1  [place] / [place] / Product Type: Medicare /      NAME/AGE/GENDER: Aisha Cardoso is a 66 y.o. female   PRIMARY DIAGNOSIS:  Closed hip fracture requiring operative repair, right, initial encounter ([facility] [S72.001A]   Procedure(s) and Anesthesia Type:     * HIP ARTHROPLASTY TOTAL CONVERSION/ RIGHT/ DEPUY- WILL NEED TO CUT PLATE - Spinal (Right)  ICD-10: Treatment Diagnosis:    Pain in Right Hip (M25.551)  Stiffness of Right Hip, Not elsewhere classified (M25.651)  Difficulty in walking, Not elsewhere classified (R26.2)      ASSESSMENT:     Ms. Leslie Martin presents with decreased strength and range of motion right lower extremity and with decreased independence with functional mobility s/p  right total hip arthroplasty. Pt will benefit from skilled PT interventions to maximize independence with functional mobility and KATHY management. Pt did well with assessment however numbness limiting mobility. Pt able to get to chair with assist of 2.  She had some reports of back ain pain from being in the the bed and was eager for a position change. In recliner worked on [employer] exercises with verbal cues. Hope to progress her therapy at next session. Pt instructed not to get up without assist. Josias Aguilera to progress mobility and KATHY exercises at next session. Pt plans to discharge to home from the hospital with continued therapy for follow up. This section established at most recent assessment   PROBLEM LIST (Impairments causing functional limitations):  Decreased Strength  Decreased ADL/Functional Activities  Decreased Transfer Abilities  Decreased Ambulation Ability/Technique  Increased Pain  Decreased Flexibility/Joint Mobility  Decreased Levy with Home Exercise Program   INTERVENTIONS PLANNED: (Benefits and precautions of physical therapy have been discussed with the patient.)  Bed Mobility  Cold  Gait Training  Home Exercise Program (HEP)  Range of Motion (ROM)  Therapeutic Activites  Therapeutic Exercise/Strengthening  Transfer Training     TREATMENT PLAN: Frequency/Duration: Follow patient BID for duration of hospital stay to address above goals. Rehabilitation Potential For Stated Goals: Good     RECOMMENDED REHABILITATION/EQUIPMENT: (at time of discharge pending progress): Continue Skilled Therapy and Home Health: Physical Therapy.               HISTORY:   History of Present Injury/Illness (Reason for Referral):  Pt s/p KATHY on 4/7/2022  Past Medical History/Comorbidities:   Ms. Jenni Chandra  has a past medical history of Allergic rhinitis, Aneurysm ([facility] [address] Anxiety, Arthritis, Asthma, Calculus of kidney, Cancer ([facility] [address] Chronic pain, Depression, Dizziness (09/08/2016), Dyslipidemia, Fatigue, Former cigarette smoker, GERD (gastroesophageal reflux disease), Heart murmur (11/24/2015), History of nuclear stress test (05/11/2021), HTN (hypertension), echocardiogram (11/25/2019), Hypercholesterolemia, Hypothyroid, IBS (irritable bowel syndrome), Migraines, Mitral valve insufficiency, Nausea & vomiting, Peripheral neuropathy, Rheumatic fever, Seizures ([facility] [address] Stroke ([facility] [address] (11/25/2019), and Unspecified adverse effect of anesthesia. Ms. Magalie Mccauley  has a past surgical history that includes hx lap cholecystectomy; hx appendectomy; hx hysterectomy; hx tonsil and adenoidectomy; hx total colectomy; hx bladder suspension; hx colonoscopy; hx other surgical; hx other surgical; hx breast lumpectomy (Right, 1996); hx orthopaedic; hx knee arthroscopy (Left); hx knee arthroscopy (Right, 10/16/09; 03/2015); hx knee replacement (Right, 3/2015); hx knee replacement (Left, 2016); hx orthopaedic (Right, 08/24/2021); and hx urological (2020). Social History/Living Environment:   Home Environment: Private residence  # Steps to Enter: 4  Rails to Enter: Yes  Hand Rails : Right  One/Two Story Residence: One story  # of Interior Steps: 2  Interior Rails: Both  Living Alone: No  Support Systems: Spouse/Significant Other  Patient Expects to be Discharged to[de-identified] Home with home health  Current DME Used/Available at Home: [address] [facility], straight; Kenia Umu, rollator; Walker, rolling  Tub or Shower Type: Tub/Shower combination    Prior Level of Function/Work/Activity:  Pt living at home, independent with mobility   Number of Personal Factors/Comorbidities that affect the Plan of Care: 0: LOW COMPLEXITY   EXAMINATION:   Most Recent Physical Functioning:   Gross Assessment: Yes  Gross Assessment  AROM: Generally decreased, functional (right lower extremity s/p KATHY)  Strength: Generally decreased, functional (right lower extremity s/p KATHY)                     Bed Mobility  Supine to Sit: Minimum assistance; Additional time  Scooting: Additional time    Transfers  Sit to Stand: Minimum assistance;Assist x2  Stand to Sit: Minimum assistance;Assist x2  Bed to Chair: Minimum assistance;Assist x2    Balance  Sitting: Intact  Standing: Pull to stand; With support  Standing - Static: Constant support  Standing - Dynamic : Constant support    Posture  Posture (WDL): Within defined limits         Weight Bearing Status  Right Side Weight Bearing: As tolerated  Distance (ft):  (bed to chair)  Ambulation - Level of Assistance: Minimal assistance;Assist x2  Assistive Device: Walker, rolling  Speed/Karen: Pace decreased (<100 feet/min)  Step Length: Left shortened;Right shortened  Stance: Right decreased  Gait Abnormalities: Antalgic;Decreased step clearance  Interventions: Safety awareness training;Verbal cues     Braces/Orthotics: none           Body Structures Involved:  Joints  Muscles Body Functions Affected: Movement Related Activities and Participation Affected: Mobility  Self Care   Number of elements that affect the Plan of Care: 4+: HIGH COMPLEXITY   CLINICAL PRESENTATION:   Presentation: Stable and uncomplicated: LOW COMPLEXITY   CLINICAL DECISION MAKING:   [facility] AM-PAC 6 Clicks   Basic Mobility Inpatient Short Form  How much difficulty does the patient currently have. .. Unable A Lot A Little None   1. Turning over in bed (including adjusting bedclothes, sheets and blankets)? [] 1   [] 2   [x] 3   [] 4   2. Sitting down on and standing up from a chair with arms ( e.g., wheelchair, bedside commode, etc.)   [] 1   [] 2   [x] 3   [] 4   3. Moving from lying on back to sitting on the side of the bed? [] 1   [] 2   [x] 3   [] 4   How much help from another person does the patient currently need. .. Total A Lot A Little None   4. Moving to and from a bed to a chair (including a wheelchair)? [] 1   [] 2   [x] 3   [] 4   5. Need to walk in hospital room? [] 1   [] 2   [x] 3   [] 4   6. Climbing 3-5 steps with a railing? [] 1   [] 2   [x] 3   [] 4   © 2007, Trustees of [facility], under license to [employer]. All rights reserved     Score:  Initial: 18 Most Recent: X (Date: -- )    Interpretation of Tool:  Represents activities that are increasingly more difficult (i.e. Bed mobility, Transfers, Gait).     Medical Necessity:     Patient is expected to demonstrate progress in   strength, range of motion, and functional technique   to   decrease assistance required with functional mobility and KATHY management  . Reason for Services/Other Comments:  Patient continues to require skilled intervention due to   Inability to complete functional mobility and KATHY management independently  . Use of outcome tool(s) and clinical judgement create a POC that gives a: Clear prediction of patient's progress: LOW COMPLEXITY            TREATMENT:   (In addition to Assessment/Re-Assessment sessions the following treatments were rendered)     Pre-treatment Symptoms/Complaints:  none  Pain Initial:   Pain Intensity 1: 4  Post Session:  4/10     Therapeutic Exercise: (13 Minutes):  Exercises per grid below to improve mobility and strength. Required minimal verbal and manual cues to promote proper body alignment and promote proper body posture. Progressed repetitions as indicated. Assessment   Date:  4/7 Date:   Date:     ACTIVITY/EXERCISE AM PM AM PM AM PM     []  []  []  []  []  []   Ankle Pumps  10       Quad Sets  10       Gluteal Sets  10       Hip ABd/ADduction  10       Knee Slides  10       Short Arc Quads         Long Arc Quads                                    B = bilateral; AA = active assistive; A = active; P = passive      Treatment/Session Assessment:     Response to Treatment:  pt seemed glad to be up and in chair. Education:  [x] Home Exercises  [x] Fall Precautions  [] Hip Precautions [] D/C Instruction Review  [] Hip Prosthesis Review  [] Walker Management/Safety [] Adaptive Equipment as Needed       Interdisciplinary Collaboration:   Occupational Therapist  Registered Nurse    After treatment position/precautions:   Up in chair  Bed/Chair-wheels locked  Call light within reach  Family at bedside    Compliance with Program/Exercises: Compliant all of the time, Will assess as treatment progresses.     Recommendations/Intent for next treatment session:  Treatment next visit will focus on increasing Ms. Monie Nath independence with bed mobility, transfers, gait training, strength/ROM exercises, modalities for pain, and patient education.       Total Treatment Duration:  PT Patient Time In/Time Out  Time In: 1705  Time Out: 74-03 [facility], PT

## 2022-04-07 NOTE — ANESTHESIA PROCEDURE NOTES
Spinal Block    Start time: 4/7/2022 11:56 AM  End time: 4/7/2022 11:59 AM  Performed by: Angelique Nguyen MD  Authorized by: Angelique Nguyen MD     Pre-procedure:   Indications: primary anesthetic  Preanesthetic Checklist: patient identified, risks and benefits discussed, anesthesia consent, site marked, patient being monitored and timeout performed    Timeout Time: 11:55 EDT          Spinal Block:   Patient Position:  Seated  Prep Region:  Lumbar  Prep: chlorhexidine      Location:  L4-5  Technique:  Single shot    Local Dose (mL):  3    Needle:   Needle Type:  Pencil-tip  Needle Gauge:  25 G  Attempts:  1      Events: CSF confirmed, no blood with aspiration and no paresthesia        Assessment:  Insertion:  Uncomplicated  Patient tolerance:  Patient tolerated the procedure well with no immediate complications

## 2022-04-07 NOTE — PROGRESS NOTES
Care Management Interventions  PCP Verified by CM: Yes  Mode of Transport at Discharge: Self  Transition of Care Consult (CM Consult): [address]: Yes  Discharge Durable Medical Equipment: Yes  Physical Therapy Consult: Yes  Occupational Therapy Consult: Yes  Support Systems: Spouse/Significant Other  Confirm Follow Up Transport: Self  The Plan for Transition of Care is Related to the Following Treatment Goals : improve mobility  The Patient and/or Patient Representative was Provided with a Choice of Provider and Agrees with the Discharge Plan?: Yes  Name of the Patient Representative Who was Provided with a Choice of Provider and Agrees with the Discharge Plan: pt  Freedom of Choice List was Provided with Basic Dialogue that Supports the Patient's Individualized Plan of Care/Goals, Treatment Preferences and Shares the Quality Data Associated with the Providers?: Yes  Discharge Location  Patient Expects to be Discharged to[de-identified] Home with home health  Patient is a 66y.o. year old female admitted for Right KATHY . Patient plans to return home on discharge. Order received to arrange home health. Patient without preference towards agency. Referral sent to [facility]. Patient has a walker. Patient requesting we arrange a bedside commode. Referral sent to [address] [place] [address]. delivered to the hospital room prior to discharge. Will follow until discharge.

## 2022-04-07 NOTE — ANESTHESIA POSTPROCEDURE EVALUATION
Procedure(s):  HIP ARTHROPLASTY TOTAL CONVERSION/ RIGHT/ DEPUY- WILL NEED TO CUT PLATE.    spinal    Anesthesia Post Evaluation      Multimodal analgesia: multimodal analgesia used between 6 hours prior to anesthesia start to PACU discharge  Patient location during evaluation: PACU  Patient participation: complete - patient participated  Level of consciousness: awake  Pain management: adequate  Airway patency: patent  Anesthetic complications: no  Cardiovascular status: acceptable and hemodynamically stable  Respiratory status: acceptable  Hydration status: acceptable  Comments: Acceptable for discharge from PACU. Post anesthesia nausea and vomiting:  none  Final Post Anesthesia Temperature Assessment:  Normothermia (36.0-37.5 degrees C)      INITIAL Post-op Vital signs:   Vitals Value Taken Time   BP 99/58 04/07/22 1411   Temp 36.8 °C (98.3 °F) 04/07/22 1357   Pulse 67 04/07/22 1413   Resp 16 04/07/22 1411   SpO2 100 % 04/07/22 1413   Vitals shown include unvalidated device data.

## 2022-04-07 NOTE — ANESTHESIA PREPROCEDURE EVALUATION
Relevant Problems   RESPIRATORY SYSTEM   (+) Asthma   (+) Shortness of breath      NEUROLOGY   (+) Depression   (+) Migraines   (+) Recurrent depression (HCC)      CARDIOVASCULAR   (+) HTN (hypertension)   (+) Heart murmur      GASTROINTESTINAL   (+) GERD (gastroesophageal reflux disease)      ENDOCRINE   (+) Arthritis   (+) Arthritis of knee, left   (+) Hypothyroid      HEMATOLOGY   (+) Acute postoperative anemia due to greater than expected blood loss       Anesthetic History     PONV          Review of Systems / Medical History  Patient summary reviewed and pertinent labs reviewed    Pulmonary            Asthma (no meds) : well controlled       Neuro/Psych       CVA (2019 - residual R hand numbness and R foot weakness; Plavix held x 7d, remains on bASA)  Psychiatric history     Cardiovascular    Hypertension: well controlled          Hyperlipidemia    Exercise tolerance: <4 METS: Ambulates with cane/walker  Comments: Stress test 5/21 - normal perfusion, normal EF  Echo 2019 - normal EF, no valve dz   GI/Hepatic/Renal     GERD: well controlled           Endo/Other      Hypothyroidism: well controlled  Arthritis     Other Findings              Physical Exam    Airway  Mallampati: II  TM Distance: 4 - 6 cm  Neck ROM: normal range of motion   Mouth opening: Normal     Cardiovascular    Rhythm: regular  Rate: normal         Dental    Dentition: Full upper dentures     Pulmonary  Breath sounds clear to auscultation               Abdominal         Other Findings            Anesthetic Plan    ASA: 3  Anesthesia type: spinal            Anesthetic plan and risks discussed with: Patient and Family

## 2022-04-07 NOTE — PROGRESS NOTES
TRANSFER - IN REPORT:    Verbal report received from MOY Tyson rn(name) on Leonela Casey  being received from [facility](unit) for routine post - op      Report consisted of patients Situation, Background, Assessment and   Recommendations(SBAR). Information from the following report(s) SBAR, Kardex, Intake/Output, MAR and Recent Results was reviewed with the receiving nurse. Opportunity for questions and clarification was provided. Assessment completed upon patients arrival to unit and care assumed.

## 2022-04-07 NOTE — PERIOP NOTES
TRANSFER - OUT REPORT:    Verbal report given to EDYTA León on Dax Phillips  being transferred to Room 335 for routine progression of care       Report consisted of patients Situation, Background, Assessment and   Recommendations(SBAR). Information from the following report(s) SBAR, Procedure Summary, Intake/Output, MAR, Recent Results and Cardiac Rhythm SR was reviewed with the receiving nurse. Lines:   Peripheral IV 04/07/22 Left;Posterior Hand (Active)   Site Assessment Clean, dry, & intact 04/07/22 1425   Phlebitis Assessment 0 04/07/22 1425   Infiltration Assessment 0 04/07/22 1425   Dressing Status Clean, dry, & intact 04/07/22 1425   Dressing Type Tape;Transparent 04/07/22 1425   Hub Color/Line Status Green; Infusing;Patent 04/07/22 1425        Opportunity for questions and clarification was provided.       Patient transported with:   O2 @ 1 liters

## 2022-04-07 NOTE — PROGRESS NOTES
Problem: Self Care Deficits Care Plan (Adult)  Goal: *Acute Goals and Plan of Care (Insert Text)  Outcome: Progressing Towards Goal  Note: GOALS:   DISCHARGE GOALS (in preparation for going home/rehab):  3 days  1. Ms. Soto Angeles will perform one lower body dressing activity with minimal assistance with adaptive equipment to demonstrate improved functional mobility and safety. 2.  Ms. Soto Angeles will perform one lower body bathing activity with minimal  assistance with adaptive equipment to demonstrate improved functional mobility and safety. 3.  Ms. Soto Angeles will perform toileting/toilet transfer with contact guard assistance with adaptive equipment to demonstrate improved functional mobility and safety. 4.  Ms. Soto Angeles will perform shower transfer with contact guard assistance with adaptive equipment to demonstrate improved functional mobility and safety. 5.  Ms. Soto Angeles will state KATHY precautions with two verbal cues to demonstrate improved functional mobility and safety. JOINT CAMP OCCUPATIONAL THERAPY KATHY: Initial Assessment, Daily Note, Treatment Day: Day of Assessment, and PM 4/7/2022  OUTPATIENT: Hospital Day: 1  [place] / [place] / Product Type: Medicare /      NAME/AGE/GENDER: Mark Be is a 66 y.o. female   PRIMARY DIAGNOSIS:  Closed hip fracture requiring operative repair, right, initial encounter ([facility] [S72.001A]   Procedure(s) and Anesthesia Type:     * HIP ARTHROPLASTY TOTAL CONVERSION/ RIGHT/ DEPUY- WILL NEED TO CUT PLATE - Spinal (Right)  ICD-10: Treatment Diagnosis:    Pain in Right Hip (M25.551)  Stiffness of Right Hip, Not elsewhere classified (M25.651)      ASSESSMENT:     Ms. Soto Angeles is s/p right KATHY and presents with decreased weight bearing on right LE and decreased independence with functional mobility and activities of daily living as compared to prior level of function and safety.   Patient would benefit from skilled Occupational Therapy to maximize independence and safety with self-care task and functional mobility. Pt would also benefit from education on lower body adaptive equipment and hip precautions post-surgery in preparation for going home. Patient plans for further rehab at home with home health services and good family support daughter. OT reviewed therapy schedule and plan of care with patient. Patient was able to transfer and perform self care skills as charted below. Patient instructed to call for assistance when needing to get up from the recliner and all needs in reach. Patient verbalized understanding of call light. Pt with slight lower extremity numbness but was able to eleazar brief and get to recliner with assistance. Pt should progress well tomorrow. This section established at most recent assessment   PROBLEM LIST (Impairments causing functional limitations):  Decreased Strength  Decreased ADL/Functional Activities  Decreased Transfer Abilities  Increased Pain  Increased Fatigue  Decreased Flexibility/Joint Mobility  Decreased Knowledge of Precautions   INTERVENTIONS PLANNED: (Benefits and precautions of occupational therapy have been discussed with the patient.)  Activities of daily living training  Adaptive equipment training  Balance training  Clothing management  Donning&doffing training  Theraputic activity     TREATMENT PLAN: Frequency/Duration: Follow patient 1-2 times to address above goals. Rehabilitation Potential For Stated Goals: Good     RECOMMENDED REHABILITATION/EQUIPMENT: (at time of discharge pending progress): Continue Skilled Therapy. OCCUPATIONAL PROFILE AND HISTORY:   History of Present Injury/Illness (Reason for Referral): Pt presents this date s/p (right) KATHY.     Past Medical History/Comorbidities:   Ms. Hilario Peña  has a past medical history of Allergic rhinitis, Aneurysm ([facility] Anxiety, Arthritis, Asthma, Calculus of kidney, Cancer ([facility] Chronic pain, Depression, Dizziness (09/08/2016), Dyslipidemia, Fatigue, Former cigarette smoker, GERD (gastroesophageal reflux disease), Heart murmur (11/24/2015), History of nuclear stress test (05/11/2021), HTN (hypertension), echocardiogram (11/25/2019), Hypercholesterolemia, Hypothyroid, IBS (irritable bowel syndrome), Migraines, Mitral valve insufficiency, Nausea & vomiting, Peripheral neuropathy, Rheumatic fever, Seizures ([facility] Stroke ([facility] (11/25/2019), and Unspecified adverse effect of anesthesia. Ms. Joes Armando Mcqueen  has a past surgical history that includes hx lap cholecystectomy; hx appendectomy; hx hysterectomy; hx tonsil and adenoidectomy; hx total colectomy; hx bladder suspension; hx colonoscopy; hx other surgical; hx other surgical; hx breast lumpectomy (Right, 1996); hx orthopaedic; hx knee arthroscopy (Left); hx knee arthroscopy (Right, 10/16/09; 03/2015); hx knee replacement (Right, 3/2015); hx knee replacement (Left, 2016); hx orthopaedic (Right, 08/24/2021); and hx urological (2020). Social History/Living Environment:   Home Environment: Private residence  # Steps to Enter: 4  Rails to Enter: Yes  Hand Rails : Right  One/Two Story Residence: One story  # of Interior Steps: 2  Interior Rails: Both  Living Alone: No  Support Systems: Spouse/Significant Other  Patient Expects to be Discharged to[de-identified] Home with home health  Current DME Used/Available at Home: Walker  Tub or Shower Type: Tub/Shower combination    Prior Level of Function/Work/Activity:  Pt was (I) with ADL's and ambulated short distances. Number of Personal Factors/Comorbidities that affect the Plan of Care: Brief history (0):  LOW COMPLEXITY   ASSESSMENT OF OCCUPATIONAL PERFORMANCE[de-identified]   Most Recent Physical Functioning:   Balance  Sitting: Intact  Standing: Pull to stand; With support  Standing - Static: Constant support  Standing - Dynamic : Constant support                    Coordination  Fine Motor Skills-Upper: Left Intact; Right Intact  Gross Motor Skills-Upper: Left Intact; Right Intact         Mental Status  Neurologic State: Alert  Orientation Level: Oriented X4  Cognition: Appropriate decision making  Perception: Appears intact  Perseveration: No perseveration noted  Safety/Judgement: Awareness of environment                Basic ADLs (From Assessment) Complex ADLs (From Assessment)   Basic ADL  Feeding: Independent  Oral Facial Hygiene/Grooming: Supervision  Bathing: Moderate assistance  Upper Body Dressing: Supervision  Lower Body Dressing: Moderate assistance  Toileting: Minimum assistance     Grooming/Bathing/Dressing Activities of Daily Living     Cognitive Retraining  Safety/Judgement: Awareness of environment                 Functional Transfers  Bathroom Mobility: Moderate assistance  Toilet Transfer : Moderate assistance  Shower Transfer: Moderate assistance     Bed/Mat Mobility  Supine to Sit: Additional time;Minimum assistance  Sit to Stand: Minimum assistance;Assist x2  Stand to Sit: Minimum assistance;Assist x2  Bed to Chair: Minimum assistance;Assist x2  Scooting: Additional time         Physical Skills Involved:  Range of Motion  Balance  Pain (acute) Cognitive Skills Affected (resulting in the inability to perform in a timely and safe manner):  none Psychosocial Skills Affected:  Environmental Adaptation   Number of elements that affect the Plan of Care: 3-5:  MODERATE COMPLEXITY   CLINICAL DECISION MAKIN[address] [place], [place] [id number] AM-PAC 6 Clicks   Daily Activity Inpatient Short Form  How much help from another person does the patient currently need. .. Total A Lot A Little None   1. Putting on and taking off regular lower body clothing? [] 1   [x] 2   [] 3   [] 4   2. Bathing (including washing, rinsing, drying)? [] 1   [x] 2   [] 3   [] 4   3. Toileting, which includes using toilet, bedpan or urinal?   [] 1   [x] 2   [] 3   [] 4   4. Putting on and taking off regular upper body clothing? [] 1   [] 2   [] 3   [x] 4   5.   Taking care of personal grooming such as brushing teeth? [] 1   [] 2   [] 3   [x] 4   6. Eating meals? [] 1   [] 2   [] 3   [x] 4   © 2007, Trustees of [address] [place] [address] [address] [address], under license to [employer]. All rights reserved     Score:  Initial: 18 Most Recent: X (Date: -- )    Interpretation of Tool:  Represents activities that are increasingly more difficult (i.e. Bed mobility, Transfers, Gait). Use of outcome tool(s) and clinical judgement create a POC that gives a: LOW COMPLEXITY            TREATMENT:   (In addition to Assessment/Re-Assessment sessions the following treatments were rendered)     Pre-treatment Symptoms/Complaints:  none  Pain: Initial: 0     Post Session:  0     Self Care: (10 min): Procedure(s) (per grid) utilized to improve and/or restore self-care/home management as related to dressing and functional transfers . Required moderate verbal and manual cueing to facilitate activities of daily living skills and compensatory activities. OT evaluation completed     Treatment/Session Assessment:     Response to Treatment:  pt up to recliner tolerated well. Education:  [] Home Exercises  [x] Fall Precautions  [x] Hip Precautions [] Going Home Video  [] Knee/Hip Prosthesis Review  [x] Walker Management/Safety [x] Adaptive Equipment as Needed       Interdisciplinary Collaboration:   Physical Therapist  Occupational Therapist  Registered Nurse    After treatment position/precautions:   Up in chair  Bed/Chair-wheels locked  Call light within reach  RN notified  Family at bedside     Compliance with Program/Exercises: Compliant all of the time, Will assess as treatment progresses. Recommendations/Intent for next treatment session:  Treatment next visit will focus on increasing Ms. Amy Martinez independence with bed mobility, transfers, self care, functional mobility, modalities for pain, and patient education.       Total Treatment Duration:  OT Patient Time In/Time Out  Time In: 1700  Time Out: [address]

## 2022-04-07 NOTE — OP NOTES
Total Hip Procedure Note               Rosalba Jacobo, [id number], 1950    Pre-operative Diagnosis: Osteoarthritis Right hip post ORIF      Post-operative Diagnosis: Same     Procedure: Conversion Right Total Hip Arthroplasty with plate extraction     BMI: Body mass index is 26.18 kg/m². Damian Joseph Location: [address] [place] [address]      Surgeon: Taylor Acevedo MD      Assistant: MEIR Neumann    Anesthesia: Spinal  BMI:Body mass index is 26.18 kg/m². EBL: 700 cc     Procedure: Total Hip Arthroplasty    The complexity of total joint surgery requires the use of a skilled first assistant for positioning, retraction and assistance in closure. The patient's size and surgical complexity makes implantation and proper insertion of total joint implants more difficult requiring a skilled first assistant Rosalba Jacobo was brought to the operating room and positioned on the operating table. Anesthesia was administered. IV antibiotics were administered, along with IV transexamic acid. A time out identifying the patient, procedure, operative side and surgeon was administered and charted by the OR Nurse. Rosalba Jacobo was positioned on right lateral decubitus position. The limb was prepped and draped in the usual sterile fashion. The Posterior approach was utilized to expose the hip. The incision was carried through the subcutaneous tissue and underlying fascia with hemostasis obtained using the bovie cauterization. A Charmley retractor was inserted. The short external rotators were divided at their insertion. The sciatic nerve was palpated and identified. Next, a T-shaped capsular incision was accomplished and femoral head was dislocated. The articular surface revealed loss of cartilage, exposed bone and bone spurring. The plate was identified and the femoral head screw removed. The femoral screw was transected at the plate and plate removed.   The screw was removed from the canal with a trephine. The area was covered with bone graft and the vastus lateralis repaired. The femoral head was again dislocated and the neck was osteotomized in the appropriate position just above the lesser trochanteric region. The neck cut was measured to be 9 mm. Acetabular retractors were placed and the appropriate capsulotomy performed. Soft tissue was removed from the acetabulum. The acetabular surface revealed loss of cartilage with exposed bone. The acetabulum was sequentially reamed. Using trial components, the acetabulum was sized to a 52 mm [place] acetabular cup. The acetabular component was impacted to achieve appropriate horizontal tilt, anteversion, coverage, and stability. 1 screw was  used to stabilize the cup. The polyethelene liner was impacted into position. Utilizing the femoral retractor, the canal was prepared with appropriate lateralization with the starter rasp. The canal was then broached progressively. The broach was positioned with the appropriate anatomic femoral anteversion. A calcar planar was utilized. A trial reduction with a +8.5 neck length was utilized. This was found to be the most stable to flexion greater than 90 degrees and on internal and external rotation. Limb lengths were thought to be equilibrated and with appropriate stability as mentioned above. All trial components were removed. The cementless permanent size 6 std offset ACTIS  was impacted into place. A trial reduction was performed and the above neck length was selected. The permanent Biolox femoral head was impacted into place. Faizan Travis Andres's hip was reduced and stability was as mentioned above. Copious irrigation was accomplished about the surgical site. The sciatic nerve was palpated and noted to be intact. The capsule was repaired with a #1 PDS and the external rotators were reattached with a #5 Mersilene.      A lavage Irracept was allowed to soak in the wound for 3 minutes after implanting of the prosthesis. The wound was irrigated with Saline again before closure. The joint and skin areas were sprinkled with 1 gm of vancomycin powder. Prior to the final skin closure, full strength betadine was applied to the skin surrounding the skin incision. The operative hip was injected prior to closure for post operative pain management. A #1 PDS suture was used to re-approximate the fascia. 60 cc of transexamic acid was injected under the fascia for hemostasis. Monocryl sutures were used to approximate the subcutaneous layers. Staples were used to reapproximate the skin edges and a sterile bandage was placed. The patient was then rolled to a supine position. The sponge and needle counts were correct. The patient tolerated the procedure without difficulty and left the operating room in satisfactory condition. Implants:   Implant Name Type Inv.  Item Serial No.  Lot No. LRB No. Used Action   [facility] PINN [id number] SS --  - [id number]  [facility] PINN [id number] SS --   [facility] [employer] [facility] [id number] Right 1 Implanted   CUP ACET [id number] 10 H HIP TI Porfirio Johnson II VIP TAPR - [id number]  CUP ACET [id number] 10 H HIP TI GRIPTION MULT H II VIP TAPR  [facility] [employer]_ [id number] Right 1 Implanted   LINER ACET OD52MM ID36MM HIP ALTRX PINN - [id number]  LINER ACET OD52MM ID36MM HIP ALTRX PINN  [facility] [id number] Right 1 Implanted   STEM FEM SZ 6 L107MM 12/14 TAPR STD OFFSET HIP DUOFIX [facility] - [id number]  STEM FEM SZ 6 L107MM 12/14 TAPR STD OFFSET HIP DUOFIX CLLRD  [facility] [employer]_ [id number] Right 1 Implanted   HEAD FEM [id number] +8MM OFFSET 12/14 TAPR HIP CERAMIC BIOLOX - [id number]  HEAD FEM [id number] +8MM OFFSET 12/14 TAPR HIP CERAMIC BIOLOX  [facility] [employer]TH[employer]_ [id number] Right 1 Implanted        By: Mani Berry MD

## 2022-04-07 NOTE — H&P
The patient has end stage arthritis of the right hip joint. The patient was seen and examined and there are no changes to the patient's orthopedic condition. They have tried conservative treatment for this condition; including antiinflammatories and lifestyle modifications and have failed. The necessity for the right hip conversion to total hip arthroplasty is still present, and the H&P from the office is still current. The patient will be admitted today forProcedure(s) (LRB):  HIP ARTHROPLASTY TOTAL CONVERSION/ RIGHT/ DEPUY- WILL NEED TO CUT PLATE (Right) .

## 2022-04-08 LAB — HGB BLD-MCNC: 8.9 G/DL (ref 11.7–15.4)

## 2022-04-08 PROCEDURE — 74011250637 HC RX REV CODE- 250/637: Performed by: ORTHOPAEDIC SURGERY

## 2022-04-08 PROCEDURE — 97530 THERAPEUTIC ACTIVITIES: CPT

## 2022-04-08 PROCEDURE — 74011250636 HC RX REV CODE- 250/636: Performed by: PHYSICIAN ASSISTANT

## 2022-04-08 PROCEDURE — 36415 COLL VENOUS BLD VENIPUNCTURE: CPT

## 2022-04-08 PROCEDURE — 74011250637 HC RX REV CODE- 250/637: Performed by: PHYSICIAN ASSISTANT

## 2022-04-08 PROCEDURE — 97535 SELF CARE MNGMENT TRAINING: CPT

## 2022-04-08 PROCEDURE — 65270000029 HC RM PRIVATE

## 2022-04-08 PROCEDURE — 85018 HEMOGLOBIN: CPT

## 2022-04-08 PROCEDURE — 74011000250 HC RX REV CODE- 250: Performed by: PHYSICIAN ASSISTANT

## 2022-04-08 RX ORDER — HYDROCODONE BITARTRATE AND ACETAMINOPHEN 5; 325 MG/1; MG/1
1-2 TABLET ORAL
Status: DISCONTINUED | OUTPATIENT
Start: 2022-04-08 | End: 2022-04-09 | Stop reason: HOSPADM

## 2022-04-08 RX ADMIN — Medication 1 AMPULE: at 19:30

## 2022-04-08 RX ADMIN — VENLAFAXINE HYDROCHLORIDE 150 MG: 150 CAPSULE, EXTENDED RELEASE ORAL at 21:11

## 2022-04-08 RX ADMIN — POTASSIUM CHLORIDE 20 MEQ: 20 TABLET, EXTENDED RELEASE ORAL at 17:41

## 2022-04-08 RX ADMIN — HYDROCODONE BITARTRATE AND ACETAMINOPHEN 1 TABLET: 10; 325 TABLET ORAL at 00:14

## 2022-04-08 RX ADMIN — HYDROCODONE BITARTRATE AND ACETAMINOPHEN 1 TABLET: 5; 325 TABLET ORAL at 17:41

## 2022-04-08 RX ADMIN — SODIUM CHLORIDE, PRESERVATIVE FREE 10 ML: 5 INJECTION INTRAVENOUS at 13:54

## 2022-04-08 RX ADMIN — CEFAZOLIN SODIUM 2 G: 10 INJECTION, POWDER, FOR SOLUTION INTRAVENOUS at 19:30

## 2022-04-08 RX ADMIN — PANTOPRAZOLE SODIUM 40 MG: 40 TABLET, DELAYED RELEASE ORAL at 08:30

## 2022-04-08 RX ADMIN — POTASSIUM CHLORIDE 20 MEQ: 20 TABLET, EXTENDED RELEASE ORAL at 08:29

## 2022-04-08 RX ADMIN — MORPHINE SULFATE 6 MG: 10 INJECTION INTRAVENOUS at 03:20

## 2022-04-08 RX ADMIN — HYDROCODONE BITARTRATE AND ACETAMINOPHEN 1 TABLET: 10; 325 TABLET ORAL at 08:29

## 2022-04-08 RX ADMIN — LOSARTAN POTASSIUM 100 MG: 50 TABLET, FILM COATED ORAL at 08:30

## 2022-04-08 RX ADMIN — Medication 81 MG: at 19:30

## 2022-04-08 RX ADMIN — CEFAZOLIN SODIUM 2 G: 10 INJECTION, POWDER, FOR SOLUTION INTRAVENOUS at 03:20

## 2022-04-08 RX ADMIN — VENLAFAXINE HYDROCHLORIDE 150 MG: 150 CAPSULE, EXTENDED RELEASE ORAL at 08:29

## 2022-04-08 RX ADMIN — Medication 1 AMPULE: at 12:58

## 2022-04-08 RX ADMIN — ALPRAZOLAM 1 MG: 0.5 TABLET ORAL at 21:11

## 2022-04-08 RX ADMIN — CEFAZOLIN SODIUM 2 G: 10 INJECTION, POWDER, FOR SOLUTION INTRAVENOUS at 12:58

## 2022-04-08 RX ADMIN — Medication 81 MG: at 08:30

## 2022-04-08 RX ADMIN — HYDROCODONE BITARTRATE AND ACETAMINOPHEN 1 TABLET: 5; 325 TABLET ORAL at 12:59

## 2022-04-08 RX ADMIN — DOCUSATE SODIUM 50MG AND SENNOSIDES 8.6MG 2 TABLET: 8.6; 5 TABLET, FILM COATED ORAL at 08:30

## 2022-04-08 RX ADMIN — LEVOTHYROXINE SODIUM 112 MCG: 0.11 TABLET ORAL at 08:30

## 2022-04-08 RX ADMIN — SODIUM CHLORIDE, PRESERVATIVE FREE 10 ML: 5 INJECTION INTRAVENOUS at 21:13

## 2022-04-08 NOTE — PROGRESS NOTES
04/07/22 2056   Oxygen Therapy   O2 Sat (%) 94 %   Pulse via Oximetry 84 beats per minute   O2 Device Nasal cannula   Skin Assessment Clean, dry, & intact   O2 Flow Rate (L/min) 2 l/min   Pt on continuous monitor for HS. Alarm limits set. Pt working on IS. Pt on 2L NC for tonight due to low SAT.  Will attempt to wean in AM

## 2022-04-08 NOTE — PROGRESS NOTES
Problem: Mobility Impaired (Adult and Pediatric)  Goal: *Acute Goals and Plan of Care (Insert Text)  Outcome: Progressing Towards Goal  Note: GOALS (1-4 days):  (1.)Ms. Raina Carter will move from supine to sit and sit to supine  in bed with SUPERVISION. (2.)Ms. Raina Carter will transfer from bed to chair and chair to bed with SUPERVISION using the least restrictive device. (3.)Ms. Raina Carter will ambulate with STAND BY ASSIST for 300 feet with the least restrictive device. (4.)Ms. Raina Carter will ambulate up/down 4 steps with bilateral  railing with CONTACT GUARD ASSIST with no device. (5.)Ms. Raina Carter will state/observe KATHY precautions with 0 verbal cues. ________________________________________________________________________________________________       PHYSICAL THERAPY JOINT CAMP KATHY: Daily Note and AM 4/8/2022  INPATIENT: Hospital Day: 2  [place] / [place] / Product Type: Medicare /      NAME/AGE/GENDER: Jeny Miranda is a 66 y.o. female   PRIMARY DIAGNOSIS:  Closed hip fracture requiring operative repair, right, [facility] [S72.001A]   Procedure(s) and Anesthesia Type:     * HIP ARTHROPLASTY TOTAL CONVERSION/ RIGHT/ DEPUY- WILL NEED TO CUT PLATE - Spinal (Right)  ICD-10: Treatment Diagnosis:    · Pain in Right Hip (M25.551)  · Stiffness of Right Hip, Not elsewhere classified (M25.651)  · Difficulty in walking, Not elsewhere classified (R26.2)      ASSESSMENT:     Ms. Raina Carter presents with decreased strength and range of motion right lower extremity and with decreased independence with functional mobility s/p  right total hip arthroplasty. Pt will benefit from skilled PT interventions to maximize independence with functional mobility and KATHY management. Pt did well with assessment however numbness limiting mobility. Pt able to get to chair with assist of 2. She had some reports of back ain pain from being in the the bed and was eager for a position change.  In recliner worked on KATHY exercises with verbal cues. Hope to progress her therapy at next session. Pt instructed not to get up without assist. Charles Clays to progress mobility and KATHY exercises at next session. Pt plans to discharge to home from the hospital with continued therapy for follow up. 4/8 sitting in the chair upon arrival. Performs R hip exercises with guidance to stay within the hip precaution. Sit>stand with Min A, ambulated 30 ft using RW with Min A. Rested few min, then ambulated another 30 ft into the bathroom with OT present. Left pt with OT. This section established at most recent assessment   PROBLEM LIST (Impairments causing functional limitations):  1. Decreased Strength  2. Decreased ADL/Functional Activities  3. Decreased Transfer Abilities  4. Decreased Ambulation Ability/Technique  5. Increased Pain  6. Decreased Flexibility/Joint Mobility  7. Decreased [place] with Home Exercise Program   INTERVENTIONS PLANNED: (Benefits and precautions of physical therapy have been discussed with the patient.)  1. Bed Mobility  2. Cold  3. Gait Training  4. Home Exercise Program (HEP)  5. Range of Motion (ROM)  6. Therapeutic Activites  7. Therapeutic Exercise/Strengthening  8. Transfer Training     TREATMENT PLAN: Frequency/Duration: Follow patient BID for duration of hospital stay to address above goals. Rehabilitation Potential For Stated Goals: Good     RECOMMENDED REHABILITATION/EQUIPMENT: (at time of discharge pending progress): Continue Skilled Therapy and Home Health: Physical Therapy.               HISTORY:   History of Present Injury/Illness (Reason for Referral):  Pt s/p KATHY on 4/7/2022  Past Medical History/Comorbidities:   Ms. Hilario Peña  has a past medical history of Allergic rhinitis, Aneurysm ([address] Anxiety, Arthritis, Asthma, Calculus of kidney, Cancer ([facility] Chronic pain, Depression, Dizziness (09/08/2016), Dyslipidemia, Fatigue, Former cigarette smoker, GERD (gastroesophageal reflux disease), Heart murmur (11/24/2015), History of nuclear stress test (05/11/2021), HTN (hypertension), echocardiogram (11/25/2019), Hypercholesterolemia, Hypothyroid, IBS (irritable bowel syndrome), Migraines, Mitral valve insufficiency, Nausea & vomiting, Peripheral neuropathy, Rheumatic fever, Seizures ([facility] Stroke ([facility] (11/25/2019), and Unspecified adverse effect of anesthesia. Ms. Magalie Mccauley  has a past surgical history that includes hx lap cholecystectomy; hx appendectomy; hx hysterectomy; hx tonsil and adenoidectomy; hx total colectomy; hx bladder suspension; hx colonoscopy; hx other surgical; hx other surgical; hx breast lumpectomy (Right, 1996); hx orthopaedic; hx knee arthroscopy (Left); hx knee arthroscopy (Right, 10/16/09; 03/2015); hx knee replacement (Right, 3/2015); hx knee replacement (Left, 2016); hx orthopaedic (Right, 08/24/2021); and hx urological (2020). Social History/Living Environment:   Home Environment: Private residence  # Steps to Enter: 4  Rails to Enter: Yes  Hand Rails : Right  One/Two Story Residence: One story  # of Interior Steps: 2  Interior Rails: Both  Living Alone: No  Support Systems: Spouse/Significant Other  Patient Expects to be Discharged to[de-identified] Home with home health  Current DME Used/Available at Home: Favian Havers, straight; Kenia Umu, rollator; Walker, rolling  Tub or Shower Type: Tub/Shower combination    Prior Level of Function/Work/Activity:  Pt living at home, independent with mobility   Number of Personal Factors/Comorbidities that affect the Plan of Care: 0: LOW COMPLEXITY   EXAMINATION:   Most Recent Physical Functioning:                            Bed Mobility  Supine to Sit: Minimum assistance  Scooting: Additional time    Transfers  Sit to Stand: Minimum assistance  Stand to Sit: Minimum assistance  Bed to Chair: Minimum assistance    Balance  Sitting: Intact  Standing: Pull to stand; With support  Standing - Static: Constant support  Standing - Dynamic : Constant support              Weight Bearing Status  Right Side Weight Bearing: As tolerated  Distance (ft): 60 Feet (ft)  Ambulation - Level of Assistance: Minimal assistance  Assistive Device: Walker, rolling  Base of Support: Center of gravity altered  Speed/Karen: Pace decreased (<100 feet/min); Slow  Step Length: Left shortened;Right shortened  Stance: Right decreased  Gait Abnormalities: Decreased step clearance  Interventions: Safety awareness training     Braces/Orthotics: none    Right Hip Cold  Type: Cold/ice packs      Body Structures Involved:  1. Joints  2. Muscles Body Functions Affected:  1. Movement Related Activities and Participation Affected:  1. Mobility  2. Self Care   Number of elements that affect the Plan of Care: 4+: HIGH COMPLEXITY   CLINICAL PRESENTATION:   Presentation: Stable and uncomplicated: LOW COMPLEXITY   CLINICAL DECISION MAKIN[address] [place] [id number] AM-PAC 6 Clicks   Basic Mobility Inpatient Short Form  How much difficulty does the patient currently have. .. Unable A Lot A Little None   1. Turning over in bed (including adjusting bedclothes, sheets and blankets)? [] 1   [] 2   [x] 3   [] 4   2. Sitting down on and standing up from a chair with arms ( e.g., wheelchair, bedside commode, etc.)   [] 1   [] 2   [x] 3   [] 4   3. Moving from lying on back to sitting on the side of the bed? [] 1   [] 2   [x] 3   [] 4   How much help from another person does the patient currently need. .. Total A Lot A Little None   4. Moving to and from a bed to a chair (including a wheelchair)? [] 1   [] 2   [x] 3   [] 4   5. Need to walk in hospital room? [] 1   [] 2   [x] 3   [] 4   6. Climbing 3-5 steps with a railing? [] 1   [] 2   [x] 3   [] 4   © , Trustees of [address] [place] [address] [address] [address], under license to [employer]. All rights reserved     Score:  Initial: 18 Most Recent: X (Date: -- )    Interpretation of Tool:  Represents activities that are increasingly more difficult (i.e. Bed mobility, Transfers, Gait).     Medical Necessity:     · Patient is expected to demonstrate progress in   · strength, range of motion, and functional technique  ·  to   · decrease assistance required with functional mobility and KATHY management  · .  Reason for Services/Other Comments:  · Patient continues to require skilled intervention due to   · Inability to complete functional mobility and KATHY management independently  · . Use of outcome tool(s) and clinical judgement create a POC that gives a: Clear prediction of patient's progress: LOW COMPLEXITY            TREATMENT:   (In addition to Assessment/Re-Assessment sessions the following treatments were rendered)     Pre-treatment Symptoms/Complaints:  agreeable  Pain Initial:   Pain Intensity 1: 3  Post Session:  Sore after therapy     Therapeutic Activity: (  40 Minutes ):  Therapeutic activities including Chair transfers, Toilet transfers, Ambulation on level ground and performs exercises with guidance. Gait Training ( ):  Gait training to improve and/or restore physical functioning as related to mobility. Ambulated 60 Feet (ft) with Minimal assistance using a Walker, rolling and minimal Safety awareness training related to their hip position and motion to promote proper body alignment.   I   Date:  4/7 Date:  4/8   Date:     ACTIVITY/EXERCISE AM PM AM PM AM PM     []  []  []  []  []  []   Ankle Pumps  10 15      Quad Sets  10 15      Gluteal Sets  10 15      Hip ABd/ADduction  10 15      Knee Slides  10 15      Short Arc Quads   15      Long Arc Quads   15                                 B = bilateral; AA = active assistive; A = active; P = passive      Treatment/Session Assessment:     Response to Treatment: participated well    Education:  [x] Home Exercises  [x] Fall Precautions  [] Hip Precautions [] D/C Instruction Review  [] Hip Prosthesis Review  [] Walker Management/Safety [] Adaptive Equipment as Needed       Interdisciplinary Collaboration:   o Physical Therapy Assistant  o Registered Nurse    After treatment position/precautions:   o Up in chair  o Bed/Chair-wheels locked  o Call light within reach  o Family at bedside    Compliance with Program/Exercises: Compliant all of the time, Will assess as treatment progresses. Recommendations/Intent for next treatment session:  Treatment next visit will focus on increasing Ms. Dwayne Zackery independence with bed mobility, transfers, gait training, strength/ROM exercises, modalities for pain, and patient education.       Total Treatment Duration:  PT Patient Time In/Time Out  Time In: 0815  Time Out: 7014    Marisol Darden, PTA

## 2022-04-08 NOTE — DISCHARGE INSTRUCTIONS
[facility]   Patient Discharge Instructions    Roya Cortez / [id number] : 1950    Admitted 2022 Discharged: 2022     IF YOU HAVE ANY PROBLEMS ONCE YOU ARE AT HOME CALL THE FOLLOWING NUMBERS:   Main office number: [phone]    Take Home Medications         · It is important that you take the medication exactly as they are prescribed. · Keep your medication in the bottles provided by the pharmacist and keep a list of the medication names, dosages, and times to be taken in your wallet. · Do not take other medications without consulting your doctor. What to do at [address] your prehospital diet. If you have excessive nausea or vomitting call your doctor's office     Home Physical Therapy is arranged. Use rolling walker when walking. Patients who have had a joint replacement should not drive until you are seen for your follow up appointment by Dr. Thuy Barclay. When to Call    - Call if you have a temperature greater then 101  - Unable to keep food down  - Loose control of your bladder or bowel function  - Are unable to bear any weight   - Need a pain medication refill     Patient Education        Hip Replacement Surgery (Posterior): What to Expect at Home  Your Recovery  Hip replacement surgery replaces the worn parts of your hip joint. When you leave the hospital, you will probably be walking with crutches or a walker. You may be able to climb a few stairs and get in and out of bed and chairs. But you will need someone to help you at home until you have more energy and can move around better. You will go home with a bandage and stitches, staples, skin glue, or tape strips. You can remove the bandage when your doctor tells you to. If you have stitches or staples, your doctor will remove them about 2 weeks after your surgery. Glue or tape strips will fall off on their own over time.  You may still have some mild pain, and the area may be swollen for 3 to 4 months after surgery. Your doctor may give you medicine for the pain. You will continue the rehabilitation program (rehab) you started in the hospital. The better you do with your rehab exercises, the sooner you will get your strength and movement back. Most people are able to return to work 4 weeks to 4 months after surgery. This care sheet gives you a general idea about how long it will take for you to recover. But each person recovers at a different pace. Follow the steps below to get better as quickly as possible. How can you care for yourself at home? Activity    · Your doctor may not want your affected leg to cross the center of your body toward the other leg. If so, your therapist may suggest these ideas:  ? Do not cross your legs. ? Be very careful as you get in or out of bed or a car so your leg does not cross the imaginary line in the middle of your body.     · Go slowly when you climb stairs. Make sure the lights are on. Have someone watch you, if you can. When you climb stairs:  ? Step up first with your unaffected leg. Then bring the affected leg up to the same step. Bring your crutches or cane up. ? To go down stairs, reverse the order. First, put your crutches or cane on the lower step. Then bring the affected leg down to that step. Finally, step down with the unaffected leg.     · You can ride in a car, but stop at least once every hour to get out and walk around.     · You may want to sleep on your back. Don't reach down too far to pull up blankets when you lie in bed.     · If your doctor recommends exercises, do them as directed. You can cut back on your exercises if your muscles start to ache, but don't stop doing them.     · Rest when you feel tired. You may take a nap, but don't stay in bed all day.     · Work with your physical therapist to learn the best way to exercise.  You will probably have to use a walker, crutches, or a cane for at least 4 to 6 weeks.     · Your doctor may advise you to stay away from activities that put stress on the joint. This includes sports such as tennis, football, and jogging.     · Try not to sit for too long at one time. You will feel less stiff if you take a short walk about every hour. When you sit, use chairs with arms, and don't sit in low chairs.     · Do not bend over more than 90 degrees (like the angle in a letter \"L\").     · Sleep on your back with your legs slightly apart or on your side with a pillow between your knees for about 6 weeks or as your doctor tells you. Do not sleep on your stomach or affected leg.     · Ask your doctor when you can drive again.     · Most people are able to return to work 4 weeks to 4 months after surgery.     · Ask your doctor when it is okay for you to have sex. Diet    · By the time you leave the hospital, you will probably be eating your normal diet. Your doctor may recommend that you take iron and vitamin supplements.     · Drink plenty of fluids (unless your doctor tells you not to).   · Eat healthy foods, and watch your portion sizes. Try to stay at your ideal weight. Too much weight puts more stress on your new hip joint.     · You may notice that your bowel movements are not regular right after your surgery. This is common. Try to avoid constipation and straining with bowel movements. You may want to take a fiber supplement every day. If you have not had a bowel movement after a couple of days, ask your doctor about taking a mild laxative. Medicines    · Your doctor will tell you if and when you can restart your medicines. You will also get instructions about taking any new medicines.     · If you take aspirin or some other blood thinner, ask your doctor if and when to start taking it again. Make sure that you understand exactly what your doctor wants you to do.     · Your doctor may give you a blood-thinning medicine to prevent blood clots.  If you take a blood thinner, be sure you get instructions about how to take your medicine safely. Blood thinners can cause serious bleeding problems. This medicine could be in pill form or as a shot (injection). If a shot is necessary, your doctor will tell you how to do this.     · Be safe with medicines. Take pain medicines exactly as directed. ? If the doctor gave you a prescription medicine for pain, take it as prescribed. ? If you are not taking a prescription pain medicine, ask your doctor if you can take an over-the-counter medicine.     · If you think your pain medicine is making you sick to your stomach:  ? Take your medicine after meals (unless your doctor has told you not to). ? Ask your doctor for a different pain medicine.     · If your doctor prescribed antibiotics, take them as directed. Do not stop taking them just because you feel better. You need to take the full course of antibiotics. Incision care    · If your doctor told you how to care for your cut (incision), follow your doctor's instructions. You will have a dressing over the cut. A dressing helps the incision heal and protects it. Your doctor will tell you how to take care of this.     · If you did not get instructions, follow this general advice:  ? If you have strips of tape on the cut the doctor made, leave the tape on for a week or until it falls off.  ? If you have stitches or staples, your doctor will tell you when to come back to have them removed. ? If you have skin glue on the cut, leave it on until it falls off. Skin glue is also called skin adhesive or liquid stitches. ? Change the bandage every day. ? Wash the area daily with warm water, and pat it dry. Don't use hydrogen peroxide or alcohol. They can slow healing. ? You may cover the area with a gauze bandage if it oozes fluid or rubs against clothing. ? You may shower 24 to 48 hours after surgery. Pat the incision dry. Don't swim or take a bath for the first 2 weeks, or until your doctor tells you it is okay.    Exercise    · Your physical therapist will teach you exercises to do at home. Always do them as your therapist tells you.     · Avoid activities where you might fall. Ice and elevation    · For pain, put ice or a cold pack on the area for 10 to 20 minutes at a time. Put a thin cloth between the ice and your skin. If your doctor recommended cold therapy using a portable machine, follow the instructions that came with the machine.     · Your ankle may swell for about 3 months. Prop up your ankle when you ice it or anytime you sit or lie down. Try to keep it above the level of your heart. This will help reduce swelling. Other instructions    · Wear compression stockings if your doctor told you to. These may help to prevent blood clots. Your doctor will tell you how long you need to keep wearing the compression stockings.     · Try to prevent falls. To avoid falling:  ? Arrange furniture so that you will not trip on it. ? Get rid of throw rugs, and move electrical cords out of the way. ? Walk only in areas with plenty of light. ? Put grab bars in showers and bathtubs. ? Try to avoid icy or snowy sidewalks. Choose shoes with good traction, or consider using traction devices that attach to your shoes. ? Wear shoes with sturdy, flat soles. Follow-up care is a key part of your treatment and safety. Be sure to make and go to all appointments, and call your doctor if you are having problems. It's also a good idea to know your test results and keep a list of the medicines you take. When should you call for help? Call 911 anytime you think you may need emergency care. For example, call if:    · You passed out (lost consciousness).     · You have severe trouble breathing.     · You have sudden chest pain and shortness of breath, or you cough up blood. Call your doctor now or seek immediate medical care if:    · You have signs that your hip may be dislocated, including:  ? Severe pain and not being able to stand.   ? A crooked leg that looks like your hip is out of position. ? Not being able to bend or straighten your leg.     · Your leg or foot is cool or pale or changes color.     · You cannot feel or move your leg.     · You have signs of a blood clot, such as:  ? Pain in your calf, back of the knee, thigh, or groin. ? Redness and swelling in your leg or groin.     · Your incision comes open and begins to bleed, or the bleeding increases.     · You feel like your heart is racing or beating irregularly.     · You have signs of infection, such as:  ? Increased pain, swelling, warmth, or redness. ? Red streaks leading from the incision. ? Pus draining from the incision. ? A fever. Watch closely for changes in your health, and be sure to contact your doctor if:    · You do not have a bowel movement after taking a laxative.     · You do not get better as expected. Where can you learn more? Go to http://www.gray.com/  Enter Q746 in the search box to learn more about \"Hip Replacement Surgery (Posterior): What to Expect at Home. \"  Current as of: July 1, 2021               Content Version: 13.2  © 7018-8445 [employer]. Care instructions adapted under license by "[employer] (which disclaims liability or warranty for this information). If you have questions about a medical condition or this instruction, always ask your healthcare professional. [place] [address] any warranty or liability for your use of this information. Information obtained by :  I understand that if any problems occur once I am at home I am to contact my physician. I understand and acknowledge receipt of the instructions indicated above.                                                                                                                                            Physician's or R.N.'s Signature                                                                  Date/Time Patient or Representative Signature                                                          Date/Time

## 2022-04-08 NOTE — DISCHARGE SUMMARY
[facility]  Total Joint Discharge Summary      Patient ID:  Placido Cushing  [id number]  3 y.o.  1950    Admit date: 4/7/2022  Discharge date and time: 4/9/2022  Admitting Physician: Saurav Haskins MD  Surgeon: Same  Admission Diagnoses: Closed hip fracture requiring operative repair, right, initial encounter [facility]) [S72.001A]  Arthritis of right hip [M16.11]  Discharge Diagnoses: Active Problems:    Arthritis of right hip (4/7/2022)      Procedure:  Conversion of right hip hemiarthroplasty to total hip arthroplasty performed on 5/8/9283 without complication. Perioperative Antibiotics: Ancef 1 to 3 g was given depending on patient's weight. If allergic to Ancef or due to other indications, patient was given Vancomycin/Gent per protocol      Hospital Medications given:   [unfilled]  [unfilled]  [unfilled]    Discharge Medications given:  Current Discharge Medication List      START taking these medications    Details   ondansetron (ZOFRAN ODT) 4 mg disintegrating tablet Take 1 Tablet by mouth every four (4) hours as needed for Nausea or Vomiting. Qty: 30 Tablet, Refills: 0         CONTINUE these medications which have CHANGED    Details   HYDROcodone-acetaminophen (NORCO)  mg tablet Take 1 Tablet by mouth every four (4) hours as needed for Pain for up to 5 days. Max Daily Amount: 6 Tablets. Qty: 30 Tablet, Refills: 0    Associated Diagnoses: Other mechanical complication of internal right hip prosthesis, subsequent encounter         CONTINUE these medications which have NOT CHANGED    Details   phenazopyridine (Pyridium) 200 mg tablet Take 1 Tablet by mouth three (3) times daily as needed for Pain. Qty: 30 Tablet, Refills: 0    Associated Diagnoses: Bladder infection      losartan-hydroCHLOROthiazide (HYZAAR) 100-25 mg per tablet Take 1 Tablet by mouth daily.  Indications: high blood pressure      omeprazole (PRILOSEC) 40 mg capsule Take 40 mg by mouth daily. potassium chloride (K-DUR, KLOR-CON M20) 20 mEq tablet TAKE 1 TABLET BY MOUTH DAILY  Qty: 90 Tablet, Refills: 3    Associated Diagnoses: Hypokalemia      ALPRAZolam (XANAX) 1 mg tablet TAKE 1 TABLET BY MOUTH EVERY DAY  Qty: 30 Tablet, Refills: 5    Associated Diagnoses: Anxiety      acetaminophen (TYLENOL) 500 mg tablet Take 1,000 mg by mouth every six (6) hours as needed for Pain. Bystolic 10 mg tablet TAKE 1 TABLET DAILY  Qty: 90 Tablet, Refills: 3    Associated Diagnoses: Essential hypertension      venlafaxine-SR (EFFEXOR-XR) 150 mg capsule TAKE ONE CAPSULE BY MOUTH EVERY 12 HOURS FOR NERVES  Qty: 60 Capsule, Refills: 11    Associated Diagnoses: Moderate episode of recurrent major depressive disorder (HCC)      Synthroid 112 mcg tablet TAKE 1 TABLET DAILY BEFORE BREAKFAST  Qty: 90 Tab, Refills: 3    Associated Diagnoses: Hypothyroidism due to acquired atrophy of thyroid      sucralfate (Carafate) 1 gram tablet Take 1 Tab by mouth four (4) times daily. Qty: 120 Tab, Refills: 3    Associated Diagnoses: Gastroesophageal reflux disease without esophagitis      hydrocortisone (ANUSOL-HC) 2.5 % rectal cream Insert  into rectum four (4) times daily. Qty: 30 g, Refills: 3    Associated Diagnoses: External hemorrhoid, bleeding; Grade I hemorrhoids      cholecalciferol, vitamin D3, (VITAMIN D3 PO) Take 1 Tablet by mouth daily. fluticasone (FLONASE) 50 mcg/actuation nasal spray 2 Sprays by Both Nostrils route daily. Qty: 1 Bottle, Refills: 0      MULTIVIT,CALC,MINS/FOLIC ACID (ONE-A-DAY PROACTIVE 65 PLUS PO) Take 1 Tablet by mouth daily. Indications: OTC      aspirin delayed-release 81 mg tablet Take 81 mg by mouth daily. Indications: OTC      cyanocobalamin 1,000 mcg tablet Take 1,000 mcg by mouth daily. Indications: OTC      clopidogreL (Plavix) 75 mg tab Take 1 Tablet by mouth daily.   Qty: 90 Tablet, Refills: 4      atorvastatin (LIPITOR) 40 mg tablet TAKE 1 TABLET BY MOUTH EVERY NIGHT  Qty: 30 Tablet, Refills: 11    Associated Diagnoses: Hyperlipidemia, unspecified hyperlipidemia type      saliva substitution (Biotene Dry Mouth Oral Rinse) mwsh mouthwash 15 mL by Mucous Membrane route as needed for Other (Dry mouth). Qty: 237 mL, Refills: 1              Additional DVT Prophylaxis:  DAVID Hose,Plexi-Pulse    Postoperative transfusions:   none  Post Op complications: none    Hemoglobin at discharge:   Lab Results   Component Value Date/Time    HGB 8.9 (L) 04/08/2022 04:01 AM       Wound appears to be healing without any evidence of infection. Physical Therapy started on the day following surgery and progressed to independent ambulation with the aid of a walker. At the time of discharge, able to go up and down stairs and had understanding of precautions needed following surgery.       PT/OT:            Assistive Device: Walker (comment)                Discharged to: home    Discharge condition:  Stable    Discharge instructions:  -Rx pain medication given  - Anticoagulate with: Ecotrin 81 mg PO BID x 4 weeks  -Resume pre hospital diet             -Resume home medications per medical continuation form     -Ambulate with walker, appropriate total joint protocol  -Follow up in office as scheduled       Signed:  MEIR Al  4/8/2022  3:39 PM

## 2022-04-08 NOTE — PROGRESS NOTES
Problem: Self Care Deficits Care Plan (Adult)  Goal: *Acute Goals and Plan of Care (Insert Text)  Outcome: Progressing Towards Goal  Note: GOALS:   DISCHARGE GOALS (in preparation for going home/rehab):  3 days  1. Ms. Hilario Peña will perform one lower body dressing activity with minimal assistance with adaptive equipment to demonstrate improved functional mobility and safety. GOAL MET 4/8/2022  2. Ms. Hilario Peña will perform one lower body bathing activity with minimal  assistance with adaptive equipment to demonstrate improved functional mobility and safety. GOAL MET 4/8/2022  3. Ms. Hilario Peña will perform toileting/toilet transfer with contact guard assistance with adaptive equipment to demonstrate improved functional mobility and safety. 4.  Ms. Hilario Peña will perform shower transfer with contact guard assistance with adaptive equipment to demonstrate improved functional mobility and safety. 5.  Ms. Hilario Peña will state KATHY precautions with two verbal cues to demonstrate improved functional mobility and safety. JOINT CAMP OCCUPATIONAL THERAPY KATHY: Initial Assessment, Daily Note, Treatment Day: Day of Assessment, and PM 4/8/2022  INPATIENT: Hospital Day: 2  [place] / [place] / Product Type: Medicare /      NAME/AGE/GENDER: Jeromy Rich is a 66 y.o. female   PRIMARY DIAGNOSIS:  Closed hip fracture requiring operative repair, right, initial encounter ([facility] [S72.001A]   Procedure(s) and Anesthesia Type:     * HIP ARTHROPLASTY TOTAL CONVERSION/ RIGHT/ DEPUY- WILL NEED TO CUT PLATE - Spinal (Right)  ICD-10: Treatment Diagnosis:    · Pain in Right Hip (M25.551)  · Stiffness of Right Hip, Not elsewhere classified (M25.651)      ASSESSMENT:     Ms. Hilario Peña is s/p right KATHY and presents with decreased weight bearing on right LE and decreased independence with functional mobility and activities of daily living as compared to prior level of function and safety.   Patient would benefit from skilled Occupational Therapy to maximize independence and safety with self-care task and functional mobility. Pt would also benefit from education on lower body adaptive equipment and hip precautions post-surgery in preparation for going home. Patient plans for further rehab at home with home health services and good family support daughter. OT reviewed therapy schedule and plan of care with patient. Patient was able to transfer and perform self care skills as charted below. Patient instructed to call for assistance when needing to get up from the recliner and all needs in reach. Patient verbalized understanding of call light. Pt with slight lower extremity numbness but was able to eleazar brief and get to recliner with assistance. Pt should progress well tomorrow. 4/8/2022  Pt seen in room, supine upon arrival with complaint of feeling weak. CNA taking BP that was 139/73. Pt's bed was elevated and she did some ankle pumps, BP retaken and 139/70. Pt up to edge of bed and reported she felt better. Pt transferred to recliner with assistance and sat up for about 15 min with education on self care. Pt stated she felt better and PTA called to assist with bathroom mobility for safety of pt. Patient completed toileting, shower, grooming and dressing as charted below in ADL grid and is ambulating with rolling walker and minimal assist.  Patient has met 2/5 goals and plans to return home with good family support. Pt is going to have assistance of her daughter at home and they should be able to provide patient with appropriate level of assistance at this time. PTA will seen again this afternoon for mobility. OT will continue plan of care if patient stays in the hospital. OT reviewed hip precautions throughout session. Patient instructed to call for assistance when needing to get up from recliner and all needs in reach. Patient verbalized understanding of call light.           This section established at most recent assessment   PROBLEM LIST (Impairments causing functional limitations):  1. Decreased Strength  2. Decreased ADL/Functional Activities  3. Decreased Transfer Abilities  4. Increased Pain  5. Increased Fatigue  6. Decreased Flexibility/Joint Mobility  7. Decreased Knowledge of Precautions   INTERVENTIONS PLANNED: (Benefits and precautions of occupational therapy have been discussed with the patient.)  1. Activities of daily living training  2. Adaptive equipment training  3. Balance training  4. Clothing management  5. Donning&doffing training  6. Theraputic activity     TREATMENT PLAN: Frequency/Duration: Follow patient 1-2 times to address above goals. Rehabilitation Potential For Stated Goals: Good     RECOMMENDED REHABILITATION/EQUIPMENT: (at time of discharge pending progress): Continue Skilled Therapy. OCCUPATIONAL PROFILE AND HISTORY:   History of Present Injury/Illness (Reason for Referral): Pt presents this date s/p (right) KATHY. Past Medical History/Comorbidities:   Ms. Nathen Edwards  has a past medical history of Allergic rhinitis, Aneurysm ([address] Anxiety, Arthritis, Asthma, Calculus of kidney, Cancer ([facility] Chronic pain, Depression, Dizziness (09/08/2016), Dyslipidemia, Fatigue, Former cigarette smoker, GERD (gastroesophageal reflux disease), Heart murmur (11/24/2015), History of nuclear stress test (05/11/2021), HTN (hypertension), echocardiogram (11/25/2019), Hypercholesterolemia, Hypothyroid, IBS (irritable bowel syndrome), Migraines, Mitral valve insufficiency, Nausea & vomiting, Peripheral neuropathy, Rheumatic fever, Seizures ([address] Stroke ([facility] (11/25/2019), and Unspecified adverse effect of anesthesia.   Ms. Nathen Edwards  has a past surgical history that includes hx lap cholecystectomy; hx appendectomy; hx hysterectomy; hx tonsil and adenoidectomy; hx total colectomy; hx bladder suspension; hx colonoscopy; hx other surgical; hx other surgical; hx breast lumpectomy (Right, 1996); hx orthopaedic; hx knee arthroscopy (Left); hx knee arthroscopy (Right, 10/16/09; 03/2015); hx knee replacement (Right, 3/2015); hx knee replacement (Left, 2016); hx orthopaedic (Right, 08/24/2021); and hx urological (2020). Social History/Living Environment:   Home Environment: Private residence  # Steps to Enter: 4  Rails to Enter: Yes  Hand Rails : Right  One/Two Story Residence: One story  # of Interior Steps: 2  Interior Rails: Both  Living Alone: No  Support Systems: Spouse/Significant Other  Patient Expects to be Discharged to[de-identified] Home with home health  Current DME Used/Available at Home: Pinkie Mower, straight; Walker, rollator; Walker, rolling  Tub or Shower Type: Tub/Shower combination    Prior Level of Function/Work/Activity:  Pt was (I) with ADL's and ambulated short distances. Number of Personal Factors/Comorbidities that affect the Plan of Care: Brief history (0):  LOW COMPLEXITY   ASSESSMENT OF OCCUPATIONAL PERFORMANCE[de-identified]   Most Recent Physical Functioning:   Balance  Sitting: Intact  Standing: Pull to stand; With support  Standing - Static: Constant support  Standing - Dynamic : Constant support                              Mental Status  Neurologic State: Alert  Orientation Level: Oriented to person;Oriented to place;Oriented to situation  Cognition: Follows commands  Perception: Appears intact  Perseveration: No perseveration noted  Safety/Judgement: Awareness of environment                Basic ADLs (From Assessment) Complex ADLs (From Assessment)   Basic ADL  Feeding: Independent  Oral Facial Hygiene/Grooming: Supervision  Bathing: Moderate assistance  Type of Bath: Chlorhexidine (CHG),Full,Shower  Upper Body Dressing: Supervision  Lower Body Dressing: Moderate assistance  Toileting: Minimum assistance     Grooming/Bathing/Dressing Activities of Daily Living   Grooming  Grooming Assistance: Set-up; Supervision (seated in chair) Cognitive Retraining  Safety/Judgement: Awareness of environment   Upper Body Bathing  Bathing Assistance: Stand-by assistance  Position Performed: Seated in chair     Lower Body Bathing  Bathing Assistance: Minimum assistance  Cues: Verbal cues provided;Physical assistance  Adaptive Equipment: Shower chair;Grab bar;Walker Toileting  Toileting Assistance: Stand-by assistance  Cues: Verbal cues provided  Adaptive Equipment: Walker;Grab bars (additional time)   Upper Body Dressing Assistance  Dressing Assistance: Supervision Functional Transfers  Bathroom Mobility: Minimum assistance  Toilet Transfer : Minimum assistance  Shower Transfer: Minimum assistance  Cues: Verbal cues provided;Physical assistance  Adaptive Equipment: Grab bars; Shower chair with back; Walker (comment)   Lower Body Dressing Assistance  Dressing Assistance: Minimum assistance  Underpants: Minimum assistance  Protective Undergarmet: Minimum assistance Bed/Mat Mobility  Supine to Sit: Minimum assistance  Sit to Stand: Minimum assistance  Stand to Sit: Minimum assistance  Bed to Chair: Minimum assistance  Scooting: Additional time  Cues: Verbal cues provided;Physical assistance         Physical Skills Involved:  1. Range of Motion  2. Balance  3. Pain (acute) Cognitive Skills Affected (resulting in the inability to perform in a timely and safe manner): 1. none Psychosocial Skills Affected:  1. Environmental Adaptation   Number of elements that affect the Plan of Care: 3-5:  MODERATE COMPLEXITY   CLINICAL DECISION MAKING:   [employer] AM-PAC 6 Clicks   Daily Activity Inpatient Short Form  How much help from another person does the patient currently need. .. Total A Lot A Little None   1. Putting on and taking off regular lower body clothing? [] 1   [] 2   [x] 3   [] 4   2. Bathing (including washing, rinsing, drying)? [] 1   [] 2   [x] 3   [] 4   3. Toileting, which includes using toilet, bedpan or urinal?   [] 1   [] 2   [x] 3   [] 4   4. Putting on and taking off regular upper body clothing? [] 1   [] 2   [] 3   [x] 4   5. Taking care of personal grooming such as brushing teeth? [] 1   [] 2   [] 3   [x] 4   6. Eating meals? [] 1   [] 2   [] 3   [x] 4   © 2007, Trustees of [address] [place] [address] [address] [id number], under license to [employer]. All rights reserved     Score:  Initial: 18 Most Recent: 21 (Date: 4/8/2022 )    Interpretation of Tool:  Represents activities that are increasingly more difficult (i.e. Bed mobility, Transfers, Gait). Use of outcome tool(s) and clinical judgement create a POC that gives a: LOW COMPLEXITY            TREATMENT:   (In addition to Assessment/Re-Assessment sessions the following treatments were rendered)     Pre-treatment Symptoms/Complaints:  none  Pain: Initial: 0     Post Session:  0     Self Care: (65 min): Procedure(s) (per grid) utilized to improve and/or restore self-care/home management as related to dressing, bathing, toileting, grooming and functional mobility. Required minimal verbal, manual and   cueing to facilitate activities of daily living skills and compensatory activities. Treatment/Session Assessment:     Response to Treatment:  pt up to shower tolerated well    Education:  [] Home Exercises  [x] Fall Precautions  [x] Hip Precautions [] Going Home Video  [] Knee/Hip Prosthesis Review  [x] Walker Management/Safety [x] Adaptive Equipment as Needed       Interdisciplinary Collaboration:   o Physical Therapy Assistant  o Occupational Therapist  o Registered Nurse    After treatment position/precautions:   o Up in chair  o Bed/Chair-wheels locked  o Call light within reach  o RN notified  o PTA with pt     Compliance with Program/Exercises: Compliant all of the time, Will assess as treatment progresses. Recommendations/Intent for next treatment session:  Treatment next visit will focus on increasing Ms. Gearl Lighter independence with bed mobility, transfers, self care, functional mobility, modalities for pain, and patient education.       Total Treatment Duration:  OT Patient Time In/Time Out  Time In: 8822  Time Out: 144 [facility], OT

## 2022-04-08 NOTE — PROGRESS NOTES
2022         Post Op day: 1 Day Post-Op   Admit Diagnosis: Closed hip fracture requiring operative repair, right, initial encounter ([facility] Jett Pabon; Arthritis of right hip [M16.11]  LAB:    Recent Results (from the past 24 hour(s))   HEMOGLOBIN    Collection Time: 22  6:55 PM   Result Value Ref Range    HGB 10.5 (L) 11.7 - 15.4 g/dL   HEMOGLOBIN    Collection Time: 22  4:01 AM   Result Value Ref Range    HGB 8.9 (L) 11.7 - 15.4 g/dL     Vital Signs:    Patient Vitals for the past 8 hrs:   BP Temp Pulse Resp SpO2   22 0718 122/74 98.2 °F (36.8 °C) 92 18 94 %   22 0429 119/77 98.8 °F (37.1 °C) 88 18 94 %     Temp (24hrs), Av.3 °F (36.8 °C), Min:98.1 °F (36.7 °C), Max:98.8 °F (37.1 °C)    Pain Control:   Pain Assessment  Pain Scale 1: Numeric (0 - 10)  Pain Intensity 1: 3  Pain Location 1: Hip  Pain Orientation 1: Right  Pain Description 1: Aching  Pain Intervention(s) 1: Medication (see MAR)  Subjective: Doing well, normal recovery experienced. Objective:  No Acute Distress, Alert and Oriented, Neurovascular exam is normal       Assessment:   Patient Active Problem List   Diagnosis Code    Localized osteoarthrosis not specified whether primary or secondary, lower leg M17.10    S/P total knee replacement Z96.659    Asthma J45.909    GERD (gastroesophageal reflux disease) K21.9    Arthritis M19.90    Depression F32. A    Anxiety F41.9    Hypothyroid E03.9    Peripheral neuropathy G62.9    Allergic rhinitis J30.9    Chronic pain syndrome G89.4    Hypercholesterolemia E78.00    HTN (hypertension) I10    IBS (irritable bowel syndrome) K58.9    Migraines G43.909    Heart murmur R01.1    Arthritis of knee, left M17.12    S/P total knee arthroplasty Z96.659    Dizziness R42    Chest pain R07.9    Shortness of breath R06.02    Hyperglycemia R73.9    Recurrent depression (HCC) F33.9    External hemorrhoid, bleeding K64.4    Abnormal findings on diagnostic imaging of other specified body structures R93.89    Constipation K59.00    Personal history of colonic polyps Z86.010    Rectal bleeding K62.5    SBO (small bowel obstruction) ([facility]) K56.609    Peripheral vascular disease ([facility]) I73.9    Ataxia R27.0    Falls W19. Lopez Maid    Thrush B37.0    Polycythemia D75.1    Hypokalemia E87.6    Elevated bilirubin R17    OAB (overactive bladder) N32.81    Vaginal atrophy N95.2    Hematuria R31.9    History of CVA (cerebrovascular accident) Z86.73    Closed displaced fracture of right femoral neck ([facility]) S72.001A    Acute postoperative anemia due to greater than expected blood loss D62    Opioid use, unspecified with unspecified opioid-induced disorder F11.99    Arthritis of right hip M16.11       Status Post Procedure(s) (LRB):  HIP ARTHROPLASTY TOTAL CONVERSION/ RIGHT/ DEPUY- WILL NEED TO CUT PLATE (Right)        Plan: Continue Physical Therapy, discharge home anticipated. No syncope. OK for home.    Signed By: Yoni Givens MD

## 2022-04-08 NOTE — PROGRESS NOTES
Problem: Mobility Impaired (Adult and Pediatric)  Goal: *Acute Goals and Plan of Care (Insert Text)  Outcome: Progressing Towards Goal  Note: GOALS (1-4 days):  (1.)Ms. Jenni Chandra will move from supine to sit and sit to supine  in bed with SUPERVISION. (2.)Ms. Jenni Chandra will transfer from bed to chair and chair to bed with SUPERVISION using the least restrictive device. (3.)Ms. Jenni Chandra will ambulate with STAND BY ASSIST for 300 feet with the least restrictive device. (4.)Ms. Jenni Chandra will ambulate up/down 4 steps with bilateral  railing with CONTACT GUARD ASSIST with no device. (5.)Ms. Jenni Chandra will state/observe KATHY precautions with 0 verbal cues. ________________________________________________________________________________________________       PHYSICAL THERAPY JOINT CAMP KATHY: Daily Note and PM 4/8/2022  INPATIENT: Hospital Day: 2  [place] / Product Type: Medicare /      NAME/AGE/GENDER: Roya Cortez is a 66 y.o. female   PRIMARY DIAGNOSIS:  Closed hip fracture requiring operative repair, right, [facility] [S72.001A]   Procedure(s) and Anesthesia Type:     * HIP ARTHROPLASTY TOTAL CONVERSION/ RIGHT/ DEPUY- WILL NEED TO CUT PLATE - Spinal (Right)  ICD-10: Treatment Diagnosis:    · Pain in Right Hip (M25.551)  · Stiffness of Right Hip, Not elsewhere classified (M25.651)  · Difficulty in walking, Not elsewhere classified (R26.2)      ASSESSMENT:     Ms. Jenni Chandra presents with decreased strength and range of motion right lower extremity and with decreased independence with functional mobility s/p  right total hip arthroplasty. Pt will benefit from skilled PT interventions to maximize independence with functional mobility and KATHY management. Pt did well with assessment however numbness limiting mobility. Pt able to get to chair with assist of 2. She had some reports of back ain pain from being in the the bed and was eager for a position change.  In recliner worked on KATHY exercises with verbal cues. Hope to progress her therapy at next session. Pt instructed not to get up without assist. [facility] to progress mobility and KATHY exercises at next session. Pt plans to discharge to home from the hospital with continued therapy for follow up. 4/8 sitting in the chair upon arrival. Performs R hip exercises with guidance to stay within the hip precaution. Sit>stand with Min A, ambulated 30 ft using RW with Min A. Rested few min, then ambulated another 30 ft into the bathroom with OT present. Left pt with OT.  4/8 pm sitting in the chair upon arrival.  Performs R hip exercises and review hip precaution again. Sit>stand with Min A. Then ambulated [de-identified] ft using RW with RW and Min A. Return to bed with Min A for B LE exercises, need in reach, ice to R hip and instructed to call for assist, before getting up. This section established at most recent assessment   PROBLEM LIST (Impairments causing functional limitations):  1. Decreased Strength  2. Decreased ADL/Functional Activities  3. Decreased Transfer Abilities  4. Decreased Ambulation Ability/Technique  5. Increased Pain  6. Decreased Flexibility/Joint Mobility  7. Decreased [place] with Home Exercise Program   INTERVENTIONS PLANNED: (Benefits and precautions of physical therapy have been discussed with the patient.)  1. Bed Mobility  2. Cold  3. Gait Training  4. Home Exercise Program (HEP)  5. Range of Motion (ROM)  6. Therapeutic Activites  7. Therapeutic Exercise/Strengthening  8. Transfer Training     TREATMENT PLAN: Frequency/Duration: Follow patient BID for duration of hospital stay to address above goals. Rehabilitation Potential For Stated Goals: Good     RECOMMENDED REHABILITATION/EQUIPMENT: (at time of discharge pending progress): Continue Skilled Therapy and Home Health: Physical Therapy.               HISTORY:   History of Present Injury/Illness (Reason for Referral):  Pt s/p KAHTY on 4/7/2022  Past Medical History/Comorbidities:   Ms. Stoo Angeles  has a past medical history of Allergic rhinitis, Aneurysm ([facility] Anxiety, Arthritis, Asthma, Calculus of kidney, Cancer ([facility] Chronic pain, Depression, Dizziness (09/08/2016), Dyslipidemia, Fatigue, Former cigarette smoker, GERD (gastroesophageal reflux disease), Heart murmur (11/24/2015), History of nuclear stress test (05/11/2021), HTN (hypertension), echocardiogram (11/25/2019), Hypercholesterolemia, Hypothyroid, IBS (irritable bowel syndrome), Migraines, Mitral valve insufficiency, Nausea & vomiting, Peripheral neuropathy, Rheumatic fever, Seizures ([facility] Stroke ([facility] (11/25/2019), and Unspecified adverse effect of anesthesia. Ms. Soto Angeles  has a past surgical history that includes hx lap cholecystectomy; hx appendectomy; hx hysterectomy; hx tonsil and adenoidectomy; hx total colectomy; hx bladder suspension; hx colonoscopy; hx other surgical; hx other surgical; hx breast lumpectomy (Right, 1996); hx orthopaedic; hx knee arthroscopy (Left); hx knee arthroscopy (Right, 10/16/09; 03/2015); hx knee replacement (Right, 3/2015); hx knee replacement (Left, 2016); hx orthopaedic (Right, 08/24/2021); and hx urological (2020).    Social History/Living Environment:   Home Environment: Private residence  # Steps to Enter: 4  Rails to Enter: Yes  Hand Rails : Right  One/Two Story Residence: One story  # of Interior Steps: 2  Interior Rails: Both  Living Alone: No  Support Systems: Spouse/Significant Other  Patient Expects to be Discharged to[de-identified] Home with home health  Current DME Used/Available at Home: Tom Hartman, straight; Timmy Heath, rollator; Walker, rolling  Tub or Shower Type: Tub/Shower combination    Prior Level of Function/Work/Activity:  Pt living at home, independent with mobility   Number of Personal Factors/Comorbidities that affect the Plan of Care: 0: LOW COMPLEXITY   EXAMINATION:   Most Recent Physical Functioning:                            Bed Mobility  Supine to Sit: Minimum assistance  Sit to Supine: Contact guard assistance  Scooting: Additional time    Transfers  Sit to Stand: Minimum assistance  Stand to Sit: Minimum assistance  Bed to Chair: Minimum assistance    Balance  Sitting: Intact  Standing: Pull to stand; With support  Standing - Static: Constant support  Standing - Dynamic : Constant support              Weight Bearing Status  Right Side Weight Bearing: As tolerated  Distance (ft):  (80)  Ambulation - Level of Assistance: Minimal assistance  Assistive Device: Walker, rolling  Base of Support: Center of gravity altered  Speed/Karen: Pace decreased (<100 feet/min)  Step Length: Left shortened;Right shortened  Stance: Right decreased  Gait Abnormalities: Decreased step clearance  Interventions: Safety awareness training     Braces/Orthotics: none    Right Hip Cold  Type: Cold/ice packs      Body Structures Involved:  1. Joints  2. Muscles Body Functions Affected:  1. Movement Related Activities and Participation Affected:  1. Mobility  2. Self Care   Number of elements that affect the Plan of Care: 4+: HIGH COMPLEXITY   CLINICAL PRESENTATION:   Presentation: Stable and uncomplicated: LOW COMPLEXITY   CLINICAL DECISION MAKIN \[facility] [place] [facility]\"" [address] [address] AM-PAC 6 Clicks   Basic Mobility Inpatient Short Form  How much difficulty does the patient currently have. .. Unable A Lot A Little None   1. Turning over in bed (including adjusting bedclothes, sheets and blankets)? [] 1   [] 2   [x] 3   [] 4   2. Sitting down on and standing up from a chair with arms ( e.g., wheelchair, bedside commode, etc.)   [] 1   [] 2   [x] 3   [] 4   3. Moving from lying on back to sitting on the side of the bed? [] 1   [] 2   [x] 3   [] 4   How much help from another person does the patient currently need. .. Total A Lot A Little None   4. Moving to and from a bed to a chair (including a wheelchair)? [] 1   [] 2   [x] 3   [] 4   5. Need to walk in hospital room? [] 1   [] 2   [x] 3   [] 4   6. Climbing 3-5 steps with a railing? [] 1   [] 2   [x] 3   [] 4   © 2007, Trustees of [facility] [employer], under license to [employer]. All rights reserved     Score:  Initial: 18 Most Recent: X (Date: -- )    Interpretation of Tool:  Represents activities that are increasingly more difficult (i.e. Bed mobility, Transfers, Gait). Medical Necessity:     · Patient is expected to demonstrate progress in   · strength, range of motion, and functional technique  ·  to   · decrease assistance required with functional mobility and KATHY management  · .  Reason for Services/Other Comments:  · Patient continues to require skilled intervention due to   · Inability to complete functional mobility and KATHY management independently  · . Use of outcome tool(s) and clinical judgement create a POC that gives a: Clear prediction of patient's progress: LOW COMPLEXITY            TREATMENT:   (In addition to Assessment/Re-Assessment sessions the following treatments were rendered)     Pre-treatment Symptoms/Complaints:  agreeable  Pain Initial:   Pain Intensity 1: 0 (same after therapy)  Post Session:  Sore after therapy     Therapeutic Activity: (  30 Minutes ):  Therapeutic activities including Chair transfers, Toilet transfers, Ambulation on level ground and performs exercises with guidance. Gait Training ( ):  Gait training to improve and/or restore physical functioning as related to mobility. Ambulated  (80) with Minimal assistance using a Walker, rolling and minimal Safety awareness training related to their hip position and motion to promote proper body alignment.   I   Date:  4/7 Date:  4/8   Date:     ACTIVITY/EXERCISE AM PM AM PM AM PM     []  []  []  []  []  []   Ankle Pumps  10 15 15     Quad Sets  10 15 15     Gluteal Sets  10 15 15     Hip ABd/ADduction  10 15 15     Knee Slides  10 15 15     Short Arc Quads   15 15     Long Arc Quads   15 15                                B = bilateral; AA = active assistive; A = active; P = passive      Treatment/Session Assessment:     Response to Treatment: participated well, making good progress. Education:  [x] Home Exercises  [x] Fall Precautions  [] Hip Precautions [] D/C Instruction Review  [] Hip Prosthesis Review  [] Walker Management/Safety [] Adaptive Equipment as Needed       Interdisciplinary Collaboration:   o Physical Therapy Assistant  o Registered Nurse    After treatment position/precautions:   o Supine in bed  o Bed/Chair-wheels locked  o Call light within reach  o Family at bedside    Compliance with Program/Exercises: Compliant all of the time, Will assess as treatment progresses. Recommendations/Intent for next treatment session:  Treatment next visit will focus on increasing Ms. Wilbert Barth independence with bed mobility, transfers, gait training, strength/ROM exercises, modalities for pain, and patient education.       Total Treatment Duration:  PT Patient Time In/Time Out  Time In: 1300  Time Out: [address], [place]

## 2022-04-09 VITALS
OXYGEN SATURATION: 96 % | WEIGHT: 162.2 LBS | HEART RATE: 102 BPM | SYSTOLIC BLOOD PRESSURE: 121 MMHG | DIASTOLIC BLOOD PRESSURE: 56 MMHG | TEMPERATURE: 97.6 F | RESPIRATION RATE: 16 BRPM | BODY MASS INDEX: 26.07 KG/M2 | HEIGHT: 66 IN

## 2022-04-09 LAB
HGB BLD-MCNC: 8 G/DL (ref 11.7–15.4)
HGB BLD-MCNC: 8.5 G/DL (ref 11.7–15.4)

## 2022-04-09 PROCEDURE — 85018 HEMOGLOBIN: CPT

## 2022-04-09 PROCEDURE — 74011250637 HC RX REV CODE- 250/637: Performed by: PHYSICIAN ASSISTANT

## 2022-04-09 PROCEDURE — 74011250636 HC RX REV CODE- 250/636: Performed by: PHYSICIAN ASSISTANT

## 2022-04-09 PROCEDURE — 97535 SELF CARE MNGMENT TRAINING: CPT

## 2022-04-09 PROCEDURE — 74011000250 HC RX REV CODE- 250: Performed by: PHYSICIAN ASSISTANT

## 2022-04-09 PROCEDURE — 94760 N-INVAS EAR/PLS OXIMETRY 1: CPT

## 2022-04-09 PROCEDURE — 74011250637 HC RX REV CODE- 250/637: Performed by: ORTHOPAEDIC SURGERY

## 2022-04-09 PROCEDURE — 36415 COLL VENOUS BLD VENIPUNCTURE: CPT

## 2022-04-09 PROCEDURE — 97530 THERAPEUTIC ACTIVITIES: CPT

## 2022-04-09 RX ORDER — LANOLIN ALCOHOL/MO/W.PET/CERES
1 CREAM (GRAM) TOPICAL
Status: DISCONTINUED | OUTPATIENT
Start: 2022-04-10 | End: 2022-04-09 | Stop reason: HOSPADM

## 2022-04-09 RX ADMIN — CEFAZOLIN SODIUM 2 G: 10 INJECTION, POWDER, FOR SOLUTION INTRAVENOUS at 04:43

## 2022-04-09 RX ADMIN — SODIUM CHLORIDE, PRESERVATIVE FREE 10 ML: 5 INJECTION INTRAVENOUS at 14:13

## 2022-04-09 RX ADMIN — SODIUM CHLORIDE, PRESERVATIVE FREE 10 ML: 5 INJECTION INTRAVENOUS at 04:42

## 2022-04-09 RX ADMIN — HYDROCODONE BITARTRATE AND ACETAMINOPHEN 1 TABLET: 5; 325 TABLET ORAL at 05:59

## 2022-04-09 RX ADMIN — PANTOPRAZOLE SODIUM 40 MG: 40 TABLET, DELAYED RELEASE ORAL at 04:43

## 2022-04-09 RX ADMIN — POTASSIUM CHLORIDE 20 MEQ: 20 TABLET, EXTENDED RELEASE ORAL at 09:27

## 2022-04-09 RX ADMIN — LEVOTHYROXINE SODIUM 112 MCG: 0.11 TABLET ORAL at 04:43

## 2022-04-09 RX ADMIN — VENLAFAXINE HYDROCHLORIDE 150 MG: 150 CAPSULE, EXTENDED RELEASE ORAL at 09:27

## 2022-04-09 RX ADMIN — HYDROCODONE BITARTRATE AND ACETAMINOPHEN 1 TABLET: 5; 325 TABLET ORAL at 09:27

## 2022-04-09 RX ADMIN — Medication 1 AMPULE: at 09:27

## 2022-04-09 RX ADMIN — HYDROCODONE BITARTRATE AND ACETAMINOPHEN 1 TABLET: 5; 325 TABLET ORAL at 01:01

## 2022-04-09 RX ADMIN — HYDROCODONE BITARTRATE AND ACETAMINOPHEN 1 TABLET: 5; 325 TABLET ORAL at 13:15

## 2022-04-09 RX ADMIN — DOCUSATE SODIUM 50MG AND SENNOSIDES 8.6MG 2 TABLET: 8.6; 5 TABLET, FILM COATED ORAL at 09:27

## 2022-04-09 RX ADMIN — LOSARTAN POTASSIUM 100 MG: 50 TABLET, FILM COATED ORAL at 09:27

## 2022-04-09 NOTE — PROGRESS NOTES
Problem: Mobility Impaired (Adult and Pediatric)  Goal: *Acute Goals and Plan of Care (Insert Text)  Outcome: Progressing Towards Goal  Note: GOALS (1-4 days):  (1.)Ms. Prince Franklin will move from supine to sit and sit to supine  in bed with SUPERVISION. (2.)Ms. Prince Franklin will transfer from bed to chair and chair to bed with SUPERVISION using the least restrictive device. (3.)Ms. Prince Franklin will ambulate with STAND BY ASSIST for 300 feet with the least restrictive device. (4.)Ms. Prince Franklin will ambulate up/down 4 steps with bilateral  railing with CONTACT GUARD ASSIST with no device. 4/9  (5.)Ms. Prince Franklin will state/observe KATHY precautions with 0 verbal cues. 4/9  ________________________________________________________________________________________________       PHYSICAL THERAPY JOINT CAMP KATHY: Daily Note and PM 4/9/2022  INPATIENT: Hospital Day: 3  [place] / Product Type: Medicare /      NAME/AGE/GENDER: Latricia Hewitt is a 66 y.o. female   PRIMARY DIAGNOSIS:  Closed hip fracture requiring operative repair, right, [facility] [S72.001A]   Procedure(s) and Anesthesia Type:     * HIP ARTHROPLASTY TOTAL CONVERSION/ RIGHT/ DEPUY- WILL NEED TO CUT PLATE - Spinal (Right)  ICD-10: Treatment Diagnosis:    · Pain in Right Hip (M25.551)  · Stiffness of Right Hip, Not elsewhere classified (M25.651)  · Difficulty in walking, Not elsewhere classified (R26.2)      ASSESSMENT:     Ms. Prince Franklin presents with decreased strength and range of motion right lower extremity and with decreased independence with functional mobility s/p  right total hip arthroplasty. Pt will benefit from skilled PT interventions to maximize independence with functional mobility and KATHY management. Pt did well with assessment however numbness limiting mobility. Pt able to get to chair with assist of 2. She had some reports of back ain pain from being in the the bed and was eager for a position change.  In recliner worked on [employer] with verbal cues. Hope to progress her therapy at next session. Pt instructed not to get up without assist. [place] to progress mobility and KATHY exercises at next session. Pt plans to discharge to home from the hospital with continued therapy for follow up. 4/8 sitting in the chair upon arrival. Performs R hip exercises with guidance to stay within the hip precaution. Sit>stand with Min A, ambulated 30 ft using RW with Min A. Rested few min, then ambulated another 30 ft into the bathroom with OT present. Left pt with OT.  4/8 pm sitting in the chair upon arrival.  Performs R hip exercises and review hip precaution again. Sit>stand with Min A. Then ambulated [de-identified] ft using RW with RW and Min A. Return to bed with Min A for B LE exercises, need in reach, ice to R hip and instructed to call for assist, before getting up. 4/9 supine upon arrival.  Performs R hip exercises with good technique. Supine>eob with CGA with therapist pulling on the pad. Sit>stand with SBA. Therapist help her change her brief. Ambulated 50 ft using RW with SBA and therapist pulling the chair behind us. Stated I am tire, so can I sit. Therapist rolled pt the rest of the way to the gym. Therapist instructed/pt demonstrated going up/down the stairs with good technique. Then ambulated another 50 ft back to the room and straight into the shower. All question answer and ready for D/C. Left with OT.  4/9 pm sitting in the chair upon arrival.  Stated I need to go to the restroom. Ambulated 15 ft to the restroom using RW with SBA. Therapist help with pt with eleazar/ff her brief. Then ambulated another 15 ft back to the chair using RW with SBA. Performs her R hip exercises with good technique. Remain in the chair with needs in reach and instructed to call for assist, before getting up. Going home today.   This section established at most recent assessment   PROBLEM LIST (Impairments causing functional limitations):  1. Decreased Strength  2. Decreased ADL/Functional Activities  3. Decreased Transfer Abilities  4. Decreased Ambulation Ability/Technique  5. Increased Pain  6. Decreased Flexibility/Joint Mobility  7. Decreased [place] with Home Exercise Program   INTERVENTIONS PLANNED: (Benefits and precautions of physical therapy have been discussed with the patient.)  1. Bed Mobility  2. Cold  3. Gait Training  4. Home Exercise Program (HEP)  5. Range of Motion (ROM)  6. Therapeutic Activites  7. Therapeutic Exercise/Strengthening  8. Transfer Training     TREATMENT PLAN: Frequency/Duration: Follow patient BID for duration of hospital stay to address above goals. Rehabilitation Potential For Stated Goals: Good     RECOMMENDED REHABILITATION/EQUIPMENT: (at time of discharge pending progress): Continue Skilled Therapy and Home Health: Physical Therapy. HISTORY:   History of Present Injury/Illness (Reason for Referral):  Pt s/p KATHY on 4/7/2022  Past Medical History/Comorbidities:   Ms. Sally Ponce  has a past medical history of Allergic rhinitis, Aneurysm ([facility] Anxiety, Arthritis, Asthma, Calculus of kidney, Cancer ([facility] Chronic pain, Depression, Dizziness (09/08/2016), Dyslipidemia, Fatigue, Former cigarette smoker, GERD (gastroesophageal reflux disease), Heart murmur (11/24/2015), History of nuclear stress test (05/11/2021), HTN (hypertension), echocardiogram (11/25/2019), Hypercholesterolemia, Hypothyroid, IBS (irritable bowel syndrome), Migraines, Mitral valve insufficiency, Nausea & vomiting, Peripheral neuropathy, Rheumatic fever, Seizures ([place] [facility] [address] Stroke ([facility] (11/25/2019), and Unspecified adverse effect of anesthesia.   Ms. Sally Ponce  has a past surgical history that includes hx lap cholecystectomy; hx appendectomy; hx hysterectomy; hx tonsil and adenoidectomy; hx total colectomy; hx bladder suspension; hx colonoscopy; hx other surgical; hx other surgical; hx breast lumpectomy (Right, 1996); hx orthopaedic; hx knee arthroscopy (Left); hx knee arthroscopy (Right, 10/16/09; 03/2015); hx knee replacement (Right, 3/2015); hx knee replacement (Left, 2016); hx orthopaedic (Right, 08/24/2021); and hx urological (2020). Social History/Living Environment:   Home Environment: Private residence  # Steps to Enter: 4  Rails to Enter: Yes  Hand Rails : Right  One/Two Story Residence: One story  # of Interior Steps: 2  Interior Rails: Both  Living Alone: No  Support Systems: Spouse/Significant Other  Patient Expects to be Discharged to[de-identified] Home with home health  Current DME Used/Available at Home: [employer], straight; Sam Blotter, rollator; Walker, rolling  Tub or Shower Type: Tub/Shower combination    Prior Level of Function/Work/Activity:  Pt living at home, independent with mobility   Number of Personal Factors/Comorbidities that affect the Plan of Care: 0: LOW COMPLEXITY   EXAMINATION:   Most Recent Physical Functioning:                            Bed Mobility  Supine to Sit: Contact guard assistance  Sit to Supine:  (left up in chair)    Transfers  Sit to Stand: Stand-by assistance  Stand to Sit: Stand-by assistance  Bed to Chair: Stand-by assistance    Balance  Sitting: Intact  Standing: Intact  Standing - Static: Constant support  Standing - Dynamic : Constant support              Weight Bearing Status  Right Side Weight Bearing: As tolerated  Distance (ft): 30 Feet (ft)  Ambulation - Level of Assistance: Stand-by assistance  Assistive Device: Walker, rolling  Base of Support: Center of gravity altered  Speed/Karen: Pace decreased (<100 feet/min)  Step Length: Right shortened  Stance: Right decreased  Gait Abnormalities: Decreased step clearance  Number of Stairs Trained: 6  Stairs - Level of Assistance: Contact guard assistance  Rail Use: Both  Interventions: Safety awareness training     Braces/Orthotics: none           Body Structures Involved:  1. Joints  2. Muscles Body Functions Affected:  1.  Movement Related Activities and Participation Affected:  1. Mobility  2. Self Care   Number of elements that affect the Plan of Care: 4+: HIGH COMPLEXITY   CLINICAL PRESENTATION:   Presentation: Stable and uncomplicated: LOW COMPLEXITY   CLINICAL DECISION MAKIN [facility] [address] [address] AM-PAC 6 Clicks   Basic Mobility Inpatient Short Form  How much difficulty does the patient currently have. .. Unable A Lot A Little None   1. Turning over in bed (including adjusting bedclothes, sheets and blankets)? [] 1   [] 2   [x] 3   [] 4   2. Sitting down on and standing up from a chair with arms ( e.g., wheelchair, bedside commode, etc.)   [] 1   [] 2   [x] 3   [] 4   3. Moving from lying on back to sitting on the side of the bed? [] 1   [] 2   [x] 3   [] 4   How much help from another person does the patient currently need. .. Total A Lot A Little None   4. Moving to and from a bed to a chair (including a wheelchair)? [] 1   [] 2   [x] 3   [] 4   5. Need to walk in hospital room? [] 1   [] 2   [x] 3   [] 4   6. Climbing 3-5 steps with a railing? [] 1   [] 2   [x] 3   [] 4   © , Trustees of [address] [place] [address] [address] [address], under license to [employer]. All rights reserved     Score:  Initial: 18 Most Recent: X (Date: -- )    Interpretation of Tool:  Represents activities that are increasingly more difficult (i.e. Bed mobility, Transfers, Gait). Medical Necessity:     · Patient is expected to demonstrate progress in   · strength, range of motion, and functional technique  ·  to   · decrease assistance required with functional mobility and KATHY management  · .  Reason for Services/Other Comments:  · Patient continues to require skilled intervention due to   · Inability to complete functional mobility and KATHY management independently  · .    Use of outcome tool(s) and clinical judgement create a POC that gives a: Clear prediction of patient's progress: LOW COMPLEXITY            TREATMENT:   (In addition to Assessment/Re-Assessment sessions the following treatments were rendered)     Pre-treatment Symptoms/Complaints:  agreeable  Pain Initial:   Pain Intensity 1: 2 (about the same after therapy)  Post Session:       Therapeutic Activity: (  30 Minutes ):  Therapeutic activities including Chair transfers, Toilet transfers, Ambulation on level ground and performs exercises with guidance. Gait Training ( ):  Gait training to improve and/or restore physical functioning as related to mobility. Ambulated 30 Feet (ft) with Stand-by assistance using a Walker, rolling and minimal Safety awareness training related to their hip position and motion to promote proper body alignment. I   Date:  4/7 Date:  4/8   Date:  4/9     ACTIVITY/EXERCISE AM PM AM PM AM PM     []  []  []  []  []  []   Ankle Pumps  10 15 15 15 15   Quad Sets  10 15 15 15 15   Gluteal Sets  10 15 15 15 15   Hip ABd/ADduction  10 15 15 15 15   Knee Slides  10 15 15 15 15   Short Arc Quads   15 15 15 15   Long Arc Quads   15 15 15 15                              B = bilateral; AA = active assistive; A = active; P = passive      Treatment/Session Assessment:     Response to Treatment: participated well, ready for D/C    Education:  [x] Home Exercises  [x] Fall Precautions  [] Hip Precautions [] D/C Instruction Review  [] Hip Prosthesis Review  [] Walker Management/Safety [] Adaptive Equipment as Needed       Interdisciplinary Collaboration:   o Physical Therapy Assistant  o Registered Nurse    After treatment position/precautions:   o Bed/Chair-wheels locked  o RN notified    Compliance with Program/Exercises: Compliant all of the time, Will assess as treatment progresses. Recommendations/Intent for next treatment session:  Treatment next visit will focus on increasing Ms. Amy Martinez independence with bed mobility, transfers, gait training, strength/ROM exercises, modalities for pain, and patient education.       Total Treatment Duration:  PT Patient Time In/Time Out  Time In: 1335  Time Out: [address] Shahab Milian, PTA

## 2022-04-09 NOTE — PROGRESS NOTES
2022         Post Op day: 2 Days Post-Op     Admit Date: 2022  Admit Diagnosis: Closed hip fracture requiring operative repair, right, initial encounter [facility] Jina Goddard; Arthritis of right hip [M16.11]  Right  Hip Fns /choice Anes To Be Admitted 21 - Right  2021    Subjective: Doing well, No SOB, No Chest Pain, No Nausea or Vomitting. Was a little dizzy earlier. Did participate well with PT.  PT/OT:            Assistive Device: Walker (comment)                Weight Bearing Status: WBAT  BMP:  No results for input(s): CREA, BUN, NA, K, CL, CO2, AGAP, GLU in the last 72 hours. Patient Vitals for the past 8 hrs:   BP Temp Pulse Resp SpO2   22 0737 137/66 99 °F (37.2 °C) (!) 104 17 91 %   22 0734     93 %   22 0309 (!) 148/70 99 °F (37.2 °C) (!) 101 18 95 %     Temp (24hrs), Av.9 °F (37.2 °C), Min:98 °F (36.7 °C), Max:99.5 °F (37.5 °C)    CBC:  Recent Labs     22  0407 22  0401 22  1855   HGB 8.0* 8.9* 10.5*       Microbiology:     All Micro Results     None        Objective: Vital Signs show she is a little tachycardic, No Acute Distress, Alert and Oriented  Dressing is Dry,  Neurovascular exam is normal.    Assessment:  Patient Active Problem List   Diagnosis Code    Localized osteoarthrosis not specified whether primary or secondary, lower leg M17.10    S/P total knee replacement Z96.659    Asthma J45.909    GERD (gastroesophageal reflux disease) K21.9    Arthritis M19.90    Depression F32. A    Anxiety F41.9    Hypothyroid E03.9    Peripheral neuropathy G62.9    Allergic rhinitis J30.9    Chronic pain syndrome G89.4    Hypercholesterolemia E78.00    HTN (hypertension) I10    IBS (irritable bowel syndrome) K58.9    Migraines G43.909    Heart murmur R01.1    Arthritis of knee, left M17.12    S/P total knee arthroplasty Z96.659    Dizziness R42    Chest pain R07.9    Shortness of breath R06.02    Hyperglycemia R73.9    Recurrent depression ([facility] F33.9    External hemorrhoid, bleeding K64.4    Abnormal findings on diagnostic imaging of other specified body structures R93.89    Constipation K59.00    Personal history of colonic polyps Z86.010    Rectal bleeding K62.5    SBO (small bowel obstruction) ([facility]) K56.609    Peripheral vascular disease ([facility]) I73.9    Ataxia R27.0    Falls W19. Vivia Mode    Thrush B37.0    Polycythemia D75.1    Hypokalemia E87.6    Elevated bilirubin R17    OAB (overactive bladder) N32.81    Vaginal atrophy N95.2    Hematuria R31.9    History of CVA (cerebrovascular accident) Z86.73    Closed displaced fracture of right femoral neck ([facility]) S72.001A    Acute postoperative anemia due to greater than expected blood loss D62    Opioid use, unspecified with unspecified opioid-induced disorder F11.99    Arthritis of right hip M16.11       Plan: Continue Physical Therapy  Monitor Hbg and labs. Postoperative acute blood loss anemia. Would like to make sure her hemoglobin is starting to turn around before discharge since she is a little tachycardic and a little dizzy. Will check hemoglobin this afternoon. If doing well enough likely discharge this afternoon.   Dispo soon      Signed By: Varsha Salcedo MD

## 2022-04-09 NOTE — PROGRESS NOTES
Problem: Self Care Deficits Care Plan (Adult)  Goal: *Acute Goals and Plan of Care (Insert Text)  Outcome: Progressing Towards Goal  Note: GOALS:   DISCHARGE GOALS (in preparation for going home/rehab):  3 days  1. Ms. Christina Webber will perform one lower body dressing activity with minimal assistance with adaptive equipment to demonstrate improved functional mobility and safety. GOAL MET 4/8/2022  2. Ms. Christina Webber will perform one lower body bathing activity with minimal  assistance with adaptive equipment to demonstrate improved functional mobility and safety. GOAL MET 4/8/2022  3. Ms. Christina Webber will perform toileting/toilet transfer with contact guard assistance with adaptive equipment to demonstrate improved functional mobility and safety. GOAL MET 4/9/2022  4. Ms. Christina Webber will perform shower transfer with contact guard assistance with adaptive equipment to demonstrate improved functional mobility and safety. GOAL MET 4/9/2022  5. Ms. Christina Webber will state KATHY precautions with two verbal cues to demonstrate improved functional mobility and safety. GOAL MET 4/9/2022       JOINT CAMP OCCUPATIONAL THERAPY KATHY: Daily Note, Treatment Day: 2nd and AM 4/9/2022  INPATIENT: Hospital Day: 3  [place] / [place] / Product Type: Medicare /      NAME/AGE/GENDER: Leia Prince is a 66 y.o. female   PRIMARY DIAGNOSIS:  Closed hip fracture requiring operative repair, right, initial encounter [facility] [S72.001A]   Procedure(s) and Anesthesia Type:     * HIP ARTHROPLASTY TOTAL CONVERSION/ RIGHT/ DEPUY- WILL NEED TO CUT PLATE - Spinal (Right)  ICD-10: Treatment Diagnosis:    · Pain in Right Hip (M25.551)  · Stiffness of Right Hip, Not elsewhere classified (M25.651)      ASSESSMENT:     Ms. Christina Webber is s/p right KATHY and presents with decreased weight bearing on right LE and decreased independence with functional mobility and activities of daily living as compared to prior level of function and safety.   Patient would benefit from skilled Occupational Therapy to maximize independence and safety with self-care task and functional mobility. Pt would also benefit from education on lower body adaptive equipment and hip precautions post-surgery in preparation for going home. Patient plans for further rehab at home with home health services and good family support daughter. OT reviewed therapy schedule and plan of care with patient. Patient was able to transfer and perform self care skills as charted below. Patient instructed to call for assistance when needing to get up from the recliner and all needs in reach. Patient verbalized understanding of call light. Pt with slight lower extremity numbness but was able to eleazar brief and get to recliner with assistance. Pt should progress well tomorrow. 4/8/2022  Pt seen in room, supine upon arrival with complaint of feeling weak. CNA taking BP that was 139/73. Pt's bed was elevated and she did some ankle pumps, BP retaken and 139/70. Pt up to edge of bed and reported she felt better. Pt transferred to recliner with assistance and sat up for about 15 min with education on self care. Pt stated she felt better and PTA called to assist with bathroom mobility for safety of pt. Patient completed toileting, shower, grooming and dressing as charted below in ADL grid and is ambulating with rolling walker and minimal assist.  Patient has met 2/5 goals and plans to return home with good family support. Pt is going to have assistance of her daughter at home and they should be able to provide patient with appropriate level of assistance at this time. PTA will seen again this afternoon for mobility. OT will continue plan of care if patient stays in the hospital. OT reviewed hip precautions throughout session. Patient instructed to call for assistance when needing to get up from recliner and all needs in reach. Patient verbalized understanding of call light.     4/9/22  Ms. Manuela Webber is s/p R KATHY and presents with decreased weight bearing on R LE and decreased independence with functional mobility and activities of daily living. Patient completed shower and dressing as charted below in ADL grid and is ambulating with rolling walker with supervision. Patient has met 5/5 goals and plans to return home with good family support. Family able to provide patient with appropriate level of assistance at this time. OT reviewed hip precautions throughout session. Patient instructed to call for assistance when needing to get up from recliner and all needs in reach. Patient verbalized understanding of call light. This section established at most recent assessment   PROBLEM LIST (Impairments causing functional limitations):  1. Decreased Strength  2. Decreased ADL/Functional Activities  3. Decreased Transfer Abilities  4. Increased Pain  5. Increased Fatigue  6. Decreased Flexibility/Joint Mobility  7. Decreased Knowledge of Precautions   INTERVENTIONS PLANNED: (Benefits and precautions of occupational therapy have been discussed with the patient.)  1. Activities of daily living training  2. Adaptive equipment training  3. Balance training  4. Clothing management  5. Donning&doffing training  6. Theraputic activity     TREATMENT PLAN: Frequency/Duration: Follow patient 1-2 times to address above goals. Rehabilitation Potential For Stated Goals: Good     RECOMMENDED REHABILITATION/EQUIPMENT: (at time of discharge pending progress): Continue Skilled Therapy. OCCUPATIONAL PROFILE AND HISTORY:   History of Present Injury/Illness (Reason for Referral): Pt presents this date s/p (right) KATHY.     Past Medical History/Comorbidities:   Ms. Jenni Chandra  has a past medical history of Allergic rhinitis, Aneurysm ([facility] Anxiety, Arthritis, Asthma, Calculus of kidney, Cancer ([facility] Chronic pain, Depression, Dizziness (09/08/2016), Dyslipidemia, Fatigue, Former cigarette smoker, GERD (gastroesophageal reflux disease), Heart murmur (11/24/2015), History of nuclear stress test (05/11/2021), HTN (hypertension), echocardiogram (11/25/2019), Hypercholesterolemia, Hypothyroid, IBS (irritable bowel syndrome), Migraines, Mitral valve insufficiency, Nausea & vomiting, Peripheral neuropathy, Rheumatic fever, Seizures ([facility] Stroke ([facility] (11/25/2019), and Unspecified adverse effect of anesthesia. Ms. [facility]  has a past surgical history that includes hx lap cholecystectomy; hx appendectomy; hx hysterectomy; hx tonsil and adenoidectomy; hx total colectomy; hx bladder suspension; hx colonoscopy; hx other surgical; hx other surgical; hx breast lumpectomy (Right, 1996); hx orthopaedic; hx knee arthroscopy (Left); hx knee arthroscopy (Right, 10/16/09; 03/2015); hx knee replacement (Right, 3/2015); hx knee replacement (Left, 2016); hx orthopaedic (Right, 08/24/2021); and hx urological (2020). Social History/Living Environment:   Home Environment: Private residence  # Steps to Enter: 4  Rails to Enter: Yes  Hand Rails : Right  One/Two Story Residence: One story  # of Interior Steps: 2  Interior Rails: Both  Living Alone: No  Support Systems: Spouse/Significant Other  Patient Expects to be Discharged to[de-identified] Home with home health  Current DME Used/Available at Home: Lucho Sorrel, straight; Walker, rollator; Walker, rolling  Tub or Shower Type: Tub/Shower combination    Prior Level of Function/Work/Activity:  Pt was (I) with ADL's and ambulated short distances. Number of Personal Factors/Comorbidities that affect the Plan of Care: Brief history (0):  LOW COMPLEXITY   ASSESSMENT OF OCCUPATIONAL PERFORMANCE[de-identified]   Most Recent Physical Functioning:   Balance  Sitting: Intact  Standing: Intact  Standing - Static: Constant support  Standing - Dynamic : Constant support                              Mental Status  Neurologic State: Alert  Orientation Level: Oriented X4  Cognition: Appropriate decision making; Appropriate for age attention/concentration  Perception: Appears intact  Perseveration: No perseveration noted  Safety/Judgement: Fall prevention                Basic ADLs (From Assessment) Complex ADLs (From Assessment)   Basic ADL  Feeding: Independent  Oral Facial Hygiene/Grooming: Supervision  Bathing: Moderate assistance  Type of Bath: Chlorhexidine (CHG),Full,Shower  Upper Body Dressing: Supervision  Lower Body Dressing: Moderate assistance  Toileting: Minimum assistance     Grooming/Bathing/Dressing Activities of Daily Living   Grooming  Washing Face: Independent Cognitive Retraining  Safety/Judgement: Fall prevention   Upper Body Bathing  Bathing Assistance: Modified independent  Position Performed: Seated in chair  Adaptive Equipment: Shower chair     Lower Body Bathing  Bathing Assistance: Modified independent  Perineal  : Independent  Lower Body : Modified independent  Adaptive Equipment: Shower chair; Long handled sponge Toileting  Bladder Hygiene: Independent  Adaptive Equipment: Elevated seat   Upper Body Dressing Assistance  Dressing Assistance: [id number] [id number]: Independent Functional Transfers  Toilet Transfer : Modified independent  Shower Transfer: Modified independent   Lower Body Dressing Assistance  Dressing Assistance: Modified independent  Underpants: Modified independent  Socks: Modified independent  Adaptive Equipment Used: Reacher;Sock aid; Long handled shoe horn Bed/Mat Mobility  Supine to Sit: Contact guard assistance  Sit to Supine:  (left up in chair)  Sit to Stand: Modified independent  Stand to Sit: Modified independent  Bed to Chair: Stand-by assistance         Physical Skills Involved:  1. Range of Motion  2. Balance  3. Pain (acute) Cognitive Skills Affected (resulting in the inability to perform in a timely and safe manner): 1. none Psychosocial Skills Affected:  1.  Environmental Adaptation   Number of elements that affect the Plan of Care: 3-5:  MODERATE COMPLEXITY   CLINICAL DECISION MAKING:   [employer] 9 Clicks   Daily Activity Inpatient Short Form  How much help from another person does the patient currently need. .. Total A Lot A Little None   1. Putting on and taking off regular lower body clothing? [] 1   [] 2   [x] 3   [] 4   2. Bathing (including washing, rinsing, drying)? [] 1   [] 2   [x] 3   [] 4   3. Toileting, which includes using toilet, bedpan or urinal?   [] 1   [] 2   [x] 3   [] 4   4. Putting on and taking off regular upper body clothing? [] 1   [] 2   [] 3   [x] 4   5. Taking care of personal grooming such as brushing teeth? [] 1   [] 2   [] 3   [x] 4   6. Eating meals? [] 1   [] 2   [] 3   [x] 4   © 2007, Trustees of [address] [place] [address], under license to [employer]. All rights reserved     Score:  Initial: 18 Most Recent: 21 (Date: 4/8/2022 )    Interpretation of Tool:  Represents activities that are increasingly more difficult (i.e. Bed mobility, Transfers, Gait). Use of outcome tool(s) and clinical judgement create a POC that gives a: LOW COMPLEXITY            TREATMENT:   (In addition to Assessment/Re-Assessment sessions the following treatments were rendered)     Pre-treatment Symptoms/Complaints:  none  Pain: Initial: 0  Pain Intensity 1: 3  Pain Location 1: Hip  Post Session:  0     Self Care: (40 min): Procedure(s) (per grid) utilized to improve and/or restore self-care/home management as related to dressing, bathing, toileting and grooming. Required minimal verbal, manual and   cueing to facilitate activities of daily living skills and compensatory activities.       Treatment/Session Assessment:     Response to Treatment:  pt up to shower tolerated well    Education:  [] Home Exercises  [x] Fall Precautions  [x] Hip Precautions [] Going Home Video  [] Knee/Hip Prosthesis Review  [x] Walker Management/Safety [x] Adaptive Equipment as Needed       Interdisciplinary Collaboration:   o Physical Therapy Assistant  o Certified Occupational Therapy Assistant  o Registered Nurse After treatment position/precautions:   o Up in chair  o Bed/Chair-wheels locked  o Call light within reach  o RN notified     Compliance with Program/Exercises: Compliant all of the time, Will assess as treatment progresses.          Total Treatment Duration:  OT Patient Time In/Time Out  Time In: 0820  Time Out: 0900     HENRRY Renae

## 2022-04-09 NOTE — PROGRESS NOTES
Problem: Mobility Impaired (Adult and Pediatric)  Goal: *Acute Goals and Plan of Care (Insert Text)  Outcome: Progressing Towards Goal  Note: GOALS (1-4 days):  (1.)Ms. Janece Osler will move from supine to sit and sit to supine  in bed with SUPERVISION. (2.)Ms. Janece Osler will transfer from bed to chair and chair to bed with SUPERVISION using the least restrictive device. (3.)Ms. Janece Osler will ambulate with STAND BY ASSIST for 300 feet with the least restrictive device. (4.)Ms. Janece Osler will ambulate up/down 4 steps with bilateral  railing with CONTACT GUARD ASSIST with no device. 4/9  (5.)Ms. Janece Osler will state/observe KATHY precautions with 0 verbal cues. 4/9  ________________________________________________________________________________________________       PHYSICAL THERAPY JOINT CAMP KATHY: Daily Note and AM 4/9/2022  INPATIENT: Hospital Day: 3  [place] / [place] / Product Type: Medicare /      NAME/AGE/GENDER: Rhett Meek is a 66 y.o. female   PRIMARY DIAGNOSIS:  Closed hip fracture requiring operative repair, right, initial encounter ([facility] [S72.001A]   Procedure(s) and Anesthesia Type:     * HIP ARTHROPLASTY TOTAL CONVERSION/ RIGHT/ DEPUY- WILL NEED TO CUT PLATE - Spinal (Right)  ICD-10: Treatment Diagnosis:    · Pain in Right Hip (M25.551)  · Stiffness of Right Hip, Not elsewhere classified (M25.651)  · Difficulty in walking, Not elsewhere classified (R26.2)      ASSESSMENT:     Ms. Janece Osler presents with decreased strength and range of motion right lower extremity and with decreased independence with functional mobility s/p  right total hip arthroplasty. Pt will benefit from skilled PT interventions to maximize independence with functional mobility and KATHY management. Pt did well with assessment however numbness limiting mobility. Pt able to get to chair with assist of 2. She had some reports of back ain pain from being in the the bed and was eager for a position change.  In recliner worked on [employer] with verbal cues. Hope to progress her therapy at next session. Pt instructed not to get up without assist. [facility] to progress mobility and KATHY exercises at next session. Pt plans to discharge to home from the hospital with continued therapy for follow up. 4/8 sitting in the chair upon arrival. Performs R hip exercises with guidance to stay within the hip precaution. Sit>stand with Min A, ambulated 30 ft using RW with Min A. Rested few min, then ambulated another 30 ft into the bathroom with OT present. Left pt with OT.  4/8 pm sitting in the chair upon arrival.  Performs R hip exercises and review hip precaution again. Sit>stand with Min A. Then ambulated [de-identified] ft using RW with RW and Min A. Return to bed with Min A for B LE exercises, need in reach, ice to R hip and instructed to call for assist, before getting up. 4/9 supine upon arrival.  Performs R hip exercises with good technique. Supine>eob with CGA with therapist pulling on the pad. Sit>stand with SBA. Therapist help her change her brief. Ambulated 50 ft using RW with SBA and therapist pulling the chair behind us. Stated I am tire, so can I sit. Therapist rolled pt the rest of the way to the gym. Therapist instructed/pt demonstrated going up/down the stairs with good technique. Then ambulated another 50 ft back to the room and straight into the shower. All question answer and ready for D/C. Left with OT. This section established at most recent assessment   PROBLEM LIST (Impairments causing functional limitations):  1. Decreased Strength  2. Decreased ADL/Functional Activities  3. Decreased Transfer Abilities  4. Decreased Ambulation Ability/Technique  5. Increased Pain  6. Decreased Flexibility/Joint Mobility  7. Decreased [place] with Home Exercise Program   INTERVENTIONS PLANNED: (Benefits and precautions of physical therapy have been discussed with the patient.)  1. Bed Mobility  2. Cold  3.  Gait Training  4. Home Exercise Program (HEP)  5. Range of Motion (ROM)  6. Therapeutic Activites  7. Therapeutic Exercise/Strengthening  8. Transfer Training     TREATMENT PLAN: Frequency/Duration: Follow patient BID for duration of hospital stay to address above goals. Rehabilitation Potential For Stated Goals: Good     RECOMMENDED REHABILITATION/EQUIPMENT: (at time of discharge pending progress): Continue Skilled Therapy and Home Health: Physical Therapy. HISTORY:   History of Present Injury/Illness (Reason for Referral):  Pt s/p KATHY on 4/7/2022  Past Medical History/Comorbidities:   Ms. Rosita Crump  has a past medical history of Allergic rhinitis, Aneurysm ([facility] Anxiety, Arthritis, Asthma, Calculus of kidney, Cancer ([facility] Chronic pain, Depression, Dizziness (09/08/2016), Dyslipidemia, Fatigue, Former cigarette smoker, GERD (gastroesophageal reflux disease), Heart murmur (11/24/2015), History of nuclear stress test (05/11/2021), HTN (hypertension), echocardiogram (11/25/2019), Hypercholesterolemia, Hypothyroid, IBS (irritable bowel syndrome), Migraines, Mitral valve insufficiency, Nausea & vomiting, Peripheral neuropathy, Rheumatic fever, Seizures ([facility] Stroke ([facility] (11/25/2019), and Unspecified adverse effect of anesthesia. Ms. Rosita Crump  has a past surgical history that includes hx lap cholecystectomy; hx appendectomy; hx hysterectomy; hx tonsil and adenoidectomy; hx total colectomy; hx bladder suspension; hx colonoscopy; hx other surgical; hx other surgical; hx breast lumpectomy (Right, 1996); hx orthopaedic; hx knee arthroscopy (Left); hx knee arthroscopy (Right, 10/16/09; 03/2015); hx knee replacement (Right, 3/2015); hx knee replacement (Left, 2016); hx orthopaedic (Right, 08/24/2021); and hx urological (2020).    Social History/Living Environment:   Home Environment: Private residence  # Steps to Enter: 4  Rails to Enter: Yes  Hand Rails : Right  One/Two Story Residence: One story  # of Interior Steps: 2  Interior Rails: Both  Living Alone: No  Support Systems: Spouse/Significant Other  Patient Expects to be Discharged to[de-identified] Home with home health  Current DME Used/Available at Home: [employer], straight; Shellyon Lee, rollator; Walker, rolling  Tub or Shower Type: Tub/Shower combination    Prior Level of Function/Work/Activity:  Pt living at home, independent with mobility   Number of Personal Factors/Comorbidities that affect the Plan of Care: 0: LOW COMPLEXITY   EXAMINATION:   Most Recent Physical Functioning:                            Bed Mobility  Supine to Sit: Contact guard assistance  Sit to Supine:  (left up in chair)    Transfers  Sit to Stand: Stand-by assistance  Stand to Sit: Stand-by assistance  Bed to Chair: Stand-by assistance    Balance  Sitting: Intact  Standing: Pull to stand; With support  Standing - Static: Constant support  Standing - Dynamic : Constant support              Weight Bearing Status  Right Side Weight Bearing: As tolerated  Distance (ft): 100 Feet (ft)  Ambulation - Level of Assistance: Contact guard assistance  Assistive Device: Walker, rolling  Base of Support: Center of gravity altered  Speed/Karen: Pace decreased (<100 feet/min)  Step Length: Left shortened;Right shortened  Stance: Right decreased  Gait Abnormalities: Decreased step clearance  Number of Stairs Trained: 6  Stairs - Level of Assistance: Contact guard assistance  Rail Use: Both  Interventions: Safety awareness training     Braces/Orthotics: none           Body Structures Involved:  1. Joints  2. Muscles Body Functions Affected:  1. Movement Related Activities and Participation Affected:  1. Mobility  2. Self Care   Number of elements that affect the Plan of Care: 4+: HIGH COMPLEXITY   CLINICAL PRESENTATION:   Presentation: Stable and uncomplicated: LOW COMPLEXITY   CLINICAL DECISION MAKING:   [employer] AM-PAC 6 Clicks   Basic Mobility Inpatient Short Form  How much difficulty does the patient currently have. .. Unable A Lot A Little None   1. Turning over in bed (including adjusting bedclothes, sheets and blankets)? [] 1   [] 2   [x] 3   [] 4   2. Sitting down on and standing up from a chair with arms ( e.g., wheelchair, bedside commode, etc.)   [] 1   [] 2   [x] 3   [] 4   3. Moving from lying on back to sitting on the side of the bed? [] 1   [] 2   [x] 3   [] 4   How much help from another person does the patient currently need. .. Total A Lot A Little None   4. Moving to and from a bed to a chair (including a wheelchair)? [] 1   [] 2   [x] 3   [] 4   5. Need to walk in hospital room? [] 1   [] 2   [x] 3   [] 4   6. Climbing 3-5 steps with a railing? [] 1   [] 2   [x] 3   [] 4   © 2007, Trustees of [facility] [employer], under license to [employer]. All rights reserved     Score:  Initial: 18 Most Recent: X (Date: -- )    Interpretation of Tool:  Represents activities that are increasingly more difficult (i.e. Bed mobility, Transfers, Gait). Medical Necessity:     · Patient is expected to demonstrate progress in   · strength, range of motion, and functional technique  ·  to   · decrease assistance required with functional mobility and KATHY management  · .  Reason for Services/Other Comments:  · Patient continues to require skilled intervention due to   · Inability to complete functional mobility and KATHY management independently  · . Use of outcome tool(s) and clinical judgement create a POC that gives a: Clear prediction of patient's progress: LOW COMPLEXITY            TREATMENT:   (In addition to Assessment/Re-Assessment sessions the following treatments were rendered)     Pre-treatment Symptoms/Complaints:  agreeable  Pain Initial:   Pain Intensity 1: 2 (sore after therapy)  Post Session:       Therapeutic Activity: (  40 Minutes ):  Therapeutic activities including Chair transfers, Toilet transfers, Ambulation on level ground and performs exercises with guidance.   Gait Training ( ):  Gait training to improve and/or restore physical functioning as related to mobility. Ambulated 100 Feet (ft) with Contact guard assistance using a Walker, rolling and minimal Safety awareness training related to their hip position and motion to promote proper body alignment. I   Date:  4/7 Date:  4/8   Date:  4/9     ACTIVITY/EXERCISE AM PM AM PM AM PM     []  []  []  []  []  []   Ankle Pumps  10 15 15 15    Quad Sets  10 15 15 15    Gluteal Sets  10 15 15 15    Hip ABd/ADduction  10 15 15 15    Knee Slides  10 15 15 15    Short Arc Quads   15 15 15    Long Arc Quads   15 15 15                               B = bilateral; AA = active assistive; A = active; P = passive      Treatment/Session Assessment:     Response to Treatment: participated well, ready for D/C    Education:  [x] Home Exercises  [x] Fall Precautions  [] Hip Precautions [] D/C Instruction Review  [] Hip Prosthesis Review  [] Walker Management/Safety [] Adaptive Equipment as Needed       Interdisciplinary Collaboration:   o Physical Therapy Assistant  o Registered Nurse    After treatment position/precautions:   o left with OT    Compliance with Program/Exercises: Compliant all of the time, Will assess as treatment progresses. Recommendations/Intent for next treatment session:  Treatment next visit will focus on increasing Ms. Amy Martinez independence with bed mobility, transfers, gait training, strength/ROM exercises, modalities for pain, and patient education.       Total Treatment Duration:  PT Patient Time In/Time Out  Time In: 0715  Time Out: 2202 Ritesh Darden, PTA

## 2022-04-09 NOTE — PROGRESS NOTES
Hgb 8.0-Order in to notify MD if hgb <9-MEIR Lopez notified-continue to monitor at this time. No other new orders.

## 2022-04-09 NOTE — PROGRESS NOTES
Pt sitting up in recliner. Denies lightheaded and dizziness upon stading. Complaining of pain in right hip, medicated per MAR. Pt voiding without difficulty. No needs expressed at this time.

## 2022-04-09 NOTE — PROGRESS NOTES
Put aspirin order on hold due to hgb drop. Next scheduled dose was at 9:00. Dr. Eleuterio Clement will be by to see later today.

## 2022-04-10 NOTE — PROGRESS NOTES
Care Management Interventions  PCP Verified by CM:  Yes  Mode of Transport at Discharge: Self  Transition of Care Consult (CM Consult): [address]: Yes  Discharge Durable Medical Equipment: Yes  Physical Therapy Consult: Yes  Occupational Therapy Consult: Yes  Support Systems: Spouse/Significant Other  Confirm Follow Up Transport: Self  The Plan for Transition of Care is Related to the Following Treatment Goals : improve mobility  The Patient and/or Patient Representative was Provided with a Choice of Provider and Agrees with the Discharge Plan?: Yes  Name of the Patient Representative Who was Provided with a Choice of Provider and Agrees with the Discharge Plan: pt  Freedom of Choice List was Provided with Basic Dialogue that Supports the Patient's Individualized Plan of Care/Goals, Treatment Preferences and Shares the Quality Data Associated with the Providers?: Yes  Discharge Location  Patient Expects to be Discharged to[de-identified] Home with home health

## 2022-04-11 ENCOUNTER — HOME CARE VISIT (OUTPATIENT)
Dept: HOME HEALTH SERVICES | Facility: HOME HEALTH | Age: 72
End: 2022-04-11

## 2022-04-11 PROBLEM — M16.11 ARTHRITIS OF RIGHT HIP: Status: ACTIVE | Noted: 2022-04-07

## 2022-04-12 ENCOUNTER — HOME CARE VISIT (OUTPATIENT)
Dept: SCHEDULING | Facility: HOME HEALTH | Age: 72
End: 2022-04-12
Payer: MEDICARE

## 2022-04-12 VITALS
TEMPERATURE: 98.1 F | RESPIRATION RATE: 16 BRPM | DIASTOLIC BLOOD PRESSURE: 72 MMHG | OXYGEN SATURATION: 98 % | SYSTOLIC BLOOD PRESSURE: 124 MMHG | HEART RATE: 84 BPM

## 2022-04-12 PROCEDURE — G0151 HHCP-SERV OF PT,EA 15 MIN: HCPCS

## 2022-04-12 PROCEDURE — 3331090001 HH PPS REVENUE CREDIT

## 2022-04-12 PROCEDURE — 400018 HH-NO PAY CLAIM PROCEDURE

## 2022-04-12 PROCEDURE — 400013 HH SOC

## 2022-04-12 PROCEDURE — 3331090002 HH PPS REVENUE DEBIT

## 2022-04-13 PROCEDURE — 3331090002 HH PPS REVENUE DEBIT

## 2022-04-13 PROCEDURE — 3331090001 HH PPS REVENUE CREDIT

## 2022-04-14 ENCOUNTER — HOME CARE VISIT (OUTPATIENT)
Dept: SCHEDULING | Facility: HOME HEALTH | Age: 72
End: 2022-04-14
Payer: MEDICARE

## 2022-04-14 PROCEDURE — 3331090001 HH PPS REVENUE CREDIT

## 2022-04-14 PROCEDURE — G0299 HHS/HOSPICE OF RN EA 15 MIN: HCPCS

## 2022-04-14 PROCEDURE — 3331090002 HH PPS REVENUE DEBIT

## 2022-04-15 ENCOUNTER — HOME CARE VISIT (OUTPATIENT)
Dept: SCHEDULING | Facility: HOME HEALTH | Age: 72
End: 2022-04-15
Payer: MEDICARE

## 2022-04-15 VITALS
SYSTOLIC BLOOD PRESSURE: 120 MMHG | RESPIRATION RATE: 18 BRPM | HEART RATE: 82 BPM | DIASTOLIC BLOOD PRESSURE: 70 MMHG | OXYGEN SATURATION: 97 % | TEMPERATURE: 97.7 F

## 2022-04-15 PROCEDURE — 3331090002 HH PPS REVENUE DEBIT

## 2022-04-15 PROCEDURE — 3331090001 HH PPS REVENUE CREDIT

## 2022-04-15 PROCEDURE — G0157 HHC PT ASSISTANT EA 15: HCPCS

## 2022-04-16 PROCEDURE — 3331090001 HH PPS REVENUE CREDIT

## 2022-04-16 PROCEDURE — 3331090002 HH PPS REVENUE DEBIT

## 2022-04-17 PROCEDURE — 3331090001 HH PPS REVENUE CREDIT

## 2022-04-17 PROCEDURE — 3331090002 HH PPS REVENUE DEBIT

## 2022-04-18 ENCOUNTER — HOME CARE VISIT (OUTPATIENT)
Dept: HOME HEALTH SERVICES | Facility: HOME HEALTH | Age: 72
End: 2022-04-18
Payer: MEDICARE

## 2022-04-18 ENCOUNTER — HOME CARE VISIT (OUTPATIENT)
Dept: SCHEDULING | Facility: HOME HEALTH | Age: 72
End: 2022-04-18
Payer: MEDICARE

## 2022-04-18 VITALS
HEART RATE: 84 BPM | DIASTOLIC BLOOD PRESSURE: 80 MMHG | BODY MASS INDEX: 25.71 KG/M2 | WEIGHT: 160 LBS | SYSTOLIC BLOOD PRESSURE: 138 MMHG | HEIGHT: 66 IN | OXYGEN SATURATION: 100 % | TEMPERATURE: 98 F | RESPIRATION RATE: 20 BRPM

## 2022-04-18 VITALS
HEART RATE: 76 BPM | TEMPERATURE: 98 F | OXYGEN SATURATION: 99 % | RESPIRATION RATE: 17 BRPM | DIASTOLIC BLOOD PRESSURE: 68 MMHG | SYSTOLIC BLOOD PRESSURE: 124 MMHG

## 2022-04-18 PROCEDURE — 3331090001 HH PPS REVENUE CREDIT

## 2022-04-18 PROCEDURE — 3331090002 HH PPS REVENUE DEBIT

## 2022-04-18 PROCEDURE — G0157 HHC PT ASSISTANT EA 15: HCPCS

## 2022-04-19 ENCOUNTER — HOME CARE VISIT (OUTPATIENT)
Dept: SCHEDULING | Facility: HOME HEALTH | Age: 72
End: 2022-04-19
Payer: MEDICARE

## 2022-04-19 PROCEDURE — 3331090001 HH PPS REVENUE CREDIT

## 2022-04-19 PROCEDURE — G0155 HHCP-SVS OF CSW,EA 15 MIN: HCPCS

## 2022-04-19 PROCEDURE — 3331090002 HH PPS REVENUE DEBIT

## 2022-04-20 ENCOUNTER — HOME CARE VISIT (OUTPATIENT)
Dept: HOME HEALTH SERVICES | Facility: HOME HEALTH | Age: 72
End: 2022-04-20
Payer: MEDICARE

## 2022-04-20 PROCEDURE — 3331090001 HH PPS REVENUE CREDIT

## 2022-04-20 PROCEDURE — 3331090002 HH PPS REVENUE DEBIT

## 2022-04-21 ENCOUNTER — HOME CARE VISIT (OUTPATIENT)
Dept: SCHEDULING | Facility: HOME HEALTH | Age: 72
End: 2022-04-21
Payer: MEDICARE

## 2022-04-21 PROCEDURE — 3331090002 HH PPS REVENUE DEBIT

## 2022-04-21 PROCEDURE — 3331090001 HH PPS REVENUE CREDIT

## 2022-04-22 PROCEDURE — 3331090001 HH PPS REVENUE CREDIT

## 2022-04-22 PROCEDURE — 3331090002 HH PPS REVENUE DEBIT

## 2022-04-23 PROCEDURE — 3331090002 HH PPS REVENUE DEBIT

## 2022-04-23 PROCEDURE — 3331090001 HH PPS REVENUE CREDIT

## 2022-04-24 PROCEDURE — 3331090001 HH PPS REVENUE CREDIT

## 2022-04-24 PROCEDURE — 3331090002 HH PPS REVENUE DEBIT

## 2022-04-25 ENCOUNTER — HOME CARE VISIT (OUTPATIENT)
Dept: HOME HEALTH SERVICES | Facility: HOME HEALTH | Age: 72
End: 2022-04-25
Payer: MEDICARE

## 2022-04-25 PROCEDURE — 3331090001 HH PPS REVENUE CREDIT

## 2022-04-25 PROCEDURE — 3331090002 HH PPS REVENUE DEBIT

## 2022-04-26 PROCEDURE — 3331090001 HH PPS REVENUE CREDIT

## 2022-04-26 PROCEDURE — 3331090002 HH PPS REVENUE DEBIT

## 2022-04-27 PROCEDURE — 3331090001 HH PPS REVENUE CREDIT

## 2022-04-27 PROCEDURE — 3331090002 HH PPS REVENUE DEBIT

## 2022-04-28 PROCEDURE — 3331090001 HH PPS REVENUE CREDIT

## 2022-04-28 PROCEDURE — 3331090002 HH PPS REVENUE DEBIT

## 2022-04-29 PROCEDURE — 3331090001 HH PPS REVENUE CREDIT

## 2022-04-29 PROCEDURE — 3331090002 HH PPS REVENUE DEBIT

## 2022-04-30 PROCEDURE — 3331090002 HH PPS REVENUE DEBIT

## 2022-04-30 PROCEDURE — 3331090001 HH PPS REVENUE CREDIT

## 2022-05-01 PROCEDURE — 3331090002 HH PPS REVENUE DEBIT

## 2022-05-01 PROCEDURE — 3331090001 HH PPS REVENUE CREDIT

## 2022-05-02 ENCOUNTER — HOME CARE VISIT (OUTPATIENT)
Dept: SCHEDULING | Facility: HOME HEALTH | Age: 72
End: 2022-05-02
Payer: MEDICARE

## 2022-05-02 PROCEDURE — G0299 HHS/HOSPICE OF RN EA 15 MIN: HCPCS

## 2022-05-02 PROCEDURE — 3331090002 HH PPS REVENUE DEBIT

## 2022-05-02 PROCEDURE — 3331090001 HH PPS REVENUE CREDIT

## 2022-05-03 ENCOUNTER — HOME CARE VISIT (OUTPATIENT)
Dept: SCHEDULING | Facility: HOME HEALTH | Age: 72
End: 2022-05-03
Payer: MEDICARE

## 2022-05-03 VITALS
RESPIRATION RATE: 16 BRPM | DIASTOLIC BLOOD PRESSURE: 70 MMHG | OXYGEN SATURATION: 98 % | HEART RATE: 88 BPM | TEMPERATURE: 97.7 F | SYSTOLIC BLOOD PRESSURE: 138 MMHG

## 2022-05-03 VITALS
HEART RATE: 82 BPM | TEMPERATURE: 98.1 F | OXYGEN SATURATION: 94 % | SYSTOLIC BLOOD PRESSURE: 138 MMHG | DIASTOLIC BLOOD PRESSURE: 74 MMHG | RESPIRATION RATE: 18 BRPM

## 2022-05-03 PROCEDURE — G0151 HHCP-SERV OF PT,EA 15 MIN: HCPCS

## 2022-05-03 PROCEDURE — 3331090002 HH PPS REVENUE DEBIT

## 2022-05-03 PROCEDURE — 3331090001 HH PPS REVENUE CREDIT

## 2022-05-04 ENCOUNTER — HOME CARE VISIT (OUTPATIENT)
Dept: SCHEDULING | Facility: HOME HEALTH | Age: 72
End: 2022-05-04
Payer: MEDICARE

## 2022-05-04 VITALS
SYSTOLIC BLOOD PRESSURE: 122 MMHG | DIASTOLIC BLOOD PRESSURE: 70 MMHG | HEART RATE: 75 BPM | OXYGEN SATURATION: 97 % | TEMPERATURE: 97.8 F | RESPIRATION RATE: 16 BRPM

## 2022-05-04 PROCEDURE — 3331090002 HH PPS REVENUE DEBIT

## 2022-05-04 PROCEDURE — G0152 HHCP-SERV OF OT,EA 15 MIN: HCPCS

## 2022-05-04 PROCEDURE — 3331090001 HH PPS REVENUE CREDIT

## 2022-05-05 ENCOUNTER — HOME CARE VISIT (OUTPATIENT)
Dept: SCHEDULING | Facility: HOME HEALTH | Age: 72
End: 2022-05-05
Payer: MEDICARE

## 2022-05-05 VITALS
RESPIRATION RATE: 18 BRPM | TEMPERATURE: 98.4 F | DIASTOLIC BLOOD PRESSURE: 78 MMHG | OXYGEN SATURATION: 94 % | SYSTOLIC BLOOD PRESSURE: 140 MMHG | HEART RATE: 72 BPM

## 2022-05-05 PROCEDURE — G0151 HHCP-SERV OF PT,EA 15 MIN: HCPCS

## 2022-05-05 PROCEDURE — 3331090001 HH PPS REVENUE CREDIT

## 2022-05-05 PROCEDURE — G0299 HHS/HOSPICE OF RN EA 15 MIN: HCPCS

## 2022-05-05 PROCEDURE — 3331090002 HH PPS REVENUE DEBIT

## 2022-05-06 PROCEDURE — 3331090002 HH PPS REVENUE DEBIT

## 2022-05-06 PROCEDURE — 3331090001 HH PPS REVENUE CREDIT

## 2022-05-07 PROCEDURE — 3331090001 HH PPS REVENUE CREDIT

## 2022-05-07 PROCEDURE — 3331090002 HH PPS REVENUE DEBIT

## 2022-05-08 PROCEDURE — 3331090001 HH PPS REVENUE CREDIT

## 2022-05-08 PROCEDURE — 3331090002 HH PPS REVENUE DEBIT

## 2022-05-09 ENCOUNTER — HOME CARE VISIT (OUTPATIENT)
Dept: SCHEDULING | Facility: HOME HEALTH | Age: 72
End: 2022-05-09
Payer: MEDICARE

## 2022-05-09 VITALS
DIASTOLIC BLOOD PRESSURE: 78 MMHG | OXYGEN SATURATION: 97 % | TEMPERATURE: 98.4 F | SYSTOLIC BLOOD PRESSURE: 128 MMHG | HEART RATE: 72 BPM | RESPIRATION RATE: 18 BRPM

## 2022-05-09 PROCEDURE — 3331090002 HH PPS REVENUE DEBIT

## 2022-05-09 PROCEDURE — 3331090001 HH PPS REVENUE CREDIT

## 2022-05-09 PROCEDURE — G0151 HHCP-SERV OF PT,EA 15 MIN: HCPCS

## 2022-05-10 PROCEDURE — 3331090002 HH PPS REVENUE DEBIT

## 2022-05-10 PROCEDURE — 3331090001 HH PPS REVENUE CREDIT

## 2022-05-11 ENCOUNTER — HOME CARE VISIT (OUTPATIENT)
Dept: SCHEDULING | Facility: HOME HEALTH | Age: 72
End: 2022-05-11
Payer: MEDICARE

## 2022-05-11 VITALS
SYSTOLIC BLOOD PRESSURE: 138 MMHG | HEART RATE: 86 BPM | RESPIRATION RATE: 18 BRPM | TEMPERATURE: 97.2 F | OXYGEN SATURATION: 99 % | DIASTOLIC BLOOD PRESSURE: 80 MMHG

## 2022-05-11 PROCEDURE — 3331090002 HH PPS REVENUE DEBIT

## 2022-05-11 PROCEDURE — 3331090001 HH PPS REVENUE CREDIT

## 2022-05-11 PROCEDURE — G0151 HHCP-SERV OF PT,EA 15 MIN: HCPCS

## 2022-05-12 VITALS
RESPIRATION RATE: 17 BRPM | HEART RATE: 69 BPM | SYSTOLIC BLOOD PRESSURE: 124 MMHG | DIASTOLIC BLOOD PRESSURE: 72 MMHG | TEMPERATURE: 97.9 F | OXYGEN SATURATION: 17 %

## 2022-05-12 PROCEDURE — 3331090002 HH PPS REVENUE DEBIT

## 2022-05-12 PROCEDURE — 3331090001 HH PPS REVENUE CREDIT

## 2022-05-13 PROCEDURE — 3331090002 HH PPS REVENUE DEBIT

## 2022-05-13 PROCEDURE — 3331090001 HH PPS REVENUE CREDIT

## 2022-05-14 PROCEDURE — 3331090002 HH PPS REVENUE DEBIT

## 2022-05-14 PROCEDURE — 3331090001 HH PPS REVENUE CREDIT

## 2022-05-15 PROCEDURE — 3331090002 HH PPS REVENUE DEBIT

## 2022-05-15 PROCEDURE — 3331090001 HH PPS REVENUE CREDIT

## 2022-05-16 ENCOUNTER — HOME CARE VISIT (OUTPATIENT)
Dept: SCHEDULING | Facility: HOME HEALTH | Age: 72
End: 2022-05-16
Payer: MEDICARE

## 2022-05-16 VITALS
OXYGEN SATURATION: 97 % | DIASTOLIC BLOOD PRESSURE: 74 MMHG | SYSTOLIC BLOOD PRESSURE: 120 MMHG | RESPIRATION RATE: 17 BRPM | HEART RATE: 68 BPM | TEMPERATURE: 98 F

## 2022-05-16 PROCEDURE — 3331090002 HH PPS REVENUE DEBIT

## 2022-05-16 PROCEDURE — 400013 HH SOC

## 2022-05-16 PROCEDURE — 3331090001 HH PPS REVENUE CREDIT

## 2022-05-16 PROCEDURE — G0157 HHC PT ASSISTANT EA 15: HCPCS

## 2022-05-17 PROCEDURE — 3331090002 HH PPS REVENUE DEBIT

## 2022-05-17 PROCEDURE — 3331090001 HH PPS REVENUE CREDIT

## 2022-05-18 PROCEDURE — 3331090002 HH PPS REVENUE DEBIT

## 2022-05-18 PROCEDURE — 3331090001 HH PPS REVENUE CREDIT

## 2022-05-19 ENCOUNTER — HOME CARE VISIT (OUTPATIENT)
Dept: SCHEDULING | Facility: HOME HEALTH | Age: 72
End: 2022-05-19
Payer: MEDICARE

## 2022-05-19 VITALS
OXYGEN SATURATION: 98 % | HEART RATE: 72 BPM | DIASTOLIC BLOOD PRESSURE: 80 MMHG | TEMPERATURE: 98.4 F | RESPIRATION RATE: 18 BRPM | SYSTOLIC BLOOD PRESSURE: 128 MMHG

## 2022-05-19 PROCEDURE — 3331090002 HH PPS REVENUE DEBIT

## 2022-05-19 PROCEDURE — 3331090001 HH PPS REVENUE CREDIT

## 2022-05-19 PROCEDURE — G0151 HHCP-SERV OF PT,EA 15 MIN: HCPCS

## 2022-05-20 PROCEDURE — 3331090002 HH PPS REVENUE DEBIT

## 2022-05-20 PROCEDURE — 3331090001 HH PPS REVENUE CREDIT

## 2022-05-21 PROCEDURE — 3331090001 HH PPS REVENUE CREDIT

## 2022-05-21 PROCEDURE — 3331090002 HH PPS REVENUE DEBIT

## 2022-05-22 PROCEDURE — 3331090002 HH PPS REVENUE DEBIT

## 2022-05-22 PROCEDURE — 3331090001 HH PPS REVENUE CREDIT

## 2022-05-23 PROCEDURE — 3331090002 HH PPS REVENUE DEBIT

## 2022-05-23 PROCEDURE — 3331090001 HH PPS REVENUE CREDIT

## 2022-05-24 ENCOUNTER — HOME CARE VISIT (OUTPATIENT)
Dept: SCHEDULING | Facility: HOME HEALTH | Age: 72
End: 2022-05-24
Payer: MEDICARE

## 2022-05-24 VITALS
DIASTOLIC BLOOD PRESSURE: 70 MMHG | OXYGEN SATURATION: 98 % | RESPIRATION RATE: 17 BRPM | TEMPERATURE: 97.1 F | SYSTOLIC BLOOD PRESSURE: 110 MMHG | HEART RATE: 64 BPM

## 2022-05-24 PROCEDURE — 3331090002 HH PPS REVENUE DEBIT

## 2022-05-24 PROCEDURE — 3331090001 HH PPS REVENUE CREDIT

## 2022-05-24 PROCEDURE — G0157 HHC PT ASSISTANT EA 15: HCPCS

## 2022-05-25 PROCEDURE — 3331090002 HH PPS REVENUE DEBIT

## 2022-05-25 PROCEDURE — 3331090001 HH PPS REVENUE CREDIT

## 2022-05-26 PROCEDURE — 3331090001 HH PPS REVENUE CREDIT

## 2022-05-26 PROCEDURE — 3331090002 HH PPS REVENUE DEBIT

## 2022-05-27 ENCOUNTER — HOME CARE VISIT (OUTPATIENT)
Dept: SCHEDULING | Facility: HOME HEALTH | Age: 72
End: 2022-05-27
Payer: MEDICARE

## 2022-05-27 VITALS
TEMPERATURE: 97.1 F | HEART RATE: 71 BPM | SYSTOLIC BLOOD PRESSURE: 118 MMHG | RESPIRATION RATE: 17 BRPM | OXYGEN SATURATION: 99 % | DIASTOLIC BLOOD PRESSURE: 70 MMHG

## 2022-05-27 PROCEDURE — 3331090002 HH PPS REVENUE DEBIT

## 2022-05-27 PROCEDURE — 3331090001 HH PPS REVENUE CREDIT

## 2022-05-27 PROCEDURE — G0157 HHC PT ASSISTANT EA 15: HCPCS

## 2022-05-28 PROCEDURE — 3331090001 HH PPS REVENUE CREDIT

## 2022-05-28 PROCEDURE — 3331090002 HH PPS REVENUE DEBIT

## 2022-05-29 PROCEDURE — 3331090002 HH PPS REVENUE DEBIT

## 2022-05-29 PROCEDURE — 3331090001 HH PPS REVENUE CREDIT

## 2022-05-30 PROCEDURE — 3331090001 HH PPS REVENUE CREDIT

## 2022-05-30 PROCEDURE — 3331090002 HH PPS REVENUE DEBIT

## 2022-05-31 PROCEDURE — 3331090001 HH PPS REVENUE CREDIT

## 2022-05-31 PROCEDURE — 3331090002 HH PPS REVENUE DEBIT

## 2022-05-31 RX ORDER — GABAPENTIN 300 MG/1
300 CAPSULE ORAL 3 TIMES DAILY
Qty: 270 CAPSULE | Refills: 0 | Status: SHIPPED | OUTPATIENT
Start: 2022-05-31 | End: 2022-07-20

## 2022-05-31 RX ORDER — HYDROCODONE BITARTRATE AND ACETAMINOPHEN 10; 325 MG/1; MG/1
1 TABLET ORAL EVERY 6 HOURS PRN
Qty: 120 TABLET | Refills: 0 | OUTPATIENT
Start: 2022-05-31 | End: 2022-06-30

## 2022-06-01 ENCOUNTER — HOME CARE VISIT (OUTPATIENT)
Dept: SCHEDULING | Facility: HOME HEALTH | Age: 72
End: 2022-06-01
Payer: MEDICARE

## 2022-06-01 VITALS
TEMPERATURE: 98.5 F | OXYGEN SATURATION: 97 % | DIASTOLIC BLOOD PRESSURE: 70 MMHG | HEART RATE: 88 BPM | SYSTOLIC BLOOD PRESSURE: 116 MMHG | RESPIRATION RATE: 17 BRPM

## 2022-06-01 PROCEDURE — 3331090002 HH PPS REVENUE DEBIT

## 2022-06-01 PROCEDURE — 3331090001 HH PPS REVENUE CREDIT

## 2022-06-01 PROCEDURE — G0157 HHC PT ASSISTANT EA 15: HCPCS

## 2022-06-02 PROCEDURE — 3331090002 HH PPS REVENUE DEBIT

## 2022-06-02 PROCEDURE — 3331090001 HH PPS REVENUE CREDIT

## 2022-06-03 ENCOUNTER — HOME CARE VISIT (OUTPATIENT)
Dept: SCHEDULING | Facility: HOME HEALTH | Age: 72
End: 2022-06-03
Payer: MEDICARE

## 2022-06-03 VITALS
OXYGEN SATURATION: 93 % | SYSTOLIC BLOOD PRESSURE: 120 MMHG | TEMPERATURE: 97.4 F | DIASTOLIC BLOOD PRESSURE: 68 MMHG | RESPIRATION RATE: 17 BRPM | HEART RATE: 92 BPM

## 2022-06-03 PROCEDURE — 3331090002 HH PPS REVENUE DEBIT

## 2022-06-03 PROCEDURE — 3331090001 HH PPS REVENUE CREDIT

## 2022-06-03 PROCEDURE — G0157 HHC PT ASSISTANT EA 15: HCPCS

## 2022-06-04 PROCEDURE — 3331090001 HH PPS REVENUE CREDIT

## 2022-06-04 PROCEDURE — 3331090002 HH PPS REVENUE DEBIT

## 2022-06-05 PROCEDURE — 3331090002 HH PPS REVENUE DEBIT

## 2022-06-05 PROCEDURE — 3331090001 HH PPS REVENUE CREDIT

## 2022-06-06 PROCEDURE — 3331090002 HH PPS REVENUE DEBIT

## 2022-06-06 PROCEDURE — 3331090001 HH PPS REVENUE CREDIT

## 2022-06-07 ENCOUNTER — HOME CARE VISIT (OUTPATIENT)
Dept: SCHEDULING | Facility: HOME HEALTH | Age: 72
End: 2022-06-07
Payer: MEDICARE

## 2022-06-07 VITALS
SYSTOLIC BLOOD PRESSURE: 120 MMHG | RESPIRATION RATE: 17 BRPM | OXYGEN SATURATION: 98 % | TEMPERATURE: 98.1 F | HEART RATE: 81 BPM | DIASTOLIC BLOOD PRESSURE: 80 MMHG

## 2022-06-07 PROCEDURE — G0157 HHC PT ASSISTANT EA 15: HCPCS

## 2022-06-07 PROCEDURE — 3331090001 HH PPS REVENUE CREDIT

## 2022-06-07 PROCEDURE — 3331090002 HH PPS REVENUE DEBIT

## 2022-06-08 PROCEDURE — 3331090001 HH PPS REVENUE CREDIT

## 2022-06-08 PROCEDURE — 3331090002 HH PPS REVENUE DEBIT

## 2022-06-09 PROCEDURE — 3331090001 HH PPS REVENUE CREDIT

## 2022-06-09 PROCEDURE — 3331090002 HH PPS REVENUE DEBIT

## 2022-06-10 ENCOUNTER — HOME CARE VISIT (OUTPATIENT)
Dept: SCHEDULING | Facility: HOME HEALTH | Age: 72
End: 2022-06-10
Payer: MEDICARE

## 2022-06-10 VITALS
DIASTOLIC BLOOD PRESSURE: 92 MMHG | OXYGEN SATURATION: 98 % | HEART RATE: 92 BPM | TEMPERATURE: 98.2 F | SYSTOLIC BLOOD PRESSURE: 138 MMHG | RESPIRATION RATE: 18 BRPM

## 2022-06-10 PROCEDURE — G0151 HHCP-SERV OF PT,EA 15 MIN: HCPCS

## 2022-06-10 PROCEDURE — 3331090001 HH PPS REVENUE CREDIT

## 2022-06-10 PROCEDURE — 3331090002 HH PPS REVENUE DEBIT

## 2022-06-16 DIAGNOSIS — G89.4 CHRONIC PAIN SYNDROME: Primary | ICD-10-CM

## 2022-06-16 DIAGNOSIS — Z79.891 ENCOUNTER FOR LONG-TERM USE OF OPIATE ANALGESIC: Primary | ICD-10-CM

## 2022-06-16 RX ORDER — HYDROCODONE BITARTRATE AND ACETAMINOPHEN 10; 325 MG/1; MG/1
1 TABLET ORAL EVERY 6 HOURS
Qty: 120 TABLET | Refills: 0 | Status: SHIPPED | OUTPATIENT
Start: 2022-06-16 | End: 2022-07-16

## 2022-06-16 RX ORDER — LOSARTAN POTASSIUM 100 MG/1
100 TABLET ORAL DAILY
Qty: 30 TABLET | Refills: 1 | Status: SHIPPED | OUTPATIENT
Start: 2022-06-16 | End: 2022-06-20

## 2022-06-16 RX ORDER — LOSARTAN POTASSIUM 100 MG/1
TABLET ORAL
Qty: 90 TABLET | Refills: 3 | OUTPATIENT
Start: 2022-06-16

## 2022-06-16 RX ORDER — ALPRAZOLAM 1 MG/1
TABLET ORAL
COMMUNITY
Start: 2022-03-30 | End: 2022-10-31 | Stop reason: SDUPTHER

## 2022-06-16 RX ORDER — CLOPIDOGREL BISULFATE 75 MG/1
TABLET ORAL
COMMUNITY
Start: 2022-06-14 | End: 2022-06-17 | Stop reason: SDUPTHER

## 2022-06-16 RX ORDER — LEVOTHYROXINE SODIUM 112 UG/1
112 TABLET ORAL DAILY
Qty: 30 TABLET | Refills: 1 | Status: SHIPPED | OUTPATIENT
Start: 2022-06-16 | End: 2022-06-20

## 2022-06-16 RX ORDER — LEVOTHYROXINE SODIUM 112 MCG
TABLET ORAL
Qty: 90 TABLET | Refills: 3 | OUTPATIENT
Start: 2022-06-16

## 2022-06-16 RX ORDER — MAGNESIUM OXIDE TAB 400 MG (241.3 MG ELEMENTAL MG) 400 (241.3 MG) MG
TAB ORAL
COMMUNITY
Start: 2022-04-29

## 2022-06-16 RX ORDER — TRAZODONE HYDROCHLORIDE 50 MG/1
TABLET ORAL
COMMUNITY
Start: 2022-04-29

## 2022-06-16 RX ORDER — ASPIRIN 81 MG/1
1 TABLET, CHEWABLE ORAL NIGHTLY
COMMUNITY
Start: 2022-04-29

## 2022-06-16 RX ORDER — ONDANSETRON 4 MG/1
TABLET, ORALLY DISINTEGRATING ORAL
COMMUNITY
Start: 2022-04-07

## 2022-06-16 RX ORDER — ATORVASTATIN CALCIUM 20 MG/1
TABLET, FILM COATED ORAL
COMMUNITY
Start: 2022-04-29 | End: 2022-10-31 | Stop reason: SDUPTHER

## 2022-06-16 RX ORDER — HYDROCODONE BITARTRATE AND ACETAMINOPHEN 10; 325 MG/1; MG/1
1 TABLET ORAL EVERY 6 HOURS
COMMUNITY
Start: 2022-03-26 | End: 2022-06-16 | Stop reason: SDUPTHER

## 2022-06-17 RX ORDER — CLOPIDOGREL BISULFATE 75 MG/1
75 TABLET ORAL DAILY
Qty: 90 TABLET | Refills: 4 | Status: SHIPPED | OUTPATIENT
Start: 2022-06-17 | End: 2022-07-20 | Stop reason: ALTCHOICE

## 2022-06-20 ENCOUNTER — HOME HEALTH ADMISSION (OUTPATIENT)
Dept: HOME HEALTH SERVICES | Facility: HOME HEALTH | Age: 72
End: 2022-06-20
Payer: MEDICARE

## 2022-06-20 RX ORDER — LOSARTAN POTASSIUM 100 MG/1
100 TABLET ORAL DAILY
Qty: 90 TABLET | Refills: 0 | Status: SHIPPED | OUTPATIENT
Start: 2022-06-20 | End: 2022-07-20 | Stop reason: ALTCHOICE

## 2022-06-20 RX ORDER — NEBIVOLOL 10 MG/1
10 TABLET ORAL DAILY
Qty: 30 TABLET | Refills: 0 | Status: SHIPPED | OUTPATIENT
Start: 2022-06-20 | End: 2022-10-31 | Stop reason: SDUPTHER

## 2022-06-20 RX ORDER — LEVOTHYROXINE SODIUM 112 UG/1
112 TABLET ORAL DAILY
Qty: 90 TABLET | Refills: 0 | Status: SHIPPED | OUTPATIENT
Start: 2022-06-20 | End: 2022-10-31 | Stop reason: SDUPTHER

## 2022-06-20 RX ORDER — NEBIVOLOL 10 MG/1
10 TABLET ORAL DAILY
Qty: 90 TABLET | Refills: 3 | Status: SHIPPED | OUTPATIENT
Start: 2022-06-20 | End: 2022-06-27

## 2022-06-22 ENCOUNTER — HOME CARE VISIT (OUTPATIENT)
Dept: SCHEDULING | Facility: HOME HEALTH | Age: 72
End: 2022-06-22
Payer: MEDICARE

## 2022-06-22 VITALS
TEMPERATURE: 98.4 F | DIASTOLIC BLOOD PRESSURE: 82 MMHG | SYSTOLIC BLOOD PRESSURE: 138 MMHG | OXYGEN SATURATION: 98 % | HEART RATE: 90 BPM | RESPIRATION RATE: 18 BRPM

## 2022-06-22 PROCEDURE — 0221000100 HH NO PAY CLAIM PROCEDURE

## 2022-06-22 PROCEDURE — 400013 HH SOC

## 2022-06-22 PROCEDURE — 1090000002 HH PPS REVENUE DEBIT

## 2022-06-22 PROCEDURE — G0151 HHCP-SERV OF PT,EA 15 MIN: HCPCS

## 2022-06-22 PROCEDURE — 1090000001 HH PPS REVENUE CREDIT

## 2022-06-22 ASSESSMENT — ENCOUNTER SYMPTOMS: PAIN LOCATION - PAIN QUALITY: SORE

## 2022-06-23 PROCEDURE — 1090000002 HH PPS REVENUE DEBIT

## 2022-06-23 PROCEDURE — 1090000001 HH PPS REVENUE CREDIT

## 2022-06-24 PROCEDURE — 1090000001 HH PPS REVENUE CREDIT

## 2022-06-24 PROCEDURE — 1090000002 HH PPS REVENUE DEBIT

## 2022-06-25 PROCEDURE — 1090000002 HH PPS REVENUE DEBIT

## 2022-06-25 PROCEDURE — 1090000001 HH PPS REVENUE CREDIT

## 2022-06-26 PROCEDURE — 1090000002 HH PPS REVENUE DEBIT

## 2022-06-26 PROCEDURE — 1090000001 HH PPS REVENUE CREDIT

## 2022-06-27 ENCOUNTER — OFFICE VISIT (OUTPATIENT)
Dept: FAMILY MEDICINE CLINIC | Facility: CLINIC | Age: 72
End: 2022-06-27
Payer: MEDICARE

## 2022-06-27 ENCOUNTER — HOME CARE VISIT (OUTPATIENT)
Dept: SCHEDULING | Facility: HOME HEALTH | Age: 72
End: 2022-06-27
Payer: MEDICARE

## 2022-06-27 ENCOUNTER — HOSPITAL ENCOUNTER (OUTPATIENT)
Dept: LAB | Age: 72
Discharge: HOME | End: 2022-06-30
Payer: MEDICARE

## 2022-06-27 ENCOUNTER — HOSPITAL ENCOUNTER (OUTPATIENT)
Dept: GENERAL RADIOLOGY | Age: 72
Discharge: HOME | End: 2022-06-30

## 2022-06-27 VITALS
TEMPERATURE: 98.1 F | OXYGEN SATURATION: 97 % | RESPIRATION RATE: 17 BRPM | HEART RATE: 88 BPM | DIASTOLIC BLOOD PRESSURE: 80 MMHG | SYSTOLIC BLOOD PRESSURE: 140 MMHG

## 2022-06-27 VITALS — HEIGHT: 66 IN | SYSTOLIC BLOOD PRESSURE: 130 MMHG | DIASTOLIC BLOOD PRESSURE: 80 MMHG | BODY MASS INDEX: 25.99 KG/M2

## 2022-06-27 DIAGNOSIS — L81.9 PIGMENTED SKIN LESION SUSPICIOUS FOR MALIGNANT NEOPLASM: ICD-10-CM

## 2022-06-27 DIAGNOSIS — Z48.02 REMOVAL OF STAPLE: ICD-10-CM

## 2022-06-27 DIAGNOSIS — M25.511 ACUTE PAIN OF RIGHT SHOULDER: Primary | ICD-10-CM

## 2022-06-27 DIAGNOSIS — M25.511 ACUTE PAIN OF RIGHT SHOULDER: ICD-10-CM

## 2022-06-27 DIAGNOSIS — Z91.81 HISTORY OF FALL: ICD-10-CM

## 2022-06-27 PROBLEM — N18.30 CHRONIC RENAL DISEASE, STAGE III (HCC): Status: ACTIVE | Noted: 2022-06-27

## 2022-06-27 PROCEDURE — 1090000001 HH PPS REVENUE CREDIT

## 2022-06-27 PROCEDURE — 1090000002 HH PPS REVENUE DEBIT

## 2022-06-27 PROCEDURE — 99215 OFFICE O/P EST HI 40 MIN: CPT | Performed by: FAMILY MEDICINE

## 2022-06-27 PROCEDURE — G8428 CUR MEDS NOT DOCUMENT: HCPCS | Performed by: FAMILY MEDICINE

## 2022-06-27 PROCEDURE — 1123F ACP DISCUSS/DSCN MKR DOCD: CPT | Performed by: FAMILY MEDICINE

## 2022-06-27 PROCEDURE — 3017F COLORECTAL CA SCREEN DOC REV: CPT | Performed by: FAMILY MEDICINE

## 2022-06-27 PROCEDURE — 1036F TOBACCO NON-USER: CPT | Performed by: FAMILY MEDICINE

## 2022-06-27 PROCEDURE — 1090F PRES/ABSN URINE INCON ASSESS: CPT | Performed by: FAMILY MEDICINE

## 2022-06-27 PROCEDURE — G0157 HHC PT ASSISTANT EA 15: HCPCS

## 2022-06-27 PROCEDURE — 88305 TISSUE EXAM BY PATHOLOGIST: CPT

## 2022-06-27 PROCEDURE — G8417 CALC BMI ABV UP PARAM F/U: HCPCS | Performed by: FAMILY MEDICINE

## 2022-06-27 PROCEDURE — G8399 PT W/DXA RESULTS DOCUMENT: HCPCS | Performed by: FAMILY MEDICINE

## 2022-06-27 RX ORDER — DICLOFENAC SODIUM 75 MG/1
75 TABLET, DELAYED RELEASE ORAL 2 TIMES DAILY PRN
Qty: 60 TABLET | Refills: 0 | Status: SHIPPED | OUTPATIENT
Start: 2022-06-27

## 2022-06-27 ASSESSMENT — ENCOUNTER SYMPTOMS
SINUS PAIN: 0
DIARRHEA: 0
SHORTNESS OF BREATH: 0
COUGH: 0
ABDOMINAL PAIN: 0
PAIN LOCATION - PAIN QUALITY: ACHING
RHINORRHEA: 0

## 2022-06-27 NOTE — PROGRESS NOTES
PROGRESS NOTE    SUBJECTIVE:   Nieves Cueto is a 66 y.o. female seen for a follow up visit regarding the following chief complaint:     Chief Complaint   Patient presents with    Follow-Up from Hospital     hospitalized after a new fall 8 days ago, no Fx, CT scan of brain OK    Lesion(s)     R lower leg purple lesion, denies itchiness           HPI presents office after being seen at the ER after a fall patient states that her shoulder is bothering her and she thinks it is dislocated also has a lesion on her right lower leg that is changing colors and wants to have it removed doctor in the ER thought it might been skin cancer also needs 6 staples removed from the back of her head      Past Medical History, Past Surgical History, Family history, Social History, and Medications were all reviewed with the patient today and updated as necessary. Current Outpatient Medications   Medication Sig Dispense Refill    diclofenac (VOLTAREN) 75 MG EC tablet Take 1 tablet by mouth 2 times daily as needed for Pain 60 tablet 0    Vitamin D, Cholecalciferol, 10 MCG (400 UNIT) CHEW Take 1 tablet by mouth daily.  venlafaxine (EFFEXOR XR) 150 MG extended release capsule Take 150 mg by mouth 2 times daily at 0800 and 1400.       losartan (COZAAR) 100 MG tablet TAKE 1 TABLET BY MOUTH DAILY 90 tablet 0    levothyroxine (SYNTHROID) 112 MCG tablet TAKE 1 TABLET BY MOUTH DAILY 90 tablet 0    nebivolol (BYSTOLIC) 10 MG tablet Take 1 tablet by mouth daily 30 tablet 0    clopidogrel (PLAVIX) 75 MG tablet Take 1 tablet by mouth daily 90 tablet 4    ALPRAZolam (XANAX) 1 MG tablet TAKE 1 TABLET BY MOUTH EVERY DAY      aspirin 81 MG chewable tablet Take 1 tablet by mouth at bedtime      atorvastatin (LIPITOR) 20 MG tablet TAKE 2 TABLETS BY MOUTH EVERY NIGHT AT BEDTIME      ondansetron (ZOFRAN-ODT) 4 MG disintegrating tablet DISSOLVE 1 TABLET ON THE TONGUE EVERY 4 HOURS AS NEEDED FOR NAUSEA OR VOMITING      MAGNESIUM-OXIDE 400 (240 Mg) MG tablet TAKE 1 TABLET BY MOUTH TWICE DAILY      traZODone (DESYREL) 50 MG tablet TAKE 1 TABLET BY MOUTH EVERY NIGHT AT BEDTIME      HYDROcodone-acetaminophen (NORCO)  MG per tablet Take 1 tablet by mouth every 6 hours for 30 days. 120 tablet 0    gabapentin (NEURONTIN) 300 MG capsule Take 1 capsule by mouth 3 times daily for 30 days. 270 capsule 0     No current facility-administered medications for this visit.      No Known Allergies  Patient Active Problem List   Diagnosis    History of CVA (cerebrovascular accident)    Arthritis    Rectal bleeding    Recurrent depression ([address]    Depression    Thrush    Acute postoperative anemia due to greater than expected blood loss    Dizziness    Chest pain    [facility]    Chronic pain syndrome    HTN (hypertension)    Ataxia    Shortness of breath    Closed displaced fracture of right femoral neck ([facility])    S/P total knee arthroplasty    Opioid use, unspecified with unspecified opioid-induced disorder ([address]    External hemorrhoid, bleeding    S/P total knee replacement    Abnormal findings on diagnostic imaging of other specified body structures    Personal history of colonic polyps    IBS (irritable bowel syndrome)    Hypokalemia    Peripheral neuropathy    Anxiety    Hypothyroid    Asthma    Migraines    Peripheral vascular disease ([address]    Falls    Polycythemia    Elevated bilirubin    Allergic rhinitis    Hyperglycemia    Constipation    Hematuria    Arthritis of knee, left    Hypercholesterolemia    OAB (overactive bladder)    Vaginal atrophy    Heart murmur    GERD (gastroesophageal reflux disease)    Arthritis of right hip    Chronic renal disease, stage III ([facility]) [082615]     Past Medical History:   Diagnosis Date    Allergic rhinitis     Aneurysm ([facility]     per CTA of neck and head (3/5/21)=small 3 mm saccular aneurysm in the right ICA near the skull base in chest wall    TONSILLECTOMY AND ADENOIDECTOMY      TOTAL COLECTOMY      for obstruction - about 14 inches removed    TOTAL KNEE ARTHROPLASTY Left 2016    TOTAL KNEE ARTHROPLASTY Right 3/2015    UROLOGICAL SURGERY      CYSTOSCOPY WITH BLADDER BIOPSIES      Family History   Problem Relation Age of Onset    Other Mother     Delayed Awakening Mother     Thyroid Disease Mother     Diabetes Mother    Everlenswapnil AdlerPaolo Cancer Mother         breast cancer    Breast Problems Mother    Everkelly Munozl Breast Cancer Mother     Heart Disease Father     Hypertension Father     Heart Failure Father         CHF    Delayed Awakening Sister     Thyroid Disease Sister     Breast Problems Sister     Breast Cancer Sister     Cancer Other         breast    Diabetes Other         type II    High Cholesterol Other     Elevated Lipids Other     Hypertension Other     Thyroid Disease Other         hypo, acquired    Breast Problems Maternal Aunt     Breast Cancer Maternal Aunt     Breast Cancer Paternal Aunt      Social History     Tobacco Use    Smoking status: Former Smoker     Packs/day: 0.25     Start date: 1990     Quit date: 2002     Years since quittin.8    Smokeless tobacco: Never Used   Substance Use Topics    Alcohol use: No         Review of Systems   Constitutional: Negative for chills, fatigue and fever. HENT: Negative for congestion, rhinorrhea and sinus pain. Staples in the back of her head   Eyes: Negative for visual disturbance. Respiratory: Negative for cough and shortness of breath. Cardiovascular: Negative for chest pain. Gastrointestinal: Negative for abdominal pain and diarrhea. Genitourinary: Negative for dysuria. Musculoskeletal: Negative for arthralgias and myalgias. Skin:        Suspicious skin lesion   Neurological: Negative for dizziness, weakness and headaches. Psychiatric/Behavioral: Negative.           OBJECTIVE:  /80 (Site: Left Upper Arm, Position: Sitting, Cuff Size: Small Adult)   Ht 5' 6\" (1.676 m)   BMI 25.99 kg/m²      Physical Exam  Nursing note reviewed. Constitutional:       General: She is not in acute distress. Appearance: Normal appearance. HENT:      Head: Normocephalic and atraumatic. Cardiovascular:      Rate and Rhythm: Normal rate and regular rhythm. Heart sounds: Normal heart sounds. Pulmonary:      Effort: Pulmonary effort is normal.      Breath sounds: Normal breath sounds. Musculoskeletal:      Right shoulder: Tenderness present. Decreased range of motion. Left shoulder: Normal.   Skin:     General: Skin is warm and dry. Findings: Lesion present. Comments: Staples removed without complication area in question in her right leg was prepped in usual fashion 3 cm punch biopsy was done we did 2 sutures of 3-0 silk patient will return back for pathology report and suture removal in 1 week   Neurological:      General: No focal deficit present. Mental Status: She is alert and oriented to person, place, and time. Psychiatric:         Mood and Affect: Mood normal.         Behavior: Behavior normal.         Thought Content: Thought content normal.         Judgment: Judgment normal.          Medical problems and test results were reviewed with the patient today. No results found for this or any previous visit (from the past 672 hour(s)).     ASSESSMENT and PLAN    Visit Diagnoses and Associated Orders     Acute pain of right shoulder    -  Primary    XR SHOULDER RIGHT (MIN 2 VIEWS) [[id number] Custom]   - Future Order    diclofenac (VOLTAREN) 75 MG EC tablet [[id number]]           Pigmented skin lesion suspicious for malignant neoplasm        [facility] Surgical Pathology [[id number] Custom]   - Future Order         History of fall             Removal of staple                  Pending x-rays we will call her back with results and plan recommended she uses her sling continue on anti-inflammatories ice to the area area in question

## 2022-06-28 PROCEDURE — 1090000001 HH PPS REVENUE CREDIT

## 2022-06-28 PROCEDURE — 1090000002 HH PPS REVENUE DEBIT

## 2022-06-29 PROCEDURE — 1090000002 HH PPS REVENUE DEBIT

## 2022-06-29 PROCEDURE — 1090000001 HH PPS REVENUE CREDIT

## 2022-06-30 ENCOUNTER — HOME CARE VISIT (OUTPATIENT)
Dept: SCHEDULING | Facility: HOME HEALTH | Age: 72
End: 2022-06-30
Payer: MEDICARE

## 2022-06-30 ENCOUNTER — TELEPHONE (OUTPATIENT)
Dept: ORTHOPEDIC SURGERY | Age: 72
End: 2022-06-30

## 2022-06-30 PROCEDURE — G0152 HHCP-SERV OF OT,EA 15 MIN: HCPCS

## 2022-06-30 PROCEDURE — 1090000001 HH PPS REVENUE CREDIT

## 2022-06-30 PROCEDURE — G0157 HHC PT ASSISTANT EA 15: HCPCS

## 2022-06-30 PROCEDURE — 1090000002 HH PPS REVENUE DEBIT

## 2022-06-30 ASSESSMENT — ENCOUNTER SYMPTOMS: PAIN LOCATION - PAIN QUALITY: ACHING

## 2022-06-30 NOTE — TELEPHONE ENCOUNTER
He did his eval and her right arm is in extreme pain when she moves it. He has no information about her injury. Does he want therapy to range it or are there precautions? Please let him know.

## 2022-07-01 PROCEDURE — 1090000002 HH PPS REVENUE DEBIT

## 2022-07-01 PROCEDURE — 1090000001 HH PPS REVENUE CREDIT

## 2022-07-02 PROCEDURE — 1090000002 HH PPS REVENUE DEBIT

## 2022-07-02 PROCEDURE — 1090000001 HH PPS REVENUE CREDIT

## 2022-07-03 PROCEDURE — 1090000001 HH PPS REVENUE CREDIT

## 2022-07-03 PROCEDURE — 1090000002 HH PPS REVENUE DEBIT

## 2022-07-04 PROCEDURE — 1090000002 HH PPS REVENUE DEBIT

## 2022-07-04 PROCEDURE — 1090000001 HH PPS REVENUE CREDIT

## 2022-07-05 ENCOUNTER — OFFICE VISIT (OUTPATIENT)
Dept: ORTHOPEDIC SURGERY | Age: 72
End: 2022-07-05
Payer: MEDICARE

## 2022-07-05 VITALS — WEIGHT: 159 LBS | HEIGHT: 66 IN | BODY MASS INDEX: 25.55 KG/M2

## 2022-07-05 DIAGNOSIS — M21.371 FOOT DROP, RIGHT FOOT: ICD-10-CM

## 2022-07-05 DIAGNOSIS — G62.9 SENSORY MOTOR NEUROPATHY: ICD-10-CM

## 2022-07-05 DIAGNOSIS — M25.511 RIGHT SHOULDER PAIN, UNSPECIFIED CHRONICITY: Primary | ICD-10-CM

## 2022-07-05 DIAGNOSIS — M75.41 IMPINGEMENT SYNDROME OF RIGHT SHOULDER: ICD-10-CM

## 2022-07-05 DIAGNOSIS — M12.811 ROTATOR CUFF ARTHROPATHY OF RIGHT SHOULDER: ICD-10-CM

## 2022-07-05 PROCEDURE — 1123F ACP DISCUSS/DSCN MKR DOCD: CPT | Performed by: ORTHOPAEDIC SURGERY

## 2022-07-05 PROCEDURE — 1090000001 HH PPS REVENUE CREDIT

## 2022-07-05 PROCEDURE — G8427 DOCREV CUR MEDS BY ELIG CLIN: HCPCS | Performed by: ORTHOPAEDIC SURGERY

## 2022-07-05 PROCEDURE — L4360 PNEUMAT WALKING BOOT PRE CST: HCPCS | Performed by: ORTHOPAEDIC SURGERY

## 2022-07-05 PROCEDURE — A9999 DME SUPPLY OR ACCESSORY, NOS: HCPCS | Performed by: ORTHOPAEDIC SURGERY

## 2022-07-05 PROCEDURE — G8399 PT W/DXA RESULTS DOCUMENT: HCPCS | Performed by: ORTHOPAEDIC SURGERY

## 2022-07-05 PROCEDURE — 20610 DRAIN/INJ JOINT/BURSA W/O US: CPT | Performed by: ORTHOPAEDIC SURGERY

## 2022-07-05 PROCEDURE — 1036F TOBACCO NON-USER: CPT | Performed by: ORTHOPAEDIC SURGERY

## 2022-07-05 PROCEDURE — 1090F PRES/ABSN URINE INCON ASSESS: CPT | Performed by: ORTHOPAEDIC SURGERY

## 2022-07-05 PROCEDURE — 3017F COLORECTAL CA SCREEN DOC REV: CPT | Performed by: ORTHOPAEDIC SURGERY

## 2022-07-05 PROCEDURE — G8417 CALC BMI ABV UP PARAM F/U: HCPCS | Performed by: ORTHOPAEDIC SURGERY

## 2022-07-05 PROCEDURE — 1090000002 HH PPS REVENUE DEBIT

## 2022-07-05 PROCEDURE — L4396 STATIC OR DYNAMI AFO PRE CST: HCPCS | Performed by: ORTHOPAEDIC SURGERY

## 2022-07-05 PROCEDURE — 99214 OFFICE O/P EST MOD 30 MIN: CPT | Performed by: ORTHOPAEDIC SURGERY

## 2022-07-05 RX ORDER — METHYLPREDNISOLONE ACETATE 40 MG/ML
40 INJECTION, SUSPENSION INTRA-ARTICULAR; INTRALESIONAL; INTRAMUSCULAR; SOFT TISSUE ONCE
Status: COMPLETED | OUTPATIENT
Start: 2022-07-05 | End: 2022-07-05

## 2022-07-05 RX ADMIN — METHYLPREDNISOLONE ACETATE 120 MG: 40 INJECTION, SUSPENSION INTRA-ARTICULAR; INTRALESIONAL; INTRAMUSCULAR; SOFT TISSUE at 16:08

## 2022-07-05 NOTE — PROGRESS NOTES
Name: Meagan Ramirez  YOB: 1950  Gender: female  MRN: [id number]    CC: Right shoulder pain: Right ankle weakness    HPI:   08/25/2021: CT scan right shoulder without contrast impression:   No fracture. Osteoarthritis of the acromioclavicular and glenohumeral joint  02/01/2022: Diagnoses:  Right rotator cuff arthropathy, glenohumeral arthritis, shoulder impingement/frozen shoulder  02/01/2022: Right shoulder was injected  02/28/2022: Neurology consultation: Dr. Daksha Singh: He reflected 11/19/2020 CVA. His consultation conclusion reflected brain aneurysm and CVA treatment with Plavix and aspirin with the plan for [place] [facility] consultation  04/07/2022: Right ANDREY   05/04/2022: ANDREY follow-up with Dr. Tyrel Menon: His note reflected \" right ANDREY when she experienced a foot drop on the right side. She was placed in an AFO at the hospital.\"  06/19/2022: She reports falling injuring her shoulder. She was admitted to the hospital with work-up regarding her hip, head and right tibia and fibula. She was treated for MAI that resolved with IV fluids. She was sent home with home health    06/27/2022: Dr. Demian Taylor office visit reflects her shoulder: XR revealed OA: no fracture    07/05/2022: She presents today for 2 reasons. 1.  The main reason for the visit: Right shoulder pain. Prior shoulder injection 02/01/2022 with good improvement  2. She did not make the appointment for her right ankle/foot, but she continues to fall. She cannot wear the brace that she obtained from the hospital.  She reports her therapist does not understand her limitations. ROS/Meds/PSH/PMH/FH/SH: reviewed today    Tobacco:  reports that she quit smoking about 19 years ago. She started smoking about 32 years ago. She smoked 0.25 packs per day. She has never used smokeless tobacco.     Physical Examination:  Patient appears to be alert and oriented with acceptable appearance.   No obvious distress or SOB  CV: appears to have acceptable vascular color and capillary refill  Neuro: Diminished sensation in her feet   Skin: No soft tissue swelling  MS: No standing exam but she is able to barely attain plantigrade: Gait dropfoot  Right = 3-/5 anterior compartment: 2/5 lateral compartment: 5/5 posterior compartment  Right = shoulder impingement signs; hurts mainly with forward flexion and abduction    XR: Right side: AP axillary and Grashey views with no significant change from her x-rays 06/27/2022 glenohumeral arthritis    XR Impression:  As above      Reviewed Test/Records/Documents: All notes as reflected above    Injection: We discussed the risks and complications of the procedure and the decision was made to proceed; after sterile prep, the right shoulder joint was injected with 3 cc Xylocaine: 120 mg Depo-Medrol; she appeared to do well     Assessment:    Right acute on chronic shoulder pain, impingement, glenohumeral arthritis  Right LE motor and sensory neuropathy  Chronic bilateral peripheral sensory neuropathy    Plan:   The patient and I discussed the above assessment. We explored treatment options. Dr. Freeman Argueta reflects dropfoot after right ANDREY.   She was tried in a brace from the hospital but the patient reports that it does not fit  Unfortunately, she is developing a cavovarus type contracture   She understands the importance of keeping her ankle in neutral    Neurology consultation: Dr. Burnett Means: Please assist with neurologic studies if needed for her new right motor neuropathy    I will see her back after she has obtained the new custom brace  In the interim, she needs bracing to prevent any progressive contracture    Regarding her shoulder: She needs to see Dr. Qi Man in consultation for TSA consult  Advanced medical imaging: No indication today for shoulder MRI scan or ankle or foot MRI scan    DME: Right night splint: Right 3D boot: Left even up  We discussed shoulder, foot and ankle care and protection  PT:

## 2022-07-05 NOTE — LETTER
DME Patient Authorization Form    Name: Iva Pagan  :   MRN: [id number]   Age: 66 y.o. Gender: female  Delivery Address: [facility]     Diagnosis:     ICD-10-CM    1. Right shoulder pain, unspecified chronicity  M25.511 XR SHOULDER RIGHT (MIN 2 VIEWS)     Ambulatory referral to Orthopedic Surgery     methylPREDNISolone acetate (DEPO-MEDROL) injection 40 mg     Walker Boot ()     Even UP ()     Night Splint Dorsal ()     Amb External Referral For Orthotics   2. Rotator cuff arthropathy of right shoulder  M12.811 Ambulatory referral to Orthopedic Surgery     methylPREDNISolone acetate (DEPO-MEDROL) injection 40 mg     Walker Boot ()     Even UP ()     Night Splint Dorsal ()     Amb External Referral For Orthotics   3. Impingement syndrome of right shoulder  M75.41 Ambulatory referral to Orthopedic Surgery     methylPREDNISolone acetate (DEPO-MEDROL) injection 40 mg     Walker Boot ()     Even UP ()     Night Splint Dorsal ()     Amb External Referral For Orthotics   4. Sensory motor neuropathy  G62.9 Ambulatory referral to Orthopedic Surgery     methylPREDNISolone acetate (DEPO-MEDROL) injection 40 mg     Walker Boot ()     Even UP ()     Night Splint Dorsal ()     Amb External Referral For Orthotics   5. Foot drop, right foot  M21.371 Ambulatory referral to Orthopedic Surgery     methylPREDNISolone acetate (DEPO-MEDROL) injection 40 mg     Walker Boot ()     Even UP ()     Night Splint Dorsal ()     Amb External Referral For Orthotics        Requested DME:  Even-Ups - ** ($30) X 1 - left  Night Splint Dorsal -  ($197.00) X 1 - right  Walker Boot -  ($290.00) X 1 - right        Clinical Notes:     **Indicates non-covered items by insurance. Payment expected on date of service. Electronically signed by  Provider: Amara Nunez.  Yuliana Yang MD  _______________________________ Date: 2022 [facility] Tax ID # [id number]        Durable Medical Equipment and/or Orthotics Patient Consent     I understand that my physician has prescribed this medical supply as part of my treatment plan as a matter of Medical Necessity.  I understand that I have a choice in where I receive my prescribed orthopedic supplies and/or services.  I authorize [facility] to furnish this service/product and to provide my insurance carrier with any information requested in order to process for payment.  I instruct my insurance carrier to pay [facility] directly for these services/products.  I understand that my insurance carrier may deny payment for this supply because it is a non-covered item, deemed not medically necessary or considered experimental.   I understand that any cost not covered by my insurance carrier will be solely my financial responsibility.  I have received the Supplier Standards and have reviewed them.  I have received the prescribed item and have been fully instructed on the proper use of the above services/products.    ______ (Patient Initials) I understand that all DME items are non-returnable after being dispensed. Items still in sealed packaging may be returned up to 14 days after purchasing. [address] will replace items that are defective.    ______ (Patient Initials) I understand that [facility] will not file a claim with my insurance carrier for this service/product and I am waiving my right to file a claim on my own for this service/product with my insurance company as this item is NON-COVERED (Denoted by the **) by my Insurance company/policy.     ______ (Patient Initials) I understand that I am responsible to bring my equipment to the hospital for any surgery. ______________________________________________  ________________________  Patient / Princess Letters            Thank you for considering [address] Your physician has prescribed specific medical equipment or devices for your home use. The following describes your rights and responsibilities as our customer. Right to Choose Providers: You have a choice regarding which company supplies your home medical equipment and devices, and to consult your physician in this decision. You may choose a medical supply store, a home medical equipment provider, or a specialist such as [facility]. [facility] will coordinate with your physician to provide the medical equipment or devices prescribed for your home use. Right to Service:  You have the right to considerate, respectful and nondiscriminatory care. You have the right to receive accurate and easily understood information about your health care. If you speak a foreign language, or don't understand the discussions, assistance will be provided to allow you to make informed health care decisions. You have the right to know your treatment options and to participate in decisions about your care, including the right to accept or refuse treatment. You have the right to expect a reasonable response to your requests for treatment or service. You have the right to talk in confidence with health care providers and to have your health care information protected. You have the right to receive an explanation of your bill. You have the right to complain about the service or product you receive. Patient Responsibilities:  Please provide complete and accurate information about your health insurance benefits and make arrangements for the timely payment of your bill.   [facility] will, if possible, assume responsibility for billing your insurance (Medicare, Medicaid and commercial) for the prescribed equipment or devices. If your policy does not cover the prescribed product, or only covers a portion of the bill, you are responsible for any remaining balance. Return and Exchange Policy:  [facility] will honor published  Warranties for products. [facility] will accept returns or exchanges within 14 days from the date of receipt, providin) the product must be in new condition; 2) receipt as required; and 3) used disposable and hygiene products may only be returned due to a defective product. Note: Refunds will be issued in a timely manner, please allow 4-6 weeks for processing. Complaint Procedures and DME Consumer Protection Resources:  [facility] values you as a customer, and is committed to resolving patient concerns. This commitment includes understanding and documenting your concerns, conducting a review of internal procedures, and providing you with an explanation and resolution to your concerns. Should you have any questions about our services or billing process, please contact our office at (practice phone number). If we are unable to resolve the concern, you have the right to direct comments to the office of Consumer Protection, in the [address] [place], [place] [address]vd. [address] or the [facility] office, without fear of repercussion. DMEPOS SUPPLIER STANDARDS    A supplier must be in compliance with all applicable Federal and [employer] and regulatory requirements. A supplier must provide complete and accurate information on the DMEPOS supplier application. Any changes to this information must be reported to the [facility] within 30 days. An authorized individual (one whose signature is binding) must sign the application for billing privileges. A supplier must fill orders from its own inventory, or must contract with other companies for the purchase of items necessary to fill the order.   A supplier may not contract with any entity that is currently excluded from the Medicare program, any [facility] program, or from any other Federal procurement or Nonprocurement programs. A supplier must advise beneficiaries that they may rent or purchase inexpensive or routinely purchased durable medical equipment, and of the purchase option for capped rental equipment. A supplier must notify beneficiaries of warranty coverage and honor all warranties under applicable State Law, and repair or replace free of charge Medicare covered items that are under warranty. A supplier must maintain a physical facility on an appropriate site. A supplier must permit CMS, or its agents to conduct on-site inspections to ascertain the supplier's compliance with these standards. The supplier location must be accessible to beneficiaries during reasonable business hours, and must maintain a visible sign and posted hours of operation. A supplier must maintain a primary business telephone listed under the name of the business in a [employer] or a toll free number available through directory assistance. The exclusive use of a beeper, answering machine or cell phone is prohibited. A supplier must have comprehensive liability insurance in the amount of at least $300,000 that covers both the supplier's place of business and all customers and employees of the supplier. If the supplier manufactures its own items, this insurance must also cover product liability and completed operations. A supplier must agree not to initiate telephone contact with beneficiaries, with a few exceptions allowed. This standard prohibits suppliers from calling beneficiaries in order to solicit new business. A supplier is responsible for delivery and must instruct beneficiaries on use of Medicare covered items, and maintain proof of delivery. A supplier must answer questions, and respond to complaints of the beneficiaries, and maintain documentation of such contacts.   A supplier must maintain must meet the DMEPOS quality standards and be separately accredited in order to bill Medicare. An accredited supplier may be denied enrollment or their enrollment may be revoked, if CMS determines that they are not in compliance with the DMEPOS quality standards. A DMEPOS supplier must disclose upon enrollment all products and services, including the addition of new product lines for which they are seeking accreditation. If a new product line is added after enrollment, the DMEPOS supplier will be responsible for notifying the accrediting body of the new product so that the DMEPOS supplier can be re-surveyed and accredited for these new products. Must meet the surety bond requirements specified in 42 C. F.R. 424.57(c). Implementation date- May 4, 2009. A supplier must obtain oxygen from a state-licensed oxygen supplier. A supplier must maintain ordering and referring documentation consistent with provisions found in 42 C. F.R. 424.516(f). DMEPOS suppliers are prohibited from sharing a practice location with certain other Medicare providers and suppliers. DMEPOS suppliers must remain open to the public for a minimum of 30 hours per week with certain exceptions.

## 2022-07-06 ENCOUNTER — HOME CARE VISIT (OUTPATIENT)
Dept: SCHEDULING | Facility: HOME HEALTH | Age: 72
End: 2022-07-06
Payer: MEDICARE

## 2022-07-06 VITALS
TEMPERATURE: 97.6 F | SYSTOLIC BLOOD PRESSURE: 138 MMHG | RESPIRATION RATE: 16 BRPM | HEART RATE: 81 BPM | OXYGEN SATURATION: 96 % | DIASTOLIC BLOOD PRESSURE: 82 MMHG

## 2022-07-06 VITALS
TEMPERATURE: 98 F | HEART RATE: 78 BPM | SYSTOLIC BLOOD PRESSURE: 120 MMHG | OXYGEN SATURATION: 97 % | RESPIRATION RATE: 17 BRPM | DIASTOLIC BLOOD PRESSURE: 80 MMHG

## 2022-07-06 PROCEDURE — G0157 HHC PT ASSISTANT EA 15: HCPCS

## 2022-07-06 PROCEDURE — 1090000001 HH PPS REVENUE CREDIT

## 2022-07-06 PROCEDURE — 1090000002 HH PPS REVENUE DEBIT

## 2022-07-06 ASSESSMENT — ENCOUNTER SYMPTOMS: PAIN LOCATION - PAIN QUALITY: ACHING

## 2022-07-06 NOTE — PROGRESS NOTES
Patient was fitted and instructed on an Even Up for the left foot. Patient was prescribed a Night splint for the patient's right foot. The patient wears a size NA shoe and I fitted the patient with a S size night splint. Additionally, I instructed the patient on proper use and care of device as well as ensuring patient could safely get device on and off. I explained to the patient that wearing the splint, the foot is held in a certain position, called dorsiflexion. This means that the fascia is stretched, not allowing it to contract and become tighter overnight. The great thing about a night splint is that the remedy is quite gentle and the fascia will be returned to its proper length over a period of time. Once stretched out, the plantar fascia will become less tense and therefore will cause less pain. The patient was prescribed a walker boot for the patient's right foot. The patient wears a size NA shoe and I fitted them with a M size boot. The patient was fitted and instructed on the use of prescribed walker boot. I explained how to fit themselves and that the plastic flexible piece should always be on the front of the boot and secured by the Velcro straps on top. The air bladder in the boot was adjusted according to proper fit and comfort. The patient walked a short distance and acknowledged satisfaction with current fit. I also explained that they need a heel lift or a higher heeled shoe for the uninvolved LE to help normalize gait and avoid excessive low back stress/strain due to leg length inequality created from walker boot. Patient read and signed documenting they understand and agree to [facility]'s current DME return policy.

## 2022-07-07 PROCEDURE — 1090000002 HH PPS REVENUE DEBIT

## 2022-07-07 PROCEDURE — 1090000001 HH PPS REVENUE CREDIT

## 2022-07-08 ENCOUNTER — HOME CARE VISIT (OUTPATIENT)
Dept: SCHEDULING | Facility: HOME HEALTH | Age: 72
End: 2022-07-08
Payer: MEDICARE

## 2022-07-08 VITALS
SYSTOLIC BLOOD PRESSURE: 138 MMHG | TEMPERATURE: 98.1 F | OXYGEN SATURATION: 97 % | DIASTOLIC BLOOD PRESSURE: 80 MMHG | HEART RATE: 78 BPM | RESPIRATION RATE: 16 BRPM

## 2022-07-08 PROCEDURE — 1090000002 HH PPS REVENUE DEBIT

## 2022-07-08 PROCEDURE — G0157 HHC PT ASSISTANT EA 15: HCPCS

## 2022-07-08 PROCEDURE — 1090000001 HH PPS REVENUE CREDIT

## 2022-07-08 ASSESSMENT — ENCOUNTER SYMPTOMS: PAIN LOCATION - PAIN QUALITY: ACHING

## 2022-07-09 PROCEDURE — 1090000001 HH PPS REVENUE CREDIT

## 2022-07-09 PROCEDURE — 1090000002 HH PPS REVENUE DEBIT

## 2022-07-10 PROCEDURE — 1090000002 HH PPS REVENUE DEBIT

## 2022-07-10 PROCEDURE — 1090000001 HH PPS REVENUE CREDIT

## 2022-07-11 PROCEDURE — 1090000001 HH PPS REVENUE CREDIT

## 2022-07-11 PROCEDURE — 1090000002 HH PPS REVENUE DEBIT

## 2022-07-12 ENCOUNTER — TELEMEDICINE (OUTPATIENT)
Dept: FAMILY MEDICINE CLINIC | Facility: CLINIC | Age: 72
End: 2022-07-12
Payer: MEDICARE

## 2022-07-12 ENCOUNTER — HOME CARE VISIT (OUTPATIENT)
Dept: SCHEDULING | Facility: HOME HEALTH | Age: 72
End: 2022-07-12
Payer: MEDICARE

## 2022-07-12 ENCOUNTER — TELEPHONE (OUTPATIENT)
Dept: ORTHOPEDIC SURGERY | Age: 72
End: 2022-07-12

## 2022-07-12 ENCOUNTER — NURSE ONLY (OUTPATIENT)
Dept: FAMILY MEDICINE CLINIC | Facility: CLINIC | Age: 72
End: 2022-07-12

## 2022-07-12 VITALS
HEART RATE: 78 BPM | TEMPERATURE: 97.5 F | SYSTOLIC BLOOD PRESSURE: 124 MMHG | DIASTOLIC BLOOD PRESSURE: 70 MMHG | OXYGEN SATURATION: 98 % | RESPIRATION RATE: 17 BRPM

## 2022-07-12 DIAGNOSIS — L81.4 LENTIGO: Primary | ICD-10-CM

## 2022-07-12 PROCEDURE — 3017F COLORECTAL CA SCREEN DOC REV: CPT | Performed by: NURSE PRACTITIONER

## 2022-07-12 PROCEDURE — G8399 PT W/DXA RESULTS DOCUMENT: HCPCS | Performed by: NURSE PRACTITIONER

## 2022-07-12 PROCEDURE — G8427 DOCREV CUR MEDS BY ELIG CLIN: HCPCS | Performed by: NURSE PRACTITIONER

## 2022-07-12 PROCEDURE — 1090000001 HH PPS REVENUE CREDIT

## 2022-07-12 PROCEDURE — 99212 OFFICE O/P EST SF 10 MIN: CPT | Performed by: NURSE PRACTITIONER

## 2022-07-12 PROCEDURE — G0157 HHC PT ASSISTANT EA 15: HCPCS

## 2022-07-12 PROCEDURE — 1090000002 HH PPS REVENUE DEBIT

## 2022-07-12 PROCEDURE — 1090F PRES/ABSN URINE INCON ASSESS: CPT | Performed by: NURSE PRACTITIONER

## 2022-07-12 PROCEDURE — 1123F ACP DISCUSS/DSCN MKR DOCD: CPT | Performed by: NURSE PRACTITIONER

## 2022-07-12 ASSESSMENT — ENCOUNTER SYMPTOMS
SINUS PRESSURE: 0
BLOOD IN STOOL: 0
RESPIRATORY NEGATIVE: 1
NAUSEA: 0
ANAL BLEEDING: 0
PAIN LOCATION - PAIN QUALITY: ACHING
RHINORRHEA: 0
SHORTNESS OF BREATH: 0
BACK PAIN: 0
COLOR CHANGE: 0
ABDOMINAL DISTENTION: 0
ALLERGIC/IMMUNOLOGIC NEGATIVE: 1
ABDOMINAL PAIN: 0
WHEEZING: 0
EYE PAIN: 0
SORE THROAT: 0
VOMITING: 0
CONSTIPATION: 0
DIARRHEA: 0
EYES NEGATIVE: 1
SINUS PAIN: 0
CHEST TIGHTNESS: 0
RECTAL PAIN: 0
VOICE CHANGE: 0
GASTROINTESTINAL NEGATIVE: 1
COUGH: 0
TROUBLE SWALLOWING: 0
FACIAL SWELLING: 0
EYE DISCHARGE: 0
STRIDOR: 0

## 2022-07-12 ASSESSMENT — PATIENT HEALTH QUESTIONNAIRE - PHQ9: DEPRESSION UNABLE TO ASSESS: FUNCTIONAL CAPACITY MOTIVATION LIMITS ACCURACY

## 2022-07-12 NOTE — PROGRESS NOTES
Patient present to office for sutural removal.  Right lower leg site clean and dry no swelling not discharge two sutures removed patient tolerate procedure .

## 2022-07-12 NOTE — PATIENT INSTRUCTIONS
Patient Education        Learning About Sun Damage and Your Skin  How does the sun affect your skin? Being out in the sun can be good for you. Sunlight can brighten your mood and help you feel healthier. But getting too much of the ultraviolet (UV) rays insunlight or from indoor tanning can harm your skin. The damage may include:   Skin changes like early wrinkling, sagging skin, and age spots.  Skin cancer. The lighter your skin is, the more easily it can be damaged by the sun. But everyone can benefit from protecting their skin from the sun, regardless ofskin color. The amount of skin damage you can get from sunlight depends on:   The time of day. You are more likely to get a sunburn in the middle of the day. You might think that exposure to the sun isn't a problem on cloudy days, but the sun's UV rays can still pass through clouds.  Whether you are near reflective surfaces, such as water, white sand, concrete, snow, or ice. All of these reflect the sun's UV rays.  The season of the year. The intense sunlight of summer days can cause more skin damage than the sunlight in other seasons.  Altitude. It's easy to get sunburned at higher altitudes. That's because there's less of the earth's atmosphere to block the sunlight. UV exposure increases in elevation.  How close you are to the equator (latitude). The closer you are to the equator, the more direct sunlight passes through the atmosphere. For example, the [facility] gets more sunlight than the [facility].  The UV index of the day, which shows the risk of getting a sunburn that day. How can you protect your skin from the sun? Most sunburn and skin cancer can be prevented. Use the following tips toprotect your skin. Avoid exposure to the sun   Try to spend less time in the sun from 10 in the morning to 4 in the afternoon. Find shade if you need to be outdoors.  Wear clothing that blocks the sun.  This can be a wide-brimmed hat that covers your neck, ears, eyes, and scalp. It can also include loose-fitting, tightly-woven clothes that cover your arms and legs.  Wear sunglasses that block UV rays. Use sunscreen   Always wear sunscreen on exposed skin. Make sure to use a broad-spectrum sunscreen that has a sun protection factor (SPF) of 30 or higher. Use it every day, even when it is cloudy.  Apply sunscreen at least 30 minutes before you go out in the sun. Put on more every 2 to 3 hours while you are in the sun and after you sweat a lot or swim.  Take extra care to protect your skin when you're near water, at higher elevations, or in tropical climates.  Use a broad-spectrum lip balm or cream that has SPF of 30 to protect your lips from getting sunburned. Where can you learn more? Go to https://Sport[employer]gladiseb.VULCUN. org and sign in to your [employer] account. Enter R455 in the [employer] box to learn more about \"Learning About Sun Damage and Your Skin. \"     If you do not have an account, please click on the \"Sign Up Now\" link. Current as of: November 15, 2021               Content Version: 13.3  © 2006-2022 [employer]. Care instructions adapted under license by [facility] ([facility]). If you have questions about a medical condition or this instruction, always ask your healthcare professional. [place] [address] any warranty or liability for your use of this information.

## 2022-07-12 NOTE — PROGRESS NOTES
[address] [facility] [address] [facility]  Phone: [phone] Fax [phone]  Alex Clifton MS, APRN, FNP-C  7/12/2022    Kishan Norris is a 66 y.o. female who was seen by synchronous (real-time) audio-video technology on 7/12/2022 for Follow-up (Pt was seen 6/27/22 by her PCP-Dr. Jeffry Castro and had a punch biopsy of a pigmented skin lesion to her right lower leg. Pt had 2 sutures placed which were removed this AM during a nurse visit with Deniz Little MA. Pt reports that the site healed up well and that a small scab remains to site. Pt denies any increased erythema, edema, warmth, tenderness, or purulent d/c from site. Pt denies any re-pigmentation from the site. Pt for VV today to discuss pathology results. )        Assessment & Plan:     1. Lentigo  Reviewed punch biopsy results from pigmented skin lesion to right lower leg with pt    Microscopic Description:  Microscopic examination reveals skin having slight increase in pigmented cells at the dermoepidermal junction. Focal dilation of blood vessels in the superficial dermis is noted without complex branching patterns or significant nuclear atypia. Underlying solar elastosis is noted. Malignant tumor is not identified. Deeper cuts have similar findings-Dr. Crystal Pavon concurs. Diagnosis:  Skin having solar elastosis in association with minimally dilated blood vessels in the superficial dermis and slight increase in pigmented cells at the dermal-epidermal junction possibly representing lentigo. Discussed with pt that biopsy results likely represent a benign lentigo (sunspot) and that there was no evidence of a malignant skin lesion found. Pt's biopsy site is healing well. Pt encouraged to avoid future excess sun exposure and wear sunscreen when she is going to be outside for an extended period of time. Pt can f/u PRN for this.  Pt agrees to call to make f/u should she have any re-pigmentation of the removed skin lesion, any new skin lesions adjacent to the biopsy site, or for any new or changing skin lesions. Return if symptoms worsen or fail to improve. 712  Subjective:       Prior to Admission medications    Medication Sig Start Date End Date Taking? Authorizing Provider   diclofenac (VOLTAREN) 75 MG EC tablet Take 1 tablet by mouth 2 times daily as needed for Pain 6/27/22  Yes David Hartmann, DO   Vitamin D, Cholecalciferol, 10 MCG (400 UNIT) CHEW Take 1 tablet by mouth daily. Yes Historical Provider, MD   venlafaxine (EFFEXOR XR) 150 MG extended release capsule Take 150 mg by mouth 2 times daily at 0800 and 1400. Yes Historical Provider, MD   losartan (COZAAR) 100 MG tablet TAKE 1 TABLET BY MOUTH DAILY 6/20/22  Yes Jonny De La Cruz DO   levothyroxine (SYNTHROID) 112 MCG tablet TAKE 1 TABLET BY MOUTH DAILY 6/20/22  Yes Jonny De La Cruz DO   nebivolol (BYSTOLIC) 10 MG tablet Take 1 tablet by mouth daily 6/20/22  Yes David Hartmann DO   clopidogrel (PLAVIX) 75 MG tablet Take 1 tablet by mouth daily 6/17/22 7/17/22 Yes Luis Queen,    aspirin 81 MG chewable tablet Take 1 tablet by mouth at bedtime 4/29/22  Yes Historical Provider, MD   atorvastatin (LIPITOR) 20 MG tablet TAKE 2 TABLETS BY MOUTH EVERY NIGHT AT BEDTIME 4/29/22  Yes Historical Provider, MD   ondansetron (ZOFRAN-ODT) 4 MG disintegrating tablet DISSOLVE 1 TABLET ON THE TONGUE EVERY 4 HOURS AS NEEDED FOR NAUSEA OR VOMITING 4/7/22  Yes Historical Provider, MD   HYDROcodone-acetaminophen (NORCO)  MG per tablet Take 1 tablet by mouth every 6 hours for 30 days.  6/16/22 7/16/22 Yes Richard De La Cruz,    ALPRAZolam (XANAX) 1 MG tablet TAKE 1 TABLET BY MOUTH EVERY DAY  Patient not taking: Reported on 7/12/2022 3/30/22   Historical Provider, MD   MAGNESIUM-OXIDE 400 (240 Mg) MG tablet TAKE 1 TABLET BY MOUTH TWICE DAILY  Patient not taking: Reported on 7/12/2022 4/29/22   Historical Provider, MD   traZODone (DESYREL) 50 MG tablet TAKE 1 TABLET BY MOUTH EVERY NIGHT AT BEDTIME  Patient not taking: Reported on 7/12/2022 4/29/22   Historical Provider, MD   gabapentin (NEURONTIN) 300 MG capsule Take 1 capsule by mouth 3 times daily for 30 days. 5/31/22 6/30/22  Judy Cool MD       No Known Allergies,   Past Medical History:   Diagnosis Date    Allergic rhinitis     Aneurysm ([address]     per CTA of neck and head (3/5/21)=small 3 mm saccular aneurysm in the right ICA near the skull base in an area of tortuosity; followed by [facility] and has referral to Dr. Neri Zimmerman Asthma     denies; \"they thought I had it but it was a panic attack\"    Calculus of kidney     Cancer ([address]     melanoma R breast    Chronic pain     Depression     worsening    Dizziness 09/08/2016    not a recent problem    Dyslipidemia     Fatigue     Former cigarette smoker     GERD (gastroesophageal reflux disease)     controlled with med    Heart murmur 11/24/2015    last echo=11/25/2019    History of nuclear stress test 05/11/2021    Normal Perfusion     HTN (hypertension)     controlled with med    Hx of echocardiogram 11/25/2019    LVEF 55-60%    Hypercholesterolemia     on med for control     Hypothyroid     on med for control     IBS (irritable bowel syndrome)     Migraines     HX no current problems     Mitral valve insufficiency     rheumatic fever as a child --- followed by dr Deb Concepcion    Nausea & vomiting     post-op    Peripheral neuropathy     bilat feet    Rheumatic fever     as a child; age 10-6    Seizures ([address]     febrile seiz. as a child , no problems after that.    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  CHOLECYSTECTOMY, LAPAROSCOPIC      COLONOSCOPY      HYSTERECTOMY (CERVIX STATUS UNKNOWN)      KNEE ARTHROSCOPY Left     KNEE ARTHROSCOPY Right 10/16/09; 2015    ORTHOPEDIC SURGERY      C- 7 ACDF    ORTHOPEDIC SURGERY Right 2021    RIGHT  HIP FNS     OTHER SURGICAL HISTORY      urethra repair    OTHER SURGICAL HISTORY      melanoma resected from right chest wall    TONSILLECTOMY AND ADENOIDECTOMY      TOTAL COLECTOMY      for obstruction - about 14 inches removed    TOTAL KNEE ARTHROPLASTY Left 2016    TOTAL KNEE ARTHROPLASTY Right 3/2015    UROLOGICAL SURGERY      CYSTOSCOPY WITH BLADDER BIOPSIES    ,   Social History     Tobacco Use    Smoking status: Former Smoker     Packs/day: 0.25     Start date: 1990     Quit date: 2002     Years since quittin.8    Smokeless tobacco: Never Used   Vaping Use    Vaping Use: Never used   Substance Use Topics    Alcohol use: No    Drug use: No   ,   Family History   Problem Relation Age of Onset    Other Mother    Filiberto Stager Delayed Awakening Mother     Thyroid Disease Mother     Diabetes Mother     Cancer Mother         breast cancer    Breast Problems Mother    Filiberto Stager Breast Cancer Mother     Heart Disease Father     Hypertension Father     Heart Failure Father         CHF    Delayed Awakening Sister     Thyroid Disease Sister     Breast Problems Sister     Breast Cancer Sister     Cancer Other         breast    Diabetes Other         type II    High Cholesterol Other     Elevated Lipids Other     Hypertension Other     Thyroid Disease Other         hypo, acquired    Breast Problems Maternal Aunt     Breast Cancer Maternal Aunt     Breast Cancer Paternal Aunt          Review of Systems   Constitutional: Negative. Negative for appetite change, chills, diaphoresis, fatigue, fever and unexpected weight change. HENT: Negative. General: She is not in acute distress. Appearance: Normal appearance. She is not ill-appearing, toxic-appearing or diaphoretic. HENT:      Head: Normocephalic and atraumatic. Pulmonary:      Effort: Pulmonary effort is normal. No respiratory distress. Comments: No audible wheezing heard  Skin:     Comments: Limited view of right lower leg on camera revealed a small healing scab. No erythema, edema, or purulent d/c seen from site. No obvious re-pigmentation seen   Neurological:      Mental Status: She is alert and oriented to person, place, and time. Psychiatric:         Mood and Affect: Mood normal.         Behavior: Behavior normal.         Thought Content: Thought content normal.         Judgment: Judgment normal.             Jazlyn Urbina (: 1950) is a 66 y.o. female, Established patient patient, seen via A/V VV for evaluation of the following chief complaint(s):   Follow-up (Pt was seen 22 by her PCP-Dr. Phil Ambriz and had a punch biopsy of a pigmented skin lesion to her right lower leg. Pt had 2 sutures placed which were removed this AM during a nurse visit with Yahir Clay MA. Pt reports that the site healed up well and that a small scab remains to site. Pt denies any increased erythema, edema, warmth, tenderness, or purulent d/c from site. Pt denies any re-pigmentation from the site. Pt for VV today to discuss pathology results. )      We discussed the expected course, resolution and complications of the diagnosis(es) in detail. Medication risks, benefits, costs, interactions, and alternatives were discussed as indicated. I advised her to contact the office if her condition worsens, changes or fails to improve as anticipated. She expressed understanding with the diagnosis(es) and plan. Jazlyn Urbina, was evaluated through a synchronous (real-time) audio-video encounter. The patient (or guardian if applicable) is aware that this is a billable service.  Verbal consent to proceed has been obtained within the past 12 months. The visit was conducted pursuant to the emergency declaration under the [address] [place] [address] and the [employer] and [employer] General Act. Patient identification was verified, and a caregiver was present when appropriate. The patient was located in a state where the provider was credentialed to provide care. Renata Martínez, was evaluated through a synchronous (real-time) audio-video encounter. The patient (or guardian if applicable) is aware that this is a billable service, which includes applicable co-pays. This Virtual Visit was conducted with patient's (and/or legal guardian's) consent. The visit was conducted pursuant to the emergency declaration under the [address] [place] [address] and the [employer] and [employer] General Act. Patient identification was verified, and a caregiver was present when appropriate. The patient was located at Home: [address] [place] [address] Road [id number]. Provider was located at [facility] ([facility] Dept): [address] Perfecto Lombardo [address] [place] [address],  [address] [place] [address].      Santos Barragan, APRN - NP

## 2022-07-13 PROCEDURE — 1090000001 HH PPS REVENUE CREDIT

## 2022-07-13 PROCEDURE — 1090000002 HH PPS REVENUE DEBIT

## 2022-07-13 ASSESSMENT — ENCOUNTER SYMPTOMS: DYSPNEA ACTIVITY LEVEL: AFTER AMBULATING MORE THAN 20 FT

## 2022-07-14 ENCOUNTER — HOME CARE VISIT (OUTPATIENT)
Dept: SCHEDULING | Facility: HOME HEALTH | Age: 72
End: 2022-07-14
Payer: MEDICARE

## 2022-07-14 VITALS
OXYGEN SATURATION: 98 % | TEMPERATURE: 98.1 F | RESPIRATION RATE: 17 BRPM | DIASTOLIC BLOOD PRESSURE: 80 MMHG | SYSTOLIC BLOOD PRESSURE: 130 MMHG | HEART RATE: 78 BPM

## 2022-07-14 PROCEDURE — G0157 HHC PT ASSISTANT EA 15: HCPCS

## 2022-07-14 PROCEDURE — 1090000001 HH PPS REVENUE CREDIT

## 2022-07-14 PROCEDURE — 1090000002 HH PPS REVENUE DEBIT

## 2022-07-14 ASSESSMENT — ENCOUNTER SYMPTOMS: PAIN LOCATION - PAIN QUALITY: ACHING

## 2022-07-15 PROCEDURE — 1090000002 HH PPS REVENUE DEBIT

## 2022-07-15 PROCEDURE — 1090000001 HH PPS REVENUE CREDIT

## 2022-07-16 PROCEDURE — 1090000001 HH PPS REVENUE CREDIT

## 2022-07-16 PROCEDURE — 1090000002 HH PPS REVENUE DEBIT

## 2022-07-17 PROCEDURE — 1090000002 HH PPS REVENUE DEBIT

## 2022-07-17 PROCEDURE — 1090000001 HH PPS REVENUE CREDIT

## 2022-07-18 ENCOUNTER — HOME CARE VISIT (OUTPATIENT)
Dept: SCHEDULING | Facility: HOME HEALTH | Age: 72
End: 2022-07-18
Payer: MEDICARE

## 2022-07-18 VITALS
TEMPERATURE: 98.4 F | DIASTOLIC BLOOD PRESSURE: 80 MMHG | SYSTOLIC BLOOD PRESSURE: 126 MMHG | HEART RATE: 72 BPM | RESPIRATION RATE: 18 BRPM | OXYGEN SATURATION: 98 %

## 2022-07-18 PROCEDURE — 1090000002 HH PPS REVENUE DEBIT

## 2022-07-18 PROCEDURE — G0151 HHCP-SERV OF PT,EA 15 MIN: HCPCS

## 2022-07-18 PROCEDURE — 1090000001 HH PPS REVENUE CREDIT

## 2022-07-18 ASSESSMENT — ENCOUNTER SYMPTOMS
PAIN LOCATION - PAIN QUALITY: SORE
DYSPNEA ACTIVITY LEVEL: AFTER AMBULATING MORE THAN 20 FT

## 2022-07-20 ENCOUNTER — OFFICE VISIT (OUTPATIENT)
Dept: CARDIOLOGY CLINIC | Age: 72
End: 2022-07-20
Payer: MEDICARE

## 2022-07-20 VITALS
DIASTOLIC BLOOD PRESSURE: 68 MMHG | WEIGHT: 155 LBS | HEART RATE: 80 BPM | HEIGHT: 66 IN | SYSTOLIC BLOOD PRESSURE: 122 MMHG | BODY MASS INDEX: 24.91 KG/M2

## 2022-07-20 DIAGNOSIS — I10 PRIMARY HYPERTENSION: Primary | ICD-10-CM

## 2022-07-20 PROCEDURE — 3017F COLORECTAL CA SCREEN DOC REV: CPT | Performed by: INTERNAL MEDICINE

## 2022-07-20 PROCEDURE — 1123F ACP DISCUSS/DSCN MKR DOCD: CPT | Performed by: INTERNAL MEDICINE

## 2022-07-20 PROCEDURE — 99213 OFFICE O/P EST LOW 20 MIN: CPT | Performed by: INTERNAL MEDICINE

## 2022-07-20 PROCEDURE — 1090F PRES/ABSN URINE INCON ASSESS: CPT | Performed by: INTERNAL MEDICINE

## 2022-07-20 PROCEDURE — G8427 DOCREV CUR MEDS BY ELIG CLIN: HCPCS | Performed by: INTERNAL MEDICINE

## 2022-07-20 PROCEDURE — G8399 PT W/DXA RESULTS DOCUMENT: HCPCS | Performed by: INTERNAL MEDICINE

## 2022-07-20 PROCEDURE — 1036F TOBACCO NON-USER: CPT | Performed by: INTERNAL MEDICINE

## 2022-07-20 PROCEDURE — G8417 CALC BMI ABV UP PARAM F/U: HCPCS | Performed by: INTERNAL MEDICINE

## 2022-07-20 RX ORDER — CLOPIDOGREL BISULFATE 75 MG/1
75 TABLET ORAL DAILY
COMMUNITY

## 2022-07-20 RX ORDER — POTASSIUM CHLORIDE 20 MEQ/1
20 TABLET, EXTENDED RELEASE ORAL 2 TIMES DAILY
COMMUNITY

## 2022-07-20 RX ORDER — UBIDECARENONE 75 MG
50 CAPSULE ORAL DAILY
COMMUNITY

## 2022-07-20 RX ORDER — HYDROCODONE BITARTRATE AND ACETAMINOPHEN 10; 325 MG/1; MG/1
1 TABLET ORAL EVERY 8 HOURS PRN
COMMUNITY
End: 2022-07-26 | Stop reason: SDUPTHER

## 2022-07-20 RX ORDER — LOSARTAN POTASSIUM AND HYDROCHLOROTHIAZIDE 25; 100 MG/1; MG/1
1 TABLET ORAL DAILY
COMMUNITY

## 2022-07-20 RX ORDER — OMEPRAZOLE 40 MG/1
40 CAPSULE, DELAYED RELEASE ORAL DAILY
COMMUNITY
End: 2022-10-17 | Stop reason: SDUPTHER

## 2022-07-20 ASSESSMENT — ENCOUNTER SYMPTOMS
SHORTNESS OF BREATH: 0
ORTHOPNEA: 0
HEMATEMESIS: 0
BOWEL INCONTINENCE: 0
ABDOMINAL PAIN: 0
HOARSE VOICE: 0
COLOR CHANGE: 0
BLURRED VISION: 0
DIARRHEA: 0
SPUTUM PRODUCTION: 0
HEMATOCHEZIA: 0
WHEEZING: 0

## 2022-07-20 NOTE — PROGRESS NOTES
[facility] CARDIOLOGY  [address], [address]  PHONE: [phone]        22        NAME:  Nigel Bourgeois  : [birth year]  MRN: [id number]       SUBJECTIVE:   Nigel Bourgeois is a 66 y.o. female seen for a follow up visit regarding the following: The patient has a hx of CVA,primary hypertension,and PAF. She returns for follow up. Chief Complaint   Patient presents with    Hypertension     6 month follow up       HPI:    Hypertension  This is a chronic problem. The problem is unchanged. The problem is controlled. Pertinent negatives include no anxiety, blurred vision, chest pain, headaches, malaise/fatigue, neck pain, orthopnea, palpitations, peripheral edema, PND, shortness of breath or sweats. Past Medical History, Past Surgical History, Family history, Social History, and Medications were all reviewed with the patient today and updated as necessary. Current Outpatient Medications:     losartan-hydroCHLOROthiazide (HYZAAR) 100-25 MG per tablet, Take 1 tablet by mouth in the morning., Disp: , Rfl:     clopidogrel (PLAVIX) 75 MG tablet, Take 75 mg by mouth in the morning., Disp: , Rfl:     Multiple Vitamins-Minerals (MULTIVITAMIN ADULTS 50+ PO), Take by mouth daily, Disp: , Rfl:     vitamin B-12 (CYANOCOBALAMIN) 100 MCG tablet, Take 50 mcg by mouth in the morning., Disp: , Rfl:     HYDROcodone-acetaminophen (NORCO)  MG per tablet, Take 1 tablet by mouth every 8 hours as needed for Pain., Disp: , Rfl:     omeprazole (PRILOSEC) 40 MG delayed release capsule, Take 40 mg by mouth in the morning., Disp: , Rfl:     potassium chloride (KLOR-CON M) 20 MEQ extended release tablet, Take 20 mEq by mouth in the morning and 20 mEq before bedtime. , Disp: , Rfl:     Vitamin D, Cholecalciferol, 10 MCG (400 UNIT) CHEW, Take 1 tablet by mouth daily. , Disp: , Rfl:     venlafaxine (EFFEXOR XR) 150 MG extended release capsule, Take 150 mg by mouth 2 times daily at 0800 and 1400., Disp: , Rfl:     levothyroxine (SYNTHROID) 112 MCG tablet, TAKE 1 TABLET BY MOUTH DAILY, Disp: 90 tablet, Rfl: 0    nebivolol (BYSTOLIC) 10 MG tablet, Take 1 tablet by mouth daily, Disp: 30 tablet, Rfl: 0    aspirin 81 MG chewable tablet, Take 1 tablet by mouth at bedtime, Disp: , Rfl:     atorvastatin (LIPITOR) 20 MG tablet, TAKE 2 TABLETS BY MOUTH EVERY NIGHT AT BEDTIME, Disp: , Rfl:     gabapentin (NEURONTIN) 300 MG capsule, Take 1 capsule by mouth 3 times daily for 30 days. , Disp: 270 capsule, Rfl: 0    diclofenac (VOLTAREN) 75 MG EC tablet, Take 1 tablet by mouth 2 times daily as needed for Pain (Patient not taking: Reported on 7/20/2022), Disp: 60 tablet, Rfl: 0    ALPRAZolam (XANAX) 1 MG tablet, TAKE 1 TABLET BY MOUTH EVERY DAY (Patient not taking: No sig reported), Disp: , Rfl:     ondansetron (ZOFRAN-ODT) 4 MG disintegrating tablet, DISSOLVE 1 TABLET ON THE TONGUE EVERY 4 HOURS AS NEEDED FOR NAUSEA OR VOMITING (Patient not taking: Reported on 7/20/2022), Disp: , Rfl:     MAGNESIUM-OXIDE 400 (240 Mg) MG tablet, TAKE 1 TABLET BY MOUTH TWICE DAILY (Patient not taking: No sig reported), Disp: , Rfl:     traZODone (DESYREL) 50 MG tablet, TAKE 1 TABLET BY MOUTH EVERY NIGHT AT BEDTIME (Patient not taking: No sig reported), Disp: , Rfl:   No Known Allergies  Past Medical History:   Diagnosis Date    Allergic rhinitis     Aneurysm (HCC)     per CTA of neck and head (3/5/21)=small 3 mm saccular aneurysm in the right ICA near the skull base in an area of tortuosity; followed by [facility] and has referral to Dr. Yumiko rodrigues;  Reji Tamez thought I had it but it was a panic attack\"    Calculus of kidney     Cancer ([facility]     melanoma R breast    Chronic pain     Depression     worsening    Dizziness 09/08/2016    not a recent problem    Dyslipidemia     Fatigue     Former cigarette smoker     GERD (gastroesophageal reflux disease)     controlled with med    Heart murmur 11/24/2015    last echo=11/25/2019    History of nuclear stress test 05/11/2021    Normal Perfusion     HTN (hypertension)     controlled with med    Hx of echocardiogram 11/25/2019    LVEF 55-60%    Hypercholesterolemia     on med for control     Hypothyroid     on med for control     IBS (irritable bowel syndrome)     Migraines     HX no current problems     Mitral valve insufficiency     rheumatic fever as a child --- followed by dr Sagar Warren    Nausea & vomiting     post-op    Peripheral neuropathy     bilat feet    Rheumatic fever     as a child; age 5-6    Seizures ([address]     febrile seiz. as a child , no problems after that. 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      CHOLECYSTECTOMY, LAPAROSCOPIC      COLONOSCOPY      HYSTERECTOMY (CERVIX STATUS UNKNOWN)      KNEE ARTHROSCOPY Left     KNEE ARTHROSCOPY Right 10/16/09; 03/2015    ORTHOPEDIC SURGERY      C- 7 ACDF    ORTHOPEDIC SURGERY Right 08/24/2021    RIGHT  HIP FNS     OTHER SURGICAL HISTORY      urethra repair    OTHER SURGICAL HISTORY      melanoma resected from right chest wall    TONSILLECTOMY AND ADENOIDECTOMY      TOTAL COLECTOMY      for obstruction - about 14 inches removed    TOTAL KNEE ARTHROPLASTY Left 2016    TOTAL KNEE ARTHROPLASTY Right 3/2015    UROLOGICAL SURGERY  2020    CYSTOSCOPY WITH BLADDER BIOPSIES      Family History   Problem Relation Age of Onset    Other Mother     Delayed Awakening Mother     Thyroid Disease Mother     Diabetes Mother     Cancer Mother         breast cancer    Breast Problems Mother     Breast Cancer Mother     Heart Disease Father Hypertension Father     Heart Failure Father         CHF    Delayed Awakening Sister     Thyroid Disease Sister     Breast Problems Sister     Breast Cancer Sister     Cancer Other         breast    Diabetes Other         type II    High Cholesterol Other     Elevated Lipids Other     Hypertension Other     Thyroid Disease Other         hypo, acquired    Breast Problems Maternal Aunt     Breast Cancer Maternal Aunt     Breast Cancer Paternal Aunt       Social History     Tobacco Use    Smoking status: Former     Packs/day: 0.25     Types: Cigarettes     Start date: 1990     Quit date: 2002     Years since quittin.9    Smokeless tobacco: Never   Substance Use Topics    Alcohol use: No       ROS:    Review of Systems   Constitutional: Negative for chills, decreased appetite, diaphoresis, fever and malaise/fatigue. HENT:  Negative for congestion, hearing loss, hoarse voice and nosebleeds. Eyes:  Negative for blurred vision. Cardiovascular:  Negative for chest pain, claudication, cyanosis, dyspnea on exertion, irregular heartbeat, leg swelling, near-syncope, orthopnea, palpitations, paroxysmal nocturnal dyspnea and syncope. Respiratory:  Negative for shortness of breath, sputum production and wheezing. Endocrine: Negative for polydipsia, polyphagia and polyuria. Skin:  Negative for color change. Musculoskeletal:  Negative for neck pain. Gastrointestinal:  Negative for abdominal pain, bowel incontinence, diarrhea, hematemesis and hematochezia. Genitourinary:  Negative for dysuria, frequency and hematuria. Neurological:  Negative for focal weakness, headaches, light-headedness, loss of balance, numbness, sensory change and weakness. Psychiatric/Behavioral:  Negative for altered mental status and memory loss.           PHYSICAL EXAM:   /68   Pulse 80   Ht 5' 6\" (1.676 m)   Wt 155 lb (70.3 kg)   BMI 25.02 kg/m²      Physical Exam  Constitutional:       Appearance: Normal appearance. HENT:      Head: Normocephalic and atraumatic. Nose: Nose normal.   Eyes:      Extraocular Movements: Extraocular movements intact. Pupils: Pupils are equal, round, and reactive to light. Neck:      Vascular: No carotid bruit. Cardiovascular:      Rate and Rhythm: Regular rhythm. Pulses: Normal pulses. Heart sounds: No murmur heard. Pulmonary:      Effort: Pulmonary effort is normal.      Breath sounds: Normal breath sounds. Abdominal:      General: Abdomen is flat. Bowel sounds are normal.      Palpations: Abdomen is soft. Musculoskeletal:         General: Normal range of motion. Cervical back: Normal range of motion and neck supple. Skin:     General: Skin is warm and dry. Neurological:      General: No focal deficit present. Mental Status: She is alert and oriented to person, place, and time. Psychiatric:         Mood and Affect: Mood normal.       Medical problems and test results were reviewed with the patient today. No results found for this or any previous visit (from the past 672 hour(s)). Lab Results   Component Value Date/Time    CHOL 123 04/14/2021 10:06 AM    HDL 41 04/14/2021 10:06 AM    VLDL 47 04/14/2021 10:06 AM     No results found for any visits on 07/20/22. ASSESSMENT and PLAN    Quoc Lai was seen today for hypertension. Diagnoses and all orders for this visit:    Primary hypertension:Stable. Continue Hyzaar and Bystolic        Disposition:    Return in about 6 months (around 1/20/2023).                 Princess Shreyas MD  7/20/2022  2:16 PM

## 2022-07-26 ENCOUNTER — TELEPHONE (OUTPATIENT)
Dept: FAMILY MEDICINE CLINIC | Facility: CLINIC | Age: 72
End: 2022-07-26

## 2022-07-26 DIAGNOSIS — G89.4 CHRONIC PAIN SYNDROME: Primary | ICD-10-CM

## 2022-07-26 RX ORDER — HYDROCODONE BITARTRATE AND ACETAMINOPHEN 10; 325 MG/1; MG/1
1 TABLET ORAL EVERY 8 HOURS PRN
Qty: 120 TABLET | Refills: 0 | Status: SHIPPED | OUTPATIENT
Start: 2022-07-26 | End: 2022-07-27 | Stop reason: SDUPTHER

## 2022-07-27 DIAGNOSIS — G89.4 CHRONIC PAIN SYNDROME: ICD-10-CM

## 2022-07-27 RX ORDER — HYDROCODONE BITARTRATE AND ACETAMINOPHEN 10; 325 MG/1; MG/1
1 TABLET ORAL EVERY 8 HOURS PRN
Qty: 120 TABLET | Refills: 0 | Status: SHIPPED | OUTPATIENT
Start: 2022-07-27 | End: 2022-09-06 | Stop reason: SDUPTHER

## 2022-07-27 RX ORDER — HYDROCODONE BITARTRATE AND ACETAMINOPHEN 10; 325 MG/1; MG/1
1 TABLET ORAL EVERY 6 HOURS PRN
Qty: 120 TABLET | Refills: 0 | Status: CANCELLED | OUTPATIENT
Start: 2022-07-27 | End: 2022-08-26

## 2022-08-05 ENCOUNTER — OFFICE VISIT (OUTPATIENT)
Dept: ORTHOPEDIC SURGERY | Age: 72
End: 2022-08-05
Payer: MEDICARE

## 2022-08-05 DIAGNOSIS — Z96.641 HISTORY OF TOTAL RIGHT HIP REPLACEMENT: ICD-10-CM

## 2022-08-05 DIAGNOSIS — Z96.641 STATUS POST RIGHT HIP REPLACEMENT: Primary | ICD-10-CM

## 2022-08-05 PROCEDURE — G8428 CUR MEDS NOT DOCUMENT: HCPCS | Performed by: ORTHOPAEDIC SURGERY

## 2022-08-05 PROCEDURE — 1036F TOBACCO NON-USER: CPT | Performed by: ORTHOPAEDIC SURGERY

## 2022-08-05 PROCEDURE — G8399 PT W/DXA RESULTS DOCUMENT: HCPCS | Performed by: ORTHOPAEDIC SURGERY

## 2022-08-05 PROCEDURE — 1123F ACP DISCUSS/DSCN MKR DOCD: CPT | Performed by: ORTHOPAEDIC SURGERY

## 2022-08-05 PROCEDURE — 99213 OFFICE O/P EST LOW 20 MIN: CPT | Performed by: ORTHOPAEDIC SURGERY

## 2022-08-05 PROCEDURE — 1090F PRES/ABSN URINE INCON ASSESS: CPT | Performed by: ORTHOPAEDIC SURGERY

## 2022-08-05 PROCEDURE — G8417 CALC BMI ABV UP PARAM F/U: HCPCS | Performed by: ORTHOPAEDIC SURGERY

## 2022-08-05 PROCEDURE — 3017F COLORECTAL CA SCREEN DOC REV: CPT | Performed by: ORTHOPAEDIC SURGERY

## 2022-08-05 NOTE — PROGRESS NOTES
Name: Luis Sales  YOB: 1950  Gender: female  MRN: [id number]      Current Outpatient Medications:     HYDROcodone-acetaminophen (NORCO)  MG per tablet, Take 1 tablet by mouth every 8 hours as needed for Pain for up to 30 days. , Disp: 120 tablet, Rfl: 0    losartan-hydroCHLOROthiazide (HYZAAR) 100-25 MG per tablet, Take 1 tablet by mouth in the morning., Disp: , Rfl:     clopidogrel (PLAVIX) 75 MG tablet, Take 75 mg by mouth in the morning., Disp: , Rfl:     Multiple Vitamins-Minerals (MULTIVITAMIN ADULTS 50+ PO), Take by mouth daily, Disp: , Rfl:     vitamin B-12 (CYANOCOBALAMIN) 100 MCG tablet, Take 50 mcg by mouth in the morning., Disp: , Rfl:     omeprazole (PRILOSEC) 40 MG delayed release capsule, Take 40 mg by mouth in the morning., Disp: , Rfl:     potassium chloride (KLOR-CON M) 20 MEQ extended release tablet, Take 20 mEq by mouth in the morning and 20 mEq before bedtime. , Disp: , Rfl:     diclofenac (VOLTAREN) 75 MG EC tablet, Take 1 tablet by mouth 2 times daily as needed for Pain (Patient not taking: Reported on 7/20/2022), Disp: 60 tablet, Rfl: 0    Vitamin D, Cholecalciferol, 10 MCG (400 UNIT) CHEW, Take 1 tablet by mouth daily. , Disp: , Rfl:     venlafaxine (EFFEXOR XR) 150 MG extended release capsule, Take 150 mg by mouth 2 times daily at 0800 and 1400., Disp: , Rfl:     levothyroxine (SYNTHROID) 112 MCG tablet, TAKE 1 TABLET BY MOUTH DAILY, Disp: 90 tablet, Rfl: 0    nebivolol (BYSTOLIC) 10 MG tablet, Take 1 tablet by mouth daily, Disp: 30 tablet, Rfl: 0    ALPRAZolam (XANAX) 1 MG tablet, TAKE 1 TABLET BY MOUTH EVERY DAY (Patient not taking: No sig reported), Disp: , Rfl:     aspirin 81 MG chewable tablet, Take 1 tablet by mouth at bedtime, Disp: , Rfl:     atorvastatin (LIPITOR) 20 MG tablet, TAKE 2 TABLETS BY MOUTH EVERY NIGHT AT BEDTIME, Disp: , Rfl:     ondansetron (ZOFRAN-ODT) 4 MG disintegrating tablet, DISSOLVE 1 TABLET ON THE TONGUE EVERY 4 HOURS AS NEEDED FOR NAUSEA OR VOMITING (Patient not taking: Reported on 7/20/2022), Disp: , Rfl:     MAGNESIUM-OXIDE 400 (240 Mg) MG tablet, TAKE 1 TABLET BY MOUTH TWICE DAILY (Patient not taking: No sig reported), Disp: , Rfl:     traZODone (DESYREL) 50 MG tablet, TAKE 1 TABLET BY MOUTH EVERY NIGHT AT BEDTIME (Patient not taking: No sig reported), Disp: , Rfl:     gabapentin (NEURONTIN) 300 MG capsule, Take 1 capsule by mouth 3 times daily for 30 days. , Disp: 270 capsule, Rfl: 0    No Known Allergies    CC: Post op right ANDREY    HPI: Patient is here now 6 months postop total hip replacement. Here for evaluation of this. Patient reports pain in the thigh. She does have an AFO that she wears for foot drop and underwent to be provided. She still has some groin pain. PMH: Reviewed in chart    ROS:  As per HPI. Pertinent positives and negatives are addressed with the patient, particularly those related to musculoskeletal concerns. Non-orthopaedic concerns were referred back to the primary care physician. PE:  right hip  Patient is comfortable and in no distress. Flexion: 0 to 90 degrees  Internal rotation: 20 degrees  External rotation: 25 degrees  Adduction: 20 degrees  Abduction: 20 degrees  Incision:  well healed  Gait: no trendelenburg or antalgia  No instability with ROM  Limb length is equal  Quadriceps strength is good    Radiographs:  AP pelvis and lateral views of the right taken in the office reveal a good bone prosthetic interface with no suggestion of a progressive lucency. Small lucency in the subtrochanteric area but this I think was the old screw hole site where previous fixation was placed. She has callus formation where greater trochanter is avulsed but this is stable. Radiographic Diagnosis:  Normal post-operative total hip, trochanteric fracture with some elongation but stabilized radiographically. Diagnosis: Post-operative total hip arthroplasty. They are doing well slow in recovery.   I think her abductor mass and trochanteric area are stable now. I think her foot drop is resolving and she got some anterior tib function. Recommendations:  Reviewed x-ray findings with the patient. Activities to be advanced as tolerated. Normal lifetime restrictions as discussed. Appropriate activities for strengthening and conditioning were reviewed. Return appointment as scheduled for follow up and radiographs. Today I offered her another prescription for outpatient physical therapy. She is on a walker and does pretty well with the brace. Therapy may help her increase her endurance and strength and to let us know she wants to pursue such. We will see her back 6 months    Approximately 20 minutes was spent reviewing the medical record, imaging, performing history and physical examination, discussing the diagnosis and treatment plan with the patient, and completing documentation for this visit.

## 2022-08-22 RX ORDER — LOSARTAN POTASSIUM 100 MG/1
100 TABLET ORAL DAILY
Qty: 30 TABLET | Refills: 1 | OUTPATIENT
Start: 2022-08-22

## 2022-09-06 DIAGNOSIS — G89.4 CHRONIC PAIN SYNDROME: ICD-10-CM

## 2022-09-06 RX ORDER — HYDROCODONE BITARTRATE AND ACETAMINOPHEN 10; 325 MG/1; MG/1
1 TABLET ORAL EVERY 6 HOURS PRN
Qty: 120 TABLET | Refills: 0 | Status: SHIPPED | OUTPATIENT
Start: 2022-09-06 | End: 2022-09-27 | Stop reason: SDUPTHER

## 2022-09-27 DIAGNOSIS — G89.4 CHRONIC PAIN SYNDROME: ICD-10-CM

## 2022-09-27 RX ORDER — HYDROCODONE BITARTRATE AND ACETAMINOPHEN 10; 325 MG/1; MG/1
1 TABLET ORAL EVERY 6 HOURS PRN
Qty: 120 TABLET | Refills: 0 | Status: SHIPPED | OUTPATIENT
Start: 2022-10-05 | End: 2022-10-31 | Stop reason: SDUPTHER

## 2022-09-29 ENCOUNTER — TELEPHONE (OUTPATIENT)
Dept: FAMILY MEDICINE CLINIC | Facility: CLINIC | Age: 72
End: 2022-09-29

## 2022-10-14 ENCOUNTER — NURSE ONLY (OUTPATIENT)
Dept: FAMILY MEDICINE CLINIC | Facility: CLINIC | Age: 72
End: 2022-10-14

## 2022-10-14 DIAGNOSIS — Z00.00 ENCOUNTER FOR MEDICARE ANNUAL WELLNESS EXAM: ICD-10-CM

## 2022-10-14 DIAGNOSIS — E55.9 VITAMIN D DEFICIENCY, UNSPECIFIED: ICD-10-CM

## 2022-10-14 DIAGNOSIS — E78.00 PURE HYPERCHOLESTEROLEMIA, UNSPECIFIED: Primary | ICD-10-CM

## 2022-10-14 DIAGNOSIS — Z79.899 ENCOUNTER FOR LONG-TERM (CURRENT) USE OF MEDICATIONS: ICD-10-CM

## 2022-10-14 DIAGNOSIS — Z00.00 LABORATORY EXAMINATION ORDERED AS PART OF A ROUTINE GENERAL MEDICAL EXAMINATION: ICD-10-CM

## 2022-10-14 DIAGNOSIS — I10 ESSENTIAL (PRIMARY) HYPERTENSION: ICD-10-CM

## 2022-10-14 LAB
25(OH)D3 SERPL-MCNC: 34 NG/ML (ref 30–100)
ALBUMIN SERPL-MCNC: 4.3 G/DL (ref 3.2–4.6)
ALBUMIN/GLOB SERPL: 1.6 {RATIO} (ref 0.4–1.6)
ALP SERPL-CCNC: 81 U/L (ref 50–136)
ALT SERPL-CCNC: 20 U/L (ref 12–65)
ANION GAP SERPL CALC-SCNC: 8 MMOL/L (ref 2–11)
APPEARANCE UR: ABNORMAL
AST SERPL-CCNC: 11 U/L (ref 15–37)
BASOPHILS # BLD: 0.1 K/UL (ref 0–0.2)
BASOPHILS NFR BLD: 1 % (ref 0–2)
BILIRUB SERPL-MCNC: 1.3 MG/DL (ref 0.2–1.1)
BILIRUB UR QL: NEGATIVE
BUN SERPL-MCNC: 20 MG/DL (ref 8–23)
CALCIUM SERPL-MCNC: 9.6 MG/DL (ref 8.3–10.4)
CHLORIDE SERPL-SCNC: 109 MMOL/L (ref 101–110)
CHOLEST SERPL-MCNC: 113 MG/DL
CO2 SERPL-SCNC: 26 MMOL/L (ref 21–32)
COLOR UR: ABNORMAL
CREAT SERPL-MCNC: 1.3 MG/DL (ref 0.6–1)
DIFFERENTIAL METHOD BLD: ABNORMAL
EOSINOPHIL # BLD: 0.1 K/UL (ref 0–0.8)
EOSINOPHIL NFR BLD: 3 % (ref 0.5–7.8)
ERYTHROCYTE [DISTWIDTH] IN BLOOD BY AUTOMATED COUNT: 15.7 % (ref 11.9–14.6)
GLOBULIN SER CALC-MCNC: 2.7 G/DL (ref 2.8–4.5)
GLUCOSE SERPL-MCNC: 76 MG/DL (ref 65–100)
GLUCOSE UR STRIP.AUTO-MCNC: NEGATIVE MG/DL
HCT VFR BLD AUTO: 35.2 % (ref 35.8–46.3)
HDLC SERPL-MCNC: 46 MG/DL (ref 40–60)
HDLC SERPL: 2.5 {RATIO}
HGB BLD-MCNC: 10.2 G/DL (ref 11.7–15.4)
HGB UR QL STRIP: NEGATIVE
IMM GRANULOCYTES # BLD AUTO: 0 K/UL (ref 0–0.5)
IMM GRANULOCYTES NFR BLD AUTO: 0 % (ref 0–5)
KETONES UR QL STRIP.AUTO: NEGATIVE MG/DL
LDLC SERPL CALC-MCNC: 33.6 MG/DL
LEUKOCYTE ESTERASE UR QL STRIP.AUTO: ABNORMAL
LYMPHOCYTES # BLD: 2.3 K/UL (ref 0.5–4.6)
LYMPHOCYTES NFR BLD: 42 % (ref 13–44)
MCH RBC QN AUTO: 24.1 PG (ref 26.1–32.9)
MCHC RBC AUTO-ENTMCNC: 29 G/DL (ref 31.4–35)
MCV RBC AUTO: 83.2 FL (ref 82–102)
MONOCYTES # BLD: 0.6 K/UL (ref 0.1–1.3)
MONOCYTES NFR BLD: 11 % (ref 4–12)
NEUTS SEG # BLD: 2.5 K/UL (ref 1.7–8.2)
NEUTS SEG NFR BLD: 44 % (ref 43–78)
NITRITE UR QL STRIP.AUTO: NEGATIVE
NRBC # BLD: 0 K/UL (ref 0–0.2)
PH UR STRIP: 6 [PH] (ref 5–9)
PLATELET # BLD AUTO: 271 K/UL (ref 150–450)
PMV BLD AUTO: 10.4 FL (ref 9.4–12.3)
POTASSIUM SERPL-SCNC: 3.7 MMOL/L (ref 3.5–5.1)
PROT SERPL-MCNC: 7 G/DL (ref 6.3–8.2)
PROT UR STRIP-MCNC: NEGATIVE MG/DL
RBC # BLD AUTO: 4.23 M/UL (ref 4.05–5.2)
SODIUM SERPL-SCNC: 143 MMOL/L (ref 133–143)
SP GR UR REFRACTOMETRY: 1.02 (ref 1–1.02)
TRIGL SERPL-MCNC: 167 MG/DL (ref 35–150)
TSH, 3RD GENERATION: 1.02 UIU/ML (ref 0.36–3.74)
UROBILINOGEN UR QL STRIP.AUTO: 0.2 EU/DL (ref 0.2–1)
VLDLC SERPL CALC-MCNC: 33.4 MG/DL (ref 6–23)
WBC # BLD AUTO: 5.6 K/UL (ref 4.3–11.1)

## 2022-10-17 RX ORDER — OMEPRAZOLE 40 MG/1
40 CAPSULE, DELAYED RELEASE ORAL DAILY
Qty: 90 CAPSULE | Refills: 3 | Status: SHIPPED | OUTPATIENT
Start: 2022-10-17 | End: 2022-10-31 | Stop reason: SDUPTHER

## 2022-10-18 RX ORDER — OMEPRAZOLE 40 MG/1
CAPSULE, DELAYED RELEASE ORAL
Qty: 90 CAPSULE | Refills: 3 | OUTPATIENT
Start: 2022-10-18

## 2022-10-31 ENCOUNTER — OFFICE VISIT (OUTPATIENT)
Dept: FAMILY MEDICINE CLINIC | Facility: CLINIC | Age: 72
End: 2022-10-31
Payer: MEDICARE

## 2022-10-31 VITALS
SYSTOLIC BLOOD PRESSURE: 120 MMHG | BODY MASS INDEX: 24.75 KG/M2 | DIASTOLIC BLOOD PRESSURE: 70 MMHG | WEIGHT: 154 LBS | HEIGHT: 66 IN

## 2022-10-31 DIAGNOSIS — Z12.31 SCREENING MAMMOGRAM FOR HIGH-RISK PATIENT: ICD-10-CM

## 2022-10-31 DIAGNOSIS — N18.30 STAGE 3 CHRONIC KIDNEY DISEASE, UNSPECIFIED WHETHER STAGE 3A OR 3B CKD (HCC): ICD-10-CM

## 2022-10-31 DIAGNOSIS — F11.99 OPIOID USE, UNSPECIFIED WITH UNSPECIFIED OPIOID-INDUCED DISORDER (HCC): ICD-10-CM

## 2022-10-31 DIAGNOSIS — I73.9 PERIPHERAL VASCULAR DISEASE, UNSPECIFIED (HCC): ICD-10-CM

## 2022-10-31 DIAGNOSIS — E03.9 HYPOTHYROIDISM, UNSPECIFIED TYPE: ICD-10-CM

## 2022-10-31 DIAGNOSIS — K21.9 GERD WITHOUT ESOPHAGITIS: ICD-10-CM

## 2022-10-31 DIAGNOSIS — N30.00 ACUTE CYSTITIS WITHOUT HEMATURIA: ICD-10-CM

## 2022-10-31 DIAGNOSIS — D64.9 ANEMIA, UNSPECIFIED TYPE: ICD-10-CM

## 2022-10-31 DIAGNOSIS — Z23 NEED FOR IMMUNIZATION AGAINST INFLUENZA: Primary | ICD-10-CM

## 2022-10-31 DIAGNOSIS — Z00.00 MEDICARE ANNUAL WELLNESS VISIT, SUBSEQUENT: ICD-10-CM

## 2022-10-31 DIAGNOSIS — Z78.0 POSTMENOPAUSAL: ICD-10-CM

## 2022-10-31 DIAGNOSIS — R30.0 DYSURIA: ICD-10-CM

## 2022-10-31 DIAGNOSIS — F41.1 GENERALIZED ANXIETY DISORDER: ICD-10-CM

## 2022-10-31 DIAGNOSIS — I10 PRIMARY HYPERTENSION: ICD-10-CM

## 2022-10-31 DIAGNOSIS — E78.01 FAMILIAL HYPERCHOLESTEROLEMIA: ICD-10-CM

## 2022-10-31 DIAGNOSIS — F33.9 RECURRENT DEPRESSION (HCC): ICD-10-CM

## 2022-10-31 DIAGNOSIS — G89.4 CHRONIC PAIN SYNDROME: ICD-10-CM

## 2022-10-31 LAB
BILIRUBIN, URINE, POC: NEGATIVE
BLOOD URINE, POC: ABNORMAL
FERRITIN SERPL-MCNC: 13 NG/ML (ref 8–388)
GLUCOSE URINE, POC: NEGATIVE
IRON SERPL-MCNC: 83 UG/DL (ref 35–150)
KETONES, URINE, POC: NEGATIVE
LEUKOCYTE ESTERASE, URINE, POC: ABNORMAL
NITRITE, URINE, POC: POSITIVE
PH, URINE, POC: 6 (ref 4.6–8)
PROTEIN,URINE, POC: ABNORMAL
SPECIFIC GRAVITY, URINE, POC: 1.02 (ref 1–1.03)
TRANSFERRIN SERPL-MCNC: 330 MG/DL (ref 202–364)
URINALYSIS CLARITY, POC: ABNORMAL
URINALYSIS COLOR, POC: YELLOW
UROBILINOGEN, POC: NORMAL
VIT B12 SERPL-MCNC: 587 PG/ML (ref 193–986)

## 2022-10-31 PROCEDURE — 3017F COLORECTAL CA SCREEN DOC REV: CPT | Performed by: FAMILY MEDICINE

## 2022-10-31 PROCEDURE — G0008 ADMIN INFLUENZA VIRUS VAC: HCPCS | Performed by: FAMILY MEDICINE

## 2022-10-31 PROCEDURE — 3078F DIAST BP <80 MM HG: CPT | Performed by: FAMILY MEDICINE

## 2022-10-31 PROCEDURE — 1123F ACP DISCUSS/DSCN MKR DOCD: CPT | Performed by: FAMILY MEDICINE

## 2022-10-31 PROCEDURE — 81003 URINALYSIS AUTO W/O SCOPE: CPT | Performed by: FAMILY MEDICINE

## 2022-10-31 PROCEDURE — 3074F SYST BP LT 130 MM HG: CPT | Performed by: FAMILY MEDICINE

## 2022-10-31 PROCEDURE — G8484 FLU IMMUNIZE NO ADMIN: HCPCS | Performed by: FAMILY MEDICINE

## 2022-10-31 PROCEDURE — 90694 VACC AIIV4 NO PRSRV 0.5ML IM: CPT | Performed by: FAMILY MEDICINE

## 2022-10-31 PROCEDURE — G0439 PPPS, SUBSEQ VISIT: HCPCS | Performed by: FAMILY MEDICINE

## 2022-10-31 RX ORDER — VENLAFAXINE HYDROCHLORIDE 150 MG/1
150 CAPSULE, EXTENDED RELEASE ORAL 2 TIMES DAILY
Qty: 180 CAPSULE | Refills: 3 | Status: SHIPPED | OUTPATIENT
Start: 2022-10-31

## 2022-10-31 RX ORDER — NEBIVOLOL 10 MG/1
10 TABLET ORAL DAILY
Qty: 90 TABLET | Refills: 3 | Status: SHIPPED | OUTPATIENT
Start: 2022-10-31

## 2022-10-31 RX ORDER — LOSARTAN POTASSIUM 100 MG/1
100 TABLET ORAL DAILY
Qty: 90 TABLET | Refills: 3 | Status: SHIPPED | OUTPATIENT
Start: 2022-10-31

## 2022-10-31 RX ORDER — OMEPRAZOLE 40 MG/1
40 CAPSULE, DELAYED RELEASE ORAL DAILY
Qty: 90 CAPSULE | Refills: 3 | Status: SHIPPED | OUTPATIENT
Start: 2022-10-31

## 2022-10-31 RX ORDER — SULFAMETHOXAZOLE AND TRIMETHOPRIM 800; 160 MG/1; MG/1
1 TABLET ORAL 2 TIMES DAILY
Qty: 14 TABLET | Refills: 0 | Status: SHIPPED | OUTPATIENT
Start: 2022-10-31 | End: 2022-11-07

## 2022-10-31 RX ORDER — LOSARTAN POTASSIUM 100 MG/1
TABLET ORAL
COMMUNITY
Start: 2022-09-29 | End: 2022-10-31 | Stop reason: SDUPTHER

## 2022-10-31 RX ORDER — ALPRAZOLAM 1 MG/1
TABLET ORAL
Qty: 30 TABLET | Refills: 2 | Status: SHIPPED | OUTPATIENT
Start: 2022-10-31 | End: 2022-11-30

## 2022-10-31 RX ORDER — HYDROCODONE BITARTRATE AND ACETAMINOPHEN 10; 325 MG/1; MG/1
1 TABLET ORAL EVERY 6 HOURS PRN
Qty: 120 TABLET | Refills: 0 | Status: SHIPPED | OUTPATIENT
Start: 2022-11-05 | End: 2022-12-05

## 2022-10-31 RX ORDER — LEVOTHYROXINE SODIUM 112 UG/1
112 TABLET ORAL DAILY
Qty: 90 TABLET | Refills: 0 | Status: SHIPPED | OUTPATIENT
Start: 2022-10-31

## 2022-10-31 RX ORDER — ATORVASTATIN CALCIUM 20 MG/1
TABLET, FILM COATED ORAL
Qty: 90 TABLET | Refills: 3 | Status: SHIPPED | OUTPATIENT
Start: 2022-10-31

## 2022-10-31 ASSESSMENT — MINI MENTAL STATE EXAM
WHAT IS TODAY'S DATE?: 0
SAY: I WOULD LIKE YOU TO COUNT BACKWARD FROM 100 BY SEVENS: 0
WHAT DAY OF THE WEEK IS THIS?: 0
SAY: PUT THE PAPER DOWN ON THE FLOOR, SCORE IF PAPER IS PLACED BACK ON FLOOR: 0
WHAT MONTH IS THIS?: 0
SAY: I AM GOING TO NAME THREE OBJECTS. WHEN I AM FINISHED, I WANT YOU TO REPEAT
THEM. REMEMBER WHAT THEY ARE BECAUSE I AM GOING TO ASK YOU TO NAME THEM AGAIN IN
A FEW MINUTES.  SAY THE FOLLOWING WORDS SLOWLY AT 1-SECOND INTERVALS - BALL/ CAR/ MAN [ITERATIONS FOR REPEAT ADMINISTRATION]: 0
ASK THE PERSON IF HE IS RIGHT OR LEFT-HANDED. TAKE A PIECE OF PAPER AND HOLD IT UP IN
FRONT OF THE PERSON. SAY: TAKE THIS PAPER IN YOUR RIGHT/LEFT HAND (WHICHEVER IS NON-
DOMINANT), SCORE IF PAPER IS PICKED UP IN CORRECT HAND.: 0
PLACE DESIGN, ERASER AND PENCIL IN FRONT OF THE PERSON.  SAY:  COPY THIS DESIGN PLEASE.  SHOW: DESIGN. ALLOW: MULTIPLE TRIES. WAIT UNTIL PERSON IS FINISHED AND HANDS IT BACK. SCORE: ONLY FOR DIAGRAM WITH 4-SIDED FIGURE BETWEEN TWO 5-SIDED FIGURES: 0
WHAT CITY/TOWN ARE WE IN?: 0
WHICH SEASON IS THIS?: 0
WHAT YEAR IS THIS?: 0
WHAT IS THE NAME OF THIS BUILDING [IN FACILITY]?/WHAT IS THE STREET ADDRESS OF THIS HOUSE [IN HOME]?: 0
SAY: READ THE WORDS ON THE PAGE AND THEN DO WHAT IT SAYS. THEN HAND THE PERSON
THE SHEET WITH CLOSE YOUR EYES ON IT. IF THE SUBJECT READS AND DOES NOT CLOSE THEIR EYES, REPEAT UP TO THREE TIMES. SCORE ONLY IF SUBJECT CLOSES EYES.: 0
SAY: I WOULD LIKE YOU TO REPEAT THIS PHRASE AFTER ME: NO IFS, ANDS, OR BUTS.: 0
HAND THE PERSON A PENCIL AND PAPER. SAY: WRITE ANY COMPLETE SENTENCE ON THAT
PIECE OF PAPER. (NOTE: THE SENTENCE MUST MAKE SENSE. IGNORE SPELLING ERRORS): 0
WHAT FLOOR ARE WE ON [IN FACILITY]?/ WHAT ROOM ARE WE IN [IN HOME]?: 0
NOW WHAT WERE THE THREE OBJECTS I ASKED YOU TO REMEMBER?: 0
WHAT COUNTRY ARE WE IN?: 0
SAY: FOLD THE PAPER IN HALF ONCE WITH BOTH HANDS, SCORE IF PAPER IS CORRECTLY FOLDED IN HALF.: 0
SUM ALL MMSE QUESTIONS FOR TOTAL SCORE [OUT OF 30].: 0
SHOW: PENCIL [OBJECT] ASK: WHAT IS THIS CALLED?: 0
SHOW: WRISTWATCH [OBJECT] ASK: WHAT IS THIS CALLED?: 0
WHAT STATE [OR PROVINCE] ARE WE IN?: 0

## 2022-10-31 ASSESSMENT — PATIENT HEALTH QUESTIONNAIRE - PHQ9: DEPRESSION UNABLE TO ASSESS: FUNCTIONAL CAPACITY MOTIVATION LIMITS ACCURACY

## 2022-10-31 NOTE — PATIENT INSTRUCTIONS
Personalized Preventive Plan for Isabel Llamas - 10/31/2022  Medicare offers a range of preventive health benefits. Some of the tests and screenings are paid in full while other may be subject to a deductible, co-insurance, and/or copay. Some of these benefits include a comprehensive review of your medical history including lifestyle, illnesses that may run in your family, and various assessments and screenings as appropriate. After reviewing your medical record and screening and assessments performed today your provider may have ordered immunizations, labs, imaging, and/or referrals for you. A list of these orders (if applicable) as well as your Preventive Care list are included within your After Visit Summary for your review. Other Preventive Recommendations:    A preventive eye exam performed by an eye specialist is recommended every 1-2 years to screen for glaucoma; cataracts, macular degeneration, and other eye disorders. A preventive dental visit is recommended every 6 months. Try to get at least 150 minutes of exercise per week or 10,000 steps per day on a pedometer . Order or download the FREE \"Exercise & Physical Activity: Your Everyday Guide\" from [employer]. Call 6-893.539.9649 or search [employer] online. You need 9873-6756 mg of calcium and 7890-0610 IU of vitamin D per day. It is possible to meet your calcium requirement with diet alone, but a vitamin D supplement is usually necessary to meet this goal.  When exposed to the sun, use a sunscreen that protects against both UVA and UVB radiation with an SPF of 30 or greater. Reapply every 2 to 3 hours or after sweating, drying off with a towel, or swimming. Always wear a seat belt when traveling in a car. Always wear a helmet when riding a bicycle or motorcycle. Personalized Preventive Plan for Isabel Llamas - 10/31/2022  Medicare offers a range of preventive health benefits.  Some of the tests and screenings are paid in full while other may be subject to a deductible, co-insurance, and/or copay. Some of these benefits include a comprehensive review of your medical history including lifestyle, illnesses that may run in your family, and various assessments and screenings as appropriate. After reviewing your medical record and screening and assessments performed today your provider may have ordered immunizations, labs, imaging, and/or referrals for you. A list of these orders (if applicable) as well as your Preventive Care list are included within your After Visit Summary for your review. Other Preventive Recommendations:    A preventive eye exam performed by an eye specialist is recommended every 1-2 years to screen for glaucoma; cataracts, macular degeneration, and other eye disorders. A preventive dental visit is recommended every 6 months. Try to get at least 150 minutes of exercise per week or 10,000 steps per day on a pedometer . Order or download the FREE \"Exercise & Physical Activity: Your Everyday Guide\" from [employer]. Call 8-265.637.3613 or search [employer] online. You need 1576-9802 mg of calcium and 4204-5328 IU of vitamin D per day. It is possible to meet your calcium requirement with diet alone, but a vitamin D supplement is usually necessary to meet this goal.  When exposed to the sun, use a sunscreen that protects against both UVA and UVB radiation with an SPF of 30 or greater. Reapply every 2 to 3 hours or after sweating, drying off with a towel, or swimming. Always wear a seat belt when traveling in a car. Always wear a helmet when riding a bicycle or motorcycle.

## 2022-10-31 NOTE — PROGRESS NOTES
Medicare Annual Wellness Visit    Antwan Talavera is here for Medicare AWV    Assessment & Plan   Need for immunization against influenza  -     Influenza, FLUAD, (age 72 y+), IM, Preservative Free, 0.5 mL  Chronic pain syndrome  -     HYDROcodone-acetaminophen (NORCO)  MG per tablet; Take 1 tablet by mouth every 6 hours as needed for Pain for up to 30 days. , Disp-120 tablet, R-0Normal  Dysuria  -     AMB POC URINALYSIS DIP STICK AUTO W/O MICRO  Anemia, unspecified type  -     Vitamin B12; Future  -     Ferritin; Future  -     Transferrin; Future  -     Iron; Future  Primary hypertension  -     losartan (COZAAR) 100 MG tablet; Take 1 tablet by mouth daily, Disp-90 tablet, R-3Normal  -     nebivolol (BYSTOLIC) 10 MG tablet; Take 1 tablet by mouth daily, Disp-90 tablet, R-3Normal  Recurrent depression (HCC)  -     venlafaxine (EFFEXOR XR) 150 MG extended release capsule; Take 1 capsule by mouth 2 times daily at 0800 and 1400, Disp-180 capsule, R-3Normal  GERD without esophagitis  -     omeprazole (PRILOSEC) 40 MG delayed release capsule; Take 1 capsule by mouth daily, Disp-90 capsule, R-3Normal  Hypothyroidism, unspecified type  -     levothyroxine (SYNTHROID) 112 MCG tablet; Take 1 tablet by mouth Daily, Disp-90 tablet, R-0**Patient requests 90 days supply**Normal  Familial hypercholesterolemia  -     atorvastatin (LIPITOR) 20 MG tablet; TAKE 2 TABLETS BY MOUTH EVERY NIGHT AT BEDTIME, Disp-90 tablet, R-3Normal  Generalized anxiety disorder  -     ALPRAZolam (XANAX) 1 MG tablet; TAKE 1 TABLET BY MOUTH EVERY DAY, Disp-30 tablet, R-2Normal  Opioid use, unspecified with unspecified opioid-induced disorder (HCC)  Peripheral vascular disease, unspecified (HCC)  Stage 3 chronic kidney disease, unspecified whether stage 3a or 3b CKD (HCC)  Postmenopausal  -     DEXA BONE DENSITY AXIAL SKELETON; Future  Screening mammogram for high-risk patient  -     FORD DIGITAL SCREEN W OR WO CAD BILATERAL;  Future  Medicare annual wellness visit, subsequent    Recommendations for Preventive Services Due: see orders and patient instructions/AVS.  Recommended screening schedule for the next 5-10 years is provided to the patient in written form: see Patient Instructions/AVS.     Return in 6 months (on 4/30/2023) for Medicare Annual Wellness Visit in 1 year. Subjective   Presents office today for Medicare wellness visit/physical without complaints    Patient's complete Health Risk Assessment and screening values have been reviewed and are found in Flowsheets. The following problems were reviewed today and where indicated follow up appointments were made and/or referrals ordered. Positive Risk Factor Screenings with Interventions:    Fall Risk:  Do you feel unsteady or are you worried about falling? : no  2 or more falls in past year?: (!) yes  Fall with injury in past year?: (!) yes   Fall Risk Interventions:    Fall risk interventions discussed     Depression:  Depression Unable to Assess: Functional capacity motivation limits accuracy    Severity:1-4 = minimal depression, 5-9 = mild depression, 10-14 = moderate depression, 15-19 = moderately severe depression, 20-27 = severe depression          Opioid Risk: (High risk score ?55) Opioid risk score: 56    Last PDMP [place] as Reviewed:  Review User Review Instant Review Result     @        Last Controlled Substance Monitoring Documentation      Flowsheet Row Refill from 9/27/2022 in [facility] The Prescription Monitoring Report for this patient was reviewed today.  filed at 09/27/2022 4495              General Health and ACP:       Advance Directives       Power of [address] Will ACP-Advance Directive ACP-Power of     Not on File Not on File Not on File Not on File          General Health Risk Interventions:  General health risk interventions needed at this time are none              Objective   Vitals:    10/31/22 1403   BP: 120/70   Site: Right [facility] Arm   Position: Sitting   Weight: 154 lb (69.9 kg)   Height: 5' 6\" (1.676 m)      Body mass index is 24.86 kg/m². General Appearance: alert and oriented to person, place and time, well developed and well- nourished, in no acute distress  Skin: warm and dry, no rash or erythema  Head: normocephalic and atraumatic  Eyes: pupils equal, round, and reactive to light, extraocular eye movements intact, conjunctivae normal  ENT: tympanic membrane, external ear and ear canal normal bilaterally, nose without deformity, nasal mucosa and turbinates normal without polyps  Neck: supple and non-tender without mass, no thyromegaly or thyroid nodules, no cervical lymphadenopathy  Pulmonary/Chest: clear to auscultation bilaterally- no wheezes, rales or rhonchi, normal air movement, no respiratory distress  Cardiovascular: normal rate, regular rhythm, normal S1 and S2, no murmurs, rubs, clicks, or gallops, distal pulses intact, no carotid bruits  Abdomen: soft, non-tender, non-distended, normal bowel sounds, no masses or organomegaly  Extremities: no cyanosis, clubbing or edema  Musculoskeletal: normal range of motion, no joint swelling, deformity or tenderness  Neurologic: reflexes normal and symmetric, no cranial nerve deficit, gait, coordination and speech normal       No Known Allergies  Prior to Visit Medications    Medication Sig Taking? Authorizing Provider   losartan (COZAAR) 100 MG tablet Take 1 tablet by mouth daily Yes Antwan De La Cruz, DO   omeprazole (PRILOSEC) 40 MG delayed release capsule Take 1 capsule by mouth daily Yes Antwan De La Cruz, DO   HYDROcodone-acetaminophen (NORCO)  MG per tablet Take 1 tablet by mouth every 6 hours as needed for Pain for up to 30 days.  Yes Antwan De La Cruz, DO   venlafaxine (EFFEXOR XR) 150 MG extended release capsule Take 1 capsule by mouth 2 times daily at 0800 and 1400 Yes Lazaro Hughes, DO   levothyroxine (SYNTHROID) 112 MCG tablet Take 1 tablet by mouth Daily Yes [facility] RADHA De La Cruz DO   nebivolol (BYSTOLIC) 10 MG tablet Take 1 tablet by mouth daily Yes Dawson De La Cruz DO   atorvastatin (LIPITOR) 20 MG tablet TAKE 2 TABLETS BY MOUTH EVERY NIGHT AT BEDTIME Yes Dawson De La Cruz DO   ALPRAZolam (XANAX) 1 MG tablet TAKE 1 TABLET BY MOUTH EVERY DAY Yes Dawson De La Cruz DO   clopidogrel (PLAVIX) 75 MG tablet Take 75 mg by mouth in the morning. Yes Historical Provider, MD   Multiple Vitamins-Minerals (MULTIVITAMIN ADULTS 50+ PO) Take by mouth daily Yes Historical Provider, MD   vitamin B-12 (CYANOCOBALAMIN) 100 MCG tablet Take 50 mcg by mouth in the morning. Yes Historical Provider, MD   diclofenac (VOLTAREN) 75 MG EC tablet Take 1 tablet by mouth 2 times daily as needed for Pain Yes Maria Raymundo,    Vitamin D, Cholecalciferol, 10 MCG (400 UNIT) CHEW Take 1 tablet by mouth daily. Yes Historical Provider, MD   aspirin 81 MG chewable tablet Take 1 tablet by mouth at bedtime Yes Historical Provider, MD   losartan-hydroCHLOROthiazide (HYZAAR) 100-25 MG per tablet Take 1 tablet by mouth in the morning. Patient not taking: Reported on 10/31/2022  Historical Provider, MD   potassium chloride (KLOR-CON M) 20 MEQ extended release tablet Take 20 mEq by mouth in the morning and 20 mEq before bedtime. Patient not taking: Reported on 10/31/2022  Historical Provider, MD   ondansetron (ZOFRAN-ODT) 4 MG disintegrating tablet DISSOLVE 1 TABLET ON THE TONGUE EVERY 4 HOURS AS NEEDED FOR NAUSEA OR VOMITING  Patient not taking: No sig reported  Historical Provider, MD   MAGNESIUM-OXIDE 400 (240 Mg) MG tablet TAKE 1 TABLET BY MOUTH TWICE DAILY  Patient not taking: No sig reported  Historical Provider, MD   traZODone (DESYREL) 50 MG tablet TAKE 1 TABLET BY MOUTH EVERY NIGHT AT BEDTIME  Patient not taking: No sig reported  Historical Provider, MD   gabapentin (NEURONTIN) 300 MG capsule Take 1 capsule by mouth 3 times daily for 30 days.   Long Martínez MD       [facility] (Including outside providers/suppliers regularly involved in providing care):   Patient Care Team:  Bryson Ramos DO as PCP - [place], DO as PCP - [facility]  Jarrod Cervantes MD as Consulting Physician (Cardiology)     Reviewed and updated this visit:  Allergies  Med Hx  Surg Hx  Soc Hx  Fam Hx               Medicare Annual Wellness Visit    Chiquis Simmons is here for Medicare AWV    Assessment & Plan   Need for immunization against influenza  -     Influenza, FLUAD, (age 72 y+), IM, Preservative Free, 0.5 mL  Chronic pain syndrome  -     HYDROcodone-acetaminophen (NORCO)  MG per tablet; Take 1 tablet by mouth every 6 hours as needed for Pain for up to 30 days. , Disp-120 tablet, R-0Normal  Dysuria  -     AMB POC URINALYSIS DIP STICK AUTO W/O MICRO  Anemia, unspecified type  -     Vitamin B12; Future  -     Ferritin; Future  -     Transferrin; Future  -     Iron; Future  Primary hypertension  -     losartan (COZAAR) 100 MG tablet; Take 1 tablet by mouth daily, Disp-90 tablet, R-3Normal  -     nebivolol (BYSTOLIC) 10 MG tablet; Take 1 tablet by mouth daily, Disp-90 tablet, R-3Normal  Recurrent depression (HCC)  -     venlafaxine (EFFEXOR XR) 150 MG extended release capsule; Take 1 capsule by mouth 2 times daily at 0800 and 1400, Disp-180 capsule, R-3Normal  GERD without esophagitis  -     omeprazole (PRILOSEC) 40 MG delayed release capsule; Take 1 capsule by mouth daily, Disp-90 capsule, R-3Normal  Hypothyroidism, unspecified type  -     levothyroxine (SYNTHROID) 112 MCG tablet;  Take 1 tablet by mouth Daily, Disp-90 tablet, R-0**Patient requests 90 days supply**Normal  Familial hypercholesterolemia  -     atorvastatin (LIPITOR) 20 MG tablet; TAKE 2 TABLETS BY MOUTH EVERY NIGHT AT BEDTIME, Disp-90 tablet, R-3Normal  Generalized anxiety disorder  -     ALPRAZolam (XANAX) 1 MG tablet; TAKE 1 TABLET BY MOUTH EVERY DAY, Disp-30 tablet, R-2Normal  Opioid use, unspecified with unspecified opioid-induced disorder (HCC)  Peripheral vascular disease, unspecified ([facility] [address]  Stage 3 chronic kidney disease, unspecified whether stage 3a or 3b CKD ([facility] [address]  Postmenopausal  -     DEXA BONE DENSITY AXIAL SKELETON; Future  Screening mammogram for high-risk patient  -     FORD DIGITAL SCREEN W OR WO CAD BILATERAL; Future  Medicare annual wellness visit, subsequent    Recommendations for Preventive Services Due: see orders and patient instructions/AVS.  Recommended screening schedule for the next 5-10 years is provided to the patient in written form: see Patient Instructions/AVS.     Return in 6 months (on 4/30/2023) for Medicare Annual Wellness Visit in 1 year. Subjective   Physical and Medicare wellness visit    Patient's complete Health Risk Assessment and screening values have been reviewed and are found in Flowsheets. The following problems were reviewed today and where indicated follow up appointments were made and/or referrals ordered. Positive Risk Factor Screenings with Interventions:    Fall Risk:  Do you feel unsteady or are you worried about falling? : no  2 or more falls in past year?: (!) yes  Fall with injury in past year?: (!) yes   Fall Risk Interventions:    Same as above     Depression:  Depression Unable to Assess: Functional capacity motivation limits accuracy    Severity:1-4 = minimal depression, 5-9 = mild depression, 10-14 = moderate depression, 15-19 = moderately severe depression, 20-27 = severe depression          Opioid Risk: (High risk score ?55) Opioid risk score: 56    Last PDMP [place] as Reviewed:  Review User Review Instant Review Result     @        Last Controlled Substance Monitoring Documentation      Flowsheet Row Refill from 9/27/2022 in [facility] The Prescription Monitoring Report for this patient was reviewed today.  filed at 09/27/2022 5919              General Health and ACP:       Advance Directives       Power of [facility] Will ACP-Advance Directive ACP-Power of     Not on File Not on File Not on File Not on File          General Health Risk Interventions:  Same as above              Objective   Vitals:    10/31/22 1403   BP: 120/70   Site: Right Upper Arm   Position: Sitting   Weight: 154 lb (69.9 kg)   Height: 5' 6\" (1.676 m)      Body mass index is 24.86 kg/m². General Appearance: alert and oriented to person, place and time, well developed and well- nourished, in no acute distress  Skin: warm and dry, no rash or erythema  Head: normocephalic and atraumatic  Eyes: pupils equal, round, and reactive to light, extraocular eye movements intact, conjunctivae normal  ENT: tympanic membrane, external ear and ear canal normal bilaterally, nose without deformity, nasal mucosa and turbinates normal without polyps  Neck: supple and non-tender without mass, no thyromegaly or thyroid nodules, no cervical lymphadenopathy  Pulmonary/Chest: clear to auscultation bilaterally- no wheezes, rales or rhonchi, normal air movement, no respiratory distress  Cardiovascular: normal rate, regular rhythm, normal S1 and S2, no murmurs, rubs, clicks, or gallops, distal pulses intact, no carotid bruits  Abdomen: soft, non-tender, non-distended, normal bowel sounds, no masses or organomegaly  Extremities: no cyanosis, clubbing or edema  Musculoskeletal: normal range of motion, no joint swelling, deformity or tenderness  Neurologic: reflexes normal and symmetric, no cranial nerve deficit, gait, coordination and speech normal       No Known Allergies  Prior to Visit Medications    Medication Sig Taking? Authorizing Provider   losartan (COZAAR) 100 MG tablet Take 1 tablet by mouth daily Yes Que De La Cruz DO   omeprazole (PRILOSEC) 40 MG delayed release capsule Take 1 capsule by mouth daily Yes Que De La Cruz,    HYDROcodone-acetaminophen (NORCO)  MG per tablet Take 1 tablet by mouth every 6 hours as needed for Pain for up to 30 days.  Yes Richard De La Cruz DO   venlafaxine (EFFEXOR XR) 150 MG extended release capsule Take 1 capsule by mouth 2 times daily at 0800 and 1400 Yes Terri Suh DO   levothyroxine (SYNTHROID) 112 MCG tablet Take 1 tablet by mouth Daily Yes Richard De La Cruz DO   nebivolol (BYSTOLIC) 10 MG tablet Take 1 tablet by mouth daily Yes Roya De La Cruz DO   atorvastatin (LIPITOR) 20 MG tablet TAKE 2 TABLETS BY MOUTH EVERY NIGHT AT BEDTIME Yes Roya De La Cruz DO   ALPRAZolam (XANAX) 1 MG tablet TAKE 1 TABLET BY MOUTH EVERY DAY Yes Roya De La Cruz DO   clopidogrel (PLAVIX) 75 MG tablet Take 75 mg by mouth in the morning. Yes Historical Provider, MD   Multiple Vitamins-Minerals (MULTIVITAMIN ADULTS 50+ PO) Take by mouth daily Yes Historical Provider, MD   vitamin B-12 (CYANOCOBALAMIN) 100 MCG tablet Take 50 mcg by mouth in the morning. Yes Historical Provider, MD   diclofenac (VOLTAREN) 75 MG EC tablet Take 1 tablet by mouth 2 times daily as needed for Pain Yes Terri Suh DO   Vitamin D, Cholecalciferol, 10 MCG (400 UNIT) CHEW Take 1 tablet by mouth daily. Yes Historical Provider, MD   aspirin 81 MG chewable tablet Take 1 tablet by mouth at bedtime Yes Historical Provider, MD   losartan-hydroCHLOROthiazide (HYZAAR) 100-25 MG per tablet Take 1 tablet by mouth in the morning. Patient not taking: Reported on 10/31/2022  Historical Provider, MD   potassium chloride (KLOR-CON M) 20 MEQ extended release tablet Take 20 mEq by mouth in the morning and 20 mEq before bedtime.   Patient not taking: Reported on 10/31/2022  Historical Provider, MD   ondansetron (ZOFRAN-ODT) 4 MG disintegrating tablet DISSOLVE 1 TABLET ON THE TONGUE EVERY 4 HOURS AS NEEDED FOR NAUSEA OR VOMITING  Patient not taking: No sig reported  Historical Provider, MD   MAGNESIUM-OXIDE 400 (240 Mg) MG tablet TAKE 1 TABLET BY MOUTH TWICE DAILY  Patient not taking: No sig reported  Historical Provider, MD   traZODone (DESYREL) 50 MG tablet TAKE 1 TABLET BY MOUTH EVERY NIGHT AT BEDTIME  Patient not taking: No sig reported  Historical Provider, MD   gabapentin (NEURONTIN) 300 MG capsule Take 1 capsule by mouth 3 times daily for 30 days. Anamaria Peñaloza MD       [facility] (Including outside providers/suppliers regularly involved in providing care):   Patient Care Team:  Susanne Gorss DO as PCP - [address],  as PCP - [facility] Provider  Go Mondragon MD as Consulting Physician (Cardiology)     Reviewed and updated this visit:  Allergies  Med Hx  Surg Hx  Soc Hx  Fam Hx        For routine physical exam reviewed her labs answered all her questions counseling and supportive care gone over recommended mammogram bone density and follow-up after. I have spent a total of 8-15 minutes assessing, reviewing, and discussing the depression screening with patient in office today.   Return back in 1 week to go over anemia panel and repeat UA  We we will set her up for her 6-month lab work and urine drug screen

## 2022-11-08 ENCOUNTER — OFFICE VISIT (OUTPATIENT)
Dept: FAMILY MEDICINE CLINIC | Facility: CLINIC | Age: 72
End: 2022-11-08
Payer: MEDICARE

## 2022-11-08 VITALS
DIASTOLIC BLOOD PRESSURE: 70 MMHG | SYSTOLIC BLOOD PRESSURE: 110 MMHG | HEIGHT: 66 IN | WEIGHT: 151 LBS | BODY MASS INDEX: 24.27 KG/M2

## 2022-11-08 DIAGNOSIS — N30.00 ACUTE CYSTITIS WITHOUT HEMATURIA: Primary | ICD-10-CM

## 2022-11-08 LAB
BACTERIA URINE, POC: ABNORMAL
BILIRUBIN, URINE, POC: NEGATIVE
BLOOD URINE, POC: ABNORMAL
CASTS URINE, POC: ABNORMAL
EPI CELLS URINE, POC: ABNORMAL
GLUCOSE URINE, POC: NEGATIVE
KETONES, URINE, POC: NEGATIVE
LEUKOCYTE ESTERASE, URINE, POC: ABNORMAL
NITRITE, URINE, POC: NEGATIVE
PH, URINE, POC: 5.5 (ref 4.6–8)
PROTEIN,URINE, POC: NEGATIVE
RBC, URINE, POC: ABNORMAL
SPECIFIC GRAVITY, URINE, POC: 1.01 (ref 1–1.03)
TRICHOMONAS URINE, POC: ABNORMAL
URINALYSIS CLARITY, POC: ABNORMAL
URINALYSIS COLOR, POC: YELLOW
UROBILINOGEN, POC: NORMAL
WBC, URINE, POC: ABNORMAL
YEAST, URINE, POC: ABNORMAL

## 2022-11-08 PROCEDURE — 1036F TOBACCO NON-USER: CPT | Performed by: FAMILY MEDICINE

## 2022-11-08 PROCEDURE — G8484 FLU IMMUNIZE NO ADMIN: HCPCS | Performed by: FAMILY MEDICINE

## 2022-11-08 PROCEDURE — G8420 CALC BMI NORM PARAMETERS: HCPCS | Performed by: FAMILY MEDICINE

## 2022-11-08 PROCEDURE — 3017F COLORECTAL CA SCREEN DOC REV: CPT | Performed by: FAMILY MEDICINE

## 2022-11-08 PROCEDURE — G8428 CUR MEDS NOT DOCUMENT: HCPCS | Performed by: FAMILY MEDICINE

## 2022-11-08 PROCEDURE — 1123F ACP DISCUSS/DSCN MKR DOCD: CPT | Performed by: FAMILY MEDICINE

## 2022-11-08 PROCEDURE — 1090F PRES/ABSN URINE INCON ASSESS: CPT | Performed by: FAMILY MEDICINE

## 2022-11-08 PROCEDURE — 99213 OFFICE O/P EST LOW 20 MIN: CPT | Performed by: FAMILY MEDICINE

## 2022-11-08 PROCEDURE — 81000 URINALYSIS NONAUTO W/SCOPE: CPT | Performed by: FAMILY MEDICINE

## 2022-11-08 PROCEDURE — G8399 PT W/DXA RESULTS DOCUMENT: HCPCS | Performed by: FAMILY MEDICINE

## 2022-11-08 PROCEDURE — 3078F DIAST BP <80 MM HG: CPT | Performed by: FAMILY MEDICINE

## 2022-11-08 PROCEDURE — 3074F SYST BP LT 130 MM HG: CPT | Performed by: FAMILY MEDICINE

## 2022-11-08 RX ORDER — SULFAMETHOXAZOLE AND TRIMETHOPRIM 800; 160 MG/1; MG/1
1 TABLET ORAL 2 TIMES DAILY
Qty: 14 TABLET | Refills: 0 | Status: SHIPPED | OUTPATIENT
Start: 2022-11-08 | End: 2022-11-15

## 2022-11-08 ASSESSMENT — ENCOUNTER SYMPTOMS
VOMITING: 0
SHORTNESS OF BREATH: 0
NAUSEA: 0

## 2022-11-08 ASSESSMENT — PATIENT HEALTH QUESTIONNAIRE - PHQ9
1. LITTLE INTEREST OR PLEASURE IN DOING THINGS: 0
SUM OF ALL RESPONSES TO PHQ9 QUESTIONS 1 & 2: 0
SUM OF ALL RESPONSES TO PHQ QUESTIONS 1-9: 0
SUM OF ALL RESPONSES TO PHQ QUESTIONS 1-9: 0
3. TROUBLE FALLING OR STAYING ASLEEP: 0
SUM OF ALL RESPONSES TO PHQ QUESTIONS 1-9: 0
SUM OF ALL RESPONSES TO PHQ QUESTIONS 1-9: 0
8. MOVING OR SPEAKING SO SLOWLY THAT OTHER PEOPLE COULD HAVE NOTICED. OR THE OPPOSITE, BEING SO FIGETY OR RESTLESS THAT YOU HAVE BEEN MOVING AROUND A LOT MORE THAN USUAL: 0
6. FEELING BAD ABOUT YOURSELF - OR THAT YOU ARE A FAILURE OR HAVE LET YOURSELF OR YOUR FAMILY DOWN: 0
9. THOUGHTS THAT YOU WOULD BE BETTER OFF DEAD, OR OF HURTING YOURSELF: 0
2. FEELING DOWN, DEPRESSED OR HOPELESS: 0
5. POOR APPETITE OR OVEREATING: 0
7. TROUBLE CONCENTRATING ON THINGS, SUCH AS READING THE NEWSPAPER OR WATCHING TELEVISION: 0
10. IF YOU CHECKED OFF ANY PROBLEMS, HOW DIFFICULT HAVE THESE PROBLEMS MADE IT FOR YOU TO DO YOUR WORK, TAKE CARE OF THINGS AT HOME, OR GET ALONG WITH OTHER PEOPLE: 0
4. FEELING TIRED OR HAVING LITTLE ENERGY: 0

## 2022-11-08 NOTE — PROGRESS NOTES
PROGRESS NOTE    SUBJECTIVE:   Warner Brewer is a 74 y.o. female seen for a follow up visit regarding the following chief complaint:     Chief Complaint   Patient presents with    Urinary Tract Infection           HPI patient presents office complaining of dysuria      Past Medical History, Past Surgical History, Family history, Social History, and Medications were all reviewed with the patient today and updated as necessary. Current Outpatient Medications   Medication Sig Dispense Refill    sulfamethoxazole-trimethoprim (BACTRIM DS) 800-160 MG per tablet Take 1 tablet by mouth 2 times daily for 7 days 14 tablet 0    atorvastatin (LIPITOR) 20 MG tablet TAKE 2 TABLETS BY MOUTH EVERY NIGHT AT BEDTIME 90 tablet 3    ALPRAZolam (XANAX) 1 MG tablet TAKE 1 TABLET BY MOUTH EVERY DAY 30 tablet 2    clopidogrel (PLAVIX) 75 MG tablet Take 75 mg by mouth in the morning. diclofenac (VOLTAREN) 75 MG EC tablet Take 1 tablet by mouth 2 times daily as needed for Pain 60 tablet 0    aspirin 81 MG chewable tablet Take 1 tablet by mouth at bedtime      losartan (COZAAR) 100 MG tablet Take 1 tablet by mouth daily 90 tablet 3    omeprazole (PRILOSEC) 40 MG delayed release capsule Take 1 capsule by mouth daily 90 capsule 3    HYDROcodone-acetaminophen (NORCO)  MG per tablet Take 1 tablet by mouth every 6 hours as needed for Pain for up to 30 days. 120 tablet 0    venlafaxine (EFFEXOR XR) 150 MG extended release capsule Take 1 capsule by mouth 2 times daily at 0800 and 1400 180 capsule 3    levothyroxine (SYNTHROID) 112 MCG tablet Take 1 tablet by mouth Daily 90 tablet 0    nebivolol (BYSTOLIC) 10 MG tablet Take 1 tablet by mouth daily 90 tablet 3    losartan-hydroCHLOROthiazide (HYZAAR) 100-25 MG per tablet Take 1 tablet by mouth in the morning.  (Patient not taking: Reported on 10/31/2022)      Multiple Vitamins-Minerals (MULTIVITAMIN ADULTS 50+ PO) Take by mouth daily      vitamin B-12 (CYANOCOBALAMIN) 100 MCG tablet Take 50 mcg by mouth in the morning. potassium chloride (KLOR-CON M) 20 MEQ extended release tablet Take 20 mEq by mouth in the morning and 20 mEq before bedtime. (Patient not taking: Reported on 10/31/2022)      Vitamin D, Cholecalciferol, 10 MCG (400 UNIT) CHEW Take 1 tablet by mouth daily. ondansetron (ZOFRAN-ODT) 4 MG disintegrating tablet DISSOLVE 1 TABLET ON THE TONGUE EVERY 4 HOURS AS NEEDED FOR NAUSEA OR VOMITING (Patient not taking: No sig reported)      MAGNESIUM-OXIDE 400 (240 Mg) MG tablet TAKE 1 TABLET BY MOUTH TWICE DAILY (Patient not taking: No sig reported)      traZODone (DESYREL) 50 MG tablet TAKE 1 TABLET BY MOUTH EVERY NIGHT AT BEDTIME (Patient not taking: No sig reported)      gabapentin (NEURONTIN) 300 MG capsule Take 1 capsule by mouth 3 times daily for 30 days. 270 capsule 0     No current facility-administered medications for this visit.      No Known Allergies  Patient Active Problem List   Diagnosis    History of CVA (cerebrovascular accident)    Arthritis    Rectal bleeding    Recurrent depression ([facility] [address]    Depression    Thrush    Acute postoperative anemia due to greater than expected blood loss    Dizziness    Chest pain    SBO (small bowel obstruction) (HCC)    Chronic pain syndrome    HTN (hypertension)    Ataxia    Shortness of breath    Closed displaced fracture of right femoral neck ([facility])    S/P total knee arthroplasty    Opioid use, unspecified with unspecified opioid-induced disorder (HCC)    External hemorrhoid, bleeding    S/P total knee replacement    Abnormal findings on diagnostic imaging of other specified body structures    Personal history of colonic polyps    IBS (irritable bowel syndrome)    Hypokalemia    Peripheral neuropathy    Anxiety    Hypothyroid    Asthma    Migraines    Peripheral vascular disease (HCC)    Falls    Polycythemia    Elevated bilirubin    Allergic rhinitis    Hyperglycemia    Constipation    Hematuria    Arthritis of knee, left Hypercholesterolemia    OAB (overactive bladder)    Vaginal atrophy    Heart murmur    GERD (gastroesophageal reflux disease)    Arthritis of right hip    Chronic renal disease, stage III ([facility] [address] [[id number]]     Past Medical History:   Diagnosis Date    Allergic rhinitis     Aneurysm (HCC)     per CTA of neck and head (3/5/21)=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 Iman rodrigues; Gladys Wade thought I had it but it was a panic attack\"    Calculus of kidney     Cancer ([facility] [address]     melanoma R breast    Chronic pain     Depression     worsening    Dizziness 09/08/2016    not a recent problem    Dyslipidemia     Fatigue     Former cigarette smoker     GERD (gastroesophageal reflux disease)     controlled with med    Heart murmur 11/24/2015    last echo=11/25/2019    History of nuclear stress test 05/11/2021    Normal Perfusion     HTN (hypertension)     controlled with med    Hx of echocardiogram 11/25/2019    LVEF 55-60%    Hypercholesterolemia     on med for control     Hypothyroid     on med for control     IBS (irritable bowel syndrome)     Migraines     HX no current problems     Mitral valve insufficiency     rheumatic fever as a child --- followed by dr Mati Hoyt    Nausea & vomiting     post-op    Peripheral neuropathy     bilat feet    Rheumatic fever     as a child; age 5-6    Seizures ([address]     febrile seiz. as a child , no problems after that. 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 CHOLECYSTECTOMY, LAPAROSCOPIC      COLONOSCOPY      HYSTERECTOMY (CERVIX STATUS UNKNOWN)      KNEE ARTHROSCOPY Left     KNEE ARTHROSCOPY Right 10/16/09; 2015    ORTHOPEDIC SURGERY      C- 7 ACDF    ORTHOPEDIC SURGERY Right 2021    RIGHT  HIP FNS     OTHER SURGICAL HISTORY      urethra repair    OTHER SURGICAL HISTORY      melanoma resected from right chest wall    TONSILLECTOMY AND ADENOIDECTOMY      TOTAL COLECTOMY      for obstruction - about 14 inches removed    TOTAL KNEE ARTHROPLASTY Left 2016    TOTAL KNEE ARTHROPLASTY Right 3/2015    UROLOGICAL SURGERY      CYSTOSCOPY WITH BLADDER BIOPSIES      Family History   Problem Relation Age of Onset    Other Mother     Delayed Awakening Mother     Thyroid Disease Mother     Diabetes Mother     Cancer Mother         breast cancer    Breast Problems Mother     Breast Cancer Mother     Heart Disease Father     Hypertension Father     Heart Failure Father         CHF    Delayed Awakening Sister     Thyroid Disease Sister     Breast Problems Sister     Breast Cancer Sister     Cancer Other         breast    Diabetes Other         type II    High Cholesterol Other     Elevated Lipids Other     Hypertension Other     Thyroid Disease Other         hypo, acquired    Breast Problems Maternal Aunt     Breast Cancer Maternal Aunt     Breast Cancer Paternal Aunt      Social History     Tobacco Use    Smoking status: Former     Packs/day: 0.25     Years: 12.00     Pack years: 3.00     Types: Cigarettes     Start date: 1990     Quit date: 2002     Years since quittin.2    Smokeless tobacco: Never   Substance Use Topics    Alcohol use: No         Review of Systems   Constitutional:  Negative for chills and fever. Respiratory:  Negative for shortness of breath. Cardiovascular:  Negative for chest pain. Gastrointestinal:  Negative for nausea and vomiting. Genitourinary:  Positive for difficulty urinating, dysuria, frequency and urgency. OBJECTIVE:  /70 (Site: Right Upper Arm, Position: Sitting)   Ht 5' 6\" (1.676 m)   Wt 151 lb (68.5 kg)   BMI 24.37 kg/m²      Physical Exam  Vitals and nursing note reviewed. Constitutional:       General: She is not in acute distress. Appearance: Normal appearance. Cardiovascular:      Rate and Rhythm: Normal rate and regular rhythm. Heart sounds: Normal heart sounds. Pulmonary:      Breath sounds: Normal breath sounds. Neurological:      Mental Status: She is alert. Psychiatric:         Mood and Affect: Mood normal.         Behavior: Behavior normal.         Thought Content: Thought content normal.         Judgment: Judgment normal.        Medical problems and test results were reviewed with the patient today.      Recent Results (from the past 672 hour(s))   Urinalysis    Collection Time: 10/14/22 10:20 AM   Result Value Ref Range    Color, UA DARK YELLOW      Appearance CLOUDY      Specific Gravity, UA 1.017 1.001 - 1.023      pH, Urine 6.0 5.0 - 9.0      Protein, UA Negative Negative mg/dL    Glucose, UA Negative mg/dL    Ketones, Urine Negative Negative mg/dL    Bilirubin Urine Negative Negative      Blood, Urine Negative Negative      Urobilinogen, Urine 0.2 0.2 - 1.0 EU/dL    Nitrite, Urine Negative Negative      Leukocyte Esterase, Urine LARGE (A) Negative     Vitamin D 25 Hydroxy    Collection Time: 10/14/22 10:20 AM   Result Value Ref Range    Vit D, 25-Hydroxy 34.0 30.0 - 100.0 ng/mL   TSH    Collection Time: 10/14/22 10:20 AM   Result Value Ref Range    TSH, 3RD GENERATION 1.020 0.358 - 3.740 uIU/mL   Comprehensive Metabolic Panel    Collection Time: 10/14/22 10:20 AM   Result Value Ref Range    Sodium 143 133 - 143 mmol/L    Potassium 3.7 3.5 - 5.1 mmol/L    Chloride 109 101 - 110 mmol/L    CO2 26 21 - 32 mmol/L    Anion Gap 8 2 - 11 mmol/L    Glucose 76 65 - 100 mg/dL    BUN 20 8 - 23 MG/DL    Creatinine 1.30 (H) 0.6 - 1.0 MG/DL    Est, Glom Filt Rate 43 (L) >60 ml/min/1.73m2    Calcium 9.6 8.3 - 10.4 MG/DL    Total Bilirubin 1.3 (H) 0.2 - 1.1 MG/DL    ALT 20 12 - 65 U/L    AST 11 (L) 15 - 37 U/L    Alk Phosphatase 81 50 - 136 U/L    Total Protein 7.0 6.3 - 8.2 g/dL    Albumin 4.3 3.2 - 4.6 g/dL    Globulin 2.7 (L) 2.8 - 4.5 g/dL    Albumin/Globulin Ratio 1.6 0.4 - 1.6     CBC with Auto Differential    Collection Time: 10/14/22 10:20 AM   Result Value Ref Range    WBC 5.6 4.3 - 11.1 K/uL    RBC 4.23 4.05 - 5.2 M/uL    Hemoglobin 10.2 (L) 11.7 - 15.4 g/dL    Hematocrit 35.2 (L) 35.8 - 46.3 %    MCV 83.2 82 - 102 FL    MCH 24.1 (L) 26.1 - 32.9 PG    MCHC 29.0 (L) 31.4 - 35.0 g/dL    RDW 15.7 (H) 11.9 - 14.6 %    Platelets 205 475 - 923 K/uL    MPV 10.4 9.4 - 12.3 FL    nRBC 0.00 0.0 - 0.2 K/uL    Differential Type AUTOMATED      Seg Neutrophils 44 43 - 78 %    Lymphocytes 42 13 - 44 %    Monocytes 11 4.0 - 12.0 %    Eosinophils % 3 0.5 - 7.8 %    Basophils 1 0.0 - 2.0 %    Immature Granulocytes 0 0.0 - 5.0 %    Segs Absolute 2.5 1.7 - 8.2 K/UL    Absolute Lymph # 2.3 0.5 - 4.6 K/UL    Absolute Mono # 0.6 0.1 - 1.3 K/UL    Absolute Eos # 0.1 0.0 - 0.8 K/UL    Basophils Absolute 0.1 0.0 - 0.2 K/UL    Absolute Immature Granulocyte 0.0 0.0 - 0.5 K/UL   Lipid Panel    Collection Time: 10/14/22 10:20 AM   Result Value Ref Range    Cholesterol, Total 113 <200 MG/DL    Triglycerides 167 (H) 35 - 150 MG/DL    HDL 46 40 - 60 MG/DL    LDL Calculated 33.6 <100 MG/DL    VLDL Cholesterol Calculated 33.4 (H) 6.0 - 23.0 MG/DL    Chol/HDL Ratio 2.5     AMB POC URINALYSIS DIP STICK AUTO W/O MICRO    Collection Time: 10/31/22  2:44 PM   Result Value Ref Range    Color, Urine, POC yellow     Clarity, Urine, POC turbid     Glucose, Urine, POC Negative Negative    Bilirubin, Urine, POC Negative Negative    Ketones, Urine, POC Negative Negative    Specific Gravity, Urine, POC 1.025 1.001 - 1.035    Blood, Urine, POC 2+ Negative    pH, Urine, POC 6.0 4.6 - 8.0    Protein, Urine, POC 1+ Negative Urobilinogen, POC normal     Nitrate, Urine, POC POSITIVE Negative    Leukocyte Esterase, Urine, POC 4+ Negative   Iron    Collection Time: 10/31/22  3:16 PM   Result Value Ref Range    Iron 83 35 - 150 ug/dL   Transferrin    Collection Time: 10/31/22  3:16 PM   Result Value Ref Range    Transferrin 330 202 - 364 mg/dL   Ferritin    Collection Time: 10/31/22  3:16 PM   Result Value Ref Range    Ferritin 13 8 - 388 NG/ML   Vitamin B12    Collection Time: 10/31/22  3:16 PM   Result Value Ref Range    Vitamin B-12 587 193 - 986 pg/mL   AMB POC URINALYSIS DIP STICK MANUAL W/ MICRO [facility]SS[facility]    Collection Time: 11/08/22  3:02 PM   Result Value Ref Range    Color (UA POC) Yellow     Clarity (UA POC) Slightly Cloudy     Glucose, Urine, POC Negative Negative    Bilirubin, Urine, POC Negative Negative    Ketones, Urine, POC Negative Negative    Specific Gravity, Urine, POC 1.015 1.001 - 1.035    Blood (UA POC) Trace Negative    pH, Urine, POC 5.5 4.6 - 8.0    Protein, Urine, POC Negative Negative    Urobilinogen, POC Normal     Nitrite, Urine, POC Negative Negative    Leukocyte Esterase, Urine, POC 1+ Negative    RBC, Urine, POC 1-3     WBC, Urine, POC 5-10     Epi Cells Urine, POC Moderate     Bacteria Urine, POC Moderate Negative    Casts Urine, POC      Yeast, Urine, POC Small     Trichomonas Urine, POC         ASSESSMENT and PLAN    Visit Diagnoses and Associated Orders       Acute cystitis without hematuria    -  Primary    AMB POC URINALYSIS DIP STICK MANUAL W/ MICRO BSSC [39439 CPT(R)]      sulfamethoxazole-trimethoprim (BACTRIM DS) 800-160 MG per tablet [94476]                       Diagnosis Orders   1. Acute cystitis without hematuria  AMB POC URINALYSIS DIP STICK MANUAL W/ MICRO BSS[facility]    sulfamethoxazole-trimethoprim (BACTRIM DS) 800-160 MG per tablet      , Nia Katz was seen today for urinary tract infection.     Diagnoses and all orders for this visit:    Acute cystitis without hematuria  -     AMB POC URINALYSIS DIP

## 2022-11-15 DIAGNOSIS — K21.9 GASTROESOPHAGEAL REFLUX DISEASE WITHOUT ESOPHAGITIS: Primary | ICD-10-CM

## 2022-11-15 RX ORDER — PANTOPRAZOLE SODIUM 40 MG/1
40 TABLET, DELAYED RELEASE ORAL
Qty: 90 TABLET | Refills: 3 | Status: SHIPPED | OUTPATIENT
Start: 2022-11-15

## 2022-11-17 ENCOUNTER — OFFICE VISIT (OUTPATIENT)
Dept: FAMILY MEDICINE CLINIC | Facility: CLINIC | Age: 72
End: 2022-11-17
Payer: MEDICARE

## 2022-11-17 ENCOUNTER — NURSE ONLY (OUTPATIENT)
Dept: FAMILY MEDICINE CLINIC | Facility: CLINIC | Age: 72
End: 2022-11-17
Payer: MEDICARE

## 2022-11-17 VITALS
SYSTOLIC BLOOD PRESSURE: 120 MMHG | HEIGHT: 66 IN | BODY MASS INDEX: 24.27 KG/M2 | WEIGHT: 151 LBS | DIASTOLIC BLOOD PRESSURE: 70 MMHG

## 2022-11-17 DIAGNOSIS — N30.01 ACUTE CYSTITIS WITH HEMATURIA: Primary | ICD-10-CM

## 2022-11-17 DIAGNOSIS — R30.0 DYSURIA: Primary | ICD-10-CM

## 2022-11-17 LAB
BACTERIA URINE, POC: ABNORMAL
BILIRUBIN, URINE, POC: ABNORMAL
BLOOD URINE, POC: NEGATIVE
CASTS URINE, POC: ABNORMAL
EPI CELLS URINE, POC: ABNORMAL
GLUCOSE URINE, POC: NEGATIVE
KETONES, URINE, POC: NEGATIVE
LEUKOCYTE ESTERASE, URINE, POC: ABNORMAL
NITRITE, URINE, POC: NEGATIVE
PH, URINE, POC: 5.5 (ref 4.6–8)
PROTEIN,URINE, POC: NEGATIVE
RBC, URINE, POC: ABNORMAL
SPECIFIC GRAVITY, URINE, POC: >1.03 (ref 1–1.03)
TRICHOMONAS URINE, POC: ABNORMAL
URINALYSIS CLARITY, POC: ABNORMAL
URINALYSIS COLOR, POC: YELLOW
UROBILINOGEN, POC: NORMAL
WBC, URINE, POC: ABNORMAL
YEAST, URINE, POC: ABNORMAL

## 2022-11-17 PROCEDURE — G8427 DOCREV CUR MEDS BY ELIG CLIN: HCPCS | Performed by: FAMILY MEDICINE

## 2022-11-17 PROCEDURE — G8420 CALC BMI NORM PARAMETERS: HCPCS | Performed by: FAMILY MEDICINE

## 2022-11-17 PROCEDURE — G8399 PT W/DXA RESULTS DOCUMENT: HCPCS | Performed by: FAMILY MEDICINE

## 2022-11-17 PROCEDURE — 1036F TOBACCO NON-USER: CPT | Performed by: FAMILY MEDICINE

## 2022-11-17 PROCEDURE — G8484 FLU IMMUNIZE NO ADMIN: HCPCS | Performed by: FAMILY MEDICINE

## 2022-11-17 PROCEDURE — 1123F ACP DISCUSS/DSCN MKR DOCD: CPT | Performed by: FAMILY MEDICINE

## 2022-11-17 PROCEDURE — 99213 OFFICE O/P EST LOW 20 MIN: CPT | Performed by: FAMILY MEDICINE

## 2022-11-17 PROCEDURE — 3017F COLORECTAL CA SCREEN DOC REV: CPT | Performed by: FAMILY MEDICINE

## 2022-11-17 PROCEDURE — 1090F PRES/ABSN URINE INCON ASSESS: CPT | Performed by: FAMILY MEDICINE

## 2022-11-17 PROCEDURE — 81000 URINALYSIS NONAUTO W/SCOPE: CPT | Performed by: FAMILY MEDICINE

## 2022-11-17 PROCEDURE — 3074F SYST BP LT 130 MM HG: CPT | Performed by: FAMILY MEDICINE

## 2022-11-17 PROCEDURE — 3078F DIAST BP <80 MM HG: CPT | Performed by: FAMILY MEDICINE

## 2022-11-17 RX ORDER — CEPHALEXIN 500 MG/1
500 CAPSULE ORAL 4 TIMES DAILY
Qty: 28 CAPSULE | Refills: 0 | Status: SHIPPED | OUTPATIENT
Start: 2022-11-17 | End: 2022-11-24

## 2022-11-17 ASSESSMENT — PATIENT HEALTH QUESTIONNAIRE - PHQ9
7. TROUBLE CONCENTRATING ON THINGS, SUCH AS READING THE NEWSPAPER OR WATCHING TELEVISION: 0
8. MOVING OR SPEAKING SO SLOWLY THAT OTHER PEOPLE COULD HAVE NOTICED. OR THE OPPOSITE, BEING SO FIGETY OR RESTLESS THAT YOU HAVE BEEN MOVING AROUND A LOT MORE THAN USUAL: 0
7. TROUBLE CONCENTRATING ON THINGS, SUCH AS READING THE NEWSPAPER OR WATCHING TELEVISION: 0
SUM OF ALL RESPONSES TO PHQ QUESTIONS 1-9: 8
SUM OF ALL RESPONSES TO PHQ QUESTIONS 1-9: 8
9. THOUGHTS THAT YOU WOULD BE BETTER OFF DEAD, OR OF HURTING YOURSELF: 0
6. FEELING BAD ABOUT YOURSELF - OR THAT YOU ARE A FAILURE OR HAVE LET YOURSELF OR YOUR FAMILY DOWN: 0
4. FEELING TIRED OR HAVING LITTLE ENERGY: 2
SUM OF ALL RESPONSES TO PHQ QUESTIONS 1-9: 10
4. FEELING TIRED OR HAVING LITTLE ENERGY: 2
3. TROUBLE FALLING OR STAYING ASLEEP: 2
SUM OF ALL RESPONSES TO PHQ9 QUESTIONS 1 & 2: 2
SUM OF ALL RESPONSES TO PHQ QUESTIONS 1-9: 8
SUM OF ALL RESPONSES TO PHQ QUESTIONS 1-9: 10
SUM OF ALL RESPONSES TO PHQ QUESTIONS 1-9: 10
9. THOUGHTS THAT YOU WOULD BE BETTER OFF DEAD, OR OF HURTING YOURSELF: 0
2. FEELING DOWN, DEPRESSED OR HOPELESS: 2
1. LITTLE INTEREST OR PLEASURE IN DOING THINGS: 0
3. TROUBLE FALLING OR STAYING ASLEEP: 2
10. IF YOU CHECKED OFF ANY PROBLEMS, HOW DIFFICULT HAVE THESE PROBLEMS MADE IT FOR YOU TO DO YOUR WORK, TAKE CARE OF THINGS AT HOME, OR GET ALONG WITH OTHER PEOPLE: 1
2. FEELING DOWN, DEPRESSED OR HOPELESS: 2
5. POOR APPETITE OR OVEREATING: 2
8. MOVING OR SPEAKING SO SLOWLY THAT OTHER PEOPLE COULD HAVE NOTICED. OR THE OPPOSITE, BEING SO FIGETY OR RESTLESS THAT YOU HAVE BEEN MOVING AROUND A LOT MORE THAN USUAL: 0
SUM OF ALL RESPONSES TO PHQ QUESTIONS 1-9: 10
SUM OF ALL RESPONSES TO PHQ9 QUESTIONS 1 & 2: 4
SUM OF ALL RESPONSES TO PHQ QUESTIONS 1-9: 8
10. IF YOU CHECKED OFF ANY PROBLEMS, HOW DIFFICULT HAVE THESE PROBLEMS MADE IT FOR YOU TO DO YOUR WORK, TAKE CARE OF THINGS AT HOME, OR GET ALONG WITH OTHER PEOPLE: 1
1. LITTLE INTEREST OR PLEASURE IN DOING THINGS: 2
5. POOR APPETITE OR OVEREATING: 2

## 2022-11-17 ASSESSMENT — ENCOUNTER SYMPTOMS
NAUSEA: 0
VOMITING: 0
SHORTNESS OF BREATH: 0

## 2022-11-17 ASSESSMENT — COLUMBIA-SUICIDE SEVERITY RATING SCALE - C-SSRS
2. HAVE YOU ACTUALLY HAD ANY THOUGHTS OF KILLING YOURSELF?: NO
1. WITHIN THE PAST MONTH, HAVE YOU WISHED YOU WERE DEAD OR WISHED YOU COULD GO TO SLEEP AND NOT WAKE UP?: NO
6. HAVE YOU EVER DONE ANYTHING, STARTED TO DO ANYTHING, OR PREPARED TO DO ANYTHING TO END YOUR LIFE?: NO

## 2022-11-17 NOTE — PROGRESS NOTES
PROGRESS NOTE    SUBJECTIVE:   Renaldo Jones is a 74 y.o. female seen for a follow up visit regarding the following chief complaint:     Chief Complaint   Patient presents with    Urinary Tract Infection     Follow up, still has R sided back pain           HPI patient is doing a follow-up on a urinary tract infection recently finished a round of Bactrim was still complaining of dysuria      Past Medical History, Past Surgical History, Family history, Social History, and Medications were all reviewed with the patient today and updated as necessary. Current Outpatient Medications   Medication Sig Dispense Refill    cephALEXin (KEFLEX) 500 MG capsule Take 1 capsule by mouth 4 times daily for 7 days 28 capsule 0    pantoprazole (PROTONIX) 40 MG tablet Take 1 tablet by mouth every morning (before breakfast) 90 tablet 3    losartan (COZAAR) 100 MG tablet Take 1 tablet by mouth daily 90 tablet 3    omeprazole (PRILOSEC) 40 MG delayed release capsule Take 1 capsule by mouth daily 90 capsule 3    HYDROcodone-acetaminophen (NORCO)  MG per tablet Take 1 tablet by mouth every 6 hours as needed for Pain for up to 30 days. 120 tablet 0    venlafaxine (EFFEXOR XR) 150 MG extended release capsule Take 1 capsule by mouth 2 times daily at 0800 and 1400 180 capsule 3    levothyroxine (SYNTHROID) 112 MCG tablet Take 1 tablet by mouth Daily 90 tablet 0    nebivolol (BYSTOLIC) 10 MG tablet Take 1 tablet by mouth daily 90 tablet 3    atorvastatin (LIPITOR) 20 MG tablet TAKE 2 TABLETS BY MOUTH EVERY NIGHT AT BEDTIME 90 tablet 3    ALPRAZolam (XANAX) 1 MG tablet TAKE 1 TABLET BY MOUTH EVERY DAY 30 tablet 2    losartan-hydroCHLOROthiazide (HYZAAR) 100-25 MG per tablet Take 1 tablet by mouth daily      clopidogrel (PLAVIX) 75 MG tablet Take 75 mg by mouth in the morning.       Multiple Vitamins-Minerals (MULTIVITAMIN ADULTS 50+ PO) Take by mouth daily      vitamin B-12 (CYANOCOBALAMIN) 100 MCG tablet Take 50 mcg by mouth in the morning. potassium chloride (KLOR-CON M) 20 MEQ extended release tablet Take 20 mEq by mouth 2 times daily      diclofenac (VOLTAREN) 75 MG EC tablet Take 1 tablet by mouth 2 times daily as needed for Pain 60 tablet 0    Vitamin D, Cholecalciferol, 10 MCG (400 UNIT) CHEW Take 1 tablet by mouth daily. aspirin 81 MG chewable tablet Take 1 tablet by mouth at bedtime      ondansetron (ZOFRAN-ODT) 4 MG disintegrating tablet DISSOLVE 1 TABLET ON THE TONGUE EVERY 4 HOURS AS NEEDED FOR NAUSEA OR VOMITING      MAGNESIUM-OXIDE 400 (240 Mg) MG tablet TAKE 1 TABLET BY MOUTH TWICE DAILY      traZODone (DESYREL) 50 MG tablet TAKE 1 TABLET BY MOUTH EVERY NIGHT AT BEDTIME      gabapentin (NEURONTIN) 300 MG capsule Take 1 capsule by mouth 3 times daily for 30 days. 270 capsule 0     No current facility-administered medications for this visit.      No Known Allergies  Patient Active Problem List   Diagnosis    History of CVA (cerebrovascular accident)    Arthritis    Rectal bleeding    Recurrent depression ([facility] [address] [address]    Depression    Thrush    Acute postoperative anemia due to greater than expected blood loss    Dizziness    Chest pain    SBO (small bowel obstruction) (HCC)    Chronic pain syndrome    HTN (hypertension)    Ataxia    Shortness of breath    Closed displaced fracture of right femoral neck (HCC)    S/P total knee arthroplasty    Opioid use, unspecified with unspecified opioid-induced disorder (HCC)    External hemorrhoid, bleeding    S/P total knee replacement    Abnormal findings on diagnostic imaging of other specified body structures    Personal history of colonic polyps    IBS (irritable bowel syndrome)    Hypokalemia    Peripheral neuropathy    Anxiety    Hypothyroid    Asthma    Migraines    Peripheral vascular disease (HCC)    Falls    Polycythemia    Elevated bilirubin    Allergic rhinitis    Hyperglycemia    Constipation    Hematuria    Arthritis of knee, left    Hypercholesterolemia    OAB (overactive bladder)    Vaginal atrophy    Heart murmur    GERD (gastroesophageal reflux disease)    Arthritis of right hip    Chronic renal disease, stage III ([address] [174457]     Past Medical History:   Diagnosis Date    Allergic rhinitis     Aneurysm (HCC)     per CTA of neck and head (3/5/21)=small 3 mm saccular aneurysm in the right ICA near the skull base in an area of tortuosity; followed by [facility] and has referral to Dr. Micah Rosenberg     denies; Reema Peñaloza thought I had it but it was a panic attack\"    Calculus of kidney     Cancer ([address]     melanoma R breast    Chronic pain     Depression     worsening    Dizziness 09/08/2016    not a recent problem    Dyslipidemia     Fatigue     Former cigarette smoker     GERD (gastroesophageal reflux disease)     controlled with med    Heart murmur 11/24/2015    last echo=11/25/2019    History of nuclear stress test 05/11/2021    Normal Perfusion     HTN (hypertension)     controlled with med    Hx of echocardiogram 11/25/2019    LVEF 55-60%    Hypercholesterolemia     on med for control     Hypothyroid     on med for control     IBS (irritable bowel syndrome)     Migraines     HX no current problems     Mitral valve insufficiency     rheumatic fever as a child --- followed by dr Marylee Sandifer    Nausea & vomiting     post-op    Peripheral neuropathy     bilat feet    Rheumatic fever     as a child; age 5-6    Seizures ([address]     febrile seiz. as a child , no problems after that. 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      CHOLECYSTECTOMY, LAPAROSCOPIC      COLONOSCOPY HYSTERECTOMY (CERVIX STATUS UNKNOWN)      KNEE ARTHROSCOPY Left     KNEE ARTHROSCOPY Right 10/16/09; 2015    ORTHOPEDIC SURGERY      C- 7 ACDF    ORTHOPEDIC SURGERY Right 2021    RIGHT  HIP FNS     OTHER SURGICAL HISTORY      urethra repair    OTHER SURGICAL HISTORY      melanoma resected from right chest wall    TONSILLECTOMY AND ADENOIDECTOMY      TOTAL COLECTOMY      for obstruction - about 14 inches removed    TOTAL KNEE ARTHROPLASTY Left 2016    TOTAL KNEE ARTHROPLASTY Right 3/2015    UROLOGICAL SURGERY  2020    CYSTOSCOPY WITH BLADDER BIOPSIES      Family History   Problem Relation Age of Onset    Other Mother     Delayed Awakening Mother     Thyroid Disease Mother     Diabetes Mother     Cancer Mother         breast cancer    Breast Problems Mother     Breast Cancer Mother     Heart Disease Father     Hypertension Father     Heart Failure Father         CHF    Delayed Awakening Sister     Thyroid Disease Sister     Breast Problems Sister     Breast Cancer Sister     Cancer Other         breast    Diabetes Other         type II    High Cholesterol Other     Elevated Lipids Other     Hypertension Other     Thyroid Disease Other         hypo, acquired    Breast Problems Maternal Aunt     Breast Cancer Maternal Aunt     Breast Cancer Paternal Aunt      Social History     Tobacco Use    Smoking status: Former     Packs/day: 0.25     Years: 12.00     Pack years: 3.00     Types: Cigarettes     Start date: 1990     Quit date: 2002     Years since quittin.2     Passive exposure: Past    Smokeless tobacco: Never   Substance Use Topics    Alcohol use: No         Review of Systems   Constitutional:  Negative for chills and fever. Respiratory:  Negative for shortness of breath. Cardiovascular:  Negative for chest pain. Gastrointestinal:  Negative for nausea and vomiting. Genitourinary:  Positive for difficulty urinating, dysuria, frequency and urgency.        OBJECTIVE:  /70 (Site: Left Upper Arm, Position: Sitting, Cuff Size: Small Adult)   Ht 5' 6\" (1.676 m)   Wt 151 lb (68.5 kg)   BMI 24.37 kg/m²      Physical Exam  Vitals and nursing note reviewed. Constitutional:       General: She is not in acute distress. Appearance: Normal appearance. Cardiovascular:      Rate and Rhythm: Normal rate and regular rhythm. Heart sounds: Normal heart sounds. Pulmonary:      Breath sounds: Normal breath sounds. Neurological:      Mental Status: She is alert. Psychiatric:         Mood and Affect: Mood normal.         Behavior: Behavior normal.         Thought Content: Thought content normal.         Judgment: Judgment normal.        Medical problems and test results were reviewed with the patient today.      Recent Results (from the past 672 hour(s))   AMB POC URINALYSIS DIP STICK AUTO W/O MICRO    Collection Time: 10/31/22  2:44 PM   Result Value Ref Range    Color, Urine, POC yellow     Clarity, Urine, POC turbid     Glucose, Urine, POC Negative Negative    Bilirubin, Urine, POC Negative Negative    Ketones, Urine, POC Negative Negative    Specific Gravity, Urine, POC 1.025 1.001 - 1.035    Blood, Urine, POC 2+ Negative    pH, Urine, POC 6.0 4.6 - 8.0    Protein, Urine, POC 1+ Negative    Urobilinogen, POC normal     Nitrate, Urine, POC POSITIVE Negative    Leukocyte Esterase, Urine, POC 4+ Negative   Iron    Collection Time: 10/31/22  3:16 PM   Result Value Ref Range    Iron 83 35 - 150 ug/dL   Transferrin    Collection Time: 10/31/22  3:16 PM   Result Value Ref Range    Transferrin 330 202 - 364 mg/dL   Ferritin    Collection Time: 10/31/22  3:16 PM   Result Value Ref Range    Ferritin 13 8 - 388 NG/ML   Vitamin B12    Collection Time: 10/31/22  3:16 PM   Result Value Ref Range    Vitamin B-12 587 193 - 986 pg/mL   AMB POC URINALYSIS DIP STICK MANUAL W/ MICRO [facility]    Collection Time: 11/08/22  3:02 PM   Result Value Ref Range    Color (UA POC) Yellow     Clarity (UA POC) Slightly Cloudy     Glucose, Urine, POC Negative Negative    Bilirubin, Urine, POC Negative Negative    Ketones, Urine, POC Negative Negative    Specific Gravity, Urine, POC 1.015 1.001 - 1.035    Blood (UA POC) Trace Negative    pH, Urine, POC 5.5 4.6 - 8.0    Protein, Urine, POC Negative Negative    Urobilinogen, POC Normal     Nitrite, Urine, POC Negative Negative    Leukocyte Esterase, Urine, POC 1+ Negative    RBC, Urine, POC 1-3     WBC, Urine, POC 5-10     Epi Cells Urine, POC Moderate     Bacteria Urine, POC Moderate Negative    Casts Urine, POC      Yeast, Urine, POC Small     Trichomonas Urine, POC     AMB POC URINALYSIS DIP STICK MANUAL W/ MICRO [facility]    Collection Time: 11/17/22  3:36 PM   Result Value Ref Range    Color (UA POC) Yellow     Clarity (UA POC) Slightly Cloudy     Glucose, Urine, POC Negative Negative    Bilirubin, Urine, POC 1+ Negative    Ketones, Urine, POC Negative Negative    Specific Gravity, Urine, POC >1.030 1.001 - 1.035    Blood (UA POC) Negative Negative    pH, Urine, POC 5.5 4.6 - 8.0    Protein, Urine, POC Negative Negative    Urobilinogen, POC Normal     Nitrite, Urine, POC Negative Negative    Leukocyte Esterase, Urine, POC 1+ Negative    RBC, Urine, POC 0-3     WBC, Urine, POC 4-8     Epi Cells Urine, POC Few     Bacteria Urine, POC Moderate Negative    Casts Urine, POC      Yeast, Urine, POC      Trichomonas Urine, POC         ASSESSMENT and PLAN    Visit Diagnoses and Associated Orders       Acute cystitis with hematuria    -  Primary    Culture, Urine [[id number] Custom]   - Future Order    Culture, Urine [[id number] Custom]      cephALEXin (KEFLEX) 500 MG capsule [9500]                       Diagnosis Orders   1. Acute cystitis with hematuria  Culture, Urine    Culture, Urine    cephALEXin (KEFLEX) 500 MG capsule      , Philip Sheritaiglesia was seen today for urinary tract infection. Diagnoses and all orders for this visit:    Acute cystitis with hematuria  -     Culture, Urine;  Future  -     Culture, Urine  -     cephALEXin (KEFLEX) 500 MG capsule;  Take 1 capsule by mouth 4 times daily for 7 days    , We will start her on Keflex we will culture her urine and call back with results and plan supportive care given precautions given

## 2022-11-20 LAB
BACTERIA SPEC CULT: NORMAL
SERVICE CMNT-IMP: NORMAL

## 2022-12-05 ENCOUNTER — TELEPHONE (OUTPATIENT)
Dept: FAMILY MEDICINE CLINIC | Facility: CLINIC | Age: 72
End: 2022-12-05

## 2022-12-05 DIAGNOSIS — G89.4 CHRONIC PAIN SYNDROME: ICD-10-CM

## 2022-12-05 RX ORDER — HYDROCODONE BITARTRATE AND ACETAMINOPHEN 10; 325 MG/1; MG/1
1 TABLET ORAL EVERY 6 HOURS PRN
Qty: 120 TABLET | Refills: 0 | Status: CANCELLED | OUTPATIENT
Start: 2022-12-05 | End: 2023-01-04

## 2022-12-06 DIAGNOSIS — G89.4 CHRONIC PAIN SYNDROME: ICD-10-CM

## 2022-12-06 RX ORDER — HYDROCODONE BITARTRATE AND ACETAMINOPHEN 10; 325 MG/1; MG/1
1 TABLET ORAL EVERY 6 HOURS PRN
Qty: 120 TABLET | Refills: 0 | Status: SHIPPED | OUTPATIENT
Start: 2022-12-06 | End: 2023-01-05

## 2023-01-03 DIAGNOSIS — F33.9 RECURRENT DEPRESSION (HCC): ICD-10-CM

## 2023-01-03 DIAGNOSIS — G89.4 CHRONIC PAIN SYNDROME: ICD-10-CM

## 2023-01-05 RX ORDER — VENLAFAXINE HYDROCHLORIDE 150 MG/1
150 CAPSULE, EXTENDED RELEASE ORAL DAILY
Qty: 180 CAPSULE | Refills: 1 | Status: SHIPPED | OUTPATIENT
Start: 2023-01-05

## 2023-01-05 RX ORDER — HYDROCODONE BITARTRATE AND ACETAMINOPHEN 10; 325 MG/1; MG/1
1 TABLET ORAL EVERY 6 HOURS PRN
Qty: 120 TABLET | Refills: 0 | Status: SHIPPED | OUTPATIENT
Start: 2023-01-05 | End: 2023-02-04

## 2023-01-11 ENCOUNTER — OFFICE VISIT (OUTPATIENT)
Dept: CARDIOLOGY CLINIC | Age: 73
End: 2023-01-11
Payer: MEDICARE

## 2023-01-11 VITALS
SYSTOLIC BLOOD PRESSURE: 134 MMHG | HEART RATE: 71 BPM | HEIGHT: 66 IN | WEIGHT: 158.4 LBS | BODY MASS INDEX: 25.46 KG/M2 | DIASTOLIC BLOOD PRESSURE: 82 MMHG

## 2023-01-11 DIAGNOSIS — I10 PRIMARY HYPERTENSION: Primary | ICD-10-CM

## 2023-01-11 PROCEDURE — 3075F SYST BP GE 130 - 139MM HG: CPT | Performed by: INTERNAL MEDICINE

## 2023-01-11 PROCEDURE — 99213 OFFICE O/P EST LOW 20 MIN: CPT | Performed by: INTERNAL MEDICINE

## 2023-01-11 PROCEDURE — 1123F ACP DISCUSS/DSCN MKR DOCD: CPT | Performed by: INTERNAL MEDICINE

## 2023-01-11 PROCEDURE — 3078F DIAST BP <80 MM HG: CPT | Performed by: INTERNAL MEDICINE

## 2023-01-11 PROCEDURE — G8427 DOCREV CUR MEDS BY ELIG CLIN: HCPCS | Performed by: INTERNAL MEDICINE

## 2023-01-11 PROCEDURE — 1036F TOBACCO NON-USER: CPT | Performed by: INTERNAL MEDICINE

## 2023-01-11 PROCEDURE — 1090F PRES/ABSN URINE INCON ASSESS: CPT | Performed by: INTERNAL MEDICINE

## 2023-01-11 PROCEDURE — G8399 PT W/DXA RESULTS DOCUMENT: HCPCS | Performed by: INTERNAL MEDICINE

## 2023-01-11 PROCEDURE — G8484 FLU IMMUNIZE NO ADMIN: HCPCS | Performed by: INTERNAL MEDICINE

## 2023-01-11 PROCEDURE — 3017F COLORECTAL CA SCREEN DOC REV: CPT | Performed by: INTERNAL MEDICINE

## 2023-01-11 PROCEDURE — G8417 CALC BMI ABV UP PARAM F/U: HCPCS | Performed by: INTERNAL MEDICINE

## 2023-01-11 ASSESSMENT — ENCOUNTER SYMPTOMS
BOWEL INCONTINENCE: 0
BLURRED VISION: 0
DIARRHEA: 0
WHEEZING: 0
HEMATOCHEZIA: 0
ABDOMINAL PAIN: 0
ORTHOPNEA: 0
HEMATEMESIS: 0
SPUTUM PRODUCTION: 0
SHORTNESS OF BREATH: 0
COLOR CHANGE: 0
HOARSE VOICE: 0

## 2023-01-11 NOTE — PROGRESS NOTES
[facility] CARDIOLOGY  [address], [address] [address]  PHONE: [phone]        23        NAME:  Gala Mckinney  :   MRN: [id number]       SUBJECTIVE:   Gala Mckinney is a 74 y.o. female seen for a follow up visit regarding the following: The patient has a hx of PAF,primary hypertension,and CVA. She returns for scheduled follow up. She reports doing better. Chief Complaint   Patient presents with    Hypertension     6 month follow up       HPI:    Hypertension  This is a chronic problem. The problem is unchanged. The problem is controlled. Pertinent negatives include no anxiety, blurred vision, chest pain, headaches, malaise/fatigue, neck pain, orthopnea, palpitations, peripheral edema, PND, shortness of breath or sweats. Past Medical History, Past Surgical History, Family history, Social History, and Medications were all reviewed with the patient today and updated as necessary. Current Outpatient Medications:     venlafaxine (EFFEXOR XR) 150 MG extended release capsule, Take 1 capsule by mouth daily, Disp: 180 capsule, Rfl: 1    HYDROcodone-acetaminophen (NORCO)  MG per tablet, Take 1 tablet by mouth every 6 hours as needed for Pain for up to 30 days. , Disp: 120 tablet, Rfl: 0    omeprazole (PRILOSEC) 40 MG delayed release capsule, Take 1 capsule by mouth daily, Disp: 90 capsule, Rfl: 3    levothyroxine (SYNTHROID) 112 MCG tablet, Take 1 tablet by mouth Daily, Disp: 90 tablet, Rfl: 0    nebivolol (BYSTOLIC) 10 MG tablet, Take 1 tablet by mouth daily, Disp: 90 tablet, Rfl: 3    atorvastatin (LIPITOR) 20 MG tablet, TAKE 2 TABLETS BY MOUTH EVERY NIGHT AT BEDTIME, Disp: 90 tablet, Rfl: 3    losartan-hydroCHLOROthiazide (HYZAAR) 100-25 MG per tablet, Take 1 tablet by mouth daily, Disp: , Rfl:     clopidogrel (PLAVIX) 75 MG tablet, Take 75 mg by mouth in the morning., Disp: , Rfl:     Multiple Vitamins-Minerals (MULTIVITAMIN ADULTS 50+ PO), Take by mouth daily, Disp: , Rfl:     vitamin B-12 (CYANOCOBALAMIN) 100 MCG tablet, Take 50 mcg by mouth in the morning., Disp: , Rfl:     diclofenac (VOLTAREN) 75 MG EC tablet, Take 1 tablet by mouth 2 times daily as needed for Pain, Disp: 60 tablet, Rfl: 0    Vitamin D, Cholecalciferol, 10 MCG (400 UNIT) CHEW, Take 1 tablet by mouth daily. , Disp: , Rfl:     aspirin 81 MG chewable tablet, Take 1 tablet by mouth at bedtime, Disp: , Rfl:     ondansetron (ZOFRAN-ODT) 4 MG disintegrating tablet, DISSOLVE 1 TABLET ON THE TONGUE EVERY 4 HOURS AS NEEDED FOR NAUSEA OR VOMITING, Disp: , Rfl:     gabapentin (NEURONTIN) 300 MG capsule, Take 1 capsule by mouth 3 times daily for 30 days. 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 Nidia Ortiz     [place];  Michel Patel thought I had it but it was a panic attack\"    Calculus of kidney     Cancer ([facility] [address]     melanoma R breast    Chronic pain     Depression     worsening    Dizziness 09/08/2016    not a recent problem    Dyslipidemia     Fatigue     Former cigarette smoker     GERD (gastroesophageal reflux disease)     controlled with med    Heart murmur 11/24/2015    last echo=11/25/2019    History of nuclear stress test 05/11/2021    Normal Perfusion     HTN (hypertension)     controlled with med    Hx of echocardiogram 11/25/2019    LVEF 55-60%    Hypercholesterolemia     on med for control     Hypothyroid     on med for control     IBS (irritable bowel syndrome)     Migraines     HX no current problems     Mitral valve insufficiency     rheumatic fever as a child --- followed by dr Serene Bloom    Nausea & vomiting     post-op    Peripheral neuropathy     bilat feet    Rheumatic fever     as a child; age 5-6    Seizures ([facility] [address]     febrile seiz. as a child , no problems after that. 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      CHOLECYSTECTOMY, LAPAROSCOPIC      COLONOSCOPY      HYSTERECTOMY (CERVIX STATUS UNKNOWN)      KNEE ARTHROSCOPY Left     KNEE ARTHROSCOPY Right 10/16/09; 03/2015    ORTHOPEDIC SURGERY      C- 7 ACDF    ORTHOPEDIC SURGERY Right 08/24/2021    RIGHT  HIP FNS     OTHER SURGICAL HISTORY      urethra repair    OTHER SURGICAL HISTORY      melanoma resected from right chest wall    TONSILLECTOMY AND ADENOIDECTOMY      TOTAL COLECTOMY      for obstruction - about 14 inches removed    TOTAL KNEE ARTHROPLASTY Left 2016    TOTAL KNEE ARTHROPLASTY Right 3/2015    UROLOGICAL SURGERY  2020    CYSTOSCOPY WITH BLADDER BIOPSIES      Family History   Problem Relation Age of Onset    Other Mother     Delayed Awakening Mother     Thyroid Disease Mother     Diabetes Mother     Cancer Mother         breast cancer    Breast Problems Mother     Breast Cancer Mother     Heart Disease Father     Hypertension Father     Heart Failure Father         CHF    Delayed Awakening Sister     Thyroid Disease Sister     Breast Problems Sister     Breast Cancer Sister     Cancer Other         breast    Diabetes Other         type II    High Cholesterol Other Elevated Lipids Other     Hypertension Other     Thyroid Disease Other         hypo, acquired    Breast Problems Maternal Aunt     Breast Cancer Maternal Aunt     Breast Cancer Paternal Aunt       Social History     Tobacco Use    Smoking status: Former     Packs/day: 0.25     Years: 12.00     Pack years: 3.00     Types: Cigarettes     Start date: 1990     Quit date: 2002     Years since quittin.3     Passive exposure: Past    Smokeless tobacco: Never   Substance Use Topics    Alcohol use: No       ROS:    Review of Systems   Constitutional: Negative for chills, decreased appetite, diaphoresis, fever and malaise/fatigue. HENT:  Negative for congestion, hearing loss, hoarse voice and nosebleeds. Eyes:  Negative for blurred vision. Cardiovascular:  Negative for chest pain, claudication, cyanosis, dyspnea on exertion, irregular heartbeat, leg swelling, near-syncope, orthopnea, palpitations, paroxysmal nocturnal dyspnea and syncope. Respiratory:  Negative for shortness of breath, sputum production and wheezing. Endocrine: Negative for polydipsia, polyphagia and polyuria. Skin:  Negative for color change. Musculoskeletal:  Negative for neck pain. Gastrointestinal:  Negative for abdominal pain, bowel incontinence, diarrhea, hematemesis and hematochezia. Genitourinary:  Negative for dysuria, frequency and hematuria. Neurological:  Negative for focal weakness, headaches, light-headedness, loss of balance, numbness, sensory change and weakness. Psychiatric/Behavioral:  Negative for altered mental status and memory loss. PHYSICAL EXAM:   /82   Pulse 71   Ht 5' 6\" (1.676 m)   Wt 158 lb 6.4 oz (71.8 kg)   BMI 25.57 kg/m²      Physical Exam  Constitutional:       Appearance: Normal appearance. HENT:      Head: Normocephalic and atraumatic. Nose: Nose normal.   Eyes:      Extraocular Movements: Extraocular movements intact.       Pupils: Pupils are equal, round, and reactive to light. Neck:      Vascular: No carotid bruit. Cardiovascular:      Rate and Rhythm: Regular rhythm. Pulses: Normal pulses. Heart sounds: No murmur heard. Pulmonary:      Effort: Pulmonary effort is normal.      Breath sounds: Normal breath sounds. Abdominal:      General: Abdomen is flat. Bowel sounds are normal.      Palpations: Abdomen is soft. Musculoskeletal:         General: Normal range of motion. Cervical back: Normal range of motion and neck supple. Skin:     General: Skin is warm and dry. Neurological:      General: No focal deficit present. Mental Status: She is alert and oriented to person, place, and time. Psychiatric:         Mood and Affect: Mood normal.       Medical problems and test results were reviewed with the patient today. No results found for this or any previous visit (from the past 672 hour(s)). Lab Results   Component Value Date/Time    CHOL 113 10/14/2022 10:20 AM    HDL 46 10/14/2022 10:20 AM    VLDL 47 04/14/2021 10:06 AM     No results found for any visits on 01/11/23. ASSESSMENT and PLAN    Lizzy Lutz was seen today for hypertension. Diagnoses and all orders for this visit:    Primary hypertension:Stable. Continue Bystolic and Hyzaar        Disposition:    Return in about 6 months (around 7/11/2023).                 Mirta Marshall MD  1/11/2023  5:06 PM

## 2023-02-06 DIAGNOSIS — G89.4 CHRONIC PAIN SYNDROME: ICD-10-CM

## 2023-02-06 RX ORDER — HYDROCODONE BITARTRATE AND ACETAMINOPHEN 10; 325 MG/1; MG/1
1 TABLET ORAL EVERY 6 HOURS PRN
Qty: 120 TABLET | Refills: 0 | Status: SHIPPED | OUTPATIENT
Start: 2023-02-06 | End: 2023-03-08

## 2023-02-08 ENCOUNTER — HOME HEALTH ADMISSION (OUTPATIENT)
Dept: HOME HEALTH SERVICES | Facility: HOME HEALTH | Age: 73
End: 2023-02-08

## 2023-02-09 RX ORDER — LOSARTAN POTASSIUM AND HYDROCHLOROTHIAZIDE 25; 100 MG/1; MG/1
1 TABLET ORAL DAILY
Qty: 90 TABLET | Refills: 3 | Status: SHIPPED | OUTPATIENT
Start: 2023-02-09

## 2023-02-14 ENCOUNTER — TELEPHONE (OUTPATIENT)
Dept: NEUROLOGY | Age: 73
End: 2023-02-14

## 2023-02-14 NOTE — TELEPHONE ENCOUNTER
Patient passed out and fell a couple of weeks ago. She hit her head and had a brain bleed. She was in ICU for a couple of days. When she released, she was told to follow up in a month. She has called that neuro office 4x and left messages and no one has called her back. Can you order the follow up CT scan? Does she need it before her appt in March or should wait until the appt?

## 2023-02-15 DIAGNOSIS — S06.5XAA SDH (SUBDURAL HEMATOMA): Primary | ICD-10-CM

## 2023-02-15 NOTE — PROGRESS NOTES
On 2-2-2023 the patient was found they are assuming passed out because it did not appear that she tried to help protect her self. He had a hit her head pretty hard on the ground and I believe lost consciousness she was taken to the hospital at [facility] they found an [place] bleed\" that actually showed a multicompartment intracranial hemorrhage and subfalcine herniation. There was a subdural hematoma also identified in the right convexity.   The last was done in 2 5-23

## 2023-02-17 ENCOUNTER — TELEPHONE (OUTPATIENT)
Dept: NEUROLOGY | Age: 73
End: 2023-02-17

## 2023-02-17 NOTE — TELEPHONE ENCOUNTER
Pt's daughter called in stating that Pt headaches has gotten worser. Pt's daughter was given the number to scheduling so the CT could get scheduled.

## 2023-03-01 ENCOUNTER — HOSPITAL ENCOUNTER (OUTPATIENT)
Dept: CT IMAGING | Age: 73
Discharge: HOME | End: 2023-03-04
Payer: MEDICARE

## 2023-03-01 DIAGNOSIS — S06.5XAA SDH (SUBDURAL HEMATOMA): ICD-10-CM

## 2023-03-01 PROCEDURE — 70450 CT HEAD/BRAIN W/O DYE: CPT

## 2023-03-06 DIAGNOSIS — G89.4 CHRONIC PAIN SYNDROME: ICD-10-CM

## 2023-03-06 RX ORDER — HYDROCODONE BITARTRATE AND ACETAMINOPHEN 10; 325 MG/1; MG/1
1 TABLET ORAL EVERY 6 HOURS PRN
Qty: 120 TABLET | Refills: 0 | Status: SHIPPED | OUTPATIENT
Start: 2023-03-06 | End: 2023-04-05

## 2023-03-07 ENCOUNTER — OFFICE VISIT (OUTPATIENT)
Dept: NEUROLOGY | Age: 73
End: 2023-03-07
Payer: MEDICARE

## 2023-03-07 DIAGNOSIS — S06.5XAA SDH (SUBDURAL HEMATOMA): ICD-10-CM

## 2023-03-07 DIAGNOSIS — I63.9 CEREBROVASCULAR ACCIDENT (CVA), UNSPECIFIED MECHANISM (HCC): Primary | ICD-10-CM

## 2023-03-07 PROCEDURE — G8484 FLU IMMUNIZE NO ADMIN: HCPCS | Performed by: PSYCHIATRY & NEUROLOGY

## 2023-03-07 PROCEDURE — 1090F PRES/ABSN URINE INCON ASSESS: CPT | Performed by: PSYCHIATRY & NEUROLOGY

## 2023-03-07 PROCEDURE — 1123F ACP DISCUSS/DSCN MKR DOCD: CPT | Performed by: PSYCHIATRY & NEUROLOGY

## 2023-03-07 PROCEDURE — 1036F TOBACCO NON-USER: CPT | Performed by: PSYCHIATRY & NEUROLOGY

## 2023-03-07 PROCEDURE — 3017F COLORECTAL CA SCREEN DOC REV: CPT | Performed by: PSYCHIATRY & NEUROLOGY

## 2023-03-07 PROCEDURE — 99214 OFFICE O/P EST MOD 30 MIN: CPT | Performed by: PSYCHIATRY & NEUROLOGY

## 2023-03-07 PROCEDURE — G8427 DOCREV CUR MEDS BY ELIG CLIN: HCPCS | Performed by: PSYCHIATRY & NEUROLOGY

## 2023-03-07 PROCEDURE — G8399 PT W/DXA RESULTS DOCUMENT: HCPCS | Performed by: PSYCHIATRY & NEUROLOGY

## 2023-03-07 PROCEDURE — G8417 CALC BMI ABV UP PARAM F/U: HCPCS | Performed by: PSYCHIATRY & NEUROLOGY

## 2023-03-07 RX ORDER — RIZATRIPTAN BENZOATE 10 MG/1
10 TABLET, ORALLY DISINTEGRATING ORAL
Qty: 12 TABLET | Refills: 5 | Status: SHIPPED | OUTPATIENT
Start: 2023-03-07 | End: 2023-03-07

## 2023-03-07 ASSESSMENT — ENCOUNTER SYMPTOMS
RESPIRATORY NEGATIVE: 1
EYES NEGATIVE: 1
GASTROINTESTINAL NEGATIVE: 1

## 2023-03-07 NOTE — PROGRESS NOTES
[address], 410 [facility], [address] [place] [address]  Phone: [phone] Fax [phone]  Dr. Gina Perez      3/7/2023  Shanae Cooper     Patient is referred by the following provider for consultation regarding as below:       I reviewed the available records and notes and have examined patient with the following findings:     Chief Complaint:  No chief complaint on file. HPI: This is a right handed 74 y.o.  female who is a very pleasant very appropriate patient unfortunately does have a history of having a left hemispheric stroke into the left internal capsule causing right-sided upper and lower extremity weakness mental status changes and slurred speech. 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 We have been trying to get her over to be seen at [facility] as endovascular team since the stroke she is not soft and sweet like she used to be but has a lot of abruptness. The patient we had to hold her Plavix in order to have her right hip replaced. Which we did and she did have her right hip replaced but unfortunately she is walking worse than she was before. And then in February she was found on the ground in the kitchen loss of consciousness the daughters feel that 3 days before this each day she was doubling her blood pressure medicine on but notes because her  was filling the prescriptions for. And that is may be why she lost consciousness she unfortunately did have a right subdural hematoma with some small subarachnoid hemorrhage in the anterior falx. They repeated CTs on 2-3-2023 there is still some blood there and then I repeated on 3-2-2023 and is fully resolved. She had some traumatic brain injury symptoms at first but she is slowly coming around and doing well.   She unfortunately does have right frontal headaches they did try Imitrex with bad side effects we will change it Maxalt and see if that helps. She is slowly improving and does not want to use preventative medicine at this time. I made it clear to her not to use Norco for her headaches. And she tells me it does not help anyways. Cognitively she is doing much better a little bit slower. She is having headaches that is her main symptom. IMAGING REVIEW:  I REVIEWED PERTINENT  IMAGES AND REPORTS WITH THE PATIENT PERSONALLY, DIRECTLY AND FULLY. Past Medical History:  Past Medical History:   Diagnosis Date    Allergic rhinitis     Aneurysm (HCC)     per CTA of neck and head (3/5/21)=small 3 mm saccular aneurysm in the right ICA near the skull base in an area of tortuosity; followed by [facility] [employer] [facility] and has referral to Dr. Giselle rodrigues; Rakanmelissa Nicko thought I had it but it was a panic attack\"    Calculus of kidney     Cancer ([facility]     melanoma R breast    Chronic pain     Depression     worsening    Dizziness 09/08/2016    not a recent problem    Dyslipidemia     Fatigue     Former cigarette smoker     GERD (gastroesophageal reflux disease)     controlled with med    Heart murmur 11/24/2015    last echo=11/25/2019    History of nuclear stress test 05/11/2021    Normal Perfusion     HTN (hypertension)     controlled with med    Hx of echocardiogram 11/25/2019    LVEF 55-60%    Hypercholesterolemia     on med for control     Hypothyroid     on med for control     IBS (irritable bowel syndrome)     Migraines     HX no current problems     Mitral valve insufficiency     rheumatic fever as a child --- followed by dr Kenya Masterson    Nausea & vomiting     post-op    Peripheral neuropathy     bilat feet    Rheumatic fever     as a child; age 5-6    Seizures ([facility] [address]     febrile seiz. as a child , no problems after that.     Stroke ([facility] [address] 11/25/2019    left hemispheric internal capsule stroke 11/25/19; has right sided weakness; followed by [facility]     Unspecified adverse effect of anesthesia     elevated temp after colectomy only per pt       Past Surgical History:  Past Surgical History:   Procedure Laterality Date    APPENDECTOMY      BLADDER SUSPENSION      BREAST LUMPECTOMY Right     melanoma breast - with lymph node biopsies.      CHOLECYSTECTOMY, LAPAROSCOPIC      COLONOSCOPY      HYSTERECTOMY (CERVIX STATUS UNKNOWN)      KNEE ARTHROSCOPY Left     KNEE ARTHROSCOPY Right 10/16/09; 2015    ORTHOPEDIC SURGERY      C- 7 ACDF    ORTHOPEDIC SURGERY Right 2021    RIGHT  HIP FNS     OTHER SURGICAL HISTORY      urethra repair    OTHER SURGICAL HISTORY      melanoma resected from right chest wall    TONSILLECTOMY AND ADENOIDECTOMY      TOTAL COLECTOMY      for obstruction - about 14 inches removed    TOTAL KNEE ARTHROPLASTY Left 2016    TOTAL KNEE ARTHROPLASTY Right 3/2015    UROLOGICAL SURGERY  2020    CYSTOSCOPY WITH BLADDER BIOPSIES        Social History:  Social History     Socioeconomic History    Marital status:      Spouse name: Not on file    Number of children: Not on file    Years of education: Not on file    Highest education level: Not on file   Occupational History    Not on file   Tobacco Use    Smoking status: Former     Packs/day: 0.25     Years: 12.00     Pack years: 3.00     Types: Cigarettes     Start date: 1990     Quit date: 2002     Years since quittin.5     Passive exposure: Past    Smokeless tobacco: Never   Vaping Use    Vaping Use: Never used   Substance and Sexual Activity    Alcohol use: No    Drug use: No    Sexual activity: Not on file   Other Topics Concern    Not on file   Social History Narrative    Not on file     Social Determinants of Health     Financial Resource Strain: Not on file   Food Insecurity: Not on file   Transportation Needs: Not on file   Physical Activity: Not on file   Stress: Not on file   Social Connections: Not on file   Intimate Partner Violence: Not on file   Housing Stability: Not on file       Family History:   Family History   Problem Relation Age of Onset    Other Mother     Delayed Awakening Mother     Thyroid Disease Mother     Diabetes Mother     Cancer Mother         breast cancer    Breast Problems Mother     Breast Cancer Mother     Heart Disease Father     Hypertension Father     Heart Failure Father         CHF    Delayed Awakening Sister     Thyroid Disease Sister     Breast Problems Sister     Breast Cancer Sister     Cancer Other         breast    Diabetes Other         type II    High Cholesterol Other     Elevated Lipids Other     Hypertension Other     Thyroid Disease Other         hypo, acquired    Breast Problems Maternal Aunt     Breast Cancer Maternal Aunt     Breast Cancer Paternal Aunt        Current Outpatient Medications on File Prior to Visit   Medication Sig Dispense Refill    HYDROcodone-acetaminophen (NORCO)  MG per tablet Take 1 tablet by mouth every 6 hours as needed for Pain for up to 30 days. 120 tablet 0    losartan-hydroCHLOROthiazide (HYZAAR) 100-25 MG per tablet Take 1 tablet by mouth daily 90 tablet 3    venlafaxine (EFFEXOR XR) 150 MG extended release capsule Take 1 capsule by mouth daily 180 capsule 1    omeprazole (PRILOSEC) 40 MG delayed release capsule Take 1 capsule by mouth daily 90 capsule 3    levothyroxine (SYNTHROID) 112 MCG tablet Take 1 tablet by mouth Daily 90 tablet 0    nebivolol (BYSTOLIC) 10 MG tablet Take 1 tablet by mouth daily 90 tablet 3    atorvastatin (LIPITOR) 20 MG tablet TAKE 2 TABLETS BY MOUTH EVERY NIGHT AT BEDTIME 90 tablet 3    clopidogrel (PLAVIX) 75 MG tablet Take 75 mg by mouth in the morning. Multiple Vitamins-Minerals (MULTIVITAMIN ADULTS 50+ PO) Take by mouth daily      vitamin B-12 (CYANOCOBALAMIN) 100 MCG tablet Take 50 mcg by mouth in the morning.       potassium chloride (KLOR-CON M) 20 MEQ extended release tablet Take 20 mEq by mouth 2 times daily (Patient not taking: Reported on 1/11/2023)      diclofenac (VOLTAREN) 75 MG EC tablet Take 1 tablet by mouth 2 times daily as needed for Pain 60 tablet 0    Vitamin D, Cholecalciferol, 10 MCG (400 UNIT) CHEW Take 1 tablet by mouth daily. aspirin 81 MG chewable tablet Take 1 tablet by mouth at bedtime      ondansetron (ZOFRAN-ODT) 4 MG disintegrating tablet DISSOLVE 1 TABLET ON THE TONGUE EVERY 4 HOURS AS NEEDED FOR NAUSEA OR VOMITING      MAGNESIUM-OXIDE 400 (240 Mg) MG tablet TAKE 1 TABLET BY MOUTH TWICE DAILY (Patient not taking: Reported on 1/11/2023)      gabapentin (NEURONTIN) 300 MG capsule Take 1 capsule by mouth 3 times daily for 30 days. 270 capsule 0     No current facility-administered medications on file prior to visit. No Known Allergies    Review of Systems:  Review of Systems   Constitutional: Negative. HENT: Negative. Eyes: Negative. Respiratory: Negative. Cardiovascular: Negative. Gastrointestinal: Negative. Endocrine: Negative. Genitourinary: Negative. Musculoskeletal: Negative. Skin: Negative. Neurological:  Positive for weakness and headaches. Hematological: Negative. Psychiatric/Behavioral: Negative. No flowsheet data found. No flowsheet data found. There were no vitals filed for this visit. Physical Exam  Constitutional:       Appearance: Normal appearance. HENT:      Head: Normocephalic and atraumatic. Eyes:      Extraocular Movements: Extraocular movements intact and EOM normal.      Pupils: Pupils are equal, round, and reactive to light. Cardiovascular:      Pulses: Normal pulses. Pulmonary:      Effort: Pulmonary effort is normal.   Abdominal:      General: Abdomen is flat. Neurological:      Mental Status: She is alert and oriented to person, place, and time. Deep Tendon Reflexes:      Reflex Scores:       Tricep reflexes are 1+ on the right side and 1+ on the left side.        Bicep reflexes are 1+ on the right side and 1+ on the left side. Brachioradialis reflexes are 1+ on the right side and 1+ on the left side. Patellar reflexes are 1+ on the right side and 1+ on the left side. Achilles reflexes are 1+ on the right side and 1+ on the left side. Neurologic Exam     Mental Status   Oriented to person, place, and time. Level of consciousness: alert  Cognitively she is still slow. Cranial Nerves     CN II   Visual fields full to confrontation. CN III, IV, VI   Pupils are equal, round, and reactive to light. Extraocular motions are normal.     CN VII   Facial expression full, symmetric. Motor Exam   Right arm tone: decreased  Left arm tone: normal  Right leg tone: decreased  Left leg tone: normal    Gait, Coordination, and Reflexes     Reflexes   Right brachioradialis: 1+  Left brachioradialis: 1+  Right biceps: 1+  Left biceps: 1+  Right triceps: 1+  Left triceps: 1+  Right patellar: 1+  Left patellar: 1+  Right achilles: 1+  Left achilles: 1+She has been using a wheelchair for distance and a walker around the house. Unfortunately she does not use it as much as she should but we encouraged her to do so. Assessment   Assessment / Plan:    Diagnoses and all orders for this visit:    Cerebrovascular accident (CVA), unspecified mechanism ([facility] [address] the patient has a history of a left hemispheric stroke causing right-sided weakness there was internal capsule. She had slurred speech mental status changes. CTA of the head and neck was performed there is moderate stenosis of the M1 there was a left vertebral artery occluded there is a 3 mm saccular aneurysm in the right internal carotid artery at the skull base. We had held her Plavix in order to get hip replacement of her right hip which she did and unfortunately she is actually walking worse than she was before.     SDH (subdural hematoma) unfortunately in February she was found in the kitchen on the ground she had a right subdural hematoma with a 6 mm right to left shift with a little bit of small subarachnoid hemorrhage in the anterior falx. Clearly she had fallen outstretched and hit her face. Black and blue on the right side of the face. Her CT scan just done in 3-2-2023 shows that all the blood has resolved and in 2 weeks we can have her restart her Plavix and aspirin for stroke prevention due to the vascular changes seen on the CTAs. The Diagnosis and differential diagnostic considerations, and Rx Tx were reviewed with the patient at length. No orders of the defined types were placed in this encounter. I have spent greater than 50% of visit discussing and counseling of patient  for treatment and diagnostic plan review. Total time30     . Notes: Patient is to continue all medications as directed by prescribing physicians. Continuations on today's visit are made based on the patient's report of current medications.              Dr. Xu Camara  Consultation Neurology, Neurodiagnostics and Neurotherapeutics  Neuroelectrophysiology, EEG, EMG  [facility]  [address] [place] [address], [address] [facility]  Phone:  [phone]  Fax:   [phone]

## 2023-03-15 DIAGNOSIS — E03.9 HYPOTHYROIDISM, UNSPECIFIED TYPE: ICD-10-CM

## 2023-03-16 RX ORDER — LEVOTHYROXINE SODIUM 112 UG/1
112 TABLET ORAL DAILY
Qty: 90 TABLET | Refills: 0 | Status: SHIPPED | OUTPATIENT
Start: 2023-03-16

## 2023-03-31 DIAGNOSIS — G89.4 CHRONIC PAIN SYNDROME: ICD-10-CM

## 2023-03-31 RX ORDER — HYDROCODONE BITARTRATE AND ACETAMINOPHEN 10; 325 MG/1; MG/1
1 TABLET ORAL EVERY 6 HOURS PRN
Qty: 120 TABLET | Refills: 0 | Status: SHIPPED | OUTPATIENT
Start: 2023-04-07 | End: 2023-05-07

## 2023-04-07 ENCOUNTER — NURSE ONLY (OUTPATIENT)
Dept: FAMILY MEDICINE CLINIC | Facility: CLINIC | Age: 73
End: 2023-04-07

## 2023-04-07 DIAGNOSIS — G89.4 CHRONIC PAIN SYNDROME: ICD-10-CM

## 2023-04-07 LAB
ALBUMIN SERPL-MCNC: 4.4 G/DL (ref 3.2–4.6)
ALBUMIN/GLOB SERPL: 1.4 (ref 0.4–1.6)
ALP SERPL-CCNC: 105 U/L (ref 50–136)
ALT SERPL-CCNC: 25 U/L (ref 12–65)
AMPHET UR QL SCN: NEGATIVE
AST SERPL-CCNC: 14 U/L (ref 15–37)
BARBITURATES UR QL SCN: NEGATIVE
BASOPHILS # BLD: 0.1 K/UL (ref 0–0.2)
BASOPHILS NFR BLD: 1 % (ref 0–2)
BENZODIAZ UR QL: NEGATIVE
BILIRUB SERPL-MCNC: 0.9 MG/DL (ref 0.2–1.1)
CANNABINOIDS UR QL SCN: NEGATIVE
COCAINE UR QL SCN: NEGATIVE
DIFFERENTIAL METHOD BLD: ABNORMAL
EOSINOPHIL # BLD: 0.2 K/UL (ref 0–0.8)
EOSINOPHIL NFR BLD: 3 % (ref 0.5–7.8)
ERYTHROCYTE [DISTWIDTH] IN BLOOD BY AUTOMATED COUNT: 16.3 % (ref 11.9–14.6)
GLOBULIN SER CALC-MCNC: 3.2 G/DL (ref 2.8–4.5)
HCT VFR BLD AUTO: 40.6 % (ref 35.8–46.3)
HGB BLD-MCNC: 12.3 G/DL (ref 11.7–15.4)
IMM GRANULOCYTES # BLD AUTO: 0 K/UL (ref 0–0.5)
IMM GRANULOCYTES NFR BLD AUTO: 0 % (ref 0–5)
LYMPHOCYTES # BLD: 2.8 K/UL (ref 0.5–4.6)
LYMPHOCYTES NFR BLD: 46 % (ref 13–44)
MCH RBC QN AUTO: 24.7 PG (ref 26.1–32.9)
MCHC RBC AUTO-ENTMCNC: 30.3 G/DL (ref 31.4–35)
MCV RBC AUTO: 81.7 FL (ref 82–102)
METHADONE UR QL: NEGATIVE
MONOCYTES # BLD: 0.7 K/UL (ref 0.1–1.3)
MONOCYTES NFR BLD: 12 % (ref 4–12)
NEUTS SEG # BLD: 2.2 K/UL (ref 1.7–8.2)
NEUTS SEG NFR BLD: 37 % (ref 43–78)
NRBC # BLD: 0 K/UL (ref 0–0.2)
OPIATES UR QL: POSITIVE
PCP UR QL: NEGATIVE
PLATELET # BLD AUTO: 242 K/UL (ref 150–450)
PMV BLD AUTO: 10.1 FL (ref 9.4–12.3)
PROT SERPL-MCNC: 7.6 G/DL (ref 6.3–8.2)
RBC # BLD AUTO: 4.97 M/UL (ref 4.05–5.2)
WBC # BLD AUTO: 6 K/UL (ref 4.3–11.1)

## 2023-04-19 LAB
ALBUMIN SERPL-MCNC: 4.4 G/DL (ref 3.2–4.6)
ALBUMIN/GLOB SERPL: 1.4 (ref 0.4–1.6)
ALP SERPL-CCNC: 105 U/L (ref 50–136)
ALT SERPL-CCNC: 25 U/L (ref 12–65)
AST SERPL-CCNC: 14 U/L (ref 15–37)
BILIRUB DIRECT SERPL-MCNC: ABNORMAL MG/DL (ref 0–0.3)
BILIRUB SERPL-MCNC: 0.9 MG/DL (ref 0.2–1.1)
GLOBULIN SER CALC-MCNC: 3.2 G/DL (ref 2.8–4.5)
PROT SERPL-MCNC: 7.6 G/DL (ref 6.3–8.2)

## 2023-05-08 DIAGNOSIS — G89.4 CHRONIC PAIN SYNDROME: ICD-10-CM

## 2023-05-08 RX ORDER — HYDROCODONE BITARTRATE AND ACETAMINOPHEN 10; 325 MG/1; MG/1
1 TABLET ORAL EVERY 6 HOURS PRN
Qty: 120 TABLET | Refills: 0 | Status: SHIPPED | OUTPATIENT
Start: 2023-05-08 | End: 2023-06-07

## 2023-05-17 DIAGNOSIS — E03.9 HYPOTHYROIDISM, UNSPECIFIED TYPE: ICD-10-CM

## 2023-05-18 RX ORDER — LEVOTHYROXINE SODIUM 112 UG/1
112 TABLET ORAL DAILY
Qty: 90 TABLET | Refills: 1 | Status: SHIPPED | OUTPATIENT
Start: 2023-05-18

## 2023-06-09 DIAGNOSIS — G89.4 CHRONIC PAIN SYNDROME: ICD-10-CM

## 2023-06-09 RX ORDER — HYDROCODONE BITARTRATE AND ACETAMINOPHEN 10; 325 MG/1; MG/1
1 TABLET ORAL EVERY 6 HOURS PRN
Qty: 120 TABLET | Refills: 0 | Status: SHIPPED | OUTPATIENT
Start: 2023-06-09 | End: 2023-07-09

## 2023-07-07 DIAGNOSIS — G89.4 CHRONIC PAIN SYNDROME: ICD-10-CM

## 2023-07-10 RX ORDER — HYDROCODONE BITARTRATE AND ACETAMINOPHEN 10; 325 MG/1; MG/1
1 TABLET ORAL EVERY 6 HOURS PRN
Qty: 120 TABLET | Refills: 0 | Status: SHIPPED | OUTPATIENT
Start: 2023-07-10 | End: 2023-08-09

## 2023-07-17 ENCOUNTER — OFFICE VISIT (OUTPATIENT)
Age: 73
End: 2023-07-17
Payer: MEDICARE

## 2023-07-17 VITALS
WEIGHT: 153.2 LBS | HEIGHT: 66 IN | SYSTOLIC BLOOD PRESSURE: 122 MMHG | HEART RATE: 86 BPM | DIASTOLIC BLOOD PRESSURE: 80 MMHG | BODY MASS INDEX: 24.62 KG/M2

## 2023-07-17 DIAGNOSIS — I10 ESSENTIAL (PRIMARY) HYPERTENSION: Primary | ICD-10-CM

## 2023-07-17 PROCEDURE — 3017F COLORECTAL CA SCREEN DOC REV: CPT | Performed by: INTERNAL MEDICINE

## 2023-07-17 PROCEDURE — 3079F DIAST BP 80-89 MM HG: CPT | Performed by: INTERNAL MEDICINE

## 2023-07-17 PROCEDURE — 99213 OFFICE O/P EST LOW 20 MIN: CPT | Performed by: INTERNAL MEDICINE

## 2023-07-17 PROCEDURE — 1090F PRES/ABSN URINE INCON ASSESS: CPT | Performed by: INTERNAL MEDICINE

## 2023-07-17 PROCEDURE — 3074F SYST BP LT 130 MM HG: CPT | Performed by: INTERNAL MEDICINE

## 2023-07-17 PROCEDURE — G8399 PT W/DXA RESULTS DOCUMENT: HCPCS | Performed by: INTERNAL MEDICINE

## 2023-07-17 PROCEDURE — 1036F TOBACCO NON-USER: CPT | Performed by: INTERNAL MEDICINE

## 2023-07-17 PROCEDURE — G8420 CALC BMI NORM PARAMETERS: HCPCS | Performed by: INTERNAL MEDICINE

## 2023-07-17 PROCEDURE — 93000 ELECTROCARDIOGRAM COMPLETE: CPT | Performed by: INTERNAL MEDICINE

## 2023-07-17 PROCEDURE — G8427 DOCREV CUR MEDS BY ELIG CLIN: HCPCS | Performed by: INTERNAL MEDICINE

## 2023-07-17 PROCEDURE — 1123F ACP DISCUSS/DSCN MKR DOCD: CPT | Performed by: INTERNAL MEDICINE

## 2023-07-17 ASSESSMENT — ENCOUNTER SYMPTOMS
HOARSE VOICE: 0
COLOR CHANGE: 0
HEMATOCHEZIA: 0
ABDOMINAL PAIN: 0
ORTHOPNEA: 0
HEMATEMESIS: 0
BOWEL INCONTINENCE: 0
DIARRHEA: 0
SHORTNESS OF BREATH: 0
WHEEZING: 0
SPUTUM PRODUCTION: 0
BLURRED VISION: 0

## 2023-07-17 NOTE — PROGRESS NOTES
Extraocular movements intact. Pupils: Pupils are equal, round, and reactive to light. Neck:      Vascular: No carotid bruit. Cardiovascular:      Rate and Rhythm: Regular rhythm. Pulses: Normal pulses. Heart sounds: Murmur (1/6 MARCIO) heard. Pulmonary:      Effort: Pulmonary effort is normal.      Breath sounds: Normal breath sounds. Abdominal:      General: Abdomen is flat. Bowel sounds are normal.      Palpations: Abdomen is soft. Musculoskeletal:         General: Normal range of motion. Cervical back: Normal range of motion and neck supple. Skin:     General: Skin is warm and dry. Neurological:      General: No focal deficit present. Mental Status: She is alert and oriented to person, place, and time. Psychiatric:         Mood and Affect: Mood normal.       Medical problems and test results were reviewed with the patient today. No results found for this or any previous visit (from the past 672 hour(s)). Lab Results   Component Value Date/Time    CHOL 113 10/14/2022 10:20 AM    HDL 46 10/14/2022 10:20 AM    VLDL 47 04/14/2021 10:06 AM     Results for orders placed or performed in visit on 07/17/23   EKG 12 lead    Impression    Sinus  Rhythm   -Old anterior infarct. ABNORMAL        ASSESSMENT and PLAN    Maren De Los Santos was seen today for hypertension. Diagnoses and all orders for this visit:    Essential (primary) hypertension:Stable and at [email or web address].  -     EKG 12 lead          Disposition:    Return in about 6 months (around 1/17/2024).                 Mono Nassar MD  7/17/2023  5:41 PM

## 2023-08-16 DIAGNOSIS — G89.4 CHRONIC PAIN SYNDROME: ICD-10-CM

## 2023-08-17 RX ORDER — HYDROCODONE BITARTRATE AND ACETAMINOPHEN 10; 325 MG/1; MG/1
1 TABLET ORAL EVERY 6 HOURS PRN
Qty: 120 TABLET | Refills: 0 | Status: SHIPPED | OUTPATIENT
Start: 2023-08-17 | End: 2023-09-16

## 2023-09-25 DIAGNOSIS — G89.4 CHRONIC PAIN SYNDROME: ICD-10-CM

## 2023-09-25 RX ORDER — HYDROCODONE BITARTRATE AND ACETAMINOPHEN 10; 325 MG/1; MG/1
1 TABLET ORAL EVERY 6 HOURS PRN
Qty: 120 TABLET | Refills: 0 | Status: SHIPPED | OUTPATIENT
Start: 2023-09-25 | End: 2023-10-25

## 2023-10-07 DIAGNOSIS — E78.01 FAMILIAL HYPERCHOLESTEROLEMIA: ICD-10-CM

## 2023-10-10 ENCOUNTER — OFFICE VISIT (OUTPATIENT)
Dept: NEUROLOGY | Age: 73
End: 2023-10-10
Payer: MEDICARE

## 2023-10-10 DIAGNOSIS — I10 PRIMARY HYPERTENSION: ICD-10-CM

## 2023-10-10 DIAGNOSIS — S06.5XAA SDH (SUBDURAL HEMATOMA) (HCC): ICD-10-CM

## 2023-10-10 DIAGNOSIS — I63.9 CEREBROVASCULAR ACCIDENT (CVA), UNSPECIFIED MECHANISM (HCC): ICD-10-CM

## 2023-10-10 DIAGNOSIS — G90.521 COMPLEX REGIONAL PAIN SYNDROME TYPE 1 OF RIGHT LOWER EXTREMITY: Primary | ICD-10-CM

## 2023-10-10 PROCEDURE — G8484 FLU IMMUNIZE NO ADMIN: HCPCS | Performed by: PSYCHIATRY & NEUROLOGY

## 2023-10-10 PROCEDURE — 3017F COLORECTAL CA SCREEN DOC REV: CPT | Performed by: PSYCHIATRY & NEUROLOGY

## 2023-10-10 PROCEDURE — 1090F PRES/ABSN URINE INCON ASSESS: CPT | Performed by: PSYCHIATRY & NEUROLOGY

## 2023-10-10 PROCEDURE — G8427 DOCREV CUR MEDS BY ELIG CLIN: HCPCS | Performed by: PSYCHIATRY & NEUROLOGY

## 2023-10-10 PROCEDURE — 1036F TOBACCO NON-USER: CPT | Performed by: PSYCHIATRY & NEUROLOGY

## 2023-10-10 PROCEDURE — 99214 OFFICE O/P EST MOD 30 MIN: CPT | Performed by: PSYCHIATRY & NEUROLOGY

## 2023-10-10 PROCEDURE — G8399 PT W/DXA RESULTS DOCUMENT: HCPCS | Performed by: PSYCHIATRY & NEUROLOGY

## 2023-10-10 PROCEDURE — G8420 CALC BMI NORM PARAMETERS: HCPCS | Performed by: PSYCHIATRY & NEUROLOGY

## 2023-10-10 PROCEDURE — 1123F ACP DISCUSS/DSCN MKR DOCD: CPT | Performed by: PSYCHIATRY & NEUROLOGY

## 2023-10-10 RX ORDER — GABAPENTIN 300 MG/1
300 CAPSULE ORAL NIGHTLY
Qty: 90 CAPSULE | Refills: 3 | Status: SHIPPED | OUTPATIENT
Start: 2023-10-10 | End: 2024-01-08

## 2023-10-10 ASSESSMENT — ENCOUNTER SYMPTOMS
COLOR CHANGE: 1
EYES NEGATIVE: 1
RESPIRATORY NEGATIVE: 1
ALLERGIC/IMMUNOLOGIC NEGATIVE: 1

## 2023-10-10 NOTE — PROGRESS NOTES
[facility], [address] [facility] [address], [address]  Phone: [phone] Fax [phone]  Dr. Jordan Finnegan      10/10/2023  Sarah Ruiz     Patient is referred by the following provider for consultation regarding as below:       I reviewed the available records and notes and have examined patient with the following findings:     Chief Complaint:  No chief complaint on file. HPI: This is a right handed 74 y.o.  female here with her daughter. The patient unfortunately did have a left hemispheric internal capsule stroke causing right upper and right lower extremity weakness and cognitive changes and slurred speech. The CTA showed an M1 MCA moderate stenosis left vertebral artery occlusion and a 3 mm saccular right internal capsule skull base aneurysm. That we try to get the patient to endovascular team.  She is on Plavix for stroke prevention at this time. We are going to need to repeat the CTA if they have not seen her at the endovascular team.    She also had an episode where she lost consciousness due to a drop in blood pressure she had too much blood pressure medicine. She has subdural hematoma and subarachnoid hemorrhage in the right side but it completely resolved on the CAT scan that was done on 3-2-2023. They really did not make much of this last time I saw him but there is been progressive worsening of bilateral lower extremity numbness tingling burning sensation the left started years ago the right is much worse. In fact I think post right hip surgery is when they noticed that the right foot was turning blue and purple and red very severe burning paresthesias dysesthetic sensation. It is now to the point where she willing to walk on it and the right leg is turning in because the right hip is still excruciatingly in pain. The right foot is turning in because that hip it is more comfortable for her to sit with it more internally rotated.     IMAGING

## 2023-10-13 RX ORDER — ATORVASTATIN CALCIUM 20 MG/1
TABLET, FILM COATED ORAL
Qty: 90 TABLET | Refills: 0 | Status: SHIPPED | OUTPATIENT
Start: 2023-10-13

## 2023-10-13 RX ORDER — NEBIVOLOL 10 MG/1
10 TABLET ORAL DAILY
Qty: 90 TABLET | Refills: 0 | Status: SHIPPED | OUTPATIENT
Start: 2023-10-13

## 2023-10-16 DIAGNOSIS — G89.4 CHRONIC PAIN SYNDROME: Primary | ICD-10-CM

## 2023-10-16 RX ORDER — HYDROCODONE BITARTRATE AND ACETAMINOPHEN 7.5; 325 MG/1; MG/1
1 TABLET ORAL EVERY 6 HOURS PRN
Qty: 120 TABLET | Refills: 0 | Status: SHIPPED | OUTPATIENT
Start: 2023-10-16 | End: 2023-11-15

## 2023-10-16 NOTE — TELEPHONE ENCOUNTER
I called pharmacies and found [facility] has Norco 7.5-325mg #120 in stock. Can you send in, please?

## 2023-11-06 DIAGNOSIS — E03.9 HYPOTHYROIDISM, UNSPECIFIED TYPE: ICD-10-CM

## 2023-11-09 RX ORDER — LEVOTHYROXINE SODIUM 112 UG/1
112 TABLET ORAL DAILY
Qty: 90 TABLET | Refills: 0 | Status: SHIPPED | OUTPATIENT
Start: 2023-11-09

## 2023-11-29 DIAGNOSIS — G89.4 CHRONIC PAIN SYNDROME: ICD-10-CM

## 2023-11-30 RX ORDER — HYDROCODONE BITARTRATE AND ACETAMINOPHEN 7.5; 325 MG/1; MG/1
1 TABLET ORAL EVERY 6 HOURS PRN
Qty: 120 TABLET | Refills: 0 | Status: SHIPPED | OUTPATIENT
Start: 2023-11-30 | End: 2023-12-30

## 2023-12-05 ENCOUNTER — OFFICE VISIT (OUTPATIENT)
Dept: FAMILY MEDICINE CLINIC | Facility: CLINIC | Age: 73
End: 2023-12-05
Payer: MEDICARE

## 2023-12-05 VITALS
HEIGHT: 66 IN | BODY MASS INDEX: 25.55 KG/M2 | SYSTOLIC BLOOD PRESSURE: 122 MMHG | DIASTOLIC BLOOD PRESSURE: 82 MMHG | WEIGHT: 159 LBS | HEART RATE: 70 BPM

## 2023-12-05 DIAGNOSIS — I10 PRIMARY HYPERTENSION: ICD-10-CM

## 2023-12-05 DIAGNOSIS — E78.01 FAMILIAL HYPERCHOLESTEROLEMIA: ICD-10-CM

## 2023-12-05 DIAGNOSIS — I73.9 PERIPHERAL VASCULAR DISEASE, UNSPECIFIED (HCC): ICD-10-CM

## 2023-12-05 DIAGNOSIS — F11.99 OPIOID USE, UNSPECIFIED WITH UNSPECIFIED OPIOID-INDUCED DISORDER (HCC): ICD-10-CM

## 2023-12-05 DIAGNOSIS — Z13.31 SCREENING FOR DEPRESSION: ICD-10-CM

## 2023-12-05 DIAGNOSIS — F33.9 RECURRENT DEPRESSION (HCC): ICD-10-CM

## 2023-12-05 DIAGNOSIS — E03.9 HYPOTHYROIDISM, UNSPECIFIED TYPE: ICD-10-CM

## 2023-12-05 DIAGNOSIS — M25.511 ACUTE PAIN OF RIGHT SHOULDER: ICD-10-CM

## 2023-12-05 DIAGNOSIS — N18.30 STAGE 3 CHRONIC KIDNEY DISEASE, UNSPECIFIED WHETHER STAGE 3A OR 3B CKD (HCC): ICD-10-CM

## 2023-12-05 DIAGNOSIS — Z00.00 ROUTINE GENERAL MEDICAL EXAMINATION AT A HEALTH CARE FACILITY: ICD-10-CM

## 2023-12-05 DIAGNOSIS — G89.4 CHRONIC PAIN SYNDROME: ICD-10-CM

## 2023-12-05 DIAGNOSIS — E55.9 VITAMIN D DEFICIENCY, UNSPECIFIED: Primary | ICD-10-CM

## 2023-12-05 DIAGNOSIS — Z00.00 MEDICARE ANNUAL WELLNESS VISIT, SUBSEQUENT: ICD-10-CM

## 2023-12-05 LAB
25(OH)D3 SERPL-MCNC: 19.7 NG/ML (ref 30–100)
ALBUMIN SERPL-MCNC: 4.5 G/DL (ref 3.2–4.6)
ALBUMIN/GLOB SERPL: 1.4 (ref 0.4–1.6)
ALP SERPL-CCNC: 82 U/L (ref 50–136)
ALT SERPL-CCNC: 27 U/L (ref 12–65)
AMPHET UR QL SCN: NEGATIVE
ANION GAP SERPL CALC-SCNC: 8 MMOL/L (ref 2–11)
AST SERPL-CCNC: 14 U/L (ref 15–37)
BARBITURATES UR QL SCN: NEGATIVE
BASOPHILS # BLD: 0.1 K/UL (ref 0–0.2)
BASOPHILS NFR BLD: 1 % (ref 0–2)
BENZODIAZ UR QL: NEGATIVE
BILIRUB SERPL-MCNC: 1.4 MG/DL (ref 0.2–1.1)
BILIRUBIN, URINE, POC: NEGATIVE
BLOOD URINE, POC: NEGATIVE
BUN SERPL-MCNC: 12 MG/DL (ref 8–23)
CALCIUM SERPL-MCNC: 10.3 MG/DL (ref 8.3–10.4)
CANNABINOIDS UR QL SCN: NEGATIVE
CHLORIDE SERPL-SCNC: 105 MMOL/L (ref 103–113)
CHOLEST SERPL-MCNC: 173 MG/DL
CO2 SERPL-SCNC: 28 MMOL/L (ref 21–32)
COCAINE UR QL SCN: NEGATIVE
CREAT SERPL-MCNC: 1.3 MG/DL (ref 0.6–1)
DIFFERENTIAL METHOD BLD: ABNORMAL
EOSINOPHIL # BLD: 0.1 K/UL (ref 0–0.8)
EOSINOPHIL NFR BLD: 1 % (ref 0.5–7.8)
ERYTHROCYTE [DISTWIDTH] IN BLOOD BY AUTOMATED COUNT: 14.1 % (ref 11.9–14.6)
GLOBULIN SER CALC-MCNC: 3.2 G/DL (ref 2.8–4.5)
GLUCOSE SERPL-MCNC: 113 MG/DL (ref 65–100)
GLUCOSE URINE, POC: NEGATIVE
HCT VFR BLD AUTO: 45.5 % (ref 35.8–46.3)
HDLC SERPL-MCNC: 44 MG/DL (ref 40–60)
HDLC SERPL: 3.9
HGB BLD-MCNC: 14.4 G/DL (ref 11.7–15.4)
IMM GRANULOCYTES # BLD AUTO: 0 K/UL (ref 0–0.5)
IMM GRANULOCYTES NFR BLD AUTO: 0 % (ref 0–5)
KETONES, URINE, POC: NEGATIVE
LDLC SERPL CALC-MCNC: 63 MG/DL
LEUKOCYTE ESTERASE, URINE, POC: NEGATIVE
LYMPHOCYTES # BLD: 3.6 K/UL (ref 0.5–4.6)
LYMPHOCYTES NFR BLD: 48 % (ref 13–44)
MCH RBC QN AUTO: 27.7 PG (ref 26.1–32.9)
MCHC RBC AUTO-ENTMCNC: 31.6 G/DL (ref 31.4–35)
MCV RBC AUTO: 87.5 FL (ref 82–102)
METHADONE UR QL: NEGATIVE
MONOCYTES # BLD: 0.7 K/UL (ref 0.1–1.3)
MONOCYTES NFR BLD: 10 % (ref 4–12)
NEUTS SEG # BLD: 3 K/UL (ref 1.7–8.2)
NEUTS SEG NFR BLD: 40 % (ref 43–78)
NITRITE, URINE, POC: NEGATIVE
NRBC # BLD: 0 K/UL (ref 0–0.2)
OPIATES UR QL: POSITIVE
PCP UR QL: NEGATIVE
PH, URINE, POC: 7 (ref 4.6–8)
PLATELET # BLD AUTO: 240 K/UL (ref 150–450)
PMV BLD AUTO: 10.8 FL (ref 9.4–12.3)
POTASSIUM SERPL-SCNC: 4 MMOL/L (ref 3.5–5.1)
PROT SERPL-MCNC: 7.7 G/DL (ref 6.3–8.2)
PROTEIN,URINE, POC: NEGATIVE
RBC # BLD AUTO: 5.2 M/UL (ref 4.05–5.2)
SODIUM SERPL-SCNC: 141 MMOL/L (ref 136–146)
SPECIFIC GRAVITY, URINE, POC: 1.01 (ref 1–1.03)
TRIGL SERPL-MCNC: 330 MG/DL (ref 35–150)
TSH W FREE THYROID IF ABNORMAL: 5.41 UIU/ML (ref 0.36–3.74)
URINALYSIS CLARITY, POC: CLEAR
URINALYSIS COLOR, POC: YELLOW
UROBILINOGEN, POC: NORMAL
VLDLC SERPL CALC-MCNC: 66 MG/DL (ref 6–23)
WBC # BLD AUTO: 7.5 K/UL (ref 4.3–11.1)

## 2023-12-05 PROCEDURE — 3017F COLORECTAL CA SCREEN DOC REV: CPT | Performed by: FAMILY MEDICINE

## 2023-12-05 PROCEDURE — 81003 URINALYSIS AUTO W/O SCOPE: CPT | Performed by: FAMILY MEDICINE

## 2023-12-05 PROCEDURE — 3074F SYST BP LT 130 MM HG: CPT | Performed by: FAMILY MEDICINE

## 2023-12-05 PROCEDURE — 1123F ACP DISCUSS/DSCN MKR DOCD: CPT | Performed by: FAMILY MEDICINE

## 2023-12-05 PROCEDURE — G0439 PPPS, SUBSEQ VISIT: HCPCS | Performed by: FAMILY MEDICINE

## 2023-12-05 PROCEDURE — 36415 COLL VENOUS BLD VENIPUNCTURE: CPT | Performed by: FAMILY MEDICINE

## 2023-12-05 PROCEDURE — G8484 FLU IMMUNIZE NO ADMIN: HCPCS | Performed by: FAMILY MEDICINE

## 2023-12-05 PROCEDURE — 3079F DIAST BP 80-89 MM HG: CPT | Performed by: FAMILY MEDICINE

## 2023-12-05 RX ORDER — NEBIVOLOL 10 MG/1
TABLET ORAL DAILY
COMMUNITY

## 2023-12-05 RX ORDER — VENLAFAXINE HYDROCHLORIDE 150 MG/1
150 CAPSULE, EXTENDED RELEASE ORAL DAILY
Qty: 180 CAPSULE | Refills: 3 | Status: SHIPPED | OUTPATIENT
Start: 2023-12-05

## 2023-12-05 RX ORDER — HYDROCODONE BITARTRATE AND ACETAMINOPHEN 7.5; 325 MG/1; MG/1
1 TABLET ORAL EVERY 6 HOURS PRN
Qty: 120 TABLET | Refills: 0 | Status: SHIPPED | OUTPATIENT
Start: 2023-12-05 | End: 2024-01-04

## 2023-12-05 RX ORDER — DICLOFENAC SODIUM 75 MG/1
75 TABLET, DELAYED RELEASE ORAL 2 TIMES DAILY PRN
Qty: 60 TABLET | Refills: 0 | Status: SHIPPED | OUTPATIENT
Start: 2023-12-05

## 2023-12-05 RX ORDER — LOSARTAN POTASSIUM AND HYDROCHLOROTHIAZIDE 25; 100 MG/1; MG/1
1 TABLET ORAL DAILY
Qty: 90 TABLET | Refills: 3 | Status: SHIPPED | OUTPATIENT
Start: 2023-12-05

## 2023-12-05 RX ORDER — NEBIVOLOL 10 MG/1
10 TABLET ORAL DAILY
Qty: 90 TABLET | Refills: 3 | Status: SHIPPED | OUTPATIENT
Start: 2023-12-05

## 2023-12-05 RX ORDER — GABAPENTIN 300 MG/1
300 CAPSULE ORAL 3 TIMES DAILY
Qty: 270 CAPSULE | Refills: 3 | Status: SHIPPED | OUTPATIENT
Start: 2023-12-05 | End: 2024-11-29

## 2023-12-05 RX ORDER — ATORVASTATIN CALCIUM 20 MG/1
TABLET, FILM COATED ORAL
Qty: 90 TABLET | Refills: 3 | Status: SHIPPED | OUTPATIENT
Start: 2023-12-05

## 2023-12-05 RX ORDER — LEVOTHYROXINE SODIUM 112 UG/1
112 TABLET ORAL DAILY
Qty: 90 TABLET | Refills: 3 | Status: SHIPPED | OUTPATIENT
Start: 2023-12-05

## 2023-12-05 NOTE — PROGRESS NOTES
Medicare Annual Wellness Visit    Jojo Herrera is here for Annual Exam (Pt declined getting undress today.)    Assessment & Plan   Vitamin D deficiency, unspecified  -     Vitamin D 25 Hydroxy; Future  Recurrent depression (HCC)  -     venlafaxine (EFFEXOR XR) 150 MG extended release capsule; Take 1 capsule by mouth daily, Disp-180 capsule, R-3Normal  Primary hypertension  -     nebivolol (BYSTOLIC) 10 MG tablet; Take 1 tablet by mouth daily, Disp-90 tablet, R-3Normal  -     losartan-hydroCHLOROthiazide (HYZAAR) 100-25 MG per tablet; Take 1 tablet by mouth daily, Disp-90 tablet, R-3Normal  -     Comprehensive Metabolic Panel; Future  -     CBC with Auto Differential; Future  -     AMB POC URINALYSIS DIP STICK AUTO W/O MICRO; Future  Hypothyroidism, unspecified type  -     levothyroxine (SYNTHROID) 112 MCG tablet; Take 1 tablet by mouth Daily, Disp-90 tablet, R-3Normal  -     TSH with Reflex; Future  Chronic pain syndrome  -     HYDROcodone-acetaminophen (NORCO) 7.5-325 MG per tablet; Take 1 tablet by mouth every 6 hours as needed for Pain for up to 30 days. Intended supply: 30 days Max Daily Amount: 4 tablets, Disp-120 tablet, R-0Normal  -     gabapentin (NEURONTIN) 300 MG capsule; Take 1 capsule by mouth 3 times daily for 360 days. , Disp-270 capsule, R-3Normal  -     Urine Drug Screen; Future  Acute pain of right shoulder  -     diclofenac (VOLTAREN) 75 MG EC tablet; Take 1 tablet by mouth 2 times daily as needed for Pain, Disp-60 tablet, R-0Normal  Familial hypercholesterolemia  -     atorvastatin (LIPITOR) 20 MG tablet; TAKE 1 TABLET BY MOUTH AT BEDTIME, Disp-90 tablet, R-3Normal  -     Lipid Panel;  Future  Opioid use, unspecified with unspecified opioid-induced disorder ([address])  Peripheral vascular disease, unspecified ([address])  Stage 3 chronic kidney disease, unspecified whether stage 3a or 3b CKD ([address])  Routine general medical examination at a health care facility  Screening for depression  Medicare annual

## 2023-12-05 NOTE — PERIOP NOTES
PLEASE CONTINUE TAKING ALL PRESCRIPTION MEDICATIONS UP TO THE DAY OF SURGERY UNLESS OTHERWISE DIRECTED BELOW. DISCONTINUE all vitamins, herbals, and supplements 21 days prior to surgery. DISCONTINUE Non-Steriodal Anti-Inflammatory (NSAIDS) such as Advil, Ibuprofen, Motrin, Naproxen, and Aleve 21 days prior to surgery. Home Medications to take  the day of surgery (4/7/22)      81 mg Aspirin, Atorvastatin, Bystolic, Flonase if needed,    Norco if needed, Omeprazole, Sucralfate, Venlafaxine, Synthroid              Home Medications   to Hold     Plavix=stop 7 days prior (last dose on 3/30/22)    *continue 81 mg Aspirin*         Comments         Bring: Frances-hex soap, Incentive Spirometer, Photo ID, Insurance card, Bystolic       Please do not bring home medications with you on the day of surgery unless otherwise directed by your nurse. If you are instructed to bring home medications, please give them to your nurse as they will be administered by the nursing staff. If you have any questions, please call [facility] [phone]. A copy of this note was provided to the patient for reference. Writer received secure chat from ED provider, Dr. Duran, patient's daughter, Jacinta PACK. Does not want patient returning to [facility], wants patient to go to a different rehab. In-person conversation with Dr. Duran, writer explained that patient will have to return to present [facility] at [facility], patient and family will have to work with SW at [facility] to move to a different [facility]. Patient was admitted to [facility] from [facility] on 11/30/23. Dr. Duran going to speak with daughter and relay above information.     1045: Asked by Dr. Duran to call [facility] to inform them patient and family want to move to a different [facility]. Given phone  [phone], [employer] , and extension [phone]-Nursing, and extension [phone] -Head Nurse, phone information received from Dr. Duran, who received from daughter Jacinta.  Writer called, [phone], Ext [phone]-Head Nurse, left  for  \" Piper\", with all above information.    Domonique Larsen RN    Care Management / Emergency Department  [facility] /  [facility]  [phone]

## 2023-12-06 LAB — T4 FREE SERPL-MCNC: 0.9 NG/DL (ref 0.78–1.46)

## 2024-01-02 DIAGNOSIS — G89.4 CHRONIC PAIN SYNDROME: ICD-10-CM

## 2024-01-02 RX ORDER — HYDROCODONE BITARTRATE AND ACETAMINOPHEN 7.5; 325 MG/1; MG/1
1 TABLET ORAL EVERY 6 HOURS PRN
Qty: 120 TABLET | Refills: 0 | Status: SHIPPED | OUTPATIENT
Start: 2024-01-02 | End: 2024-02-01

## 2024-01-23 ENCOUNTER — PROCEDURE VISIT (OUTPATIENT)
Dept: NEUROLOGY | Age: 74
End: 2024-01-23
Payer: MEDICARE

## 2024-01-23 DIAGNOSIS — M79.604 RIGHT LEG PAIN: ICD-10-CM

## 2024-01-23 DIAGNOSIS — R20.0 NUMBNESS AND TINGLING OF BOTH FEET: Primary | ICD-10-CM

## 2024-01-23 DIAGNOSIS — G62.9 NEUROPATHY: ICD-10-CM

## 2024-01-23 DIAGNOSIS — R20.2 NUMBNESS AND TINGLING OF BOTH FEET: Primary | ICD-10-CM

## 2024-01-23 PROCEDURE — 95885 MUSC TST DONE W/NERV TST LIM: CPT | Performed by: PSYCHIATRY & NEUROLOGY

## 2024-01-23 PROCEDURE — 95910 NRV CNDJ TEST 7-8 STUDIES: CPT | Performed by: PSYCHIATRY & NEUROLOGY

## 2024-01-23 NOTE — PROGRESS NOTES
EMG/Nerve Conduction Study Procedure Note  [address]    [address]  [place]  [address]   [phone]      Hx:    Exam:     73 y.o.  M  W female  EMG lowers  BLE for neuropathy / RLE and CRPS s/p [facility] brain Dr Saab .  High arches.  Some of her toes appear to be hammertoes.  There might be a family history that is positive.  Referring: Dr. Esteban Parrish  Technologist: Page Lucas  PCP: Dr. Richard De La Cruz  Height: 5 foot 6 inch        Summary               needle EMG selected lower extremity muscles as noted below with CV.       Controlled environmental factors / EMG lab.  Temperature.   NCV : sensory segments:    Abnormal = mildly slowed bilateral sural SCV = with attenuated SNAP.  NCV plantar sensory segments:     Deferred.  NCV Motor MCV segments:     Abnormal = the right peroneal MCV is markedly abnormal = ABSENT /no response CMAP of the right peroneal nerve however there is evidence for anastomosis variant.  The left peroneal basically normal with some slight prolonged TL as bilateral tibial MCV slightly prolonged TL and equivocal proximal slowing MCV.  F-wave studies:         Abnormal prolonged bilateral peroneal tibial F waves = symmetric.  H-REFLEX Studies:    Abnormal = prolonged symmetric bilateral H-reflexes.   NEEDLE EMG:   Tested muscles::    Bilateral TA MG VL muscles tested = a single abnormality occurs at the right tibialis anterior with 3+ 4+ 4+ IA fib PSW without any fasciculations HF or MUP abnormality but with discrete recruitment.  The  other tested muscles are within normal limits.      INTERPRETATION:      THESE FINDINGS ARE ELECTROPHYSIOLOGIC EVIDENCE OF A POLYNEUROPATHY = SOME ASYMMETRIES OCCUR THERE MIGHT BE A RIGHT PERONEAL NEUROPATHY ASSOCIATED.  SUPERIMPOSED.  NO EVIDENCE FOR MYOPATHY MYOTONIA OR FASCICULATIONS OR PROXIMAL UPPER MOTOR NEURON ABNORMALITY.           CONCLUSION:      Compatible with a polyneuropathy = sensorimotor mixed some asymmetry.      Procedure Details:

## 2024-02-06 DIAGNOSIS — G89.4 CHRONIC PAIN SYNDROME: Primary | ICD-10-CM

## 2024-02-07 NOTE — TELEPHONE ENCOUNTER
Patient had labs done in Dec. I sent a message to diana to schedule a 6 month labs and follow up appointment.   SCRIPTS show patient is due for refill. Pharmacy confirmed.

## 2024-02-08 RX ORDER — HYDROCODONE BITARTRATE AND ACETAMINOPHEN 10; 325 MG/1; MG/1
1 TABLET ORAL EVERY 8 HOURS PRN
Qty: 120 TABLET | Refills: 0 | Status: SHIPPED | OUTPATIENT
Start: 2024-02-08 | End: 2024-03-09

## 2024-03-05 DIAGNOSIS — G89.4 CHRONIC PAIN SYNDROME: ICD-10-CM

## 2024-03-05 RX ORDER — HYDROCODONE BITARTRATE AND ACETAMINOPHEN 10; 325 MG/1; MG/1
1 TABLET ORAL EVERY 8 HOURS PRN
Qty: 120 TABLET | Refills: 0 | Status: SHIPPED | OUTPATIENT
Start: 2024-03-06 | End: 2024-04-05

## 2024-03-05 NOTE — TELEPHONE ENCOUNTER
[facility] out of stock [facility]. I have reviewed the patient’s controlled substance prescription history, as maintained in the [place] prescription monitoring program, so that the prescription(s) for a  controlled substance can be given

## 2024-04-08 DIAGNOSIS — G89.4 CHRONIC PAIN SYNDROME: ICD-10-CM

## 2024-04-08 RX ORDER — HYDROCODONE BITARTRATE AND ACETAMINOPHEN 10; 325 MG/1; MG/1
1 TABLET ORAL EVERY 8 HOURS PRN
Qty: 120 TABLET | Refills: 0 | Status: SHIPPED | OUTPATIENT
Start: 2024-04-08 | End: 2024-05-08

## 2024-04-09 ENCOUNTER — TELEPHONE (OUTPATIENT)
Dept: FAMILY MEDICINE CLINIC | Facility: CLINIC | Age: 74
End: 2024-04-09

## 2024-04-09 NOTE — TELEPHONE ENCOUNTER
Patient called requesting we send in patient's pain medicine. I notified patient's  we sent prescription to pharmacy. Confirmed it was sent to right pharmacy. Told patient's  to call and make sure they received it. Patient's  verbalized understanding and will return call if they don't have it today.

## 2024-05-06 DIAGNOSIS — G89.4 CHRONIC PAIN SYNDROME: ICD-10-CM

## 2024-05-07 RX ORDER — HYDROCODONE BITARTRATE AND ACETAMINOPHEN 10; 325 MG/1; MG/1
1 TABLET ORAL EVERY 8 HOURS PRN
Qty: 120 TABLET | Refills: 0 | Status: SHIPPED | OUTPATIENT
Start: 2024-05-07 | End: 2024-05-09 | Stop reason: SDUPTHER

## 2024-05-09 DIAGNOSIS — G89.4 CHRONIC PAIN SYNDROME: ICD-10-CM

## 2024-05-09 RX ORDER — HYDROCODONE BITARTRATE AND ACETAMINOPHEN 10; 325 MG/1; MG/1
1 TABLET ORAL EVERY 8 HOURS PRN
Qty: 21 TABLET | Refills: 0 | Status: SHIPPED | OUTPATIENT
Start: 2024-05-09 | End: 2024-05-16

## 2024-07-08 ENCOUNTER — NURSE ONLY (OUTPATIENT)
Dept: FAMILY MEDICINE CLINIC | Facility: CLINIC | Age: 74
End: 2024-07-08
Payer: MEDICARE

## 2024-07-08 ENCOUNTER — TRANSCRIBE ORDERS (OUTPATIENT)
Dept: SCHEDULING | Age: 74
End: 2024-07-08

## 2024-07-08 DIAGNOSIS — Z79.899 ENCOUNTER FOR LONG-TERM (CURRENT) USE OF MEDICATIONS: Primary | ICD-10-CM

## 2024-07-08 DIAGNOSIS — G89.4 CHRONIC PAIN SYNDROME: ICD-10-CM

## 2024-07-08 DIAGNOSIS — Z12.31 SCREENING MAMMOGRAM FOR HIGH-RISK PATIENT: Primary | ICD-10-CM

## 2024-07-08 LAB
ALBUMIN SERPL-MCNC: 4.3 G/DL (ref 3.2–4.6)
ALBUMIN/GLOB SERPL: 1.7 (ref 1–1.9)
ALP SERPL-CCNC: 81 U/L (ref 35–104)
ALT SERPL-CCNC: 24 U/L (ref 12–65)
AST SERPL-CCNC: 29 U/L (ref 15–37)
BASOPHILS # BLD: 0.1 K/UL (ref 0–0.2)
BASOPHILS NFR BLD: 1 % (ref 0–2)
BILIRUB DIRECT SERPL-MCNC: 0.2 MG/DL (ref 0–0.4)
BILIRUB SERPL-MCNC: 1 MG/DL (ref 0–1.2)
DIFFERENTIAL METHOD BLD: NORMAL
EOSINOPHIL # BLD: 0.1 K/UL (ref 0–0.8)
EOSINOPHIL NFR BLD: 1 % (ref 0.5–7.8)
ERYTHROCYTE [DISTWIDTH] IN BLOOD BY AUTOMATED COUNT: 13.3 % (ref 11.9–14.6)
GLOBULIN SER CALC-MCNC: 2.6 G/DL (ref 2.3–3.5)
HCT VFR BLD AUTO: 43.8 % (ref 35.8–46.3)
HGB BLD-MCNC: 14.4 G/DL (ref 11.7–15.4)
IMM GRANULOCYTES # BLD AUTO: 0 K/UL (ref 0–0.5)
IMM GRANULOCYTES NFR BLD AUTO: 0 % (ref 0–5)
LYMPHOCYTES # BLD: 2.2 K/UL (ref 0.5–4.6)
LYMPHOCYTES NFR BLD: 29 % (ref 13–44)
MCH RBC QN AUTO: 30.6 PG (ref 26.1–32.9)
MCHC RBC AUTO-ENTMCNC: 32.9 G/DL (ref 31.4–35)
MCV RBC AUTO: 93 FL (ref 82–102)
MONOCYTES # BLD: 0.6 K/UL (ref 0.1–1.3)
MONOCYTES NFR BLD: 8 % (ref 4–12)
NEUTS SEG # BLD: 4.7 K/UL (ref 1.7–8.2)
NEUTS SEG NFR BLD: 61 % (ref 43–78)
NRBC # BLD: 0 K/UL (ref 0–0.2)
PLATELET # BLD AUTO: 200 K/UL (ref 150–450)
PMV BLD AUTO: 10.3 FL (ref 9.4–12.3)
PROT SERPL-MCNC: 6.9 G/DL (ref 6.3–8.2)
RBC # BLD AUTO: 4.71 M/UL (ref 4.05–5.2)
WBC # BLD AUTO: 7.7 K/UL (ref 4.3–11.1)

## 2024-07-08 PROCEDURE — 36415 COLL VENOUS BLD VENIPUNCTURE: CPT | Performed by: FAMILY MEDICINE

## 2024-07-24 ENCOUNTER — TELEMEDICINE (OUTPATIENT)
Dept: FAMILY MEDICINE CLINIC | Facility: CLINIC | Age: 74
End: 2024-07-24
Payer: MEDICARE

## 2024-07-24 ENCOUNTER — TELEPHONE (OUTPATIENT)
Dept: FAMILY MEDICINE CLINIC | Facility: CLINIC | Age: 74
End: 2024-07-24

## 2024-07-24 DIAGNOSIS — I10 PRIMARY HYPERTENSION: ICD-10-CM

## 2024-07-24 DIAGNOSIS — E03.9 HYPOTHYROIDISM, UNSPECIFIED TYPE: ICD-10-CM

## 2024-07-24 DIAGNOSIS — F11.99 OPIOID USE, UNSPECIFIED WITH UNSPECIFIED OPIOID-INDUCED DISORDER (HCC): ICD-10-CM

## 2024-07-24 DIAGNOSIS — G89.4 CHRONIC PAIN SYNDROME: ICD-10-CM

## 2024-07-24 DIAGNOSIS — K21.9 GERD WITHOUT ESOPHAGITIS: ICD-10-CM

## 2024-07-24 DIAGNOSIS — F33.9 RECURRENT DEPRESSION (HCC): ICD-10-CM

## 2024-07-24 DIAGNOSIS — E78.01 FAMILIAL HYPERCHOLESTEROLEMIA: ICD-10-CM

## 2024-07-24 DIAGNOSIS — G89.4 CHRONIC PAIN SYNDROME: Primary | ICD-10-CM

## 2024-07-24 PROCEDURE — 99442 PR PHYS/QHP TELEPHONE EVALUATION 11-20 MIN: CPT | Performed by: FAMILY MEDICINE

## 2024-07-24 RX ORDER — ATORVASTATIN CALCIUM 20 MG/1
TABLET, FILM COATED ORAL
Qty: 90 TABLET | Refills: 3 | Status: SHIPPED | OUTPATIENT
Start: 2024-07-24

## 2024-07-24 RX ORDER — HYDROCODONE BITARTRATE AND ACETAMINOPHEN 10; 325 MG/1; MG/1
1 TABLET ORAL EVERY 8 HOURS PRN
Qty: 120 TABLET | Refills: 0 | Status: SHIPPED | OUTPATIENT
Start: 2024-07-24 | End: 2024-07-24 | Stop reason: SDUPTHER

## 2024-07-24 RX ORDER — PANTOPRAZOLE SODIUM 40 MG/1
40 TABLET, DELAYED RELEASE ORAL
Qty: 90 TABLET | Refills: 3 | Status: SHIPPED | OUTPATIENT
Start: 2024-07-24

## 2024-07-24 RX ORDER — NEBIVOLOL 10 MG/1
10 TABLET ORAL DAILY
Qty: 90 TABLET | Refills: 3 | Status: SHIPPED | OUTPATIENT
Start: 2024-07-24

## 2024-07-24 RX ORDER — LOSARTAN POTASSIUM AND HYDROCHLOROTHIAZIDE 25; 100 MG/1; MG/1
1 TABLET ORAL DAILY
Qty: 90 TABLET | Refills: 3 | Status: SHIPPED | OUTPATIENT
Start: 2024-07-24

## 2024-07-24 RX ORDER — LEVOTHYROXINE SODIUM 112 UG/1
112 TABLET ORAL DAILY
Qty: 90 TABLET | Refills: 3 | Status: SHIPPED | OUTPATIENT
Start: 2024-07-24

## 2024-07-24 RX ORDER — HYDROCODONE BITARTRATE AND ACETAMINOPHEN 10; 325 MG/1; MG/1
1 TABLET ORAL EVERY 8 HOURS PRN
Qty: 90 TABLET | Refills: 0 | Status: SHIPPED | OUTPATIENT
Start: 2024-07-24 | End: 2024-08-23

## 2024-07-24 RX ORDER — VENLAFAXINE HYDROCHLORIDE 150 MG/1
150 CAPSULE, EXTENDED RELEASE ORAL DAILY
Qty: 180 CAPSULE | Refills: 3 | Status: SHIPPED | OUTPATIENT
Start: 2024-07-24

## 2024-07-24 NOTE — TELEPHONE ENCOUNTER
Hi, patient requesting help with transportation. She has an appointment in December.    INTERVAL HPI/OVERNIGHT EVENTS:       Antimicrobial:    Cardiovascular:    Pulmonary:  ALBUTerol/ipratropium for Nebulization. 3 milliLiter(s) Nebulizer once    Hematalogic:    Other:  chlorhexidine 2% Cloths 1 Application(s) Topical daily  insulin glargine Injectable (LANTUS) 8 Unit(s) SubCutaneous at bedtime  insulin lispro (HumaLOG) corrective regimen sliding scale   SubCutaneous every 6 hours  pantoprazole Infusion 8 mG/Hr IV Continuous <Continuous>  sodium bicarbonate  Infusion 0.089 mEq/kG/Hr IV Continuous <Continuous>      Drug Dosing Weight  Height (cm): 149.86 (21 Sep 2019 15:50)  Weight (kg): 63.5 (21 Sep 2019 15:50)  BMI (kg/m2): 28.3 (21 Sep 2019 15:50)  BSA (m2): 1.58 (21 Sep 2019 15:50)    CENTRAL LINE: [ ] YES [ ] NO  LOCATION:   DATE INSERTED:    MONIQUE: [ ] YES [ ] NO    DATE INSERTED:    A-LINE:  [ ] YES [ ] NO  LOCATION:   DATE INSERTED:    PMH/Social Hx/Fam Hx -reviewed admission note, no change since admission  PAST MEDICAL & SURGICAL HISTORY:  Diabetes mellitus  CKD (chronic kidney disease)  Seasonal allergies  Vitamin D deficiency  Osteoarthritis  Osteoporosis  Breast lump  Cataract  Gout  Hyperlipidemia  Hypertension  History of knee replacement, total, left:   History of breast surgery: Excision of left breast lump - benign    H/O bilateral cataract extraction:   and       T(C): 36.3 (19 @ 07:00), Max: 36.4 (19 @ 19:55)  HR: 62 (19 @ 11:00)  BP: 112/46 (19 @ 11:00)  BP(mean): 59 (19 @ 11:00)  ABP: --  ABP(mean): --  RR: 23 (19 @ 11:00)  SpO2: 99% (19 @ 11:00)  Wt(kg): --           @ 07:01  -   @ 07:00  --------------------------------------------------------  IN: 705 mL / OUT: 105 mL / NET: 600 mL            PHYSICAL EXAM:    GENERAL: No signs of distress, comfortable  HEAD: Atraumatic, Normocephalic  EYES: EOMI, PERRLA  ENMT: No erythema, exudates, or enlargement, Moist mucous membranes  NECK: Supple, normal appearance, No JVD; [  ] central line (if applicable)  CHEST/LUNG: No chest deformity, fair bilateral air entry; No rales, rhonchi, wheezing; crackles  HEART: Regular rate and rhythm; No murmurs, rubs, or gallops;   ABDOMEN: Soft, Nontender, Nondistended; Bowel sounds present  EXTREMITIES:  + Peripheral Pulses, No clubbing, cyanosis, or edema  NERVOUS SYSTEM: awake and alert x 3, follows commands, upper and lower extremities  LYMPH: No lymphadenopathy noted  SKIN: No rashes or lesions; good turgor, warm, dry      LABS:  CBC Full  -  ( 22 Sep 2019 11:59 )  WBC Count : 5.97 K/uL  RBC Count : 3.03 M/uL  Hemoglobin : 9.1 g/dL  Hematocrit : 26.7 %  Platelet Count - Automated : 139 K/uL  Mean Cell Volume : 88.1 fl  Mean Cell Hemoglobin : 30.0 pg  Mean Cell Hemoglobin Concentration : 34.1 gm/dL  Auto Neutrophil # : x  Auto Lymphocyte # : x  Auto Monocyte # : x  Auto Eosinophil # : x  Auto Basophil # : x  Auto Neutrophil % : x  Auto Lymphocyte % : x  Auto Monocyte % : x  Auto Eosinophil % : x  Auto Basophil % : x        132<L>  |  104  |  141<H>  ----------------------------<  116<H>  5.2   |  11<L>  |  11.70<H>    Ca    8.1<L>      22 Sep 2019 06:31  Phos  7.7       Mg     2.8         TPro  6.3  /  Alb  3.3<L>  /  TBili  0.5  /  DBili  x   /  AST  10  /  ALT  16  /  AlkPhos  67      PT/INR - ( 21 Sep 2019 22:33 )   PT: 11.5 sec;   INR: 1.03 ratio         PTT - ( 21 Sep 2019 22:33 )  PTT:40.2 sec  Urinalysis Basic - ( 21 Sep 2019 18:59 )    Color: Yellow / Appearance: Slightly Turbid / S.020 / pH: x  Gluc: x / Ketone: Negative  / Bili: Negative / Urobili: Negative   Blood: x / Protein: 100 / Nitrite: Negative   Leuk Esterase: Trace / RBC: >50 /HPF / WBC 6-10 /HPF   Sq Epi: x / Non Sq Epi: Few /HPF / Bacteria: Few /HPF          RADIOLOGY & ADDITIONAL STUDIES REVIEWED     CXR: . + mild b/l lower lung zones edema    IMPRESSION:  PAST MEDICAL & SURGICAL HISTORY:  Diabetes mellitus  CKD (chronic kidney disease)  Seasonal allergies  Vitamin D deficiency  Osteoarthritis  Osteoporosis  Breast lump  Cataract  Gout  Hyperlipidemia  Hypertension  History of knee replacement, total, left:   History of breast surgery: Excision of left breast lump - benign    H/O bilateral cataract extraction:   and    p/w       IMP: This is an 78 yr old  with  HTn, HLD, DM, Gout, CKD stage 4( creatinine  year ago, 1.05), comes in with abdominal pain, diarrhea. Patient reports having dark stools and diarrhea for 1 week. Patient also reports lower abdominal pain with severe anemia s/p 2 units PRBC due to possible UGIB.  She is on protonix gtt.  GI f/u noted.  Has bilateral leg swelling and exertional sob due to acute renal failure with severe metabolic acidosis.    In Ed, BP: 98/ 58 mm hg, HR: 58, Temp: 97.2 F  Cbc shows hb of 6.7/20   Bmp shows na of 131, bicarb 10, elevated bun/creatinine 137/11           Plan:      CNS: no sedation    PULMONARY: O2 supp    CARDIAC: hemodynamic monitoring    GI: npo, PPI gtt     RENAL: neph eval P. start bicarb gtt    SKIN: no issue    MUSCULOSKELETAL: boots    ID: no issue    HEME: monitor  CBC BID    DVT and GI Prophylaxis no anticoag due to GI bleed requiring multiple PRBC transfusion    GOALS OF CARE DISCUSSION WITH PATIENT/FAMILY/PROXY/ icu team on rounds    CRITICAL CARE TIME SPENT: 35 minutes INTERVAL HPI/OVERNIGHT EVENTS:       Antimicrobial:    Cardiovascular:    Pulmonary:  ALBUTerol/ipratropium for Nebulization. 3 milliLiter(s) Nebulizer once    Hematalogic:    Other:  chlorhexidine 2% Cloths 1 Application(s) Topical daily  insulin glargine Injectable (LANTUS) 8 Unit(s) SubCutaneous at bedtime  insulin lispro (HumaLOG) corrective regimen sliding scale   SubCutaneous every 6 hours  pantoprazole Infusion 8 mG/Hr IV Continuous <Continuous>  sodium bicarbonate  Infusion 0.089 mEq/kG/Hr IV Continuous <Continuous>      Drug Dosing Weight  Height (cm): 149.86 (21 Sep 2019 15:50)  Weight (kg): 63.5 (21 Sep 2019 15:50)  BMI (kg/m2): 28.3 (21 Sep 2019 15:50)  BSA (m2): 1.58 (21 Sep 2019 15:50)    CENTRAL LINE: [ ] YES [ ] NO  LOCATION:   DATE INSERTED:    DICKSON: [ ] YES [ ] NO    DATE INSERTED:    A-LINE:  [ ] YES [ ] NO  LOCATION:   DATE INSERTED:    PMH/Social Hx/Fam Hx -reviewed admission note, no change since admission  PAST MEDICAL & SURGICAL HISTORY:  Diabetes mellitus  CKD (chronic kidney disease)  Seasonal allergies  Vitamin D deficiency  Osteoarthritis  Osteoporosis  Breast lump  Cataract  Gout  Hyperlipidemia  Hypertension  History of knee replacement, total, left:   History of breast surgery: Excision of left breast lump - benign    H/O bilateral cataract extraction:   and       T(C): 36.3 (19 @ 07:00), Max: 36.4 (19 @ 19:55)  HR: 62 (19 @ 11:00)  BP: 112/46 (19 @ 11:00)  BP(mean): 59 (19 @ 11:00)  ABP: --  ABP(mean): --  RR: 23 (19 @ 11:00)  SpO2: 99% (19 @ 11:00)  Wt(kg): --           @ 07:01  -   @ 07:00  --------------------------------------------------------  IN: 705 mL / OUT: 105 mL / NET: 600 mL            PHYSICAL EXAM:    GENERAL: No signs of distress, comfortable  HEAD: Atraumatic, Normocephalic  EYES: EOMI, PERRLA  ENMT: No erythema, exudates, or enlargement, Moist mucous membranes  NECK: Supple, normal appearance, No JVD; [  ] central line (if applicable)  CHEST/LUNG: No chest deformity, fair bilateral air entry; No rales, rhonchi, wheezing; crackles  HEART: Regular rate and rhythm; No murmurs, rubs, or gallops;   ABDOMEN: Soft, Nontender, Nondistended; Bowel sounds present  EXTREMITIES:  + Peripheral Pulses, No clubbing, cyanosis, or edema  NERVOUS SYSTEM: awake and alert x 3, follows commands, upper and lower extremities  LYMPH: No lymphadenopathy noted  SKIN: No rashes or lesions; good turgor, warm, dry      LABS:  CBC Full  -  ( 22 Sep 2019 11:59 )  WBC Count : 5.97 K/uL  RBC Count : 3.03 M/uL  Hemoglobin : 9.1 g/dL  Hematocrit : 26.7 %  Platelet Count - Automated : 139 K/uL  Mean Cell Volume : 88.1 fl  Mean Cell Hemoglobin : 30.0 pg  Mean Cell Hemoglobin Concentration : 34.1 gm/dL  Auto Neutrophil # : x  Auto Lymphocyte # : x  Auto Monocyte # : x  Auto Eosinophil # : x  Auto Basophil # : x  Auto Neutrophil % : x  Auto Lymphocyte % : x  Auto Monocyte % : x  Auto Eosinophil % : x  Auto Basophil % : x        132<L>  |  104  |  141<H>  ----------------------------<  116<H>  5.2   |  11<L>  |  11.70<H>    Ca    8.1<L>      22 Sep 2019 06:31  Phos  7.7       Mg     2.8         TPro  6.3  /  Alb  3.3<L>  /  TBili  0.5  /  DBili  x   /  AST  10  /  ALT  16  /  AlkPhos  67      PT/INR - ( 21 Sep 2019 22:33 )   PT: 11.5 sec;   INR: 1.03 ratio         PTT - ( 21 Sep 2019 22:33 )  PTT:40.2 sec  Urinalysis Basic - ( 21 Sep 2019 18:59 )    Color: Yellow / Appearance: Slightly Turbid / S.020 / pH: x  Gluc: x / Ketone: Negative  / Bili: Negative / Urobili: Negative   Blood: x / Protein: 100 / Nitrite: Negative   Leuk Esterase: Trace / RBC: >50 /HPF / WBC 6-10 /HPF   Sq Epi: x / Non Sq Epi: Few /HPF / Bacteria: Few /HPF          RADIOLOGY & ADDITIONAL STUDIES REVIEWED     CXR: . + mild b/l lower lung zones edema    IMPRESSION:  PAST MEDICAL & SURGICAL HISTORY:  Diabetes mellitus  CKD (chronic kidney disease)  Seasonal allergies  Vitamin D deficiency  Osteoarthritis  Osteoporosis  Breast lump  Cataract  Gout  Hyperlipidemia  Hypertension  History of knee replacement, total, left:   History of breast surgery: Excision of left breast lump - benign    H/O bilateral cataract extraction:   and    p/w       IMP: This is an 78 yr old  with  HTn, HLD, DM, Gout, CKD stage 4( creatinine  year ago, 1.05), comes in with abdominal pain, diarrhea. Patient reports having dark stools and diarrhea for 1 week. Patient also reports lower abdominal pain with severe anemia s/p 2 units PRBC due to possible UGIB.  She is on protonix gtt.  GI f/u noted.  Has bilateral leg swelling and exertional sob due to acute renal failure with severe metabolic acidosis.    In Ed, BP: 98/ 58 mm hg, HR: 58, Temp: 97.2 F  Cbc shows hb of 6.7/20   Bmp shows na of 131, bicarb 10, elevated bun/creatinine 137/11           Plan:      CNS: no sedation    PULMONARY: O2 supp    CARDIAC: hemodynamic monitoring    GI: npo, PPI gtt     RENAL: neph eval P. start bicarb gtt, dickson and renal failure work up. may need dialysis but will defer to nephro    SKIN: no issue    MUSCULOSKELETAL: boots    ID: no issue    HEME: monitor  CBC BID    DVT and GI Prophylaxis no anticoag due to GI bleed requiring multiple PRBC transfusion    GOALS OF CARE DISCUSSION WITH PATIENT/FAMILY/PROXY/ icu team on rounds    CRITICAL CARE TIME SPENT: 35 minutes

## 2024-07-24 NOTE — PROGRESS NOTES
%    Neutrophils Absolute 4.7 1.7 - 8.2 K/UL    Lymphocytes Absolute 2.2 0.5 - 4.6 K/UL    Monocytes Absolute 0.6 0.1 - 1.3 K/UL    Eosinophils Absolute 0.1 0.0 - 0.8 K/UL    Basophils Absolute 0.1 0.0 - 0.2 K/UL    Immature Granulocytes Absolute 0.0 0.0 - 0.5 K/UL       ASSESSMENT and PLAN    Visit Diagnoses and Associated Orders       Chronic pain syndrome    -  Primary    Urine Drug Screen [58693 Custom]   - Future Order    HYDROcodone-acetaminophen (NORCO)  MG per tablet [[id number]]           Opioid use, unspecified with unspecified opioid-induced disorder (HCC)        Urine Drug Screen [63107 Custom]   - Future Order         Familial hypercholesterolemia        atorvastatin (LIPITOR) 20 MG tablet [[id number]]           Hypothyroidism, unspecified type        levothyroxine (SYNTHROID) 112 MCG tablet [17113]           Primary hypertension        losartan-hydroCHLOROthiazide (HYZAAR) 100-25 MG per tablet [09445]      nebivolol (BYSTOLIC) 10 MG tablet [[id number]]           GERD without esophagitis        pantoprazole (PROTONIX) 40 MG tablet [00282]           Recurrent depression (HCC)        venlafaxine (EFFEXOR XR) 150 MG extended release capsule [[id number]]                       Diagnosis Orders   1. Chronic pain syndrome  Urine Drug Screen    HYDROcodone-acetaminophen (NORCO)  MG per tablet      2. Opioid use, unspecified with unspecified opioid-induced disorder (HCC)  Urine Drug Screen      3. Familial hypercholesterolemia  atorvastatin (LIPITOR) 20 MG tablet      4. Hypothyroidism, unspecified type  levothyroxine (SYNTHROID) 112 MCG tablet      5. Primary hypertension  losartan-hydroCHLOROthiazide (HYZAAR) 100-25 MG per tablet    nebivolol (BYSTOLIC) 10 MG tablet      6. GERD without esophagitis  pantoprazole (PROTONIX) 40 MG tablet      7. Recurrent depression (HCC)  venlafaxine (EFFEXOR XR) 150 MG extended release capsule      , Alicia was seen today for discuss labs.    Diagnoses and all orders for this  No

## 2024-07-26 ENCOUNTER — CARE COORDINATION (OUTPATIENT)
Dept: CARE COORDINATION | Facility: CLINIC | Age: 74
End: 2024-07-26

## 2024-07-29 ENCOUNTER — CARE COORDINATION (OUTPATIENT)
Dept: CARE COORDINATION | Facility: CLINIC | Age: 74
End: 2024-07-29

## 2024-07-29 NOTE — CARE COORDINATION
2nd VM left with pt regarding referral from PCP.  Will try once more in 5 business days if no call back is received before then.

## 2024-08-05 ENCOUNTER — CARE COORDINATION (OUTPATIENT)
Dept: CARE COORDINATION | Facility: CLINIC | Age: 74
End: 2024-08-05

## 2024-08-05 NOTE — CARE COORDINATION
Final VM left with pt regarding referral from PCP.  If no call back is received within 5 business days, referral will be closed.

## 2024-08-12 ENCOUNTER — CARE COORDINATION (OUTPATIENT)
Dept: CARE COORDINATION | Facility: CLINIC | Age: 74
End: 2024-08-12

## 2024-08-16 ENCOUNTER — OFFICE VISIT (OUTPATIENT)
Age: 74
End: 2024-08-16
Payer: MEDICARE

## 2024-08-16 VITALS
SYSTOLIC BLOOD PRESSURE: 122 MMHG | WEIGHT: 160 LBS | DIASTOLIC BLOOD PRESSURE: 68 MMHG | HEART RATE: 83 BPM | HEIGHT: 66 IN | BODY MASS INDEX: 25.71 KG/M2

## 2024-08-16 DIAGNOSIS — I10 ESSENTIAL (PRIMARY) HYPERTENSION: Primary | ICD-10-CM

## 2024-08-16 DIAGNOSIS — I10 PRIMARY HYPERTENSION: ICD-10-CM

## 2024-08-16 PROCEDURE — 3078F DIAST BP <80 MM HG: CPT | Performed by: INTERNAL MEDICINE

## 2024-08-16 PROCEDURE — 93000 ELECTROCARDIOGRAM COMPLETE: CPT | Performed by: INTERNAL MEDICINE

## 2024-08-16 PROCEDURE — 3074F SYST BP LT 130 MM HG: CPT | Performed by: INTERNAL MEDICINE

## 2024-08-16 PROCEDURE — 99213 OFFICE O/P EST LOW 20 MIN: CPT | Performed by: INTERNAL MEDICINE

## 2024-08-16 PROCEDURE — 1123F ACP DISCUSS/DSCN MKR DOCD: CPT | Performed by: INTERNAL MEDICINE

## 2024-08-16 ASSESSMENT — ENCOUNTER SYMPTOMS
SPUTUM PRODUCTION: 0
ABDOMINAL PAIN: 0
WHEEZING: 0
BLURRED VISION: 0
SHORTNESS OF BREATH: 0
HEMATOCHEZIA: 0
ORTHOPNEA: 0
DIARRHEA: 0
HEMATEMESIS: 0
BOWEL INCONTINENCE: 0
HOARSE VOICE: 0
COLOR CHANGE: 0

## 2024-08-16 NOTE — PROGRESS NOTES
[facility] CARDIOLOGY  [address] [place] [address], [place] [address]  PHONE: [phone]        24        NAME:  Alicia Jimenez  : 1950  MRN: [id number]       SUBJECTIVE:   Alicia Jimenez is a 73 y.o. female seen for a follow up visit regarding the following: The patient has a hx of PAF,CVA,and primary hypertension.She returns for annual follow up.She reports doing ok except difficulty walking.    Chief Complaint   Patient presents with    Hypertension       HPI:    Hypertension  This is a chronic problem. The problem is unchanged. The problem is controlled. Pertinent negatives include no anxiety, blurred vision, chest pain, headaches, malaise/fatigue, neck pain, orthopnea, palpitations, peripheral edema, PND, shortness of breath or sweats.       Past Medical History, Past Surgical History, Family history, Social History, and Medications were all reviewed with the patient today and updated as necessary.         Current Outpatient Medications:     atorvastatin (LIPITOR) 20 MG tablet, TAKE 1 TABLET BY MOUTH AT BEDTIME, Disp: 90 tablet, Rfl: 3    levothyroxine (SYNTHROID) 112 MCG tablet, Take 1 tablet by mouth Daily, Disp: 90 tablet, Rfl: 3    losartan-hydroCHLOROthiazide (HYZAAR) 100-25 MG per tablet, Take 1 tablet by mouth daily, Disp: 90 tablet, Rfl: 3    nebivolol (BYSTOLIC) 10 MG tablet, Take 1 tablet by mouth daily, Disp: 90 tablet, Rfl: 3    pantoprazole (PROTONIX) 40 MG tablet, Take 1 tablet by mouth every morning (before breakfast), Disp: 90 tablet, Rfl: 3    venlafaxine (EFFEXOR XR) 150 MG extended release capsule, Take 1 capsule by mouth daily, Disp: 180 capsule, Rfl: 3    HYDROcodone-acetaminophen (NORCO)  MG per tablet, Take 1 tablet by mouth every 8 hours as needed for Pain for up to 30 days. Max Daily Amount: 3 tablets, Disp: 90 tablet, Rfl: 0    clopidogrel (PLAVIX) 75 MG tablet, Take 1 tablet by mouth daily, Disp: , Rfl:     Multiple Vitamins-Minerals (MULTIVITAMIN

## 2024-08-28 DIAGNOSIS — G89.4 CHRONIC PAIN SYNDROME: ICD-10-CM

## 2024-08-29 RX ORDER — HYDROCODONE BITARTRATE AND ACETAMINOPHEN 10; 325 MG/1; MG/1
1 TABLET ORAL EVERY 8 HOURS PRN
Qty: 120 TABLET | Refills: 0 | Status: SHIPPED | OUTPATIENT
Start: 2024-08-29 | End: 2024-09-28

## 2024-09-13 ENCOUNTER — OFFICE VISIT (OUTPATIENT)
Dept: NEUROLOGY | Age: 74
End: 2024-09-13

## 2024-09-13 VITALS
DIASTOLIC BLOOD PRESSURE: 65 MMHG | OXYGEN SATURATION: 92 % | HEIGHT: 66 IN | WEIGHT: 144 LBS | BODY MASS INDEX: 23.14 KG/M2 | HEART RATE: 78 BPM | SYSTOLIC BLOOD PRESSURE: 118 MMHG

## 2024-09-13 DIAGNOSIS — E53.8 B12 DEFICIENCY: ICD-10-CM

## 2024-09-13 DIAGNOSIS — G62.9 NEUROPATHY: ICD-10-CM

## 2024-09-13 DIAGNOSIS — I63.9 CEREBROVASCULAR ACCIDENT (CVA), UNSPECIFIED MECHANISM (HCC): ICD-10-CM

## 2024-09-13 DIAGNOSIS — I72.9 ANEURYSM (HCC): ICD-10-CM

## 2024-09-13 DIAGNOSIS — S06.5XAA SDH (SUBDURAL HEMATOMA) (HCC): ICD-10-CM

## 2024-09-13 DIAGNOSIS — G90.50 RSD (REFLEX SYMPATHETIC DYSTROPHY): Primary | ICD-10-CM

## 2024-09-13 LAB — VIT B12 SERPL-MCNC: 1836 PG/ML (ref 193–986)

## 2024-09-13 RX ORDER — GABAPENTIN 300 MG/1
CAPSULE ORAL
Qty: 90 CAPSULE | Refills: 11 | Status: SHIPPED | OUTPATIENT
Start: 2024-09-13 | End: 2024-10-24

## 2024-09-13 ASSESSMENT — ENCOUNTER SYMPTOMS
ALLERGIC/IMMUNOLOGIC NEGATIVE: 1
EYES NEGATIVE: 1
RESPIRATORY NEGATIVE: 1

## 2024-09-16 LAB
ANA SER QL: NEGATIVE
ANTI-YO (PURKINJE CELL): NEGATIVE TITER
HU ANTIBODY: NEGATIVE TITER
RI ANTIBODY: NEGATIVE TITER

## 2024-09-17 LAB
ALBUMIN SERPL ELPH-MCNC: 4 G/DL (ref 2.9–4.4)
ALBUMIN/GLOB SERPL: 1.5 (ref 0.7–1.7)
ALPHA1 GLOB SERPL ELPH-MCNC: 0.2 G/DL (ref 0–0.4)
ALPHA2 GLOB SERPL ELPH-MCNC: 0.8 G/DL (ref 0.4–1)
B-GLOBULIN SERPL ELPH-MCNC: 0.8 G/DL (ref 0.7–1.3)
GAMMA GLOB SERPL ELPH-MCNC: 0.8 G/DL (ref 0.4–1.8)
GLOBULIN SER CALC-MCNC: 2.6 G/DL (ref 2.2–3.9)
M PROTEIN SERPL ELPH-MCNC: NORMAL G/DL
METHYLMALONATE SERPL-SCNC: 237 NMOL/L (ref 0–378)
PROT SERPL-MCNC: 6.6 G/DL (ref 6–8.5)

## 2024-09-24 DIAGNOSIS — G89.4 CHRONIC PAIN SYNDROME: ICD-10-CM

## 2024-09-26 DIAGNOSIS — G89.4 CHRONIC PAIN SYNDROME: ICD-10-CM

## 2024-09-26 RX ORDER — HYDROCODONE BITARTRATE AND ACETAMINOPHEN 10; 325 MG/1; MG/1
1 TABLET ORAL EVERY 8 HOURS PRN
Qty: 120 TABLET | Refills: 0 | Status: CANCELLED | OUTPATIENT
Start: 2024-10-01 | End: 2024-10-31

## 2024-09-26 RX ORDER — HYDROCODONE BITARTRATE AND ACETAMINOPHEN 10; 325 MG/1; MG/1
1 TABLET ORAL EVERY 8 HOURS PRN
Qty: 120 TABLET | Refills: 0 | Status: SHIPPED | OUTPATIENT
Start: 2024-10-01 | End: 2024-10-31

## 2024-10-24 DIAGNOSIS — G89.4 CHRONIC PAIN SYNDROME: ICD-10-CM

## 2024-10-24 RX ORDER — HYDROCODONE BITARTRATE AND ACETAMINOPHEN 10; 325 MG/1; MG/1
1 TABLET ORAL EVERY 8 HOURS PRN
Qty: 120 TABLET | Refills: 0 | Status: SHIPPED | OUTPATIENT
Start: 2024-11-01 | End: 2024-12-01

## 2024-12-05 ENCOUNTER — OFFICE VISIT (OUTPATIENT)
Dept: FAMILY MEDICINE CLINIC | Facility: CLINIC | Age: 74
End: 2024-12-05
Payer: MEDICARE

## 2024-12-05 DIAGNOSIS — E03.9 HYPOTHYROIDISM, UNSPECIFIED TYPE: ICD-10-CM

## 2024-12-05 DIAGNOSIS — I10 PRIMARY HYPERTENSION: ICD-10-CM

## 2024-12-05 DIAGNOSIS — Z79.899 NEED FOR PROPHYLACTIC CHEMOTHERAPY: ICD-10-CM

## 2024-12-05 DIAGNOSIS — E78.01 FAMILIAL HYPERCHOLESTEROLEMIA: ICD-10-CM

## 2024-12-05 DIAGNOSIS — F11.99 OPIOID USE, UNSPECIFIED WITH UNSPECIFIED OPIOID-INDUCED DISORDER (HCC): ICD-10-CM

## 2024-12-05 DIAGNOSIS — R30.0 DYSURIA: ICD-10-CM

## 2024-12-05 DIAGNOSIS — E55.9 VITAMIN D DEFICIENCY, UNSPECIFIED: ICD-10-CM

## 2024-12-05 DIAGNOSIS — K21.9 GERD WITHOUT ESOPHAGITIS: ICD-10-CM

## 2024-12-05 DIAGNOSIS — Z13.31 SCREENING FOR DEPRESSION: ICD-10-CM

## 2024-12-05 DIAGNOSIS — Z00.00 MEDICARE ANNUAL WELLNESS VISIT, SUBSEQUENT: ICD-10-CM

## 2024-12-05 DIAGNOSIS — Z00.00 ROUTINE GENERAL MEDICAL EXAMINATION AT A HEALTH CARE FACILITY: Primary | ICD-10-CM

## 2024-12-05 DIAGNOSIS — R53.83 OTHER FATIGUE: ICD-10-CM

## 2024-12-05 DIAGNOSIS — F33.9 RECURRENT DEPRESSION (HCC): ICD-10-CM

## 2024-12-05 LAB
25(OH)D3 SERPL-MCNC: 30.5 NG/ML (ref 30–100)
ALBUMIN SERPL-MCNC: 4.5 G/DL (ref 3.2–4.6)
ALBUMIN/GLOB SERPL: 1.6 (ref 1–1.9)
ALP SERPL-CCNC: 83 U/L (ref 35–104)
ALT SERPL-CCNC: 23 U/L (ref 8–45)
ANION GAP SERPL CALC-SCNC: 14 MMOL/L (ref 7–16)
AST SERPL-CCNC: 21 U/L (ref 15–37)
BILIRUB SERPL-MCNC: 1.5 MG/DL (ref 0–1.2)
BILIRUBIN, URINE, POC: NEGATIVE
BLOOD URINE, POC: NEGATIVE
BUN SERPL-MCNC: 29 MG/DL (ref 8–23)
CALCIUM SERPL-MCNC: 9.8 MG/DL (ref 8.8–10.2)
CHLORIDE SERPL-SCNC: 100 MMOL/L (ref 98–107)
CHOLEST SERPL-MCNC: 168 MG/DL (ref 0–200)
CO2 SERPL-SCNC: 27 MMOL/L (ref 20–29)
CREAT SERPL-MCNC: 1.37 MG/DL (ref 0.6–1.1)
GLOBULIN SER CALC-MCNC: 2.8 G/DL (ref 2.3–3.5)
GLUCOSE SERPL-MCNC: 89 MG/DL (ref 70–99)
GLUCOSE URINE, POC: NEGATIVE
GRANS ABSOLUTE, POC: 3.4 K/UL
GRANULOCYTES %, POC: 51.6 %
HDLC SERPL-MCNC: 45 MG/DL (ref 40–60)
HDLC SERPL: 3.7 (ref 0–5)
HEMATOCRIT, POC: 48.1 %
HEMOGLOBIN, POC: 15.6 G/DL
KETONES, URINE, POC: NEGATIVE
LDLC SERPL CALC-MCNC: 68 MG/DL (ref 0–100)
LEUKOCYTE ESTERASE, URINE, POC: NEGATIVE
LYMPHOCYTE %, POC: 33.2 %
LYMPHS ABSOLUTE, POC: 2.2 K/UL
MCH, POC: NORMAL PG (ref 40–?)
MCHC, POC: 32.4
MCV, POC: 97
MONOCYTE %, POC: 15.2 %
MONOCYTE, ABSOLUTE POC: 1 K/UL
MPV, POC: 7.2 FL
NITRITE, URINE, POC: NEGATIVE
PH, URINE, POC: 6 (ref 4.6–8)
PLATELET COUNT, POC: 308 K/UL
POTASSIUM SERPL-SCNC: 3.6 MMOL/L (ref 3.5–5.1)
PROT SERPL-MCNC: 7.3 G/DL (ref 6.3–8.2)
PROTEIN,URINE, POC: NEGATIVE
RBC, POC: 4.91 M/UL
RDW, POC: 13.3 %
SODIUM SERPL-SCNC: 142 MMOL/L (ref 136–145)
SPECIFIC GRAVITY, URINE, POC: 1.02 (ref 1–1.03)
TRIGL SERPL-MCNC: 272 MG/DL (ref 0–150)
TSH, 3RD GENERATION: 3.66 UIU/ML (ref 0.27–4.2)
URINALYSIS CLARITY, POC: CLEAR
URINALYSIS COLOR, POC: YELLOW
UROBILINOGEN, POC: NORMAL
VLDLC SERPL CALC-MCNC: 54 MG/DL (ref 6–23)
WBC, POC: 6.6 K/UL

## 2024-12-05 PROCEDURE — G0439 PPPS, SUBSEQ VISIT: HCPCS | Performed by: FAMILY MEDICINE

## 2024-12-05 PROCEDURE — 1159F MED LIST DOCD IN RCRD: CPT | Performed by: FAMILY MEDICINE

## 2024-12-05 PROCEDURE — 81003 URINALYSIS AUTO W/O SCOPE: CPT | Performed by: FAMILY MEDICINE

## 2024-12-05 PROCEDURE — 36415 COLL VENOUS BLD VENIPUNCTURE: CPT | Performed by: FAMILY MEDICINE

## 2024-12-05 PROCEDURE — 85025 COMPLETE CBC W/AUTO DIFF WBC: CPT | Performed by: FAMILY MEDICINE

## 2024-12-05 PROCEDURE — 1123F ACP DISCUSS/DSCN MKR DOCD: CPT | Performed by: FAMILY MEDICINE

## 2024-12-05 ASSESSMENT — PATIENT HEALTH QUESTIONNAIRE - PHQ9: DEPRESSION UNABLE TO ASSESS: FUNCTIONAL CAPACITY MOTIVATION LIMITS ACCURACY

## 2024-12-06 RX ORDER — PANTOPRAZOLE SODIUM 40 MG/1
40 TABLET, DELAYED RELEASE ORAL
Qty: 90 TABLET | Refills: 3 | Status: SHIPPED | OUTPATIENT
Start: 2024-12-06

## 2024-12-06 RX ORDER — NEBIVOLOL 10 MG/1
10 TABLET ORAL DAILY
Qty: 90 TABLET | Refills: 3 | Status: SHIPPED | OUTPATIENT
Start: 2024-12-06

## 2024-12-06 RX ORDER — LOSARTAN POTASSIUM AND HYDROCHLOROTHIAZIDE 25; 100 MG/1; MG/1
1 TABLET ORAL DAILY
Qty: 90 TABLET | Refills: 3 | Status: SHIPPED | OUTPATIENT
Start: 2024-12-06

## 2024-12-06 RX ORDER — VENLAFAXINE HYDROCHLORIDE 150 MG/1
150 CAPSULE, EXTENDED RELEASE ORAL DAILY
Qty: 180 CAPSULE | Refills: 3 | Status: SHIPPED | OUTPATIENT
Start: 2024-12-06

## 2024-12-06 RX ORDER — ATORVASTATIN CALCIUM 20 MG/1
TABLET, FILM COATED ORAL
Qty: 90 TABLET | Refills: 3 | Status: SHIPPED | OUTPATIENT
Start: 2024-12-06

## 2024-12-06 RX ORDER — LEVOTHYROXINE SODIUM 112 UG/1
112 TABLET ORAL DAILY
Qty: 90 TABLET | Refills: 3 | Status: SHIPPED | OUTPATIENT
Start: 2024-12-06

## 2024-12-06 ASSESSMENT — ENCOUNTER SYMPTOMS
ABDOMINAL PAIN: 0
SHORTNESS OF BREATH: 0
COUGH: 0

## 2024-12-06 NOTE — PROGRESS NOTES
chemotherapy  AMB POC DRUG SCREEN, URINE      12. Medicare annual wellness visit, subsequent        13. Screening for depression        , Alicia was seen today for medicare awv.    Diagnoses and all orders for this visit:    Routine general medical examination at a health care facility    Familial hypercholesterolemia  -     atorvastatin (LIPITOR) 20 MG tablet; TAKE 1 TABLET BY MOUTH AT BEDTIME  -     Lipid Panel; Future  -     Lipid Panel  -     COLLECTION VENOUS BLOOD,VENIPUNCTURE    Hypothyroidism, unspecified type  -     levothyroxine (SYNTHROID) 112 MCG tablet; Take 1 tablet by mouth Daily  -     COLLECTION VENOUS BLOOD,VENIPUNCTURE    Primary hypertension  -     losartan-hydroCHLOROthiazide (HYZAAR) 100-25 MG per tablet; Take 1 tablet by mouth daily  -     nebivolol (BYSTOLIC) 10 MG tablet; Take 1 tablet by mouth daily  -     AMB POC KTY COMPLETE CBC  -     Comprehensive Metabolic Panel; Future  -     Comprehensive Metabolic Panel  -     COLLECTION VENOUS BLOOD,VENIPUNCTURE    GERD without esophagitis  -     pantoprazole (PROTONIX) 40 MG tablet; Take 1 tablet by mouth every morning (before breakfast)    Recurrent depression (HCC)  -     venlafaxine (EFFEXOR XR) 150 MG extended release capsule; Take 1 capsule by mouth daily    Vitamin D deficiency, unspecified  -     Vitamin D 25 Hydroxy; Future  -     Vitamin D 25 Hydroxy    Other fatigue  -     TSH; Future  -     TSH    Dysuria  -     AMB POC URINALYSIS DIP STICK AUTO W/O MICRO    Opioid use, unspecified with unspecified opioid-induced disorder (HCC)    Need for prophylactic chemotherapy  -     AMB POC DRUG SCREEN, URINE    Medicare annual wellness visit, subsequent    Screening for depression    , Otherwise normal routine physical exam will set up for labs for chronic medical conditions and call back with the results and plan otherwise supportive care given precautions given continue on present medication diet and exercise.I have spent a total of 8-15

## 2024-12-06 NOTE — PATIENT INSTRUCTIONS
questions about a medical condition or this instruction, always ask your healthcare professional. [employer], [employer] disclaims any warranty or liability for your use of this information.      Personalized Preventive Plan for Alicia Jimenez - 12/5/2024  Medicare offers a range of preventive health benefits. Some of the tests and screenings are paid in full while other may be subject to a deductible, co-insurance, and/or copay.    Some of these benefits include a comprehensive review of your medical history including lifestyle, illnesses that may run in your family, and various assessments and screenings as appropriate.    After reviewing your medical record and screening and assessments performed today your provider may have ordered immunizations, labs, imaging, and/or referrals for you.  A list of these orders (if applicable) as well as your Preventive Care list are included within your After Visit Summary for your review.    Other Preventive Recommendations:    A preventive eye exam performed by an eye specialist is recommended every 1-2 years to screen for glaucoma; cataracts, macular degeneration, and other eye disorders.  A preventive dental visit is recommended every 6 months.  Try to get at least 150 minutes of exercise per week or 10,000 steps per day on a pedometer .  Order or download the FREE \"Exercise & Physical Activity: Your Everyday Guide\" from [employer]. Call 1-486.663.8149 or search [employer] online.  You need 8130-8469 mg of calcium and 8705-8541 IU of vitamin D per day. It is possible to meet your calcium requirement with diet alone, but a vitamin D supplement is usually necessary to meet this goal.  When exposed to the sun, use a sunscreen that protects against both UVA and UVB radiation with an SPF of 30 or greater. Reapply every 2 to 3 hours or after sweating, drying off with a towel, or swimming.  Always wear a seat belt when traveling in a

## 2024-12-24 DIAGNOSIS — G89.4 CHRONIC PAIN SYNDROME: ICD-10-CM

## 2024-12-24 DIAGNOSIS — Z79.899 ENCOUNTER FOR LONG-TERM (CURRENT) USE OF MEDICATIONS: Primary | ICD-10-CM

## 2025-01-07 DIAGNOSIS — G89.4 CHRONIC PAIN SYNDROME: ICD-10-CM

## 2025-01-07 RX ORDER — HYDROCODONE BITARTRATE AND ACETAMINOPHEN 7.5; 325 MG/1; MG/1
1 TABLET ORAL EVERY 6 HOURS PRN
Qty: 120 TABLET | Refills: 0 | Status: SHIPPED | OUTPATIENT
Start: 2025-01-07 | End: 2025-02-06

## 2025-02-11 DIAGNOSIS — G89.4 CHRONIC PAIN SYNDROME: ICD-10-CM

## 2025-02-11 RX ORDER — HYDROCODONE BITARTRATE AND ACETAMINOPHEN 7.5; 325 MG/1; MG/1
1 TABLET ORAL EVERY 6 HOURS PRN
Qty: 120 TABLET | Refills: 0 | Status: SHIPPED | OUTPATIENT
Start: 2025-02-11 | End: 2025-03-13

## 2025-03-03 ENCOUNTER — OFFICE VISIT (OUTPATIENT)
Dept: NEUROLOGY | Age: 75
End: 2025-03-03
Payer: MEDICARE

## 2025-03-03 DIAGNOSIS — G90.50 RSD (REFLEX SYMPATHETIC DYSTROPHY): ICD-10-CM

## 2025-03-03 DIAGNOSIS — I67.1 BRAIN ANEURYSM: Primary | ICD-10-CM

## 2025-03-03 DIAGNOSIS — G62.9 NEUROPATHY: ICD-10-CM

## 2025-03-03 PROCEDURE — 1160F RVW MEDS BY RX/DR IN RCRD: CPT | Performed by: PSYCHIATRY & NEUROLOGY

## 2025-03-03 PROCEDURE — 99214 OFFICE O/P EST MOD 30 MIN: CPT | Performed by: PSYCHIATRY & NEUROLOGY

## 2025-03-03 PROCEDURE — 1159F MED LIST DOCD IN RCRD: CPT | Performed by: PSYCHIATRY & NEUROLOGY

## 2025-03-03 PROCEDURE — 1123F ACP DISCUSS/DSCN MKR DOCD: CPT | Performed by: PSYCHIATRY & NEUROLOGY

## 2025-03-03 ASSESSMENT — ENCOUNTER SYMPTOMS
SHORTNESS OF BREATH: 0
EYES NEGATIVE: 1
GASTROINTESTINAL NEGATIVE: 1
RESPIRATORY NEGATIVE: 1

## 2025-03-03 NOTE — PROGRESS NOTES
stenosis as well    RSD (reflex sympathetic dystrophy) this started immediately after her right hip second right hip surgery in 2022 when they did a full replacement causing severe right foot pain discoloration that clearly appears to be chronic regional pain syndrome/reflex sympathetic dystrophy.  Working to set the patient up with a chronic pain management physician possibly ganglion blocks versus any other options.    Neuropathy she has a peripheral neuropathy of bilateral lower extremities but it is overshadowed by the right reflex sympathetic dystrophy that has started in 2022 after her right hip surgery.        The Diagnosis and differential diagnostic considerations, and Rx Tx were reviewed with the patient at length.           No orders of the defined types were placed in this encounter.         I have spent greater than 50% of visit discussing and counseling of patient  for treatment and diagnostic plan review. Total time30     .      Notes: Patient is to continue all medications as directed by prescribing physicians. Continuations on today's visit are made based on the patient's report of current medications.             Dr. Esteban Parrish  Consultation Neurology, Neurodiagnostics and Neurotherapeutics  Neuroelectrophysiology, EEG, EMG  [address], [place] [address]  Phone: [phone] Fax [phone]

## 2025-03-10 DIAGNOSIS — G89.4 CHRONIC PAIN SYNDROME: ICD-10-CM

## 2025-03-10 RX ORDER — HYDROCODONE BITARTRATE AND ACETAMINOPHEN 7.5; 325 MG/1; MG/1
1 TABLET ORAL EVERY 6 HOURS PRN
Qty: 120 TABLET | Refills: 0 | Status: SHIPPED | OUTPATIENT
Start: 2025-03-10 | End: 2025-04-09

## 2025-03-25 ENCOUNTER — CLINICAL DOCUMENTATION (OUTPATIENT)
Dept: NEUROLOGY | Age: 75
End: 2025-03-25

## 2025-03-25 NOTE — PROGRESS NOTES
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  The report was scanned into the system.

## 2025-03-30 ENCOUNTER — HOSPITAL ENCOUNTER (EMERGENCY)
Age: 75
Discharge: HOME | End: 2025-03-30
Attending: STUDENT IN AN ORGANIZED HEALTH CARE EDUCATION/TRAINING PROGRAM
Payer: MEDICARE

## 2025-03-30 ENCOUNTER — APPOINTMENT (OUTPATIENT)
Dept: GENERAL RADIOLOGY | Age: 75
End: 2025-03-30
Payer: MEDICARE

## 2025-03-30 ENCOUNTER — APPOINTMENT (OUTPATIENT)
Dept: CT IMAGING | Age: 75
End: 2025-03-30
Payer: MEDICARE

## 2025-03-30 VITALS
TEMPERATURE: 98.1 F | DIASTOLIC BLOOD PRESSURE: 71 MMHG | BODY MASS INDEX: 23.3 KG/M2 | OXYGEN SATURATION: 95 % | HEIGHT: 66 IN | RESPIRATION RATE: 12 BRPM | WEIGHT: 145 LBS | SYSTOLIC BLOOD PRESSURE: 128 MMHG | HEART RATE: 67 BPM
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
## 2025-03-30 DIAGNOSIS — S52.501A CLOSED FRACTURE OF DISTAL END OF RIGHT RADIUS, UNSPECIFIED FRACTURE MORPHOLOGY, INITIAL ENCOUNTER: Primary | ICD-10-CM

## 2025-03-30 LAB
EKG ATRIAL RATE: 69 BPM
EKG DIAGNOSIS: NORMAL
EKG P AXIS: 10 DEGREES
EKG P-R INTERVAL: 189 MS
EKG Q-T INTERVAL: 446 MS
EKG QRS DURATION: 109 MS
EKG QTC CALCULATION (BAZETT): 475 MS
EKG R AXIS: -42 DEGREES
EKG T AXIS: 64 DEGREES
EKG VENTRICULAR RATE: 68 BPM

## 2025-03-30 PROCEDURE — 93005 ELECTROCARDIOGRAM TRACING: CPT | Performed by: STUDENT IN AN ORGANIZED HEALTH CARE EDUCATION/TRAINING PROGRAM

## 2025-03-30 PROCEDURE — 73502 X-RAY EXAM HIP UNI 2-3 VIEWS: CPT

## 2025-03-30 PROCEDURE — 73110 X-RAY EXAM OF WRIST: CPT

## 2025-03-30 PROCEDURE — 93010 ELECTROCARDIOGRAM REPORT: CPT | Performed by: INTERNAL MEDICINE

## 2025-03-30 PROCEDURE — 29125 APPL SHORT ARM SPLINT STATIC: CPT

## 2025-03-30 PROCEDURE — 73030 X-RAY EXAM OF SHOULDER: CPT

## 2025-03-30 PROCEDURE — 70450 CT HEAD/BRAIN W/O DYE: CPT

## 2025-03-30 PROCEDURE — 73060 X-RAY EXAM OF HUMERUS: CPT

## 2025-03-30 PROCEDURE — 72125 CT NECK SPINE W/O DYE: CPT

## 2025-03-30 PROCEDURE — 99284 EMERGENCY DEPT VISIT MOD MDM: CPT

## 2025-03-30 ASSESSMENT — PAIN - FUNCTIONAL ASSESSMENT: PAIN_FUNCTIONAL_ASSESSMENT: 0-10

## 2025-03-30 ASSESSMENT — PAIN DESCRIPTION - DESCRIPTORS: DESCRIPTORS: PRESSURE;DULL

## 2025-03-30 ASSESSMENT — PAIN DESCRIPTION - ORIENTATION: ORIENTATION: LEFT

## 2025-03-30 ASSESSMENT — LIFESTYLE VARIABLES
HOW OFTEN DO YOU HAVE A DRINK CONTAINING ALCOHOL: NEVER
HOW MANY STANDARD DRINKS CONTAINING ALCOHOL DO YOU HAVE ON A TYPICAL DAY: PATIENT DOES NOT DRINK

## 2025-03-30 ASSESSMENT — PAIN SCALES - GENERAL
PAINLEVEL_OUTOF10: 9
PAINLEVEL_OUTOF10: 6

## 2025-03-30 ASSESSMENT — PAIN DESCRIPTION - PAIN TYPE: TYPE: ACUTE PAIN

## 2025-03-30 NOTE — ED TRIAGE NOTES
Pt states she stood up to fast and then she fell on to the floor hurting right shoulder, right wrist, and right hip    Ems started IV in left AC  Ems gave 100 mcg fentanyl IV prior to arrival

## 2025-03-30 NOTE — ED PROVIDER NOTES
[facility]  Emergency Department    DISPOSITION Decision To Discharge 03/30/2025 04:39:20 AM     ICD-10-CM    1. Closed fracture of distal end of right radius, unspecified fracture morphology, initial encounter  S52.501A [facility] (Hand Surgery)      2. Fall, initial encounter  W19.XXXA         New Prescriptions    No medications on file     ED Course     ED Course as of 03/30/25 0502   Sun Mar 30, 2025   0347 74-year-old female with complex past medical history including multiple prior falls due to syncope presents following fall now with right wrist/arm pain.  She denies syncope.  No other symptoms besides right wrist pain at this time.  Vitals reassuring.  Neurovascularly intact.  Will obtain CT and x-ray imaging to rule out traumatic injury [ER]   0353 EKG: Normal sinus rhythm, rate 68.  No STEMI.  Mild LVH.  No evidence of Brugada pattern, delta wave, prolonged QTc, heart block.  Time: 0352 [ER]   0438 XR shows distal radius fracture with mild displacement. I placed her in a sugar tong splint. I did perform some mild manipulation of wrist during splint placement to attempt to aid in improved alignment. She is not significantly displaced so do not feel sedation for further manipulation warranted at this time. CT results pending [ER]   0501 CT imaging without acute traumatic injury.  She is appropriate for discharge home to follow-up with hand surgery in the next few days.  Placed referral to hand surgery and patient family will also reach out to her own hand surgeon see about possible appointment as well.  Educated on splint management and pain control.  She already has Norco that she takes for chronic pain and we discussed change in dosing frequency.  Return precautions given [ER]      ED Course User Index  [ER] Nitish Boyer MD     Data Reviewed and Analyzed:  1 or more acute illnesses that pose a threat to life or bodily function.   Patient was discharged

## 2025-03-30 NOTE — DISCHARGE INSTRUCTIONS
Please keep the splint on until you follow up with the Orthopedic Hand Surgery clinic. You will need to give their office a call on Monday morning to set up your appointment. May either follow-up with [facility] or with Dr. Corea if you prefer.  You may increase your Norco dosing to every 6 hours (4 times a day).  Take ice and wrap it in a towel and placed on top of the wrist for 20 minutes at a time every few hours.  Use the sling during the day.  The sling may come off at night while sleeping.  Return to the ER if any new or worsening symptoms    I hope you cat gets feeling better as well!

## 2025-03-31 ENCOUNTER — TELEPHONE (OUTPATIENT)
Dept: ORTHOPEDIC SURGERY | Age: 75
End: 2025-03-31

## 2025-03-31 NOTE — TELEPHONE ENCOUNTER
I scheduled patient apt 4/3 w/ Ranjana Ware for [facility] ER f/u s/p fall sat 3/29 night for   Closed fracture of distal end of right radius, unspecified fracture morphology, initial encounter, told ivone   *josh. Daughter Ni wants to know if can be seen sooner?

## 2025-04-01 ENCOUNTER — OFFICE VISIT (OUTPATIENT)
Age: 75
End: 2025-04-01
Payer: MEDICARE

## 2025-04-01 DIAGNOSIS — S52.551A OTHER EXTRAARTICULAR FRACTURE OF LOWER END OF RIGHT RADIUS, INITIAL ENCOUNTER FOR CLOSED FRACTURE: Primary | ICD-10-CM

## 2025-04-01 PROCEDURE — 1123F ACP DISCUSS/DSCN MKR DOCD: CPT | Performed by: NURSE PRACTITIONER

## 2025-04-01 PROCEDURE — 1160F RVW MEDS BY RX/DR IN RCRD: CPT | Performed by: NURSE PRACTITIONER

## 2025-04-01 PROCEDURE — A4590 SPECIAL CASTING MATERIAL: HCPCS | Performed by: NURSE PRACTITIONER

## 2025-04-01 PROCEDURE — 1159F MED LIST DOCD IN RCRD: CPT | Performed by: NURSE PRACTITIONER

## 2025-04-01 PROCEDURE — 99214 OFFICE O/P EST MOD 30 MIN: CPT | Performed by: NURSE PRACTITIONER

## 2025-04-01 RX ORDER — OXYCODONE HYDROCHLORIDE 5 MG/1
5 TABLET ORAL EVERY 6 HOURS PRN
Qty: 20 TABLET | Refills: 0 | Status: SHIPPED | OUTPATIENT
Start: 2025-04-01 | End: 2025-04-06

## 2025-04-01 NOTE — PROGRESS NOTES
Orthopaedic Hand Clinic Note    Name: Alicia Jimenez  Age: 74 y.o.  YOB: 1950  Gender: female  MRN: [id number]      CC: Patient referred for evaluation of right wrist pain    HPI: Alicia Jimenez is a 74 y.o. female right handed with a chief complaint of   right  wrist pain. The injury occurred 3 days ago when the patient fell in her bedroom around 2 AM on March 30, 2025. The pain is located diffusely about the right wrist. Denies numbness or paresthesias of the fingers. Evaluation at the ER has included x-rays. Treatment to date has included sugar-tong splint and sling. Denies loss of consciousness, head injury or neck pain.  She is accompanied by her daughter.  She is on Plavix followed by Dr. Carrington Flynn with [facility] cardiology.  She also sees Dr. De La Cruz.  She is in pain management and takes Norco 7.5/325 4 times daily as needed.  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 Werner.  She has a history of a stroke with right-sided weakness.  She also notes persistent numbness and tingling in the right hand at baseline.  She ambulates with a quad cane on the left side.    ROS/Meds/PSH/PMH/FH/SH: I personally reviewed the patients standard intake form.  Pertinents are discussed in the HPI    Physical Examination:  General: Awake and alert.  HEENT: Normocephalic, atraumatic  CV/Pulm: Breathing even and unlabored  Skin: No obvious rashes noted.  Lymphatic: No obvious evidence of lymphedema or lymphadenopathy    Musculoskeletal Exam:  Examination of the right upper extremity demonstrates no open wounds. Negative Tinel's over left carpal tunnel. Sensation is intact throughout, cap refill in all fingers < 5 seconds. Finger motion is limited with  moderate ecchymosis and  swelling of the hand and fingers. Tenderness over the distal radius.  Negative  tenderness over the ulnar aspect of the wrist. No tenderness at elbow, shoulder, clavicle

## 2025-04-08 ENCOUNTER — OFFICE VISIT (OUTPATIENT)
Age: 75
End: 2025-04-08
Payer: MEDICARE

## 2025-04-08 DIAGNOSIS — S52.551A OTHER EXTRAARTICULAR FRACTURE OF LOWER END OF RIGHT RADIUS, INITIAL ENCOUNTER FOR CLOSED FRACTURE: Primary | ICD-10-CM

## 2025-04-08 PROCEDURE — 1159F MED LIST DOCD IN RCRD: CPT | Performed by: NURSE PRACTITIONER

## 2025-04-08 PROCEDURE — 1123F ACP DISCUSS/DSCN MKR DOCD: CPT | Performed by: NURSE PRACTITIONER

## 2025-04-08 PROCEDURE — 99214 OFFICE O/P EST MOD 30 MIN: CPT | Performed by: NURSE PRACTITIONER

## 2025-04-08 PROCEDURE — 1160F RVW MEDS BY RX/DR IN RCRD: CPT | Performed by: NURSE PRACTITIONER

## 2025-04-08 PROCEDURE — A4590 SPECIAL CASTING MATERIAL: HCPCS | Performed by: NURSE PRACTITIONER

## 2025-04-08 RX ORDER — OXYCODONE HYDROCHLORIDE 5 MG/1
5 TABLET ORAL EVERY 6 HOURS PRN
Qty: 20 TABLET | Refills: 0 | Status: SHIPPED | OUTPATIENT
Start: 2025-04-08 | End: 2025-04-13

## 2025-04-08 NOTE — PROGRESS NOTES
cap refill in all fingers < 5 seconds. Finger motion is limited with  moderate ecchymosis and  swelling of the hand and fingers. Tenderness over the distal radius.  Negative  tenderness over the ulnar aspect of the wrist. No tenderness at elbow, shoulder, clavicle or cervical spine.     Imaging / Electrodiagnostic Tests:     X-rays including a 3 view right wrist are reviewed.  Fracture has maintained previous alignment.    Assessment:     ICD-10-CM    1. Other extraarticular fracture of lower end of right radius, initial encounter for closed fracture  S52.551A XR WRIST RIGHT (MIN 3 VIEWS)          Plan:   We discussed the diagnosis and different treatment options. We discussed observation, therapy, antiinflammatory medications and other pertinent treatment modalities.    After discussing in detail the patient elects to proceed with nonsurgical treatment.  Again surgical intervention was offered.  The patient and her daughter want to avoid surgery.  Will place her cast.  I will refill her pain medication.  We again discussed activity restrictions such as no lifting, pushing, pulling I will see her back in 4 weeks with new x-rays out of plaster..     Patient voiced accordance and understanding of the information provided and the formulated plan. All questions were answered to the patient's satisfaction during the encounter.    4 This is an acute complicated injury  Treatment at this time: Prescription medication and cast    MIRYAM Kitchen - CNP  Orthopaedic Surgery  04/08/25  12:04 PM

## 2025-04-15 DIAGNOSIS — G89.4 CHRONIC PAIN SYNDROME: ICD-10-CM

## 2025-04-17 RX ORDER — HYDROCODONE BITARTRATE AND ACETAMINOPHEN 7.5; 325 MG/1; MG/1
1 TABLET ORAL EVERY 6 HOURS PRN
Qty: 120 TABLET | Refills: 0 | Status: SHIPPED | OUTPATIENT
Start: 2025-04-17 | End: 2025-05-17

## 2025-05-01 DIAGNOSIS — G89.4 CHRONIC PAIN SYNDROME: ICD-10-CM

## 2025-05-01 RX ORDER — HYDROCODONE BITARTRATE AND ACETAMINOPHEN 10; 325 MG/1; MG/1
1 TABLET ORAL EVERY 8 HOURS PRN
Qty: 120 TABLET | Refills: 0 | Status: SHIPPED | OUTPATIENT
Start: 2025-05-16 | End: 2025-06-15

## 2025-05-12 ENCOUNTER — OFFICE VISIT (OUTPATIENT)
Age: 75
End: 2025-05-12
Payer: MEDICARE

## 2025-05-12 DIAGNOSIS — S52.551A OTHER EXTRAARTICULAR FRACTURE OF LOWER END OF RIGHT RADIUS, INITIAL ENCOUNTER FOR CLOSED FRACTURE: Primary | ICD-10-CM

## 2025-05-12 PROCEDURE — 1159F MED LIST DOCD IN RCRD: CPT | Performed by: NURSE PRACTITIONER

## 2025-05-12 PROCEDURE — 1160F RVW MEDS BY RX/DR IN RCRD: CPT | Performed by: NURSE PRACTITIONER

## 2025-05-12 PROCEDURE — 1123F ACP DISCUSS/DSCN MKR DOCD: CPT | Performed by: NURSE PRACTITIONER

## 2025-05-12 PROCEDURE — 99214 OFFICE O/P EST MOD 30 MIN: CPT | Performed by: NURSE PRACTITIONER

## 2025-05-12 NOTE — PATIENT INSTRUCTIONS
Patient Education        Wrist Fracture: Rehab Exercises  Introduction  Here are some examples of exercises for you to try. The exercises may be suggested for a condition or for rehabilitation. Start each exercise slowly. Ease off the exercises if you start to have pain.  You will be told when to start these exercises and which ones will work best for you.  How to do the exercises  Wrist flexion and extension    Place your forearm on a table, with your hand and affected wrist extended beyond the table, palm down.  Bend your wrist to move your hand upward and allow your hand to close into a fist, then lower your hand and allow your fingers to relax. Hold each position for about 6 seconds.  Repeat 8 to 12 times.  Hand flips    While seated, place your forearm and affected wrist on your thigh, palm down.  Flip your hand over so the back of your hand rests on your thigh and your palm is up. Alternate between palm up and palm down while keeping your forearm on your thigh.  Repeat 8 to 12 times.  Wrist radial and ulnar deviation    Hold your affected hand out in front of you, palm down.  Slowly bend your wrist as far as you can from side to side. Hold each position for about 6 seconds.  Repeat 8 to 12 times.  Wrist extensor stretch    Extend the arm with the affected wrist in front of you and point your fingers toward the floor.  With your other hand, gently bend your wrist farther until you feel a mild to moderate stretch in your forearm.  Hold the stretch for at least 15 to 30 seconds.  Repeat 2 to 4 times.  When you can do this stretch with ease and no pain, repeat steps 1 through 4. But this time extend your affected arm in front of you and make a fist with your palm facing down. Then bend your wrist, pointing your fist toward the floor.  Wrist flexor stretch    Extend the arm with the affected wrist in front of you with your palm facing away from your body.  Bend back your wrist, pointing your hand up toward the

## 2025-05-16 NOTE — PROGRESS NOTES
Patient was fitted and instructed on an  Wrist Splint for patients right wrist. Patient is aware the hand slides in the brace with the thumb placed threw the thumb hole. I demonstrated the correct way to tighten the brace straps to allow for a comfortable fit. Patient understood the correct way to wear the brace.    Patient read and signed documenting they understand and agree to [facility]'s current DME return policy.   
Surgery  05/12/25  1:48 PM

## 2025-05-20 DIAGNOSIS — G89.4 CHRONIC PAIN SYNDROME: ICD-10-CM

## 2025-05-20 RX ORDER — HYDROCODONE BITARTRATE AND ACETAMINOPHEN 10; 325 MG/1; MG/1
1 TABLET ORAL EVERY 8 HOURS PRN
Qty: 120 TABLET | Refills: 0 | Status: SHIPPED | OUTPATIENT
Start: 2025-05-20 | End: 2025-06-19

## 2025-06-11 ENCOUNTER — LAB (OUTPATIENT)
Dept: FAMILY MEDICINE CLINIC | Facility: CLINIC | Age: 75
End: 2025-06-11
Payer: MEDICARE

## 2025-06-11 DIAGNOSIS — Z79.899 ENCOUNTER FOR LONG-TERM (CURRENT) USE OF MEDICATIONS: Primary | ICD-10-CM

## 2025-06-11 DIAGNOSIS — G89.4 CHRONIC PAIN SYNDROME: ICD-10-CM

## 2025-06-11 LAB
ALBUMIN SERPL-MCNC: 4.1 G/DL (ref 3.2–4.6)
ALBUMIN/GLOB SERPL: 1.5 (ref 1–1.9)
ALP SERPL-CCNC: 80 U/L (ref 35–104)
ALT SERPL-CCNC: 19 U/L (ref 8–45)
AST SERPL-CCNC: 19 U/L (ref 15–37)
BASOPHILS # BLD: 0.05 K/UL (ref 0–0.2)
BASOPHILS NFR BLD: 0.6 % (ref 0–2)
BILIRUB DIRECT SERPL-MCNC: 0.5 MG/DL (ref 0–0.3)
BILIRUB SERPL-MCNC: 1.4 MG/DL (ref 0–1.2)
DIFFERENTIAL METHOD BLD: ABNORMAL
EOSINOPHIL # BLD: 0.15 K/UL (ref 0–0.8)
EOSINOPHIL NFR BLD: 1.9 % (ref 0.5–7.8)
ERYTHROCYTE [DISTWIDTH] IN BLOOD BY AUTOMATED COUNT: 12.4 % (ref 11.9–14.6)
GLOBULIN SER CALC-MCNC: 2.8 G/DL (ref 2.3–3.5)
HCT VFR BLD AUTO: 45.6 % (ref 35.8–46.3)
HGB BLD-MCNC: 15.3 G/DL (ref 11.7–15.4)
IMM GRANULOCYTES # BLD AUTO: 0.03 K/UL (ref 0–0.5)
IMM GRANULOCYTES NFR BLD AUTO: 0.4 % (ref 0–5)
LYMPHOCYTES # BLD: 3.82 K/UL (ref 0.5–4.6)
LYMPHOCYTES NFR BLD: 49.5 % (ref 13–44)
MCH RBC QN AUTO: 31 PG (ref 26.1–32.9)
MCHC RBC AUTO-ENTMCNC: 33.6 G/DL (ref 31.4–35)
MCV RBC AUTO: 92.3 FL (ref 82–102)
MONOCYTES # BLD: 0.75 K/UL (ref 0.1–1.3)
MONOCYTES NFR BLD: 9.7 % (ref 4–12)
NEUTS SEG # BLD: 2.92 K/UL (ref 1.7–8.2)
NEUTS SEG NFR BLD: 37.9 % (ref 43–78)
NRBC # BLD: 0 K/UL (ref 0–0.2)
PLATELET # BLD AUTO: 200 K/UL (ref 150–450)
PMV BLD AUTO: 10.4 FL (ref 9.4–12.3)
PROT SERPL-MCNC: 6.9 G/DL (ref 6.3–8.2)
RBC # BLD AUTO: 4.94 M/UL (ref 4.05–5.2)
WBC # BLD AUTO: 7.7 K/UL (ref 4.3–11.1)

## 2025-06-11 PROCEDURE — 36415 COLL VENOUS BLD VENIPUNCTURE: CPT | Performed by: FAMILY MEDICINE

## 2025-06-16 ENCOUNTER — TELEMEDICINE (OUTPATIENT)
Dept: FAMILY MEDICINE CLINIC | Facility: CLINIC | Age: 75
End: 2025-06-16
Payer: MEDICARE

## 2025-06-16 ENCOUNTER — TELEPHONE (OUTPATIENT)
Dept: ORTHOPEDIC SURGERY | Age: 75
End: 2025-06-16

## 2025-06-16 DIAGNOSIS — E78.01 FAMILIAL HYPERCHOLESTEROLEMIA: ICD-10-CM

## 2025-06-16 DIAGNOSIS — F33.9 RECURRENT DEPRESSION: ICD-10-CM

## 2025-06-16 DIAGNOSIS — I10 PRIMARY HYPERTENSION: ICD-10-CM

## 2025-06-16 DIAGNOSIS — E03.9 HYPOTHYROIDISM, UNSPECIFIED TYPE: ICD-10-CM

## 2025-06-16 DIAGNOSIS — K21.9 GERD WITHOUT ESOPHAGITIS: ICD-10-CM

## 2025-06-16 PROCEDURE — 99213 OFFICE O/P EST LOW 20 MIN: CPT | Performed by: FAMILY MEDICINE

## 2025-06-16 RX ORDER — NEBIVOLOL 10 MG/1
10 TABLET ORAL DAILY
Qty: 90 TABLET | Refills: 3 | Status: SHIPPED | OUTPATIENT
Start: 2025-06-16

## 2025-06-16 RX ORDER — VENLAFAXINE HYDROCHLORIDE 150 MG/1
150 CAPSULE, EXTENDED RELEASE ORAL DAILY
Qty: 180 CAPSULE | Refills: 3 | Status: SHIPPED | OUTPATIENT
Start: 2025-06-16

## 2025-06-16 RX ORDER — LEVOTHYROXINE SODIUM 112 UG/1
112 TABLET ORAL DAILY
Qty: 90 TABLET | Refills: 3 | Status: SHIPPED | OUTPATIENT
Start: 2025-06-16

## 2025-06-16 RX ORDER — ATORVASTATIN CALCIUM 20 MG/1
TABLET, FILM COATED ORAL
Qty: 90 TABLET | Refills: 3 | Status: SHIPPED | OUTPATIENT
Start: 2025-06-16

## 2025-06-16 RX ORDER — LOSARTAN POTASSIUM AND HYDROCHLOROTHIAZIDE 25; 100 MG/1; MG/1
1 TABLET ORAL DAILY
Qty: 90 TABLET | Refills: 3 | Status: SHIPPED | OUTPATIENT
Start: 2025-06-16

## 2025-06-16 RX ORDER — PANTOPRAZOLE SODIUM 40 MG/1
40 TABLET, DELAYED RELEASE ORAL
Qty: 90 TABLET | Refills: 3 | Status: SHIPPED | OUTPATIENT
Start: 2025-06-16

## 2025-06-16 SDOH — ECONOMIC STABILITY: FOOD INSECURITY: WITHIN THE PAST 12 MONTHS, THE FOOD YOU BOUGHT JUST DIDN'T LAST AND YOU DIDN'T HAVE MONEY TO GET MORE.: NEVER TRUE

## 2025-06-16 SDOH — ECONOMIC STABILITY: FOOD INSECURITY: WITHIN THE PAST 12 MONTHS, YOU WORRIED THAT YOUR FOOD WOULD RUN OUT BEFORE YOU GOT MONEY TO BUY MORE.: NEVER TRUE

## 2025-06-16 ASSESSMENT — PATIENT HEALTH QUESTIONNAIRE - PHQ9: DEPRESSION UNABLE TO ASSESS: PT REFUSES

## 2025-06-16 NOTE — PROGRESS NOTES
extended release capsule [[id number]]                       Diagnosis Orders   1. Familial hypercholesterolemia  atorvastatin (LIPITOR) 20 MG tablet      2. Hypothyroidism, unspecified type  levothyroxine (SYNTHROID) 112 MCG tablet      3. Primary hypertension  losartan-hydroCHLOROthiazide (HYZAAR) 100-25 MG per tablet    nebivolol (BYSTOLIC) 10 MG tablet      4. GERD without esophagitis  pantoprazole (PROTONIX) 40 MG tablet      5. Recurrent depression  venlafaxine (EFFEXOR XR) 150 MG extended release capsule      , Diagnoses and all orders for this visit:    Familial hypercholesterolemia  -     atorvastatin (LIPITOR) 20 MG tablet; TAKE 1 TABLET BY MOUTH AT BEDTIME    Hypothyroidism, unspecified type  -     levothyroxine (SYNTHROID) 112 MCG tablet; Take 1 tablet by mouth Daily    Primary hypertension  -     losartan-hydroCHLOROthiazide (HYZAAR) 100-25 MG per tablet; Take 1 tablet by mouth daily  -     nebivolol (BYSTOLIC) 10 MG tablet; Take 1 tablet by mouth daily    GERD without esophagitis  -     pantoprazole (PROTONIX) 40 MG tablet; Take 1 tablet by mouth every morning (before breakfast)    Recurrent depression  -     venlafaxine (EFFEXOR XR) 150 MG extended release capsule; Take 1 capsule by mouth daily    , Reviewed her labs answered all her questions supportive care given she is due for urine drug screen in December when she is due for physical

## 2025-06-17 ENCOUNTER — OFFICE VISIT (OUTPATIENT)
Age: 75
End: 2025-06-17

## 2025-06-17 DIAGNOSIS — S52.551A OTHER EXTRAARTICULAR FRACTURE OF LOWER END OF RIGHT RADIUS, INITIAL ENCOUNTER FOR CLOSED FRACTURE: Primary | ICD-10-CM

## 2025-06-17 RX ORDER — MELOXICAM 15 MG/1
15 TABLET ORAL DAILY
Qty: 30 TABLET | Refills: 0 | Status: ON HOLD | OUTPATIENT
Start: 2025-06-17 | End: 2025-06-22 | Stop reason: HOSPADM

## 2025-06-17 RX ORDER — METHYLPREDNISOLONE ACETATE 40 MG/ML
40 INJECTION, SUSPENSION INTRA-ARTICULAR; INTRALESIONAL; INTRAMUSCULAR; SOFT TISSUE ONCE
Status: COMPLETED | OUTPATIENT
Start: 2025-06-17 | End: 2025-06-17

## 2025-06-17 RX ORDER — MELOXICAM 15 MG/1
15 TABLET ORAL DAILY
Qty: 28 TABLET | Refills: 0 | Status: CANCELLED | OUTPATIENT
Start: 2025-06-17 | End: 2025-07-15

## 2025-06-17 RX ADMIN — METHYLPREDNISOLONE ACETATE 40 MG: 40 INJECTION, SUSPENSION INTRA-ARTICULAR; INTRALESIONAL; INTRAMUSCULAR; SOFT TISSUE at 11:55

## 2025-06-18 NOTE — PROGRESS NOTES
Orthopaedic Hand Clinic Note    Name: Alicia Jimenez  Age: 74 y.o.  YOB: 1950  Gender: female  MRN: [id number]      Follow up visit:   1. Other extraarticular fracture of lower end of right radius, initial encounter for closed fracture        HPI: Alicia Jimenez is a 74 y.o. female who is following up for right distal radius fracture.  She is 11 weeks out from injury.  She is accompanied by her daughter.  She complains of pain in the right wrist.  She says it hurts to do most activities.  She has been using a sock as a compressive wrap.  She reports most of her pain is on the ulnar side and worse with supination.  She says it hurts to do activities such as brushing her hair.    5/12/25 Alicia Jimenez is a 74 y.o. female who is following up for right distal radius fracture.  She is 6 weeks out from injury.  She is accompanied by her daughter.  She has been in a short arm cast.  She has been compliant with her restrictions.  She denies pain or discomfort at rest.     4/8/25 Alicia Jimenez is a 74 y.o. female who is following up for right distal radius fracture.  She is 9 days out from injury.  She comes in today for her 1 week recheck.  She has been in a short arm cast.  She says the cast feels too loose.  She is with her daughter.  She complains of pain.     4/1/25 Alicia Jimenez is a 74 y.o. female right handed with a chief complaint of   right  wrist pain. The injury occurred 3 days ago when the patient fell in her bedroom around 2 AM on March 30, 2025. The pain is located diffusely about the right wrist. Denies numbness or paresthesias of the fingers. Evaluation at the ER has included x-rays. Treatment to date has included sugar-tong splint and sling. Denies loss of consciousness, head injury or neck pain.  She is accompanied by her daughter.  She is on Plavix followed by Dr. Carrington Flynn with [facility] cardiology.  She also sees Dr. De La Cruz.  She is in pain management and takes 
The patient was prescribed and fitted with a [facility] [place] wrist brace for the right wrist.     Patient read and signed documenting they understand and agree to [facility]'s current DME return policy.   
No

## 2025-06-20 ENCOUNTER — APPOINTMENT (OUTPATIENT)
Dept: CT IMAGING | Age: 75
DRG: 379 | End: 2025-06-20
Payer: MEDICARE

## 2025-06-20 ENCOUNTER — HOSPITAL ENCOUNTER (INPATIENT)
Age: 75
LOS: 1 days | Discharge: HOME | DRG: 379 | End: 2025-06-22
Attending: STUDENT IN AN ORGANIZED HEALTH CARE EDUCATION/TRAINING PROGRAM | Admitting: FAMILY MEDICINE
Payer: MEDICARE

## 2025-06-20 DIAGNOSIS — K57.32 SIGMOID DIVERTICULITIS: Primary | ICD-10-CM

## 2025-06-20 DIAGNOSIS — K62.5 RECTAL BLEEDING: ICD-10-CM

## 2025-06-20 DIAGNOSIS — E87.6 HYPOKALEMIA: ICD-10-CM

## 2025-06-20 DIAGNOSIS — K57.32 DIVERTICULITIS OF COLON: ICD-10-CM

## 2025-06-20 LAB
ABO + RH BLD: NORMAL
ALBUMIN SERPL-MCNC: 4.2 G/DL (ref 3.2–4.6)
ALBUMIN/GLOB SERPL: 1.3 (ref 1–1.9)
ALP SERPL-CCNC: 120 U/L (ref 35–104)
ALT SERPL-CCNC: 23 U/L (ref 8–45)
ANION GAP SERPL CALC-SCNC: 16 MMOL/L (ref 7–16)
AST SERPL-CCNC: 24 U/L (ref 15–37)
BASOPHILS # BLD: 0.03 K/UL (ref 0–0.2)
BASOPHILS NFR BLD: 0.4 % (ref 0–2)
BILIRUB SERPL-MCNC: 1.3 MG/DL (ref 0–1.2)
BLOOD GROUP ANTIBODIES SERPL: NORMAL
BUN SERPL-MCNC: 24 MG/DL (ref 8–23)
CALCIUM SERPL-MCNC: 10.6 MG/DL (ref 8.8–10.2)
CHLORIDE SERPL-SCNC: 105 MMOL/L (ref 98–107)
CO2 SERPL-SCNC: 21 MMOL/L (ref 20–29)
CREAT SERPL-MCNC: 1.04 MG/DL (ref 0.6–1.1)
DIFFERENTIAL METHOD BLD: ABNORMAL
EOSINOPHIL # BLD: 0.04 K/UL (ref 0–0.8)
EOSINOPHIL NFR BLD: 0.5 % (ref 0.5–7.8)
ERYTHROCYTE [DISTWIDTH] IN BLOOD BY AUTOMATED COUNT: 12.5 % (ref 11.9–14.6)
GLOBULIN SER CALC-MCNC: 3.2 G/DL (ref 2.3–3.5)
GLUCOSE SERPL-MCNC: 140 MG/DL (ref 70–99)
HCT VFR BLD AUTO: 44.2 % (ref 35.8–46.3)
HGB BLD-MCNC: 15.8 G/DL (ref 11.7–15.4)
IMM GRANULOCYTES # BLD AUTO: 0.01 K/UL (ref 0–0.5)
IMM GRANULOCYTES NFR BLD AUTO: 0.1 % (ref 0–5)
INR PPP: 1
LYMPHOCYTES # BLD: 1.38 K/UL (ref 0.5–4.6)
LYMPHOCYTES NFR BLD: 18.9 % (ref 13–44)
MCH RBC QN AUTO: 31.1 PG (ref 26.1–32.9)
MCHC RBC AUTO-ENTMCNC: 35.7 G/DL (ref 31.4–35)
MCV RBC AUTO: 87 FL (ref 82–102)
MONOCYTES # BLD: 0.55 K/UL (ref 0.1–1.3)
MONOCYTES NFR BLD: 7.5 % (ref 4–12)
NEUTS SEG # BLD: 5.29 K/UL (ref 1.7–8.2)
NEUTS SEG NFR BLD: 72.6 % (ref 43–78)
NRBC # BLD: 0 K/UL (ref 0–0.2)
PLATELET # BLD AUTO: 157 K/UL (ref 150–450)
PMV BLD AUTO: 10.2 FL (ref 9.4–12.3)
POTASSIUM SERPL-SCNC: 3.3 MMOL/L (ref 3.5–5.1)
PROT SERPL-MCNC: 7.4 G/DL (ref 6.3–8.2)
PROTHROMBIN TIME: 13.7 SEC (ref 11.3–14.9)
RBC # BLD AUTO: 5.08 M/UL (ref 4.05–5.2)
SODIUM SERPL-SCNC: 142 MMOL/L (ref 136–145)
SPECIMEN EXP DATE BLD: NORMAL
WBC # BLD AUTO: 7.3 K/UL (ref 4.3–11.1)

## 2025-06-20 PROCEDURE — 99285 EMERGENCY DEPT VISIT HI MDM: CPT

## 2025-06-20 PROCEDURE — 74178 CT ABD&PLV WO CNTR FLWD CNTR: CPT

## 2025-06-20 PROCEDURE — 86901 BLOOD TYPING SEROLOGIC RH(D): CPT

## 2025-06-20 PROCEDURE — 86850 RBC ANTIBODY SCREEN: CPT

## 2025-06-20 PROCEDURE — 85025 COMPLETE CBC W/AUTO DIFF WBC: CPT

## 2025-06-20 PROCEDURE — 85610 PROTHROMBIN TIME: CPT

## 2025-06-20 PROCEDURE — 80053 COMPREHEN METABOLIC PANEL: CPT

## 2025-06-20 PROCEDURE — 6360000004 HC RX CONTRAST MEDICATION: Performed by: STUDENT IN AN ORGANIZED HEALTH CARE EDUCATION/TRAINING PROGRAM

## 2025-06-20 PROCEDURE — 86900 BLOOD TYPING SEROLOGIC ABO: CPT

## 2025-06-20 PROCEDURE — 83735 ASSAY OF MAGNESIUM: CPT

## 2025-06-20 RX ORDER — IOPAMIDOL 755 MG/ML
100 INJECTION, SOLUTION INTRAVASCULAR
Status: COMPLETED | OUTPATIENT
Start: 2025-06-20 | End: 2025-06-20

## 2025-06-20 RX ORDER — METRONIDAZOLE 500 MG/100ML
500 INJECTION, SOLUTION INTRAVENOUS ONCE
Status: COMPLETED | OUTPATIENT
Start: 2025-06-20 | End: 2025-06-21

## 2025-06-20 RX ORDER — CIPROFLOXACIN 2 MG/ML
400 INJECTION, SOLUTION INTRAVENOUS ONCE
Status: COMPLETED | OUTPATIENT
Start: 2025-06-20 | End: 2025-06-21

## 2025-06-20 RX ORDER — POTASSIUM CHLORIDE 1500 MG/1
20 TABLET, EXTENDED RELEASE ORAL ONCE
Status: COMPLETED | OUTPATIENT
Start: 2025-06-20 | End: 2025-06-21

## 2025-06-20 RX ADMIN — IOPAMIDOL 100 ML: 755 INJECTION, SOLUTION INTRAVENOUS at 23:09

## 2025-06-20 ASSESSMENT — PAIN - FUNCTIONAL ASSESSMENT: PAIN_FUNCTIONAL_ASSESSMENT: NONE - DENIES PAIN

## 2025-06-21 PROBLEM — K92.2 LOWER GI BLEED: Status: ACTIVE | Noted: 2018-11-16

## 2025-06-21 PROBLEM — K57.32 SIGMOID DIVERTICULITIS: Status: ACTIVE | Noted: 2025-06-21

## 2025-06-21 LAB
ANION GAP SERPL CALC-SCNC: 16 MMOL/L (ref 7–16)
BUN SERPL-MCNC: 19 MG/DL (ref 8–23)
CALCIUM SERPL-MCNC: 10 MG/DL (ref 8.8–10.2)
CHLORIDE SERPL-SCNC: 106 MMOL/L (ref 98–107)
CO2 SERPL-SCNC: 19 MMOL/L (ref 20–29)
CREAT SERPL-MCNC: 0.9 MG/DL (ref 0.6–1.1)
EST. AVERAGE GLUCOSE BLD GHB EST-MCNC: 110 MG/DL
GLUCOSE SERPL-MCNC: 106 MG/DL (ref 70–99)
HBA1C MFR BLD: 5.5 % (ref 0–5.6)
HCT VFR BLD AUTO: 39.6 % (ref 35.8–46.3)
HCT VFR BLD AUTO: 41.7 % (ref 35.8–46.3)
HCT VFR BLD AUTO: 44.6 % (ref 35.8–46.3)
HGB BLD-MCNC: 14.1 G/DL (ref 11.7–15.4)
HGB BLD-MCNC: 14.9 G/DL (ref 11.7–15.4)
HGB BLD-MCNC: 15.8 G/DL (ref 11.7–15.4)
MAGNESIUM SERPL-MCNC: 1.8 MG/DL (ref 1.8–2.4)
POTASSIUM SERPL-SCNC: 3.4 MMOL/L (ref 3.5–5.1)
SODIUM SERPL-SCNC: 141 MMOL/L (ref 136–145)

## 2025-06-21 PROCEDURE — 6370000000 HC RX 637 (ALT 250 FOR IP): Performed by: FAMILY MEDICINE

## 2025-06-21 PROCEDURE — 2580000003 HC RX 258: Performed by: FAMILY MEDICINE

## 2025-06-21 PROCEDURE — 2500000003 HC RX 250 WO HCPCS

## 2025-06-21 PROCEDURE — 2500000003 HC RX 250 WO HCPCS: Performed by: FAMILY MEDICINE

## 2025-06-21 PROCEDURE — 6360000002 HC RX W HCPCS: Performed by: STUDENT IN AN ORGANIZED HEALTH CARE EDUCATION/TRAINING PROGRAM

## 2025-06-21 PROCEDURE — 85014 HEMATOCRIT: CPT

## 2025-06-21 PROCEDURE — 83036 HEMOGLOBIN GLYCOSYLATED A1C: CPT

## 2025-06-21 PROCEDURE — 80048 BASIC METABOLIC PNL TOTAL CA: CPT

## 2025-06-21 PROCEDURE — 2580000003 HC RX 258

## 2025-06-21 PROCEDURE — 96365 THER/PROPH/DIAG IV INF INIT: CPT

## 2025-06-21 PROCEDURE — 96368 THER/DIAG CONCURRENT INF: CPT

## 2025-06-21 PROCEDURE — 6360000002 HC RX W HCPCS: Performed by: FAMILY MEDICINE

## 2025-06-21 PROCEDURE — 85018 HEMOGLOBIN: CPT

## 2025-06-21 PROCEDURE — 6370000000 HC RX 637 (ALT 250 FOR IP): Performed by: STUDENT IN AN ORGANIZED HEALTH CARE EDUCATION/TRAINING PROGRAM

## 2025-06-21 PROCEDURE — 96366 THER/PROPH/DIAG IV INF ADDON: CPT

## 2025-06-21 PROCEDURE — 36415 COLL VENOUS BLD VENIPUNCTURE: CPT

## 2025-06-21 PROCEDURE — 6370000000 HC RX 637 (ALT 250 FOR IP)

## 2025-06-21 PROCEDURE — 1100000003 HC PRIVATE W/ TELEMETRY

## 2025-06-21 RX ORDER — MORPHINE SULFATE 2 MG/ML
2 INJECTION, SOLUTION INTRAMUSCULAR; INTRAVENOUS ONCE
Refills: 0 | Status: COMPLETED | OUTPATIENT
Start: 2025-06-21 | End: 2025-06-21

## 2025-06-21 RX ORDER — LANOLIN ALCOHOL/MO/W.PET/CERES
250 CREAM (GRAM) TOPICAL DAILY
Status: DISCONTINUED | OUTPATIENT
Start: 2025-06-21 | End: 2025-06-22 | Stop reason: HOSPADM

## 2025-06-21 RX ORDER — SODIUM CHLORIDE 9 MG/ML
INJECTION, SOLUTION INTRAVENOUS PRN
Status: DISCONTINUED | OUTPATIENT
Start: 2025-06-21 | End: 2025-06-22 | Stop reason: HOSPADM

## 2025-06-21 RX ORDER — ONDANSETRON 2 MG/ML
4 INJECTION INTRAMUSCULAR; INTRAVENOUS EVERY 6 HOURS PRN
Status: DISCONTINUED | OUTPATIENT
Start: 2025-06-21 | End: 2025-06-22 | Stop reason: HOSPADM

## 2025-06-21 RX ORDER — SODIUM CHLORIDE 9 MG/ML
INJECTION, SOLUTION INTRAVENOUS CONTINUOUS
Status: DISCONTINUED | OUTPATIENT
Start: 2025-06-21 | End: 2025-06-21

## 2025-06-21 RX ORDER — SODIUM CHLORIDE 0.9 % (FLUSH) 0.9 %
5-40 SYRINGE (ML) INJECTION EVERY 12 HOURS SCHEDULED
Status: DISCONTINUED | OUTPATIENT
Start: 2025-06-21 | End: 2025-06-22 | Stop reason: HOSPADM

## 2025-06-21 RX ORDER — ASPIRIN 81 MG/1
81 TABLET, CHEWABLE ORAL NIGHTLY
Status: DISCONTINUED | OUTPATIENT
Start: 2025-06-21 | End: 2025-06-22 | Stop reason: HOSPADM

## 2025-06-21 RX ORDER — MELOXICAM 7.5 MG/1
15 TABLET ORAL DAILY
Status: DISCONTINUED | OUTPATIENT
Start: 2025-06-21 | End: 2025-06-22

## 2025-06-21 RX ORDER — MULTIVITAMIN WITH IRON
1 TABLET ORAL DAILY
Status: DISCONTINUED | OUTPATIENT
Start: 2025-06-21 | End: 2025-06-22 | Stop reason: HOSPADM

## 2025-06-21 RX ORDER — HYDROCODONE BITARTRATE AND ACETAMINOPHEN 10; 325 MG/1; MG/1
1 TABLET ORAL
Refills: 0 | Status: COMPLETED | OUTPATIENT
Start: 2025-06-21 | End: 2025-06-21

## 2025-06-21 RX ORDER — HYDROCHLOROTHIAZIDE 25 MG/1
25 TABLET ORAL DAILY
Status: DISCONTINUED | OUTPATIENT
Start: 2025-06-21 | End: 2025-06-22 | Stop reason: HOSPADM

## 2025-06-21 RX ORDER — SODIUM CHLORIDE 0.9 % (FLUSH) 0.9 %
5-40 SYRINGE (ML) INJECTION PRN
Status: DISCONTINUED | OUTPATIENT
Start: 2025-06-21 | End: 2025-06-22 | Stop reason: HOSPADM

## 2025-06-21 RX ORDER — LOSARTAN POTASSIUM 50 MG/1
100 TABLET ORAL DAILY
Status: DISCONTINUED | OUTPATIENT
Start: 2025-06-21 | End: 2025-06-22 | Stop reason: HOSPADM

## 2025-06-21 RX ORDER — LEVOTHYROXINE SODIUM 112 UG/1
112 TABLET ORAL DAILY
Status: DISCONTINUED | OUTPATIENT
Start: 2025-06-21 | End: 2025-06-22 | Stop reason: HOSPADM

## 2025-06-21 RX ORDER — PANTOPRAZOLE SODIUM 40 MG/1
40 TABLET, DELAYED RELEASE ORAL
Status: DISCONTINUED | OUTPATIENT
Start: 2025-06-21 | End: 2025-06-22 | Stop reason: HOSPADM

## 2025-06-21 RX ORDER — CIPROFLOXACIN 2 MG/ML
400 INJECTION, SOLUTION INTRAVENOUS EVERY 12 HOURS
Status: DISCONTINUED | OUTPATIENT
Start: 2025-06-21 | End: 2025-06-22

## 2025-06-21 RX ORDER — ACETAMINOPHEN 650 MG/1
650 SUPPOSITORY RECTAL EVERY 6 HOURS PRN
Status: DISCONTINUED | OUTPATIENT
Start: 2025-06-21 | End: 2025-06-22 | Stop reason: HOSPADM

## 2025-06-21 RX ORDER — ATORVASTATIN CALCIUM 20 MG/1
20 TABLET, FILM COATED ORAL DAILY
Status: DISCONTINUED | OUTPATIENT
Start: 2025-06-21 | End: 2025-06-22 | Stop reason: HOSPADM

## 2025-06-21 RX ORDER — HYDROMORPHONE HYDROCHLORIDE 1 MG/ML
0.25 INJECTION, SOLUTION INTRAMUSCULAR; INTRAVENOUS; SUBCUTANEOUS EVERY 4 HOURS PRN
Status: DISCONTINUED | OUTPATIENT
Start: 2025-06-21 | End: 2025-06-22 | Stop reason: HOSPADM

## 2025-06-21 RX ORDER — LANOLIN ALCOHOL/MO/W.PET/CERES
400 CREAM (GRAM) TOPICAL DAILY
Status: DISCONTINUED | OUTPATIENT
Start: 2025-06-21 | End: 2025-06-22 | Stop reason: HOSPADM

## 2025-06-21 RX ORDER — POTASSIUM CHLORIDE 1500 MG/1
40 TABLET, EXTENDED RELEASE ORAL 2 TIMES DAILY
Status: COMPLETED | OUTPATIENT
Start: 2025-06-21 | End: 2025-06-21

## 2025-06-21 RX ORDER — METRONIDAZOLE 500 MG/100ML
500 INJECTION, SOLUTION INTRAVENOUS EVERY 8 HOURS
Status: DISCONTINUED | OUTPATIENT
Start: 2025-06-21 | End: 2025-06-22

## 2025-06-21 RX ORDER — SODIUM CHLORIDE 9 MG/ML
INJECTION, SOLUTION INTRAVENOUS CONTINUOUS
Status: ACTIVE | OUTPATIENT
Start: 2025-06-21 | End: 2025-06-21

## 2025-06-21 RX ORDER — ACETAMINOPHEN 325 MG/1
650 TABLET ORAL EVERY 6 HOURS PRN
Status: DISCONTINUED | OUTPATIENT
Start: 2025-06-21 | End: 2025-06-22 | Stop reason: HOSPADM

## 2025-06-21 RX ORDER — VENLAFAXINE HYDROCHLORIDE 150 MG/1
150 CAPSULE, EXTENDED RELEASE ORAL DAILY
Status: DISCONTINUED | OUTPATIENT
Start: 2025-06-21 | End: 2025-06-22 | Stop reason: HOSPADM

## 2025-06-21 RX ORDER — METOPROLOL SUCCINATE 100 MG/1
100 TABLET, EXTENDED RELEASE ORAL DAILY
Status: DISCONTINUED | OUTPATIENT
Start: 2025-06-21 | End: 2025-06-22 | Stop reason: HOSPADM

## 2025-06-21 RX ORDER — ONDANSETRON 4 MG/1
4 TABLET, ORALLY DISINTEGRATING ORAL EVERY 8 HOURS PRN
Status: DISCONTINUED | OUTPATIENT
Start: 2025-06-21 | End: 2025-06-22 | Stop reason: HOSPADM

## 2025-06-21 RX ORDER — OXYCODONE HYDROCHLORIDE 5 MG/1
5 TABLET ORAL EVERY 4 HOURS PRN
Refills: 0 | Status: DISCONTINUED | OUTPATIENT
Start: 2025-06-21 | End: 2025-06-22 | Stop reason: HOSPADM

## 2025-06-21 RX ADMIN — CIPROFLOXACIN 400 MG: 400 INJECTION, SOLUTION INTRAVENOUS at 01:32

## 2025-06-21 RX ADMIN — SODIUM CHLORIDE: 0.9 INJECTION, SOLUTION INTRAVENOUS at 10:42

## 2025-06-21 RX ADMIN — POTASSIUM CHLORIDE 40 MEQ: 1500 TABLET, EXTENDED RELEASE ORAL at 11:41

## 2025-06-21 RX ADMIN — SODIUM CHLORIDE 40 MG: 9 INJECTION INTRAMUSCULAR; INTRAVENOUS; SUBCUTANEOUS at 08:54

## 2025-06-21 RX ADMIN — MORPHINE SULFATE 2 MG: 2 INJECTION, SOLUTION INTRAMUSCULAR; INTRAVENOUS at 10:33

## 2025-06-21 RX ADMIN — METRONIDAZOLE 500 MG: 500 SOLUTION INTRAVENOUS at 01:32

## 2025-06-21 RX ADMIN — SODIUM CHLORIDE: 0.9 INJECTION, SOLUTION INTRAVENOUS at 05:54

## 2025-06-21 RX ADMIN — HYDROCODONE BITARTRATE AND ACETAMINOPHEN 1 TABLET: 10; 325 TABLET ORAL at 01:28

## 2025-06-21 RX ADMIN — ATORVASTATIN CALCIUM 20 MG: 20 TABLET, FILM COATED ORAL at 08:54

## 2025-06-21 RX ADMIN — SODIUM CHLORIDE, PRESERVATIVE FREE 10 ML: 5 INJECTION INTRAVENOUS at 20:11

## 2025-06-21 RX ADMIN — MULTIVITAMIN TABLET 1 TABLET: TABLET at 08:54

## 2025-06-21 RX ADMIN — METOPROLOL SUCCINATE 100 MG: 100 TABLET, EXTENDED RELEASE ORAL at 08:54

## 2025-06-21 RX ADMIN — HYDROCHLOROTHIAZIDE 25 MG: 25 TABLET ORAL at 08:54

## 2025-06-21 RX ADMIN — LOSARTAN POTASSIUM 100 MG: 50 TABLET, FILM COATED ORAL at 08:54

## 2025-06-21 RX ADMIN — OXYCODONE 5 MG: 5 TABLET ORAL at 20:39

## 2025-06-21 RX ADMIN — METRONIDAZOLE 500 MG: 500 INJECTION, SOLUTION INTRAVENOUS at 09:06

## 2025-06-21 RX ADMIN — LEVOTHYROXINE SODIUM 112 MCG: 0.11 TABLET ORAL at 05:52

## 2025-06-21 RX ADMIN — METRONIDAZOLE 500 MG: 500 INJECTION, SOLUTION INTRAVENOUS at 16:36

## 2025-06-21 RX ADMIN — ACETAMINOPHEN 650 MG: 325 TABLET ORAL at 20:07

## 2025-06-21 RX ADMIN — CIPROFLOXACIN 400 MG: 400 INJECTION, SOLUTION INTRAVENOUS at 14:01

## 2025-06-21 RX ADMIN — Medication 400 UNITS: at 08:55

## 2025-06-21 RX ADMIN — VENLAFAXINE HYDROCHLORIDE 150 MG: 150 CAPSULE, EXTENDED RELEASE ORAL at 08:55

## 2025-06-21 RX ADMIN — CYANOCOBALAMIN TAB 1000 MCG 250 MCG: 1000 TAB at 08:54

## 2025-06-21 RX ADMIN — SODIUM CHLORIDE, PRESERVATIVE FREE 10 ML: 5 INJECTION INTRAVENOUS at 08:55

## 2025-06-21 RX ADMIN — MAGNESIUM GLUCONATE 500 MG ORAL TABLET 400 MG: 500 TABLET ORAL at 08:54

## 2025-06-21 RX ADMIN — POTASSIUM CHLORIDE 40 MEQ: 1500 TABLET, EXTENDED RELEASE ORAL at 20:07

## 2025-06-21 RX ADMIN — POTASSIUM CHLORIDE 20 MEQ: 1500 TABLET, EXTENDED RELEASE ORAL at 01:19

## 2025-06-21 ASSESSMENT — PAIN SCALES - GENERAL
PAINLEVEL_OUTOF10: 8
PAINLEVEL_OUTOF10: 7
PAINLEVEL_OUTOF10: 8
PAINLEVEL_OUTOF10: 3
PAINLEVEL_OUTOF10: 8

## 2025-06-21 ASSESSMENT — PAIN - FUNCTIONAL ASSESSMENT
PAIN_FUNCTIONAL_ASSESSMENT: ACTIVITIES ARE NOT PREVENTED
PAIN_FUNCTIONAL_ASSESSMENT: ACTIVITIES ARE NOT PREVENTED

## 2025-06-21 ASSESSMENT — PAIN DESCRIPTION - DESCRIPTORS
DESCRIPTORS: ACHING
DESCRIPTORS: ACHING;DISCOMFORT
DESCRIPTORS: ACHING;DISCOMFORT;CRAMPING;TENDER

## 2025-06-21 ASSESSMENT — PAIN DESCRIPTION - LOCATION
LOCATION: ABDOMEN
LOCATION: HIP;ABDOMEN
LOCATION: ABDOMEN

## 2025-06-21 ASSESSMENT — PAIN DESCRIPTION - ORIENTATION
ORIENTATION: LOWER
ORIENTATION: RIGHT;LEFT
ORIENTATION: LOWER

## 2025-06-21 NOTE — ED PROVIDER NOTES
[facility]  Emergency Department    DISPOSITION Decision To Admit 06/20/2025 11:34:38 PM     ICD-10-CM    1. Rectal bleeding  K62.5       2. Hypokalemia  E87.6       3. Diverticulitis of colon  K57.32         New Prescriptions    No medications on file     ED Course     ED Course as of 06/20/25 2347 Fri Jun 20, 2025 2209 74-year-old female on Plavix presents with 2 episodes of BRBPR.  Vitals reassuring.  Abdomen soft, nontender.  Will obtain labs and CT bleeding study.  Will need admission for further observation as she is anticoagulated [ER]   2346 Labs potassium 3.3, otherwise reassuring.  Hemoglobin 15.8.  She has had no further episodes of bloody bowel movements while in the ER.  CT imaging shows no active bleeding but does note possible early diverticulitis.  I have reached out to hospitalist regarding admission for further observation. [ER]      ED Course User Index  [ER] Nitish Boyer MD     Data Reviewed and Analyzed:  1 or more acute illnesses that pose a threat to life or bodily function.   Chronic medical problems impacting care include anticoagulation.  I independently ordered and reviewed each unique test.     I interpreted the CT Scan reviewed radiology report; early diverticulitis but no noted active bleeding.  I interpreted the labs.  The patient was admitted and I have discussed patient management with the admitting provider.    Critical care time: 47 minutes of critical care time was performed in the emergency department. This was separate from any other procedures listed during the patients emergency department course. The failure to initiate these interventions on an urgent basis would likely have resulted in sudden, clinically significant or life-threatening deterioration in the patients condition.       HPI   Alicia Jimenez is a 74 y.o. female with a history of CAD who presents to the ED with complaint of blood per rectum.  Reports a 1 day history of 2

## 2025-06-21 NOTE — PROGRESS NOTES
Hospitalist Progress Note   Admit Date:  2025  9:42 PM   Name:  Alicia Jimenez   Age:  74 y.o.  Sex:  female  :  1950   MRN:  [id number]   Room:  [facility]    Presenting/Chief Complaint: Rectal Bleeding     Reason(s) for Admission: Hypokalemia [E87.6]  Rectal bleeding [K62.5]  Diverticulitis of colon [K57.32]  Lower GI bleed [K92.2]     Hospital Course:   Alicia Jimenez is a 74 y.o. female with medical history of  diverticulosis and diverticulitis, CAD, hemorrhoids, saccular aneurysm of the right internal carotid artery 3 mm, arthritis, asthma, renal stone disease, chronic pain, anxiety &depression,, dyslipidemia, heart murmur, hypothyroidism, GERD who presented to the emergency room via EMS with complaints that she had been having abdominal cramping and diarrhea with nausea and 1 episode of vomiting early in the day on  and  multiple BRBPR prior to arrival.  In the ER, HDS. Labs revealed a potassium of 3.3 and a glucose of 140, BUN of 24 and creatinine of 1.04.  CBC showed a white count of 7.3 with no left shift, and H&H of 15.8 and 44.2, and a platelet count of 157,000.  PT 13.7, INR 1.0.  CT showed early likely diverticulitis of the sigmoid colon with no active signs of GI bleeding consistent with uncomplicated sigmoid diverticulitis.  Hospital medicine has been asked to admit the patient for further evaluation and management of her diverticulitis and suspected diverticular bleed.    Subjective & 24hr Events:   No overnight events. RA.  Repeating HH.  Advance diet as tolerated. Keep liquids for now.  Needs OP GI for colonoscopy.    Assessment & Plan:     Lower GI bleed  Sigmoid diverticulitis  - H&H of 15.8 and 44.2  - CT AP per HPI.  - IV Cipro and Flagyl.  - IV PPI daily.  - Will hold aspirin and Plavix  - Liquid diet for now.   - OP GI follow-up.    Stable hypokalemia  - Replete as warranted.    Hyperglycemia  - A1c pending.    History of hypertension  - Continue patient's home

## 2025-06-21 NOTE — ED NOTES
TRANSFER - OUT REPORT:    Verbal report given to Rn on Alicia Jimenez  being transferred to [facility] for routine progression of patient care       Report consisted of patient's Situation, Background, Assessment and   Recommendations(SBAR).     Information from the following report(s) Nurse Handoff Report, ED Encounter Summary, ED SBAR, Adult Overview, MAR, Recent Results, and Med Rec Status was reviewed with the receiving nurse.    Coventry Fall Assessment:    Presents to emergency department  because of falls (Syncope, seizure, or loss of consciousness): No  Age > 70: Yes  Altered Mental Status, Intoxication with alcohol or substance confusion (Disorientation, impaired judgment, poor safety awaremess, or inability to follow instructions): No  Impaired Mobility: Ambulates or transfers with assistive devices or assistance; Unable to ambulate or transer.: Yes  Nursing Judgement: No          Lines:   Peripheral IV 06/20/25 Left Forearm (Active)        Opportunity for questions and clarification was provided.      Patient transported with:  Registered Nurse           Raj Berger RN  06/21/25 0336

## 2025-06-21 NOTE — PROGRESS NOTES
.TRANSFER - IN REPORT:    Verbal report received from MATTHEW Anthony on Alicia Jimenez  being received from ED for routine progression of patient care      Report consisted of patient's Situation, Background, Assessment and   Recommendations(SBAR).     Information from the following report(s) Nurse Handoff Report, ED Encounter Summary, ED SBAR, MAR, and Neuro Assessment was reviewed with the receiving nurse.    Opportunity for questions and clarification was provided.      Assessment completed upon patient's arrival to unit and care assumed.

## 2025-06-21 NOTE — PROGRESS NOTES
.4 Eyes Skin Assessment     NAME:  Alicia Jimenez  YOB: 1950  MEDICAL RECORD NUMBER:  [id number]    The patient is being assessed for  Admission    I agree that at least one RN has performed a thorough Head to Toe Skin Assessment on the patient. ALL assessment sites listed below have been assessed.      Areas assessed by both nurses:    Head, Face, Ears, Shoulders, Back, Chest, Arms, Elbows, Hands, Sacrum. Buttock, Coccyx, Ischium, and Legs. Feet and Heels        Does the Patient have a Wound? No noted wound(s)       Avi Prevention initiated by RN: Yes  Wound Care Orders initiated by RN: No    Pressure Injury (Stage 3,4, Unstageable, DTI, NWPT, and Complex wounds) if present, place Wound referral order by RN under : No    New Ostomies, if present place, Ostomy referral order under : No     Nurse 1 eSignature: Electronically signed by Suzy Alston RN on 6/21/25 at 4:40 AM EDT    **SHARE this note so that the co-signing nurse can place an eSignature**    Nurse 2 eSignature: Electronically signed by Gibson Lenz RN on 6/21/25 at 4:41 AM EDT

## 2025-06-21 NOTE — H&P
Filt Rate 56 (L) >60 ml/min/1.73m2    Calcium 10.6 (H) 8.8 - 10.2 MG/DL    Total Bilirubin 1.3 (H) 0.0 - 1.2 MG/DL    ALT 23 8 - 45 U/L    AST 24 15 - 37 U/L    Alk Phosphatase 120 (H) 35 - 104 U/L    Total Protein 7.4 6.3 - 8.2 g/dL    Albumin 4.2 3.2 - 4.6 g/dL    Globulin 3.2 2.3 - 3.5 g/dL    Albumin/Globulin Ratio 1.3 1.0 - 1.9     Protime-INR    Collection Time: 06/20/25 10:02 PM   Result Value Ref Range    Protime 13.7 11.3 - 14.9 sec    INR 1.0     Magnesium    Collection Time: 06/20/25 10:02 PM   Result Value Ref Range    Magnesium 1.8 1.8 - 2.4 mg/dL   TYPE AND SCREEN    Collection Time: 06/20/25 10:43 PM   Result Value Ref Range    Crossmatch expiration date 06/23/2025,2359     ABO/Rh B NEGATIVE     Antibody Screen NEG    Hemoglobin and Hematocrit    Collection Time: 06/21/25  5:01 AM   Result Value Ref Range    Hemoglobin 15.8 (H) 11.7 - 15.4 g/dL    Hematocrit 44.6 35.8 - 46.3 %   Hemoglobin and Hematocrit    Collection Time: 06/21/25  8:43 AM   Result Value Ref Range    Hemoglobin 14.9 11.7 - 15.4 g/dL    Hematocrit 41.7 35.8 - 46.3 %       No results for input(s): \"COVID19\" in the last 72 hours.    CTA ABDOMEN PELVIS GI BLEED Additional Contrast? None  Result Date: 6/20/2025  EXAMINATION CT Angiogram of the Abdomen and Pelvis, without and with Intravenous Contrast, with Multiphasic Imaging According to the GI Bleed Protocol, including multiplanar and 3D reformatted images performed on an independent workstation CLINICAL HISTORY Provided Indication: BRBPR with associated LLQ pain; h/o diverticulitis COMPARISON: None FINDINGS: Clear lung bases. No pleural effusion. Moderate coronary calcification. Cholecystectomy. Liver is negative. Patent portal vein. No biliary dilation. Pancreas and spleen are negative. Adrenals are negative. No nephrolithiasis. Right extrarenal pelvis. No focal renal lesion. Nonspecific calcification near the inferior left margin of the bladder and along the urethra which could

## 2025-06-21 NOTE — ED TRIAGE NOTES
Arrived via [facility], coming from home, C/o N/V, bloody stool, bright red Blood with clots noted with BM, \"pulling\" sensation to LLQ    H/O Diverticulitis and Hemorrhoids  136/86, HR 83, , 97.8, 97%

## 2025-06-22 VITALS
DIASTOLIC BLOOD PRESSURE: 94 MMHG | HEIGHT: 67 IN | OXYGEN SATURATION: 96 % | TEMPERATURE: 98.2 F | HEART RATE: 78 BPM | SYSTOLIC BLOOD PRESSURE: 148 MMHG | RESPIRATION RATE: 16 BRPM | BODY MASS INDEX: 23.54 KG/M2 | WEIGHT: 150 LBS

## 2025-06-22 LAB
ANION GAP SERPL CALC-SCNC: 14 MMOL/L (ref 7–16)
APTT PPP: 25 SEC (ref 23.3–37.4)
BASOPHILS # BLD: 0.03 K/UL (ref 0–0.2)
BASOPHILS NFR BLD: 0.4 % (ref 0–2)
BUN SERPL-MCNC: 12 MG/DL (ref 8–23)
CALCIUM SERPL-MCNC: 10.1 MG/DL (ref 8.8–10.2)
CHLORIDE SERPL-SCNC: 109 MMOL/L (ref 98–107)
CO2 SERPL-SCNC: 18 MMOL/L (ref 20–29)
CREAT SERPL-MCNC: 0.9 MG/DL (ref 0.6–1.1)
DIFFERENTIAL METHOD BLD: ABNORMAL
EOSINOPHIL # BLD: 0.02 K/UL (ref 0–0.8)
EOSINOPHIL NFR BLD: 0.3 % (ref 0.5–7.8)
ERYTHROCYTE [DISTWIDTH] IN BLOOD BY AUTOMATED COUNT: 12.5 % (ref 11.9–14.6)
GLUCOSE SERPL-MCNC: 105 MG/DL (ref 70–99)
HCT VFR BLD AUTO: 42.3 % (ref 35.8–46.3)
HGB BLD-MCNC: 15.1 G/DL (ref 11.7–15.4)
IMM GRANULOCYTES # BLD AUTO: 0.01 K/UL (ref 0–0.5)
IMM GRANULOCYTES NFR BLD AUTO: 0.1 % (ref 0–5)
INR PPP: 1
LYMPHOCYTES # BLD: 1.95 K/UL (ref 0.5–4.6)
LYMPHOCYTES NFR BLD: 27.2 % (ref 13–44)
MCH RBC QN AUTO: 31.4 PG (ref 26.1–32.9)
MCHC RBC AUTO-ENTMCNC: 35.7 G/DL (ref 31.4–35)
MCV RBC AUTO: 87.9 FL (ref 82–102)
MONOCYTES # BLD: 0.49 K/UL (ref 0.1–1.3)
MONOCYTES NFR BLD: 6.8 % (ref 4–12)
NEUTS SEG # BLD: 4.68 K/UL (ref 1.7–8.2)
NEUTS SEG NFR BLD: 65.2 % (ref 43–78)
NRBC # BLD: 0 K/UL (ref 0–0.2)
PLATELET # BLD AUTO: 166 K/UL (ref 150–450)
PMV BLD AUTO: 10.1 FL (ref 9.4–12.3)
POTASSIUM SERPL-SCNC: 4.4 MMOL/L (ref 3.5–5.1)
PROTHROMBIN TIME: 14 SEC (ref 11.3–14.9)
RBC # BLD AUTO: 4.81 M/UL (ref 4.05–5.2)
SODIUM SERPL-SCNC: 141 MMOL/L (ref 136–145)
WBC # BLD AUTO: 7.2 K/UL (ref 4.3–11.1)

## 2025-06-22 PROCEDURE — 85025 COMPLETE CBC W/AUTO DIFF WBC: CPT

## 2025-06-22 PROCEDURE — 2580000003 HC RX 258

## 2025-06-22 PROCEDURE — 36415 COLL VENOUS BLD VENIPUNCTURE: CPT

## 2025-06-22 PROCEDURE — 85610 PROTHROMBIN TIME: CPT

## 2025-06-22 PROCEDURE — 6370000000 HC RX 637 (ALT 250 FOR IP)

## 2025-06-22 PROCEDURE — 6360000002 HC RX W HCPCS: Performed by: FAMILY MEDICINE

## 2025-06-22 PROCEDURE — 2500000003 HC RX 250 WO HCPCS: Performed by: FAMILY MEDICINE

## 2025-06-22 PROCEDURE — 85730 THROMBOPLASTIN TIME PARTIAL: CPT

## 2025-06-22 PROCEDURE — 6370000000 HC RX 637 (ALT 250 FOR IP): Performed by: FAMILY MEDICINE

## 2025-06-22 PROCEDURE — 80048 BASIC METABOLIC PNL TOTAL CA: CPT

## 2025-06-22 RX ORDER — OXYCODONE HYDROCHLORIDE 5 MG/1
5 TABLET ORAL EVERY 6 HOURS PRN
Qty: 12 TABLET | Refills: 0 | Status: SHIPPED | OUTPATIENT
Start: 2025-06-22 | End: 2025-06-25

## 2025-06-22 RX ORDER — CIPROFLOXACIN 500 MG/1
500 TABLET, FILM COATED ORAL EVERY 12 HOURS SCHEDULED
Qty: 14 TABLET | Refills: 0 | Status: SHIPPED | OUTPATIENT
Start: 2025-06-22 | End: 2025-06-29

## 2025-06-22 RX ORDER — METRONIDAZOLE 500 MG/1
500 TABLET ORAL EVERY 8 HOURS SCHEDULED
Qty: 21 TABLET | Refills: 0 | Status: SHIPPED | OUTPATIENT
Start: 2025-06-22 | End: 2025-06-29

## 2025-06-22 RX ORDER — CIPROFLOXACIN 250 MG/1
500 TABLET, FILM COATED ORAL EVERY 12 HOURS SCHEDULED
Status: DISCONTINUED | OUTPATIENT
Start: 2025-06-22 | End: 2025-06-22 | Stop reason: HOSPADM

## 2025-06-22 RX ORDER — SODIUM CHLORIDE, SODIUM LACTATE, POTASSIUM CHLORIDE, AND CALCIUM CHLORIDE .6; .31; .03; .02 G/100ML; G/100ML; G/100ML; G/100ML
1000 INJECTION, SOLUTION INTRAVENOUS ONCE
Status: COMPLETED | OUTPATIENT
Start: 2025-06-22 | End: 2025-06-22

## 2025-06-22 RX ORDER — METRONIDAZOLE 500 MG/1
500 TABLET ORAL EVERY 8 HOURS SCHEDULED
Status: DISCONTINUED | OUTPATIENT
Start: 2025-06-22 | End: 2025-06-22 | Stop reason: HOSPADM

## 2025-06-22 RX ADMIN — MULTIVITAMIN TABLET 1 TABLET: TABLET at 08:56

## 2025-06-22 RX ADMIN — CIPROFLOXACIN 400 MG: 400 INJECTION, SOLUTION INTRAVENOUS at 01:40

## 2025-06-22 RX ADMIN — METOPROLOL SUCCINATE 100 MG: 100 TABLET, EXTENDED RELEASE ORAL at 08:57

## 2025-06-22 RX ADMIN — ATORVASTATIN CALCIUM 20 MG: 20 TABLET, FILM COATED ORAL at 08:57

## 2025-06-22 RX ADMIN — CIPROFLOXACIN 500 MG: 250 TABLET, FILM COATED ORAL at 08:57

## 2025-06-22 RX ADMIN — HYDROCHLOROTHIAZIDE 25 MG: 25 TABLET ORAL at 08:58

## 2025-06-22 RX ADMIN — MAGNESIUM GLUCONATE 500 MG ORAL TABLET 400 MG: 500 TABLET ORAL at 08:57

## 2025-06-22 RX ADMIN — VENLAFAXINE HYDROCHLORIDE 150 MG: 150 CAPSULE, EXTENDED RELEASE ORAL at 08:58

## 2025-06-22 RX ADMIN — METRONIDAZOLE 500 MG: 500 TABLET ORAL at 08:59

## 2025-06-22 RX ADMIN — LOSARTAN POTASSIUM 100 MG: 50 TABLET, FILM COATED ORAL at 08:57

## 2025-06-22 RX ADMIN — CYANOCOBALAMIN TAB 1000 MCG 250 MCG: 1000 TAB at 08:57

## 2025-06-22 RX ADMIN — Medication 400 UNITS: at 09:12

## 2025-06-22 RX ADMIN — SODIUM CHLORIDE, POTASSIUM CHLORIDE, SODIUM LACTATE AND CALCIUM CHLORIDE 1000 ML: 600; 310; 30; 20 INJECTION, SOLUTION INTRAVENOUS at 09:06

## 2025-06-22 RX ADMIN — SODIUM CHLORIDE, PRESERVATIVE FREE 10 ML: 5 INJECTION INTRAVENOUS at 08:54

## 2025-06-22 RX ADMIN — LEVOTHYROXINE SODIUM 112 MCG: 0.11 TABLET ORAL at 06:01

## 2025-06-22 RX ADMIN — METRONIDAZOLE 500 MG: 500 INJECTION, SOLUTION INTRAVENOUS at 00:46

## 2025-06-22 NOTE — CARE COORDINATION
Chart reviewed by CM for discharge planning. CM met with pt to complete assessment. Demographics and PCP verified. Pt insured and is able to afford prescription medications. Pt lives with her spouse in a one level home. At baseline, pt reports she is independent with completing ADL's and ambulates with rollator. Pt uses a cane as needed.  Pt reports a hx of STR and HH. CM consulted for HH services. Pt does not feel services are needed at this time. Pt anticipates returning home with family at discharge. Please consult CM for any needs that may arise.        06/22/25 6172   Service Assessment   Patient Orientation Alert and Oriented;Person;Place;Situation   Cognition Alert   History Provided By Patient;Medical Record   Primary Caregiver Self   Accompanied By/Relationship Daughter Ni   Support Systems Spouse/Significant Other;Children;Family Members   Patient's Healthcare Decision Maker is: Legal Next of Kin   PCP Verified by CM Yes   Last Visit to PCP Within last 3 months   Prior Functional Level Independent in ADLs/IADLs   Current Functional Level Independent in ADLs/IADLs   Can patient return to prior living arrangement Yes   Ability to make needs known: Good   Family able to assist with home care needs: Yes   Would you like for me to discuss the discharge plan with any other family members/significant others, and if so, who? No  (Unless indiciated or necessary.)   Financial Resources [place]   Community Resources None   CM/ Referral Other (see comment)  (CM consult)   Social/Functional History   Lives With Spouse   Type of Home House   Home Layout One level   Home Access Ramped entrance   Home Equipment Rollator;Walker - Rolling;Cane   Receives Help From Family   Prior Level of Assist for ADLs Independent   Prior Level of Assist for Transfers Independent   Active  No   Mode of Transportation Family   Discharge Planning   Type of Residence House   Living Arrangements Spouse/Significant

## 2025-06-22 NOTE — ACP (ADVANCE CARE PLANNING)
Advance Care Planning   Healthcare Decision Maker:    Primary Decision Maker: Joe Jimenez - Spouse - [phone]    Secondary Decision Maker: Jeremiah Jimenez - Child - [phone]    Secondary Decision Maker: Ni Gonzalez - Child - [phone]    Secondary Decision Maker: Cynthia Nash - Child - [phone]    Today we documented Decision Maker(s) consistent with Legal Next of Kin hierarchy.     No LW/HCPOA on file. Pt's LNOK is the pt's spouse.

## 2025-06-22 NOTE — DISCHARGE SUMMARY
Hospitalist Discharge Summary   Admit Date:  2025  9:42 PM   DC Note date: 2025  Name:  Alicia Jimenez   Age:  74 y.o.  Sex:  female  :  1950   MRN:  [id number]   Room:  [facility]  PCP:  Richard De La Cruz DO    Presenting Complaint: Rectal Bleeding     Initial Admission Diagnosis: Hypokalemia [E87.6]  Rectal bleeding [K62.5]  Diverticulitis of colon [K57.32]  Lower GI bleed [K92.2]     Problem List for this Hospitalization (present on admission):    Principal Problem:    Lower GI bleed  Active Problems:    Hypokalemia    Hyperglycemia    Sigmoid diverticulitis  Resolved Problems:    * No resolved hospital problems. *      Hospital Course:    Alicia Jimenez is a 74 y.o. female with medical history of  diverticulosis and diverticulitis, CAD, hemorrhoids, saccular aneurysm of the right internal carotid artery 3 mm, arthritis, asthma, renal stone disease, chronic pain, anxiety &depression,, dyslipidemia, heart murmur, hypothyroidism, GERD who presented to the emergency room via EMS with complaints that she had been having abdominal cramping and diarrhea with nausea and 1 episode of vomiting early in the day on  and  multiple BRBPR prior to arrival.  In the ER, HDS. Labs revealed a potassium of 3.3 and a glucose of 140, BUN of 24 and creatinine of 1.04.  CBC showed a white count of 7.3 with no left shift, and H&H of 15.8 and 44.2, and a platelet count of 157,000.  PT 13.7, INR 1.0.  CT showed early likely diverticulitis of the sigmoid colon with no active signs of GI bleeding consistent with uncomplicated sigmoid diverticulitis.  Hospital medicine has been asked to admit the patient for further evaluation and management of her diverticulitis and suspected diverticular bleed.    Day of discharge patient is comfortable on room air. States pain is much better. Will continue antibiotics upon discharge with close follow up with Gastroenterology for colonoscopy once diverticulitis

## 2025-06-22 NOTE — PROGRESS NOTES
Pt had c/o severe abd pain. Given oxycodone PRN and was effective. Had x1 brown BM. No s/s overt bleeding. Tolerating current diet well. Hourly rounds completed, all needs met this shift. Bed in L/L with call light in reach. Report to be given to dayshift nurse.

## 2025-06-23 ENCOUNTER — TELEPHONE (OUTPATIENT)
Dept: FAMILY MEDICINE CLINIC | Facility: CLINIC | Age: 75
End: 2025-06-23

## 2025-06-23 NOTE — TELEPHONE ENCOUNTER
Care Transitions Initial Follow Up Call    Outreach made within 2 business days of discharge: Yes    Patient: Alicia Jimenez Patient : [id number]   MRN: [id number]  Reason for Admission: Diverticulitis  Discharge Date: 25       Spoke with: Alicia Jimenez    Discharge department/facility: [facility]    TCM Interactive Patient Contact:  Was patient able to fill all prescriptions: Yes  Was patient instructed to bring all medications to the follow-up visit: Yes  Is patient taking all medications as directed in the discharge summary? Yes  Does patient understand their discharge instructions: Yes  Does patient have questions or concerns that need addressed prior to 7-14 day follow up office visit: no    Additional needs identified to be addressed with provider  No needs identified             Scheduled appointment with PCP within 7-14 days    Follow Up  Future Appointments   Date Time Provider Department Center   2025  2:00 PM Richard De La Cruz DO PST [facility] DEP   2025  2:00 PM Esteban Parrish DO [facility] AMB   2025  1:20 PM Patricia Ware, APRN - CNP [facility]   2025  2:00 PM Richard De La Cruz DO [facility] DEP       Madonna Kaufman MA

## 2025-07-02 ENCOUNTER — TELEPHONE (OUTPATIENT)
Age: 75
End: 2025-07-02

## 2025-07-03 ENCOUNTER — TELEPHONE (OUTPATIENT)
Dept: GASTROENTEROLOGY | Age: 75
End: 2025-07-03

## 2025-07-03 NOTE — TELEPHONE ENCOUNTER
Patient choose to schedule an office visit  / scheduled patient for a follow up visit liliya/ Bambi on 08/21/25  Received: Yesterday  Hina Dee Cathy G RN  Cc: Yary Velarde; Lois Chairez  Caller: Unspecified (Yesterday,  3:39 PM)  I also did not see hosp note, but at the bottom of [place] states mary w/Nikkie in 1 wk.   I'm wondering if they talked to Nikkie.     Of course, we cannot get into office until Aug.

## 2025-08-15 DIAGNOSIS — G89.4 CHRONIC PAIN SYNDROME: ICD-10-CM

## 2025-08-18 RX ORDER — HYDROCODONE BITARTRATE AND ACETAMINOPHEN 7.5; 325 MG/1; MG/1
1 TABLET ORAL EVERY 6 HOURS PRN
Qty: 120 TABLET | Refills: 0 | Status: SHIPPED | OUTPATIENT
Start: 2025-08-18 | End: 2025-09-17

## (undated) DEVICE — SURGICAL PROCEDURE PACK BASIC ST FRANCIS

## (undated) DEVICE — Device

## (undated) DEVICE — Z DISCONTINUED USE 2744636  DRESSING AQUACEL 14 IN ALG W3.5XL14IN POLYUR FLM CVR W/ HYDRCOLL

## (undated) DEVICE — C-ARM: Brand: UNBRANDED

## (undated) DEVICE — PREP SKN CHLRAPRP APL 26ML STR --

## (undated) DEVICE — SHEET, T, LAPAROTOMY, STERILE: Brand: MEDLINE

## (undated) DEVICE — SUT CHRMC 0 27IN CT1 BRN --

## (undated) DEVICE — SOLUTION IV 1000ML 0.9% SOD CHL

## (undated) DEVICE — SKIN PREP TRAY 4 COMPARTM TRAY: Brand: MEDLINE INDUSTRIES, INC.

## (undated) DEVICE — SHEET, DRAPE, SPLIT, STERILE: Brand: MEDLINE

## (undated) DEVICE — ZIMMER® STERILE DISPOSABLE TOURNIQUET CUFF WITH PROTECTIVE SLEEVE, DUAL PORT, SINGLE BLADDER, 34 IN. (86 CM)

## (undated) DEVICE — TRAY PREP DRY W/ PREM GLV 2 APPL 6 SPNG 2 UNDPD 1 OVERWRAP

## (undated) DEVICE — DRAPE SHT 3 QTR PROXIMA 53X77 --

## (undated) DEVICE — 2000CC GUARDIAN II: Brand: GUARDIAN

## (undated) DEVICE — SUTURE PDS II SZ 1 L96IN ABSRB VLT TP-1 L65MM 1/2 CIR Z880G

## (undated) DEVICE — TOTAL HIP DR KAVOLUS: Brand: MEDLINE INDUSTRIES, INC.

## (undated) DEVICE — SOLUTION IV 250ML 0.9% SOD CHL CLR INJ FLX BG CONT PRT CLSR

## (undated) DEVICE — LOWER EXTREMITY: Brand: MEDLINE INDUSTRIES, INC.

## (undated) DEVICE — GARMENT,MEDLINE,DVT,INT,CALF,MED, GEN2: Brand: MEDLINE

## (undated) DEVICE — MINOR LITHOTOMY PACK: Brand: MEDLINE INDUSTRIES, INC.

## (undated) DEVICE — CRADLE POS 3X5X24IN RASPBERRY ARM PRONE FOAM DISP

## (undated) DEVICE — TUBING, SUCTION, 1/4" X 10', STRAIGHT: Brand: MEDLINE

## (undated) DEVICE — 1010 S-DRAPE TOWEL DRAPE 10/BX: Brand: STERI-DRAPE™

## (undated) DEVICE — SOLUTION IRRIG 3000ML H2O STRL BAG

## (undated) DEVICE — FLEXIBLE YANKAUER,MEDIUM TIP, NO VACUUM CONTROL: Brand: ARGYLE

## (undated) DEVICE — SYR 50ML LR LCK 1ML GRAD NSAF --

## (undated) DEVICE — DRAPE,LITHOTOMY,STERILE: Brand: MEDLINE

## (undated) DEVICE — SUTURE VCRL SZ 2-0 L27IN ABSRB UD L26MM SH 1/2 CIR J417H

## (undated) DEVICE — SOLUTION IRRIGATION H2O 0797305] ICU MEDICAL INC]

## (undated) DEVICE — REM POLYHESIVE ADULT PATIENT RETURN ELECTRODE: Brand: VALLEYLAB

## (undated) DEVICE — PAD,ABDOMINAL,5"X9",ST,LF,25/BX: Brand: MEDLINE INDUSTRIES, INC.

## (undated) DEVICE — CYSTO/BLADDER IRRIGATION SET, REGULATING CLAMP

## (undated) DEVICE — DRAPE,U/SHT,SPLIT,FILM,60X84,STERILE: Brand: MEDLINE

## (undated) DEVICE — SOLUTION IRRIG 3000ML 0.9% SOD CHL FLX CONT 0797208] ICU MEDICAL INC]

## (undated) DEVICE — DRAPE PT ISOLATN 130 IN X 96 IN

## (undated) DEVICE — SPONGE LAP 18X18IN STRL -- 5/PK

## (undated) DEVICE — NEEDLE HYPO 18GA L1.5IN PNK POLYPR HUB S STL SHT BVL STR

## (undated) DEVICE — YANKAUER,FLEXIBLE HANDLE,REGLR CAPACITY: Brand: MEDLINE INDUSTRIES, INC.

## (undated) DEVICE — SUTURE ETHBND EXCEL SZ 5 L30IN NONABSORBABLE GRN L40MM V-37 MB66G

## (undated) DEVICE — SUTURE MCRYL SZ 2-0 L27IN ABSRB UD CP-1 1 L36MM 1/2 CIR REV Y266H

## (undated) DEVICE — Z DISCONTINUED PER MEDLINE USE 2741944 DRESSING AQUACEL 12 IN SURG W9XL30CM SIL CVR WTRPRF VIR BACT BARR ANTIMIC

## (undated) DEVICE — KENDALL SCD EXPRESS SLEEVES, KNEE LENGTH, MEDIUM: Brand: KENDALL SCD

## (undated) DEVICE — JELLY LUBRICATING 10GM PREFIL SYR LUBE

## (undated) DEVICE — STRYKER PERFORMANCE SERIES SAGITTAL BLADE: Brand: STRYKER PERFORMANCE SERIES

## (undated) DEVICE — BIPOLAR SEALER 23-112-1 AQM 6.0: Brand: AQUAMANTYS ®